# Patient Record
Sex: MALE | Race: BLACK OR AFRICAN AMERICAN | NOT HISPANIC OR LATINO | URBAN - METROPOLITAN AREA
[De-identification: names, ages, dates, MRNs, and addresses within clinical notes are randomized per-mention and may not be internally consistent; named-entity substitution may affect disease eponyms.]

---

## 2018-05-09 ENCOUNTER — INPATIENT (INPATIENT)
Facility: HOSPITAL | Age: 78
LOS: 14 days | Discharge: NURSING, REHAB OR LONG-TERM CARE | DRG: 264 | End: 2018-05-24
Attending: INTERNAL MEDICINE | Admitting: INTERNAL MEDICINE
Payer: MEDICARE

## 2018-05-09 VITALS
HEIGHT: 66 IN | OXYGEN SATURATION: 96 % | HEART RATE: 102 BPM | RESPIRATION RATE: 20 BRPM | TEMPERATURE: 98 F | SYSTOLIC BLOOD PRESSURE: 159 MMHG | DIASTOLIC BLOOD PRESSURE: 86 MMHG | WEIGHT: 138.01 LBS

## 2018-05-09 DIAGNOSIS — Z98.890 OTHER SPECIFIED POSTPROCEDURAL STATES: Chronic | ICD-10-CM

## 2018-05-09 DIAGNOSIS — Z95.1 PRESENCE OF AORTOCORONARY BYPASS GRAFT: Chronic | ICD-10-CM

## 2018-05-09 DIAGNOSIS — I10 ESSENTIAL (PRIMARY) HYPERTENSION: ICD-10-CM

## 2018-05-09 DIAGNOSIS — I25.10 ATHEROSCLEROTIC HEART DISEASE OF NATIVE CORONARY ARTERY WITHOUT ANGINA PECTORIS: ICD-10-CM

## 2018-05-09 DIAGNOSIS — E05.90 THYROTOXICOSIS, UNSPECIFIED WITHOUT THYROTOXIC CRISIS OR STORM: ICD-10-CM

## 2018-05-09 DIAGNOSIS — I50.9 HEART FAILURE, UNSPECIFIED: ICD-10-CM

## 2018-05-09 LAB
ALBUMIN SERPL ELPH-MCNC: 3.8 G/DL — SIGNIFICANT CHANGE UP (ref 3.3–5)
ALP SERPL-CCNC: 599 U/L — HIGH (ref 40–120)
ALT FLD-CCNC: 9 U/L — LOW (ref 10–45)
ANION GAP SERPL CALC-SCNC: 17 MMOL/L — SIGNIFICANT CHANGE UP (ref 5–17)
AST SERPL-CCNC: 47 U/L — HIGH (ref 10–40)
BASOPHILS NFR BLD AUTO: 0.2 % — SIGNIFICANT CHANGE UP (ref 0–2)
BILIRUB SERPL-MCNC: 1 MG/DL — SIGNIFICANT CHANGE UP (ref 0.2–1.2)
BUN SERPL-MCNC: 12 MG/DL — SIGNIFICANT CHANGE UP (ref 7–23)
CALCIUM SERPL-MCNC: 9.7 MG/DL — SIGNIFICANT CHANGE UP (ref 8.4–10.5)
CHLORIDE SERPL-SCNC: 106 MMOL/L — SIGNIFICANT CHANGE UP (ref 96–108)
CK MB CFR SERPL CALC: 3.3 NG/ML — SIGNIFICANT CHANGE UP (ref 0–6.7)
CK MB CFR SERPL CALC: 3.4 NG/ML — SIGNIFICANT CHANGE UP (ref 0–6.7)
CK SERPL-CCNC: 567 U/L — HIGH (ref 30–200)
CK SERPL-CCNC: 660 U/L — HIGH (ref 30–200)
CO2 SERPL-SCNC: 22 MMOL/L — SIGNIFICANT CHANGE UP (ref 22–31)
CREAT SERPL-MCNC: 0.75 MG/DL — SIGNIFICANT CHANGE UP (ref 0.5–1.3)
EOSINOPHIL NFR BLD AUTO: 0.1 % — SIGNIFICANT CHANGE UP (ref 0–6)
EXTRA BLUE TOP TUBE: SIGNIFICANT CHANGE UP
EXTRA SST TUBE: SIGNIFICANT CHANGE UP
GLUCOSE SERPL-MCNC: 105 MG/DL — HIGH (ref 70–99)
HCT VFR BLD CALC: 34.2 % — LOW (ref 39–50)
HGB BLD-MCNC: 11 G/DL — LOW (ref 13–17)
LYMPHOCYTES # BLD AUTO: 5.9 % — LOW (ref 13–44)
MCHC RBC-ENTMCNC: 27.9 PG — SIGNIFICANT CHANGE UP (ref 27–34)
MCHC RBC-ENTMCNC: 32.2 G/DL — SIGNIFICANT CHANGE UP (ref 32–36)
MCV RBC AUTO: 86.8 FL — SIGNIFICANT CHANGE UP (ref 80–100)
MONOCYTES NFR BLD AUTO: 8.3 % — SIGNIFICANT CHANGE UP (ref 2–14)
NEUTROPHILS NFR BLD AUTO: 85.5 % — HIGH (ref 43–77)
PLATELET # BLD AUTO: 336 K/UL — SIGNIFICANT CHANGE UP (ref 150–400)
POTASSIUM SERPL-MCNC: 4.3 MMOL/L — SIGNIFICANT CHANGE UP (ref 3.5–5.3)
POTASSIUM SERPL-SCNC: 4.3 MMOL/L — SIGNIFICANT CHANGE UP (ref 3.5–5.3)
PROT SERPL-MCNC: 7.3 G/DL — SIGNIFICANT CHANGE UP (ref 6–8.3)
RAPID RVP RESULT: DETECTED
RBC # BLD: 3.94 M/UL — LOW (ref 4.2–5.8)
RBC # FLD: 15.6 % — SIGNIFICANT CHANGE UP (ref 10.3–16.9)
SODIUM SERPL-SCNC: 145 MMOL/L — SIGNIFICANT CHANGE UP (ref 135–145)
TROPONIN T SERPL-MCNC: 0.08 NG/ML — CRITICAL HIGH (ref 0–0.01)
TROPONIN T SERPL-MCNC: 0.09 NG/ML — CRITICAL HIGH (ref 0–0.01)
WBC # BLD: 8.8 K/UL — SIGNIFICANT CHANGE UP (ref 3.8–10.5)
WBC # FLD AUTO: 8.8 K/UL — SIGNIFICANT CHANGE UP (ref 3.8–10.5)

## 2018-05-09 PROCEDURE — 71046 X-RAY EXAM CHEST 2 VIEWS: CPT | Mod: 26

## 2018-05-09 PROCEDURE — 99222 1ST HOSP IP/OBS MODERATE 55: CPT | Mod: AI

## 2018-05-09 PROCEDURE — 93010 ELECTROCARDIOGRAM REPORT: CPT

## 2018-05-09 PROCEDURE — 99285 EMERGENCY DEPT VISIT HI MDM: CPT | Mod: 25

## 2018-05-09 RX ORDER — METHIMAZOLE 10 MG/1
0 TABLET ORAL
Qty: 0 | Refills: 0 | COMMUNITY

## 2018-05-09 RX ORDER — MORPHINE SULFATE 50 MG/1
2 CAPSULE, EXTENDED RELEASE ORAL ONCE
Qty: 0 | Refills: 0 | Status: COMPLETED | OUTPATIENT
Start: 2018-05-09 | End: 2018-05-09

## 2018-05-09 RX ORDER — METOPROLOL TARTRATE 50 MG
25 TABLET ORAL DAILY
Qty: 0 | Refills: 0 | Status: DISCONTINUED | OUTPATIENT
Start: 2018-05-09 | End: 2018-05-24

## 2018-05-09 RX ORDER — NITROGLYCERIN 6.5 MG
1 CAPSULE, EXTENDED RELEASE ORAL EVERY 6 HOURS
Qty: 0 | Refills: 0 | Status: DISCONTINUED | OUTPATIENT
Start: 2018-05-09 | End: 2018-05-10

## 2018-05-09 RX ORDER — LOSARTAN POTASSIUM 100 MG/1
25 TABLET, FILM COATED ORAL DAILY
Qty: 0 | Refills: 0 | Status: DISCONTINUED | OUTPATIENT
Start: 2018-05-09 | End: 2018-05-24

## 2018-05-09 RX ORDER — METOPROLOL TARTRATE 50 MG
25 TABLET ORAL DAILY
Qty: 0 | Refills: 0 | Status: DISCONTINUED | OUTPATIENT
Start: 2018-05-09 | End: 2018-05-09

## 2018-05-09 RX ORDER — METOPROLOL TARTRATE 50 MG
0 TABLET ORAL
Qty: 0 | Refills: 0 | COMMUNITY

## 2018-05-09 RX ORDER — APIXABAN 2.5 MG/1
0 TABLET, FILM COATED ORAL
Qty: 0 | Refills: 0 | COMMUNITY

## 2018-05-09 RX ORDER — APIXABAN 2.5 MG/1
5 TABLET, FILM COATED ORAL EVERY 12 HOURS
Qty: 0 | Refills: 0 | Status: DISCONTINUED | OUTPATIENT
Start: 2018-05-09 | End: 2018-05-10

## 2018-05-09 RX ORDER — FUROSEMIDE 40 MG
40 TABLET ORAL
Qty: 0 | Refills: 0 | Status: DISCONTINUED | OUTPATIENT
Start: 2018-05-09 | End: 2018-05-11

## 2018-05-09 RX ORDER — ATORVASTATIN CALCIUM 80 MG/1
0 TABLET, FILM COATED ORAL
Qty: 0 | Refills: 0 | COMMUNITY

## 2018-05-09 RX ORDER — IPRATROPIUM/ALBUTEROL SULFATE 18-103MCG
3 AEROSOL WITH ADAPTER (GRAM) INHALATION ONCE
Qty: 0 | Refills: 0 | Status: COMPLETED | OUTPATIENT
Start: 2018-05-09 | End: 2018-05-09

## 2018-05-09 RX ORDER — FUROSEMIDE 40 MG
40 TABLET ORAL ONCE
Qty: 0 | Refills: 0 | Status: COMPLETED | OUTPATIENT
Start: 2018-05-09 | End: 2018-05-09

## 2018-05-09 RX ORDER — FUROSEMIDE 40 MG
0 TABLET ORAL
Qty: 0 | Refills: 0 | COMMUNITY

## 2018-05-09 RX ORDER — ATORVASTATIN CALCIUM 80 MG/1
40 TABLET, FILM COATED ORAL AT BEDTIME
Qty: 0 | Refills: 0 | Status: DISCONTINUED | OUTPATIENT
Start: 2018-05-09 | End: 2018-05-10

## 2018-05-09 RX ORDER — SENNA PLUS 8.6 MG/1
5 TABLET ORAL DAILY
Qty: 0 | Refills: 0 | Status: DISCONTINUED | OUTPATIENT
Start: 2018-05-09 | End: 2018-05-24

## 2018-05-09 RX ADMIN — ATORVASTATIN CALCIUM 40 MILLIGRAM(S): 80 TABLET, FILM COATED ORAL at 21:31

## 2018-05-09 RX ADMIN — Medication 40 MILLIGRAM(S): at 21:31

## 2018-05-09 RX ADMIN — Medication 40 MILLIGRAM(S): at 18:01

## 2018-05-09 RX ADMIN — Medication 3 MILLILITER(S): at 21:29

## 2018-05-09 RX ADMIN — Medication 1 INCH(S): at 21:31

## 2018-05-09 NOTE — H&P ADULT - NSHPSOCIALHISTORY_GEN_ALL_CORE
Pt denies tobacco/alcohol/elicit drug use. Pt denies elicit drug use.  former smoker; quit 30 yrs ago; smoked 1 Pack/week X 4-5 years (on and off)  drinks ETOH socially

## 2018-05-09 NOTE — H&P ADULT - PROBLEM SELECTOR PLAN 1
Pt with _____. BNP 6822.  --Trop 0.08, alk phos 500, . f/u CE @ 10pm, 2am  --CXR wet read revealed R sided pleural effusion. f/u official read.  --EKG revealed NSR with 1st degree AV block @ 94 BPM with left axis deviation and Q wave in inferior leads and V1-V5.  --Strict I&O's, daily weights. Pt with _____. BNP 6822. Currently CP free and hemodynamically stable.  --Trop 0.08, alk phos 500, . f/u CE @ 10pm, 2am  --Received 40 mg IV lasix x1 in ER.   --CXR wet read revealed R sided pleural effusion. f/u official read.  --EKG revealed NSR with 1st degree AV block @ 94 BPM with left axis deviation and Q wave in inferior leads and V1-V5.  --f/u ECHO  --Strict I&O's, daily weights. Pt with bibasilar crackles, L> R, diminished breath sounds at R lower bases, expiratory wheezes throughout all lung zones. BNP 6822. Currently CP free and hemodynamically stable.  --Trop 0.08, alk phos 500, . f/u CE @ 10pm, 2am  --Received 40 mg IV lasix x1 in ER. - c/w Lasix 40 mg IV BID  - given duoneb X 1 for expiratory wheezing.   --CXR wet read revealed R sided pleural effusion. f/u official read.  --EKG revealed NSR with 1st degree AV block @ 94 BPM with left axis deviation and Q wave in inferior leads and V1-V5.  - pt. with cough X 1 week with white sputum production . f/u RVP  --f/u ECHO  --Strict I&O's, daily weights.

## 2018-05-09 NOTE — H&P ADULT - PSH
History of coronary artery bypass graft  2017 @ [facility] History of coronary artery bypass graft  2017 @ [facility]  S/P hernia surgery

## 2018-05-09 NOTE — ED PROVIDER NOTE - MEDICAL DECISION MAKING DETAILS
pt with CHF exacerbation and CP, non ischemic EKG, trop and BNP elevated. Mild improvement with lasix 40mg. Will admit to cardiology for diuresis.

## 2018-05-09 NOTE — H&P ADULT - PROBLEM SELECTOR PLAN 3
SBP 150s  --c/w lopressor 25 mg BID  --Consider ACEi    Dispo:  --Pending clinical course SBP 150s  --c/w lopressor 25 mg BID, losartan 25 mg daily  --Continue to monitor    Dispo:  --Pending clinical course SBP 150s  --c/w lopressor 25 mg daily, losartan 25 mg daily  --Continue to monitor    DVT PPX: Eliquis  Dispo:  --Pending clinical course

## 2018-05-09 NOTE — H&P ADULT - RS GEN PE MLT RESP DETAILS PC
bibasilar crackles, L >R, expiratory wheezes throughout all lung zones/respirations non-labored/diminished breath sounds, R

## 2018-05-09 NOTE — H&P ADULT - PROBLEM SELECTOR PLAN 2
h/o CABG at [facility] in 2017.  --f/u AM lipid panel, hgbA1C  --c/w lipitor 40 mg daily, lopressor 25 mg BID. ?ACEi h/o CABG at [facility] in 2017.  --f/u AM lipid panel, hgbA1C  --c/w lipitor 40 mg daily, lopressor 25 mg BID, losartan 25 mg daily h/o CABG at [facility] in 2017.  --Obtain CABG report in AM as able.  --f/u AM lipid panel, hgbA1C  --c/w lipitor 40 mg daily, lopressor 25 mg BID, losartan 25 mg daily h/o CABG at [facility] in 2017.  --Obtain CABG report in AM as able.  --f/u AM lipid panel, hgbA1C  --c/w lipitor 40 mg daily, lopressor 25 mg daily, losartan 25 mg daily    - Pt. with hx of L DVT (dx: 6 months ago: on eliquis)- cw eliquis

## 2018-05-09 NOTE — H&P ADULT - HISTORY OF PRESENT ILLNESS
76 yo male with PMHx of HTN, CAD s/p CABG (in 2017 @ [facility]), CHF (EF unknown), ?ho AFIB (on Eliquis) who presented to [facility] ED on 5/9/18 which c/o non-radiating SSCP described as ____ and of _/10 intensity with associated SOB over past 1 week.  Denies dizziness, diaphoresis, palpitations, fatigue, LE edema, orthopnea, PND, syncope.  Upon admit, temp 98.3F,  BPM, /86, RR 20, SpO2 96% on RA.  Labs significant for alk phos 500, , trop 0.08, BNP 6822. CXR wet read revealed R sided pleural effusion. EKG revealed NSR with 1st degree AV block @ 94 BPM with left axis deviation and Q wave in inferior leads and V1-V5.  While in ER, pt received IV Lasix 40 mg x1. Patient admitted to 5U for diuresis, ECHO, r/o ACS. 78 yo male FORMER SMOKER with PMHx of HTN, HLD, pre- DM,  CAD s/p CABG (in 2016 @ [facility]), CHF (EF unknown), h/o DVT 6 months ago (on Eliquis; last dose last night) who presented to [facility] ED on 5/9/18 with CC of Cough and SOB X 5-6 days. Pt. reports that he initially had a cough with white sputum production associated with shortness of breath at rest, non-radiating L sided Chest tightness and palpitations. pt. reports that the chest tightness and SOB comes along with the cough  and is relieved when he stops coughing. He also reports feeling sore. At baseline, pt. reports that he can ambulate 4 blocks before getting SOB.  Today am, pt. reports having hot flashes , cough, SOB and chest tightness which prompted him to come to the ED. Denies dizziness, diaphoresis, fatigue, LE edema, orthopnea, PND, syncope.  Upon admit, temp 98.3F,  BPM, /86, RR 20, SpO2 96% on RA.  Labs significant for alk phos 500, , trop 0.08, BNP 6822. CXR wet read revealed R sided pleural effusion. EKG revealed NSR with 1st degree AV block @ 94 BPM with left axis deviation and Q wave in inferior leads and V1-V5.  While in ER, pt received IV Lasix 40 mg x1. Patient admitted to 5U for diuresis, ECHO, r/o ACS.

## 2018-05-09 NOTE — H&P ADULT - ATTENDING COMMENTS
Assessment: Patient personally seen and examined myself during rounds with the Physician Assistant/House Staff/Nurse Practitioner   ON DATE 5/9/18  Physician Assistant/House Staff/Nurse Practitioner note read, including vitals, physical findings, laboratory data, and radiological reports.   Revisions included below.   Direct personal management at bed side and extensive interpretation of the data.    Plan was outlined and discussed in details with the Physician Assistant/House Staff/Nurse Practitioner.    Decision making of high complexity   Risk high of complications, morbidity, and/or mortality  Assessment and Action taken for acute disease activity to reflect the level of care provided:  -Hemodynamic evaluation and support  -Medication reconciliation  -Review laboratory data  -EKG reviewed   -Echo reviewed   -ACS assessment and treatment as applicable  -Heart failure assessment and treatment as applicable  -Cardiac Telemetry reviewed  -Interdisciplinary discussion with IC / EP / HF / CTS teams as needed  TIME SPENT in evaluation and management, reassessments, review and interpretation of labs and x-rays, and hemodynamic management, formulating a plan and coordinating care: ___50____ MIN.  Time does not include procedural time.

## 2018-05-09 NOTE — PROVIDER CONTACT NOTE (OTHER) - SITUATION
Pt. c/o chest pain 8/10 to left side. Pt. c/o chest pain 8/10 to left side of chest. Pt. c/o 8/10 left sided chest pain and SOB.

## 2018-05-09 NOTE — ED ADULT NURSE NOTE - OBJECTIVE STATEMENT
pt to ER w/ report of sob and cough for two days.  Pt reports some chest tightness on L side.  hx recent cardiac sx.   O2 sat >95% on room air.  12 lead ekg done and shown to ER attending physician.  Pt maintained on CM. Will continue to monitor.

## 2018-05-09 NOTE — PROVIDER CONTACT NOTE (OTHER) - ACTION/TREATMENT ORDERED:
DWAYNE Steven at bedside to assess patient. Lasix 40mg IVP ordered and administered. EKG performed by PA. Nebulizer treatment administered. DWAYNE Steven at bedside to assess patient. Lasix 40mg IVP ordered and administered. Nitroglycerin paste administered. EKG performed by PA. Nebulizer treatment administered. Condom catheter applied.

## 2018-05-09 NOTE — PROVIDER CONTACT NOTE (OTHER) - ASSESSMENT
Pt. alert and oriented, c/o 8/10 chest pain. Lung sounds, crackles and expiratory wheezing to B/L lungs. B/L LE ankles +1 edema. Pt. alert and oriented, c/o 8/10 left sided chest pain and SOB. Lung sounds, crackles and expiratory wheezing to B/L lungs. B/L LE ankles +1 edema.

## 2018-05-09 NOTE — H&P ADULT - ASSESSMENT
78 yo male with PMHx of HTN, CAD s/p CABG (in 2017 @ [facility]), CHF (EF unknown), ?ho AFIB (on Eliquis) who presented to [facility] ED on 5/9/18 which c/o non-radiating SSCP described as ____ and of _/10 intensity with associated SOB over past 1 week.  Denies dizziness, diaphoresis, palpitations, fatigue, LE edema, orthopnea, PND, syncope.  Upon admit, temp 98.3F,  BPM, /86, RR 20, SpO2 96% on RA.  Labs significant for alk phos 500, , trop 0.08, BNP 6822. CXR wet read revealed R sided pleural effusion. EKG revealed NSR with 1st degree AV block @ 94 BPM with left axis deviation and Q wave in inferior leads and V1-V5.  While in ER, pt received IV Lasix 40 mg x1. Patient admitted to 5U for diuresis, ECHO, r/o ACS. 76 yo male with PMHx of HTN, CAD s/p CABG (in 2017 @ [facility]), CHF (EF unknown), ?ho AFIB (on Eliquis) who presented to [facility] ED on 5/9/18 which c/o non-radiating SSCP described as ____ and of _/10 intensity with associated SOB over past 1 week.  Patient admitted to 5U for r/o ACS,  diuresis, ECHO. 76 yo male FORMER SMOKER with PMHx of HTN, HLD, pre- DM, CAD s/p CABG (in 2016 @ [facility]), CHF (EF unknown), h/o DVT 6 months ago (on Eliquis; last dose last night)  admitted to 5U for diuresis, ECHO, r/o ACS.

## 2018-05-09 NOTE — ED PROVIDER NOTE - OBJECTIVE STATEMENT
78 yo male with CABG 1 yr ago at [facility], CHF (EF unknown), on eliquis (doesn't know if he has h/o a-fib) presenting with chest discomfort as well as worsening SOB for one week. Denies lower ext edema. Compliant with lasix 40mg BID.

## 2018-05-09 NOTE — H&P ADULT - NSHPLABSRESULTS_GEN_ALL_CORE
11.0   8.8   )-----------( 336      ( 09 May 2018 16:52 )             34.2       05-09    145  |  106  |  12  ----------------------------<  105<H>  4.3   |  22  |  0.75    Ca    9.7      09 May 2018 16:52    TPro  7.3  /  Alb  3.8  /  TBili  1.0  /  DBili  x   /  AST  47<H>  /  ALT  9<L>  /  AlkPhos  599<H>  05-09          CARDIAC MARKERS ( 09 May 2018 16:52 )  x     / 0.08 ng/mL / 660 U/L / x     / 3.3 ng/mL            EKG: NSR with 1st degree AV block @ 94 BPM with left axis deviation and Q wave in inferior leads and V1-V5.

## 2018-05-10 DIAGNOSIS — I82.409 ACUTE EMBOLISM AND THROMBOSIS OF UNSPECIFIED DEEP VEINS OF UNSPECIFIED LOWER EXTREMITY: ICD-10-CM

## 2018-05-10 LAB
ANION GAP SERPL CALC-SCNC: 16 MMOL/L — SIGNIFICANT CHANGE UP (ref 5–17)
APPEARANCE UR: CLEAR — SIGNIFICANT CHANGE UP
APTT BLD: 36.1 SEC — SIGNIFICANT CHANGE UP (ref 27.5–37.4)
APTT BLD: 46.2 SEC — HIGH (ref 27.5–37.4)
APTT BLD: 54.5 SEC — HIGH (ref 27.5–37.4)
BILIRUB UR-MCNC: NEGATIVE — SIGNIFICANT CHANGE UP
BUN SERPL-MCNC: 13 MG/DL — SIGNIFICANT CHANGE UP (ref 7–23)
CALCIUM SERPL-MCNC: 9.2 MG/DL — SIGNIFICANT CHANGE UP (ref 8.4–10.5)
CHLORIDE SERPL-SCNC: 103 MMOL/L — SIGNIFICANT CHANGE UP (ref 96–108)
CHOLEST SERPL-MCNC: 172 MG/DL — SIGNIFICANT CHANGE UP (ref 10–199)
CK MB CFR SERPL CALC: 2.5 NG/ML — SIGNIFICANT CHANGE UP (ref 0–6.7)
CK MB CFR SERPL CALC: 3.3 NG/ML — SIGNIFICANT CHANGE UP (ref 0–6.7)
CK MB CFR SERPL CALC: 3.5 NG/ML — SIGNIFICANT CHANGE UP (ref 0–6.7)
CK SERPL-CCNC: 346 U/L — HIGH (ref 30–200)
CK SERPL-CCNC: 347 U/L — HIGH (ref 30–200)
CK SERPL-CCNC: 408 U/L — HIGH (ref 30–200)
CK SERPL-CCNC: 473 U/L — HIGH (ref 30–200)
CO2 SERPL-SCNC: 25 MMOL/L — SIGNIFICANT CHANGE UP (ref 22–31)
COLOR SPEC: YELLOW — SIGNIFICANT CHANGE UP
CREAT SERPL-MCNC: 0.84 MG/DL — SIGNIFICANT CHANGE UP (ref 0.5–1.3)
DIFF PNL FLD: (no result)
GLUCOSE SERPL-MCNC: 110 MG/DL — HIGH (ref 70–99)
GLUCOSE UR QL: NEGATIVE — SIGNIFICANT CHANGE UP
HBA1C BLD-MCNC: 5.6 % — SIGNIFICANT CHANGE UP (ref 4–5.6)
HCT VFR BLD CALC: 33.2 % — LOW (ref 39–50)
HCT VFR BLD CALC: 36.3 % — LOW (ref 39–50)
HDLC SERPL-MCNC: 63 MG/DL — SIGNIFICANT CHANGE UP (ref 40–125)
HGB BLD-MCNC: 10.8 G/DL — LOW (ref 13–17)
HGB BLD-MCNC: 12 G/DL — LOW (ref 13–17)
INR BLD: 1.96 — HIGH (ref 0.88–1.16)
KETONES UR-MCNC: NEGATIVE — SIGNIFICANT CHANGE UP
LEUKOCYTE ESTERASE UR-ACNC: NEGATIVE — SIGNIFICANT CHANGE UP
LIPID PNL WITH DIRECT LDL SERPL: 93 MG/DL — SIGNIFICANT CHANGE UP
MAGNESIUM SERPL-MCNC: 1.8 MG/DL — SIGNIFICANT CHANGE UP (ref 1.6–2.6)
MCHC RBC-ENTMCNC: 27.8 PG — SIGNIFICANT CHANGE UP (ref 27–34)
MCHC RBC-ENTMCNC: 27.8 PG — SIGNIFICANT CHANGE UP (ref 27–34)
MCHC RBC-ENTMCNC: 32.5 G/DL — SIGNIFICANT CHANGE UP (ref 32–36)
MCHC RBC-ENTMCNC: 33.1 G/DL — SIGNIFICANT CHANGE UP (ref 32–36)
MCV RBC AUTO: 84 FL — SIGNIFICANT CHANGE UP (ref 80–100)
MCV RBC AUTO: 85.6 FL — SIGNIFICANT CHANGE UP (ref 80–100)
NITRITE UR-MCNC: NEGATIVE — SIGNIFICANT CHANGE UP
PH UR: 6 — SIGNIFICANT CHANGE UP (ref 5–8)
PLATELET # BLD AUTO: 306 K/UL — SIGNIFICANT CHANGE UP (ref 150–400)
PLATELET # BLD AUTO: 307 K/UL — SIGNIFICANT CHANGE UP (ref 150–400)
POTASSIUM SERPL-MCNC: 3.7 MMOL/L — SIGNIFICANT CHANGE UP (ref 3.5–5.3)
POTASSIUM SERPL-SCNC: 3.7 MMOL/L — SIGNIFICANT CHANGE UP (ref 3.5–5.3)
PROT UR-MCNC: NEGATIVE MG/DL — SIGNIFICANT CHANGE UP
PROTHROM AB SERPL-ACNC: 22 SEC — HIGH (ref 9.8–12.7)
RBC # BLD: 3.88 M/UL — LOW (ref 4.2–5.8)
RBC # BLD: 4.32 M/UL — SIGNIFICANT CHANGE UP (ref 4.2–5.8)
RBC # FLD: 15.3 % — SIGNIFICANT CHANGE UP (ref 10.3–16.9)
RBC # FLD: 15.6 % — SIGNIFICANT CHANGE UP (ref 10.3–16.9)
SODIUM SERPL-SCNC: 144 MMOL/L — SIGNIFICANT CHANGE UP (ref 135–145)
SP GR SPEC: 1.01 — SIGNIFICANT CHANGE UP (ref 1–1.03)
T3FREE SERPL-MCNC: 1.86 PG/ML — SIGNIFICANT CHANGE UP (ref 1.71–3.71)
T4 FREE SERPL-MCNC: 1.05 NG/DL — SIGNIFICANT CHANGE UP (ref 0.7–1.48)
TOTAL CHOLESTEROL/HDL RATIO MEASUREMENT: 2.7 RATIO — LOW (ref 3.4–9.6)
TRIGL SERPL-MCNC: 80 MG/DL — SIGNIFICANT CHANGE UP (ref 10–149)
TROPONIN T SERPL-MCNC: 0.12 NG/ML — CRITICAL HIGH (ref 0–0.01)
TROPONIN T SERPL-MCNC: 0.15 NG/ML — CRITICAL HIGH (ref 0–0.01)
TROPONIN T SERPL-MCNC: 0.16 NG/ML — CRITICAL HIGH (ref 0–0.01)
TROPONIN T SERPL-MCNC: 0.2 NG/ML — CRITICAL HIGH (ref 0–0.01)
TSH SERPL-MCNC: 1.77 UIU/ML — SIGNIFICANT CHANGE UP (ref 0.35–4.94)
UROBILINOGEN FLD QL: 0.2 E.U./DL — SIGNIFICANT CHANGE UP
WBC # BLD: 10 K/UL — SIGNIFICANT CHANGE UP (ref 3.8–10.5)
WBC # BLD: 9.5 K/UL — SIGNIFICANT CHANGE UP (ref 3.8–10.5)
WBC # FLD AUTO: 10 K/UL — SIGNIFICANT CHANGE UP (ref 3.8–10.5)
WBC # FLD AUTO: 9.5 K/UL — SIGNIFICANT CHANGE UP (ref 3.8–10.5)

## 2018-05-10 PROCEDURE — 93306 TTE W/DOPPLER COMPLETE: CPT | Mod: 26

## 2018-05-10 PROCEDURE — 99232 SBSQ HOSP IP/OBS MODERATE 35: CPT

## 2018-05-10 PROCEDURE — 93010 ELECTROCARDIOGRAM REPORT: CPT

## 2018-05-10 RX ORDER — HEPARIN SODIUM 5000 [USP'U]/ML
3800 INJECTION INTRAVENOUS; SUBCUTANEOUS EVERY 6 HOURS
Qty: 0 | Refills: 0 | Status: DISCONTINUED | OUTPATIENT
Start: 2018-05-10 | End: 2018-05-11

## 2018-05-10 RX ORDER — MORPHINE SULFATE 50 MG/1
1 CAPSULE, EXTENDED RELEASE ORAL ONCE
Qty: 0 | Refills: 0 | Status: DISCONTINUED | OUTPATIENT
Start: 2018-05-10 | End: 2018-05-10

## 2018-05-10 RX ORDER — TICAGRELOR 90 MG/1
180 TABLET ORAL ONCE
Qty: 0 | Refills: 0 | Status: COMPLETED | OUTPATIENT
Start: 2018-05-10 | End: 2018-05-10

## 2018-05-10 RX ORDER — TICAGRELOR 90 MG/1
90 TABLET ORAL
Qty: 0 | Refills: 0 | Status: DISCONTINUED | OUTPATIENT
Start: 2018-05-11 | End: 2018-05-11

## 2018-05-10 RX ORDER — ASPIRIN/CALCIUM CARB/MAGNESIUM 324 MG
81 TABLET ORAL DAILY
Qty: 0 | Refills: 0 | Status: DISCONTINUED | OUTPATIENT
Start: 2018-05-11 | End: 2018-05-15

## 2018-05-10 RX ORDER — ATORVASTATIN CALCIUM 80 MG/1
80 TABLET, FILM COATED ORAL AT BEDTIME
Qty: 0 | Refills: 0 | Status: DISCONTINUED | OUTPATIENT
Start: 2018-05-10 | End: 2018-05-24

## 2018-05-10 RX ORDER — ASPIRIN/CALCIUM CARB/MAGNESIUM 324 MG
325 TABLET ORAL ONCE
Qty: 0 | Refills: 0 | Status: COMPLETED | OUTPATIENT
Start: 2018-05-10 | End: 2018-05-10

## 2018-05-10 RX ORDER — HEPARIN SODIUM 5000 [USP'U]/ML
INJECTION INTRAVENOUS; SUBCUTANEOUS
Qty: 25000 | Refills: 0 | Status: DISCONTINUED | OUTPATIENT
Start: 2018-05-10 | End: 2018-05-11

## 2018-05-10 RX ORDER — MAGNESIUM OXIDE 400 MG ORAL TABLET 241.3 MG
400 TABLET ORAL ONCE
Qty: 0 | Refills: 0 | Status: COMPLETED | OUTPATIENT
Start: 2018-05-10 | End: 2018-05-10

## 2018-05-10 RX ORDER — POTASSIUM CHLORIDE 20 MEQ
20 PACKET (EA) ORAL ONCE
Qty: 0 | Refills: 0 | Status: COMPLETED | OUTPATIENT
Start: 2018-05-10 | End: 2018-05-10

## 2018-05-10 RX ADMIN — Medication 325 MILLIGRAM(S): at 09:13

## 2018-05-10 RX ADMIN — HEPARIN SODIUM 850 UNIT(S)/HR: 5000 INJECTION INTRAVENOUS; SUBCUTANEOUS at 19:54

## 2018-05-10 RX ADMIN — MORPHINE SULFATE 1 MILLIGRAM(S): 50 CAPSULE, EXTENDED RELEASE ORAL at 00:42

## 2018-05-10 RX ADMIN — Medication 40 MILLIGRAM(S): at 18:23

## 2018-05-10 RX ADMIN — TICAGRELOR 180 MILLIGRAM(S): 90 TABLET ORAL at 11:47

## 2018-05-10 RX ADMIN — HEPARIN SODIUM 850 UNIT(S)/HR: 5000 INJECTION INTRAVENOUS; SUBCUTANEOUS at 13:19

## 2018-05-10 RX ADMIN — ATORVASTATIN CALCIUM 80 MILLIGRAM(S): 80 TABLET, FILM COATED ORAL at 20:45

## 2018-05-10 RX ADMIN — Medication 40 MILLIGRAM(S): at 06:44

## 2018-05-10 RX ADMIN — Medication 1 INCH(S): at 06:44

## 2018-05-10 RX ADMIN — Medication 25 MILLIGRAM(S): at 05:55

## 2018-05-10 RX ADMIN — LOSARTAN POTASSIUM 25 MILLIGRAM(S): 100 TABLET, FILM COATED ORAL at 06:44

## 2018-05-10 RX ADMIN — HEPARIN SODIUM 750 UNIT(S)/HR: 5000 INJECTION INTRAVENOUS; SUBCUTANEOUS at 05:47

## 2018-05-10 RX ADMIN — MORPHINE SULFATE 1 MILLIGRAM(S): 50 CAPSULE, EXTENDED RELEASE ORAL at 02:03

## 2018-05-10 RX ADMIN — Medication 1 INCH(S): at 18:30

## 2018-05-10 RX ADMIN — Medication 20 MILLIEQUIVALENT(S): at 09:13

## 2018-05-10 RX ADMIN — MAGNESIUM OXIDE 400 MG ORAL TABLET 400 MILLIGRAM(S): 241.3 TABLET ORAL at 09:13

## 2018-05-10 RX ADMIN — SENNA PLUS 5 MILLILITER(S): 8.6 TABLET ORAL at 11:47

## 2018-05-10 NOTE — PROGRESS NOTE ADULT - PROBLEM SELECTOR PLAN 2
CP overnight.  EKG unchanged.  now CP free.  - Rule in NSTEMI, CE 0.08 -> 0.15, f/u CE 7pm.   - Continue heparin gtt (ACS)  - NPO after midnight for R/LHC tomorrow w/ Dr. Sam.  - Loaded with ASA 325mg PO x1 and Brilinta 180mg PO x1 today.  Continue ASA/Brilinta/STatin.

## 2018-05-10 NOTE — PROVIDER CONTACT NOTE (OTHER) - ACTION/TREATMENT ORDERED:
DWAYNE Steven at bedside to assess pt. Pt. bladder scanned, showing >649ml. Gould inserted by DWAYNE Steven. Pt. denies chest pain, SOB, and palpitations post gould insertion. VSS. Will continue to monitor.

## 2018-05-10 NOTE — PROGRESS NOTE ADULT - PROBLEM SELECTOR PLAN 1
SOB and wheezing much improved, b/l crackles on exam.  breathing well and laying flat on oxygen.    - CXR 5/10: Pulmonary vascular congestion. Interstitial pulmonary edema.  - EKG revealed NSR with 1st degree AV block @ 94 BPM with left axis deviation and Q wave in inferior leads and V1-V5.  - Echo 5/10: severe concentric left ventricular hypertrophy.There is severe   global hypokinesis of the left ventricle, EF 30%.The left atrium is severely dilated, mitral inflow pattern is consistent with restrictive left ventricular filling, suggestive of severely elevated left atrial pressure, right atrium is moderately dilated, Tethered mitral valve leaflets.  - Ordered for Cardiac MRI tonight (Echo findings suggestive of amyloidosis).   --Strict I&O's, daily weights.

## 2018-05-10 NOTE — CONSULT NOTE ADULT - SUBJECTIVE AND OBJECTIVE BOX
VIDA MCCALL      Patient is a 77y old  Male who presents with a chief complaint of CP (09 May 2018 19:41)      HPI:  78 yo male FORMER SMOKER with PMHx of HTN, HLD, pre- DM,  CAD s/p CABG (in 2016 @ [facility]), CHF (EF unknown), h/o DVT 6 months ago (on Eliquis; last dose last night) who presented to [facility] ED on 18 with CC of Cough and SOB X 5-6 days. Pt. reports that he initially had a cough with white sputum production associated with shortness of breath at rest, non-radiating L sided Chest tightness and palpitations. pt. reports that the chest tightness and SOB comes along with the cough  and is relieved when he stops coughing. He also reports feeling sore. At baseline, pt. reports that he can ambulate 4 blocks before getting SOB.  Today am, pt. reports having hot flashes , cough, SOB and chest tightness which prompted him to come to the ED. Denies dizziness, diaphoresis, fatigue, LE edema, orthopnea, PND, syncope.  Upon admit, temp 98.3F,  BPM, /86, RR 20, SpO2 96% on RA.  Labs significant for alk phos 500, , trop 0.08, BNP 6822. CXR wet read revealed R sided pleural effusion. EKG revealed NSR with 1st degree AV block @ 94 BPM with left axis deviation and Q wave in inferior leads and V1-V5.  While in ER, pt received IV Lasix 40 mg x1. Patient admitted to 5U for diuresis, ECHO, r/o ACS. (09 May 2018 19:41)      Addl  Medical issues:       HEALTH ISSUES - PROBLEM Dx:  DVT (deep venous thrombosis): DVT (deep venous thrombosis)  Hyperthyroidism: Hyperthyroidism  HTN (hypertension): HTN (hypertension)  CAD (coronary artery disease): CAD (coronary artery disease)  CHF exacerbation: CHF exacerbation            MEDICATIONS  (STANDING):  atorvastatin 80 milliGRAM(s) Oral at bedtime  furosemide   Injectable 40 milliGRAM(s) IV Push two times a day  heparin  Infusion.  Unit(s)/Hr (7.5 mL/Hr) IV Continuous <Continuous>  losartan 25 milliGRAM(s) Oral daily  methimazole 5 milliGRAM(s) Oral three times a day  metoprolol succinate ER 25 milliGRAM(s) Oral daily  senna Syrup 5 milliLiter(s) Oral daily    MEDICATIONS  (PRN):  heparin  Injectable 3800 Unit(s) IV Push every 6 hours PRN For aPTT less than 40          PAST MEDICAL & SURGICAL HISTORY:  CHF (congestive heart failure)  CAD (coronary artery disease)  HTN (hypertension)  S/P hernia surgery  History of coronary artery bypass graft: 2017 @ [facility]      REVIEW OF SYSTEMS:  [x] As per HPI          Reviewed   no change                            Changes noted  CONSTITUTIONAL: No fever, weight loss, or fatigue  RESPIRATORY: No cough, wheezing, chills or hemoptysis; No Shortness of Breath  CARDIOVASCULAR: No chest pain, palpitations, dizziness, or leg swelling  GASTROINTESTINAL: No abdominal or epigastric pain. No nausea, vomiting, or hematemesis; No diarrhea or constipation. No melena or hematochezia.  MUSCULOSKELETAL: No joint pain or swelling; No muscle, back, or extremity pain  Neuro:   Grossly  Negative  Psych        Awake  alert  [x] All others negative	  [ ] Unable to obtain      Vital Signs Last 24 Hrs  T(C): 37 (10 May 2018 22:35), Max: 37 (10 May 2018 22:35)  T(F): 98.6 (10 May 2018 22:35), Max: 98.6 (10 May 2018 22:35)  HR: 87 (10 May 2018 20:42) (79 - 108)  BP: 116/71 (10 May 2018 20:42) (116/71 - 164/85)  BP(mean): --  RR: 16 (10 May 2018 20:42) (16 - 20)  SpO2: 98% (10 May 2018 20:42) (95% - 100%)    PHYSICAL EXAM:      Constitutional:    Eyes:    ENMT:    Neck:    Breasts:    Back:    Respiratory:    Cardiovascular:    Gastrointestinal:    Genitourinary:    Rectal:    Extremities:    Vascular:    Neurological:    Skin:    Lymph Nodes:    Musculoskeletal:    Psychiatric:                              12.0   9.5   )-----------( 307      ( 10 May 2018 12:02 )             36.3     05-10    144  |  103  |  13  ----------------------------<  110<H>  3.7   |  25  |  0.84    Ca    9.2      10 May 2018 05:20  Mg     1.8     05-10    TPro  7.3  /  Alb  3.8  /  TBili  1.0  /  DBili  x   /  AST  47<H>  /  ALT  9<L>  /  AlkPhos  599<H>  05-09    CARDIAC MARKERS ( 10 May 2018 19:07 )  x     / 0.20 ng/mL / 346 U/L / x     / 2.5 ng/mL  CARDIAC MARKERS ( 10 May 2018 12:23 )  x     / 0.16 ng/mL / 347 U/L / x     / 3.3 ng/mL  CARDIAC MARKERS ( 10 May 2018 05:20 )  x     / 0.15 ng/mL / 408 U/L / x     / x      CARDIAC MARKERS ( 10 May 2018 02:34 )  x     / 0.12 ng/mL / 473 U/L / x     / 3.5 ng/mL  CARDIAC MARKERS ( 09 May 2018 22:55 )  x     / 0.09 ng/mL / 567 U/L / x     / 3.4 ng/mL  CARDIAC MARKERS ( 09 May 2018 16:52 )  x     / 0.08 ng/mL / 660 U/L / x     / 3.3 ng/mL      PT/INR - ( 10 May 2018 05:03 )   PT: 22.0 sec;   INR: 1.96          PTT - ( 10 May 2018 19:07 )  PTT:54.5 sec  CAPILLARY BLOOD GLUCOSE        Urinalysis Basic - ( 10 May 2018 01:55 )    Color: Yellow / Appearance: Clear / S.010 / pH: x  Gluc: x / Ketone: NEGATIVE  / Bili: Negative / Urobili: 0.2 E.U./dL   Blood: x / Protein: NEGATIVE mg/dL / Nitrite: NEGATIVE   Leuk Esterase: NEGATIVE / RBC: < 5 /HPF / WBC < 5 /HPF   Sq Epi: x / Non Sq Epi: 0-5 /HPF / Bacteria: Present /HPF      I&O's Summary    09 May 2018 07:01  -  10 May 2018 07:00  --------------------------------------------------------  IN: 15 mL / OUT: 2250 mL / NET: -2235 mL    10 May 2018 07:01  -  10 May 2018 23:05  --------------------------------------------------------  IN: 316 mL / OUT: 0 mL / NET: -1734 mL            ASSESSMENT/PLAN/RECOMMENDATIONS

## 2018-05-10 NOTE — CHART NOTE - NSCHARTNOTEFT_GEN_A_CORE
Pt seen since approx. 21:00 when notified by RN pt c/o 8/10 left sided chest pain and SOB.  Pt found standing at side of bed, several urine puddles on the floor.  Pt reports CP and SOB as above, similar to presenting symptoms. Denies HA, lightheadedness.    Vital Signs Last 24 Hrs  T(C): 36.3 (10 May 2018 00:06), Max: 36.8 (09 May 2018 15:43)  T(F): 97.3 (10 May 2018 00:06), Max: 98.3 (09 May 2018 15:43)  HR: 96 (10 May 2018 01:28) (86 - 108)  BP: 134/70 (10 May 2018 01:28) (134/70 - 164/85)  BP(mean): 103 (09 May 2018 19:41) (103 - 103)  RR: 18 (10 May 2018 01:28) (16 - 20)  SpO2: 98% (10 May 2018 01:28) (96% - 100%)  gen: pt standing at bedside, in moderate respiratory distress  CV: regular, tachy  lungs: diffuse crackles and rhonchi with diffuse, coarse anterior and posterior wheeze  +acc muscle use  Abd: soft, ntnd  ext: 1+ non pitting bilat le edema      LIVER FUNCTIONS - ( 09 May 2018 16:52 )  Alb: 3.8 g/dL / Pro: 7.3 g/dL / ALK PHOS: 599 U/L / ALT: 9 U/L / AST: 47 U/L / GGT: x           CBC Full  -  ( 09 May 2018 16:52 )  WBC Count : 8.8 K/uL  Hemoglobin : 11.0 g/dL  Hematocrit : 34.2 %  Platelet Count - Automated : 336 K/uL  Mean Cell Volume : 86.8 fL  Mean Cell Hemoglobin : 27.9 pg  Mean Cell Hemoglobin Concentration : 32.2 g/dL  Auto Neutrophil % : 85.5 %  Auto Lymphocyte % : 5.9 %  Auto Monocyte % : 8.3 %  Auto Eosinophil % : 0.1 %  Auto Basophil % : 0.2 %    CARDIAC MARKERS ( 09 May 2018 22:55 )  x     / 0.09 ng/mL / 567 U/L / x     / 3.4 ng/mL  CARDIAC MARKERS ( 09 May 2018 16:52 )  x     / 0.08 ng/mL / 660 U/L / x     / 3.3 ng/mL  CE #3 pending in lab now    09 May 2018 16:52    145    |  106    |  12     ----------------------------<  105    4.3     |  22     |  0.75     Ca    9.7        09 May 2018 16:52    TPro  7.3    /  Alb  3.8    /  TBili  1.0    /  DBili  x      /  AST  47     /  ALT  9      /  AlkPhos  599    09 May 2018 16:52      This is a 78yo M h/o CAD s/p 4V CABG, CHF and DVT on Eliquis Bid who is admitted with acute CHF exacerbation.  Pt initially seen with CP and respiratory distress.  Treatment included:  - oxygen 3lpm via NC  - NTP 1" acw q6hr first dose stat  - lasix 40mg IV x 1 stat  - condom catheter placement for I/Os  - duoneb as ordered    After above interventions were completed, pt reported resolution of chest pain and significant improvement in SOB.  On routine follow up, UO was noted at 1050cc.  Pt then reported difficulty voiding, having to "strain."  Bladder scan then revealed >650cc.  De Leon catheter was placed by me with clear urine output, ___ cc total.  At this time pt is resting comfortably without complaint.  Will continue tele monitoring, diuresis, echo planned for am.  Elevated troponin x 2 with #3 pending in lab now.  Can consider heparin gtt if continuing to rise or if recurrent c/o CP.   Eliquis HELD for possible intervention - will re-evaluate a/c in am.  echo ordered for am. Pt seen since approx. 21:00 when notified by RN pt c/o 8/10 left sided chest pain and SOB.  Pt found standing at side of bed, several urine puddles on the floor.  Pt reports CP and SOB as above, similar to presenting symptoms. Denies HA, lightheadedness.    Vital Signs Last 24 Hrs  T(C): 36.3 (10 May 2018 00:06), Max: 36.8 (09 May 2018 15:43)  T(F): 97.3 (10 May 2018 00:06), Max: 98.3 (09 May 2018 15:43)  HR: 96 (10 May 2018 01:28) (86 - 108)  BP: 134/70 (10 May 2018 01:28) (134/70 - 164/85)  BP(mean): 103 (09 May 2018 19:41) (103 - 103)  RR: 18 (10 May 2018 01:28) (16 - 20)  SpO2: 98% (10 May 2018 01:28) (96% - 100%)  gen: pt standing at bedside, in moderate respiratory distress  CV: regular, tachy  lungs: diffuse crackles and rhonchi with diffuse, coarse anterior and posterior wheeze  +acc muscle use  Abd: soft, ntnd  ext: 1+ non pitting bilat le edema    EKG SR with 1st degree avb, no changes from prior    LIVER FUNCTIONS - ( 09 May 2018 16:52 )  Alb: 3.8 g/dL / Pro: 7.3 g/dL / ALK PHOS: 599 U/L / ALT: 9 U/L / AST: 47 U/L / GGT: x           CBC Full  -  ( 09 May 2018 16:52 )  WBC Count : 8.8 K/uL  Hemoglobin : 11.0 g/dL  Hematocrit : 34.2 %  Platelet Count - Automated : 336 K/uL  Mean Cell Volume : 86.8 fL  Mean Cell Hemoglobin : 27.9 pg  Mean Cell Hemoglobin Concentration : 32.2 g/dL  Auto Neutrophil % : 85.5 %  Auto Lymphocyte % : 5.9 %  Auto Monocyte % : 8.3 %  Auto Eosinophil % : 0.1 %  Auto Basophil % : 0.2 %    CARDIAC MARKERS ( 09 May 2018 22:55 )  x     / 0.09 ng/mL / 567 U/L / x     / 3.4 ng/mL  CARDIAC MARKERS ( 09 May 2018 16:52 )  x     / 0.08 ng/mL / 660 U/L / x     / 3.3 ng/mL  CE #3 pending in lab now    09 May 2018 16:52    145    |  106    |  12     ----------------------------<  105    4.3     |  22     |  0.75     Ca    9.7        09 May 2018 16:52    TPro  7.3    /  Alb  3.8    /  TBili  1.0    /  DBili  x      /  AST  47     /  ALT  9      /  AlkPhos  599    09 May 2018 16:52      This is a 78yo M h/o CAD s/p 4V CABG, CHF and DVT on Eliquis Bid who is admitted with acute CHF exacerbation.  Pt initially seen with CP and respiratory distress.  Treatment included:  - oxygen 3lpm via NC  - NTP 1" acw q6hr first dose stat  - lasix 40mg IV x 1 stat  - condom catheter placement for I/Os  - duoneb as ordered    After above interventions were completed, pt reported resolution of chest pain and significant improvement in SOB.  On routine follow up, UO was noted at 1050cc.  Pt then reported difficulty voiding, having to "strain."  Bladder scan then revealed >650cc.  Morphine 1mg IV x 1 given for pain a/w retention.  De Leon catheter was placed by me with clear urine output, 1000cc additional.  At this time pt is resting comfortably without complaint.  Will continue tele monitoring, diuresis, echo planned for am.  Elevated troponin x 2 with #3 pending in lab now.  Can consider heparin gtt if continuing to rise or if recurrent c/o CP.   Eliquis HELD for possible intervention - will re-evaluate a/c in am.  echo ordered for am.

## 2018-05-10 NOTE — PROGRESS NOTE ADULT - ASSESSMENT
76 yo male FORMER SMOKER with PMHx of HTN, HLD, pre- DM, CAD s/p CABG (in 2016 @ [facility]), CHF (EF unknown), h/o DVT 6 months ago (on Eliquis; last dose last night)  admitted to 5U for diuresis, ECHO, r/o ACS.

## 2018-05-10 NOTE — PROGRESS NOTE ADULT - PROBLEM SELECTOR PLAN 5
- Pt. with hx of L DVT (dx: 6 months ago: on eliquis).  Last dose Eliquis 5/8pm  - Continue heparin gtt for ACS.

## 2018-05-10 NOTE — PROGRESS NOTE ADULT - SUBJECTIVE AND OBJECTIVE BOX
Interventional Cardiology PA Adult Progress Note    Subjective Assessment: Patient seen and examined at bedside, improved chest pain and SOB today  	  MEDICATIONS:  furosemide   Injectable 40 milliGRAM(s) IV Push two times a day  losartan 25 milliGRAM(s) Oral daily  metoprolol succinate ER 25 milliGRAM(s) Oral daily  senna Syrup 5 milliLiter(s) Oral daily  atorvastatin 40 milliGRAM(s) Oral at bedtime  methimazole 5 milliGRAM(s) Oral three times a day  heparin  Infusion.  Unit(s)/Hr IV Continuous <Continuous>  heparin  Injectable 3800 Unit(s) IV Push every 6 hours PRN    [PHYSICAL EXAM:  TELEMETRY:  T(C): 36.5 (05-10-18 @ 18:00), Max: 36.6 (05-09-18 @ 22:29)  HR: 85 (05-10-18 @ 16:41) (79 - 108)  BP: 137/79 (05-10-18 @ 16:41) (121/71 - 164/85)  RR: 16 (05-10-18 @ 16:41) (16 - 20)  SpO2: 97% (05-10-18 @ 16:41) (95% - 100%)    I&O's Summary    09 May 2018 07:01  -  10 May 2018 07:00  --------------------------------------------------------  IN: 15 mL / OUT: 2250 mL / NET: -2235 mL    10 May 2018 07:01  -  10 May 2018 18:54  --------------------------------------------------------  IN: 316 mL / OUT: 1300 mL / NET: -984 mL      Height (cm): 167.64 (05-09 @ 19:30)  Weight (kg): 62.6 (05-09 @ 19:30)  BMI (kg/m2): 22.3 (05-09 @ 19:30)  BSA (m2): 1.71 (05-09 @ 19:30)    [place]: No  Central/PICC/Mid Line: No                                         Appearance: Normal	  HEENT:   Normal oral mucosa, PERRL, EOMI	  Neck: Supple,  - JVD; Carotid Bruit   Cardiovascular: Normal S1 S2, No JVD, No murmurs,   Respiratory: b/l rales lower lungs  Gastrointestinal:  Soft, Non-tender, + BS	  Skin: No rashes, No ecchymoses, No cyanosis  Extremities: Normal range of motion, No clubbing, cyanosis or edema  Vascular: Peripheral pulses palpable 2+ bilaterally  Neurologic: Non-focal  Psychiatry: A & O x 3, Mood & affect appropriate  	    LABS:	 	  CARDIAC MARKERS:                          12.0   9.5   )-----------( 307      ( 10 May 2018 12:02 )             36.3     05-10    144  |  103  |  13  ----------------------------<  110<H>  3.7   |  25  |  0.84    Ca    9.2      10 May 2018 05:20  Mg     1.8     05-10    TPro  7.3  /  Alb  3.8  /  TBili  1.0  /  DBili  x   /  AST  47<H>  /  ALT  9<L>  /  AlkPhos  599<H>  05-09    HgA1c: Hemoglobin A1C, Whole Blood: 5.6 % (05-10 @ 05:22)    TSH: Thyroid Stimulating Hormone, Serum: 1.766 uIU/mL (05-10 @ 05:20)    PT/INR - ( 10 May 2018 05:03 )   PT: 22.0 sec;   INR: 1.96         PTT - ( 10 May 2018 12:02 )  PTT:46.2 sec    ASSESSMENT/PLAN:

## 2018-05-11 DIAGNOSIS — K76.89 OTHER SPECIFIED DISEASES OF LIVER: ICD-10-CM

## 2018-05-11 DIAGNOSIS — Z12.5 ENCOUNTER FOR SCREENING FOR MALIGNANT NEOPLASM OF PROSTATE: ICD-10-CM

## 2018-05-11 LAB
ALBUMIN SERPL ELPH-MCNC: 3.5 G/DL — SIGNIFICANT CHANGE UP (ref 3.3–5)
ALP SERPL-CCNC: 767 U/L — HIGH (ref 40–120)
ALT FLD-CCNC: 8 U/L — LOW (ref 10–45)
ANION GAP SERPL CALC-SCNC: 13 MMOL/L — SIGNIFICANT CHANGE UP (ref 5–17)
APTT BLD: 47.7 SEC — HIGH (ref 27.5–37.4)
APTT BLD: 52.1 SEC — HIGH (ref 27.5–37.4)
AST SERPL-CCNC: 36 U/L — SIGNIFICANT CHANGE UP (ref 10–40)
BILIRUB DIRECT SERPL-MCNC: 0.8 MG/DL — HIGH (ref 0–0.2)
BILIRUB INDIRECT FLD-MCNC: 1.4 MG/DL — HIGH (ref 0.2–1)
BILIRUB SERPL-MCNC: 2.2 MG/DL — HIGH (ref 0.2–1.2)
BUN SERPL-MCNC: 20 MG/DL — SIGNIFICANT CHANGE UP (ref 7–23)
CALCIUM SERPL-MCNC: 9.1 MG/DL — SIGNIFICANT CHANGE UP (ref 8.4–10.5)
CHLORIDE SERPL-SCNC: 101 MMOL/L — SIGNIFICANT CHANGE UP (ref 96–108)
CK MB CFR SERPL CALC: 2.1 NG/ML — SIGNIFICANT CHANGE UP (ref 0–6.7)
CK MB CFR SERPL CALC: 2.7 NG/ML — SIGNIFICANT CHANGE UP (ref 0–6.7)
CK SERPL-CCNC: 302 U/L — HIGH (ref 30–200)
CK SERPL-CCNC: 340 U/L — HIGH (ref 30–200)
CO2 SERPL-SCNC: 29 MMOL/L — SIGNIFICANT CHANGE UP (ref 22–31)
CREAT SERPL-MCNC: 0.86 MG/DL — SIGNIFICANT CHANGE UP (ref 0.5–1.3)
GLUCOSE SERPL-MCNC: 102 MG/DL — HIGH (ref 70–99)
HCT VFR BLD CALC: 41.4 % — SIGNIFICANT CHANGE UP (ref 39–50)
HGB BLD-MCNC: 13.8 G/DL — SIGNIFICANT CHANGE UP (ref 13–17)
INR BLD: 2.17 — HIGH (ref 0.88–1.16)
MAGNESIUM SERPL-MCNC: 2 MG/DL — SIGNIFICANT CHANGE UP (ref 1.6–2.6)
MCHC RBC-ENTMCNC: 28.2 PG — SIGNIFICANT CHANGE UP (ref 27–34)
MCHC RBC-ENTMCNC: 33.3 G/DL — SIGNIFICANT CHANGE UP (ref 32–36)
MCV RBC AUTO: 84.7 FL — SIGNIFICANT CHANGE UP (ref 80–100)
PLATELET # BLD AUTO: 338 K/UL — SIGNIFICANT CHANGE UP (ref 150–400)
POTASSIUM SERPL-MCNC: 3.4 MMOL/L — LOW (ref 3.5–5.3)
POTASSIUM SERPL-SCNC: 3.4 MMOL/L — LOW (ref 3.5–5.3)
PROT SERPL-MCNC: 7.4 G/DL — SIGNIFICANT CHANGE UP (ref 6–8.3)
PROTHROM AB SERPL-ACNC: 24.5 SEC — HIGH (ref 9.8–12.7)
RBC # BLD: 4.89 M/UL — SIGNIFICANT CHANGE UP (ref 4.2–5.8)
RBC # FLD: 15.6 % — SIGNIFICANT CHANGE UP (ref 10.3–16.9)
SODIUM SERPL-SCNC: 143 MMOL/L — SIGNIFICANT CHANGE UP (ref 135–145)
TROPONIN T SERPL-MCNC: 0.22 NG/ML — CRITICAL HIGH (ref 0–0.01)
TROPONIN T SERPL-MCNC: 0.24 NG/ML — CRITICAL HIGH (ref 0–0.01)
WBC # BLD: 12.4 K/UL — HIGH (ref 3.8–10.5)
WBC # FLD AUTO: 12.4 K/UL — HIGH (ref 3.8–10.5)

## 2018-05-11 PROCEDURE — 99232 SBSQ HOSP IP/OBS MODERATE 35: CPT

## 2018-05-11 PROCEDURE — 93459 L HRT ART/GRFT ANGIO: CPT | Mod: 26

## 2018-05-11 PROCEDURE — 93010 ELECTROCARDIOGRAM REPORT: CPT | Mod: 59

## 2018-05-11 RX ORDER — NITROGLYCERIN 6.5 MG
0.4 CAPSULE, EXTENDED RELEASE ORAL
Qty: 0 | Refills: 0 | Status: DISCONTINUED | OUTPATIENT
Start: 2018-05-11 | End: 2018-05-13

## 2018-05-11 RX ORDER — APIXABAN 2.5 MG/1
5 TABLET, FILM COATED ORAL EVERY 12 HOURS
Qty: 0 | Refills: 0 | Status: DISCONTINUED | OUTPATIENT
Start: 2018-05-12 | End: 2018-05-14

## 2018-05-11 RX ORDER — SODIUM CHLORIDE 9 MG/ML
200 INJECTION INTRAMUSCULAR; INTRAVENOUS; SUBCUTANEOUS ONCE
Qty: 0 | Refills: 0 | Status: DISCONTINUED | OUTPATIENT
Start: 2018-05-11 | End: 2018-05-11

## 2018-05-11 RX ORDER — POTASSIUM CHLORIDE 20 MEQ
40 PACKET (EA) ORAL ONCE
Qty: 0 | Refills: 0 | Status: COMPLETED | OUTPATIENT
Start: 2018-05-11 | End: 2018-05-11

## 2018-05-11 RX ORDER — FUROSEMIDE 40 MG
40 TABLET ORAL EVERY 12 HOURS
Qty: 0 | Refills: 0 | Status: DISCONTINUED | OUTPATIENT
Start: 2018-05-11 | End: 2018-05-12

## 2018-05-11 RX ORDER — ACETAMINOPHEN 500 MG
650 TABLET ORAL ONCE
Qty: 0 | Refills: 0 | Status: COMPLETED | OUTPATIENT
Start: 2018-05-11 | End: 2018-05-11

## 2018-05-11 RX ADMIN — Medication 40 MILLIEQUIVALENT(S): at 11:48

## 2018-05-11 RX ADMIN — Medication 25 MILLIGRAM(S): at 05:58

## 2018-05-11 RX ADMIN — Medication 650 MILLIGRAM(S): at 11:13

## 2018-05-11 RX ADMIN — ATORVASTATIN CALCIUM 80 MILLIGRAM(S): 80 TABLET, FILM COATED ORAL at 22:20

## 2018-05-11 RX ADMIN — TICAGRELOR 90 MILLIGRAM(S): 90 TABLET ORAL at 05:57

## 2018-05-11 RX ADMIN — SENNA PLUS 5 MILLILITER(S): 8.6 TABLET ORAL at 11:15

## 2018-05-11 RX ADMIN — HEPARIN SODIUM 950 UNIT(S)/HR: 5000 INJECTION INTRAVENOUS; SUBCUTANEOUS at 03:47

## 2018-05-11 RX ADMIN — Medication 81 MILLIGRAM(S): at 05:58

## 2018-05-11 RX ADMIN — LOSARTAN POTASSIUM 25 MILLIGRAM(S): 100 TABLET, FILM COATED ORAL at 05:58

## 2018-05-11 RX ADMIN — Medication 650 MILLIGRAM(S): at 11:45

## 2018-05-11 RX ADMIN — Medication 40 MILLIGRAM(S): at 17:56

## 2018-05-11 RX ADMIN — Medication 40 MILLIGRAM(S): at 05:57

## 2018-05-11 NOTE — PROGRESS NOTE ADULT - ASSESSMENT
78 yo male FORMER SMOKER with PMHx of HTN, HLD, pre- DM, CAD s/p CABG (in 2016 @ [facility]), CHF (EF unknown), h/o DVT 6 months ago (on Eliquis; last dose last night)  admitted to 5U for diuresis, ECHO, r/o ACS.

## 2018-05-11 NOTE — PROGRESS NOTE ADULT - PROBLEM SELECTOR PLAN 3
SBP low during and post Cath today.  Improved on arrival to 5 uris.  Continue to monitor.   --c/w lopressor 25 mg daily, losartan 25 mg daily

## 2018-05-11 NOTE — PROGRESS NOTE ADULT - SUBJECTIVE AND OBJECTIVE BOX
Interventional Cardiology PA Adult Progress Note    Subjective Assessment: Patient seen and examined this morning, complaint of some slight chest pressure.  Patient also with c/o penile pain 2/2 gould placement.   	  MEDICATIONS:  furosemide    Tablet 40 milliGRAM(s) Oral every 12 hours  losartan 25 milliGRAM(s) Oral daily  metoprolol succinate ER 25 milliGRAM(s) Oral daily  nitroglycerin     SubLingual 0.4 milliGRAM(s) SubLingual every 5 minutes PRN  senna Syrup 5 milliLiter(s) Oral daily  atorvastatin 80 milliGRAM(s) Oral at bedtime  methimazole 5 milliGRAM(s) Oral three times a day  aspirin enteric coated 81 milliGRAM(s) Oral daily	    [PHYSICAL EXAM:  TELEMETRY:  T(C): 36 (05-11-18 @ 09:47), Max: 37 (05-10-18 @ 22:35)  HR: 74 (05-11-18 @ 16:32) (74 - 92)  BP: 96/62 (05-11-18 @ 16:32) (94/58 - 120/65)  RR: 17 (05-11-18 @ 16:32) (16 - 18)  SpO2: 98% (05-11-18 @ 16:32) (95% - 98%)    I&O's Summary    10 May 2018 07:01  -  11 May 2018 07:00  --------------------------------------------------------  IN: 316 mL / OUT: 2050 mL / NET: -1734 mL    11 May 2018 07:01  -  11 May 2018 17:24  --------------------------------------------------------  IN: 128.5 mL / OUT: 0 mL / NET: 128.5 mL    Gould: No  Central/PICC/Mid Line: NO                                        Appearance: Normal	  HEENT:   Normal oral mucosa, PERRL, EOMI	  Neck: Supple,  - JVD; Carotid Bruit   Cardiovascular: Normal S1 S2, No JVD, No murmurs,   Respiratory: mild crackles  Gastrointestinal:  Soft, Non-tender, + BS	  Skin: No rashes, No ecchymoses, No cyanosis  Extremities: Normal range of motion, No clubbing, cyanosis or edema  Vascular: Peripheral pulses palpable 2+ bilaterally  Neurologic: Non-focal  Psychiatry: A & O x 3, Mood & affect appropriate    LABS:	 	  CARDIAC MARKERS:                        13.8   12.4  )-----------( 338      ( 11 May 2018 06:41 )             41.4     05-11    143  |  101  |  20  ----------------------------<  102<H>  3.4<L>   |  29  |  0.86    Ca    9.1      11 May 2018 06:40  Mg     2.0     05-11    TPro  7.4  /  Alb  3.5  /  TBili  2.2<H>  /  DBili  0.8<H>  /  AST  36  /  ALT  8<L>  /  AlkPhos  767<H>  05-11    PT/INR - ( 11 May 2018 06:42 )   PT: 24.5 sec;   INR: 2.17     PTT - ( 11 May 2018 10:09 )  PTT:52.1 sec    ASSESSMENT/PLAN:

## 2018-05-11 NOTE — PROGRESS NOTE ADULT - PROBLEM SELECTOR PLAN 7
Alk Phos 767, Total Bili 2.2, direct bili 0.8, Indirect bili 1.4.  Persistently elevated INR.   - No complaints of Abd pain  - Ordered for CT Abd/Plevis w/ IV contrast.
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
## 2018-05-11 NOTE — PROGRESS NOTE ADULT - PROBLEM SELECTOR PLAN 1
Patient appears near euvolemic today, Breathing much improved.   - CXR 5/10: Pulmonary vascular congestion. Interstitial pulmonary edema.  - EKG revealed NSR with 1st degree AV block @ 94 BPM with left axis deviation and Q wave in inferior leads and V1-V5.  - Echo 5/10: severe concentric LVH, severe global hypokinesis of the left ventricle, EF 30%, left atrium is severely dilated, mitral inflow pattern is consistent with restrictive left ventricular filling, suggestive of severely elevated left atrial pressure, right atrium is moderately dilated, Tethered mitral valve leaflets.  - Echo possibly concerning for amyloidosis but no Cardiac MRI at this time per Dr. Murphy.   - Lasix transitioned to Lasix 40mg PO BID today as EDP 4  - Continue Losartan 25mg QD.   - Strict I&O's, daily weights.

## 2018-05-11 NOTE — PROGRESS NOTE ADULT - SUBJECTIVE AND OBJECTIVE BOX
Procedure: LHC, Perclose  Indication: NSTEMI, CAD  Complication: none    Result:  1) Three vessel CAD (99% mLAD, 100% ramus, 90% OM2, 99% small OM3, 85% distal dominant LCX)  2) Patent LIMA to LAD  3) Patent radial graft to ramus  4) Patent SVG to OM2  5) LVEDP 4        Plan: Medical management of chronic systolic CHF

## 2018-05-11 NOTE — PROGRESS NOTE ADULT - PROBLEM SELECTOR PLAN 2
current CP free.  Patient r/I NSTEMI CE 0.08 - > 0.24.  EKG remained unchanged.   - s/p Diagnostic Cath today 5/11: known 3VCAD (99% mLAD, 100% ramus, 90% OM2, 99% small OM3, 85% distal dominant LCX), Patent LIMA to LAD, patent radial graft to ramus, patent SVG to OM2, LVEDP 4.  Perclose.   - Received 200 mcg briana x2 for hypotension in procedure room (86/57, 91/59) which improved to ~100 SBP, 79 SBP post-cath in holding, reverse-trandelenberg and SBP improved to 120. SBP again declined to 70s-80s with c/o dizziness and given 200cc IV NS bolus with improvement in SBP to 90s.  HD stable upon transfer to [facility].   - Continue ASA 81mg QD, Atorvastatin 80mg Qhs. (heparin gtt and Brilinta discontinued).

## 2018-05-11 NOTE — PROGRESS NOTE ADULT - PROBLEM SELECTOR PLAN 2
current CP free.  Patient r/I NSTEMI CE 0.08 - > 0.24.  EKG remained unchanged.   - s/p Diagnostic Cath today 5/11: known 3VCAD (99% mLAD, 100% ramus, 90% OM2, 99% small OM3, 85% distal dominant LCX), Patent LIMA to LAD, patent radial graft to ramus, patent SVG to OM2, LVEDP 4.  Perclose.   - Received 200 mcg briana x2 for hypotension in procedure room (86/57, 91/59) which improved to ~100 SBP, 79 SBP post-cath in holding, reverse-trandelenberg and SBP improved to 120. SBP again declined to 70s-80s with c/o dizziness and given 200cc IV NS bolus with improvement in SBP to 90s.  HD stable upon transfer to [facility].   - Continue ASA 81mg QD, Atorvastatin 80mg Qhs. (heparin gtt and Brilinta discontinued).

## 2018-05-11 NOTE — PROVIDER CONTACT NOTE (CRITICAL VALUE NOTIFICATION) - SITUATION
Pt admitted with L sided chest tightness, SOB, and palpitations.  Pt NPO for cardiac cath this morning as scheduled.  Pt on heparin gtt for ACS.

## 2018-05-11 NOTE — PROGRESS NOTE ADULT - ASSESSMENT
76 yo male FORMER SMOKER with PMHx of HTN, HLD, pre- DM, CAD s/p CABG (in 2016 @ [facility]), CHF (EF unknown), h/o DVT 6 months ago (on Eliquis; last dose last night)  admitted to 5U for diuresis, ECHO, r/o ACS.

## 2018-05-11 NOTE — PROVIDER CONTACT NOTE (CRITICAL VALUE NOTIFICATION) - BACKGROUND
HTN, HLD, pre- DM, CAD s/p CABG, CHF (EF unknown), h/o DVT 6 months ago (on Eliquis; last dose last night), hyperthyroidism

## 2018-05-11 NOTE — PROGRESS NOTE ADULT - PROBLEM SELECTOR PLAN 5
- Pt. with hx of L DVT (dx: 6 months ago: on eliquis).  Last dose Eliquis 5/8pm  - Will resume Eliquis 5/12 AM  - Consider stopping as diagnosed 6mo ago.

## 2018-05-11 NOTE — PROGRESS NOTE ADULT - PROBLEM SELECTOR PLAN 6
CXR w/ incidental finding of sclerotic bone metastases, likely from prostate CA.  - Patient denies Hx of prostate CA  - Urology Team consulted.  f/u further Recs  - f/u PSA level  - Ordered for CT Abd/Pelvis w/ IV contrast.

## 2018-05-12 LAB
ALBUMIN SERPL ELPH-MCNC: 3 G/DL — LOW (ref 3.3–5)
ALP SERPL-CCNC: 576 U/L — HIGH (ref 40–120)
ALT FLD-CCNC: 8 U/L — LOW (ref 10–45)
ANION GAP SERPL CALC-SCNC: 13 MMOL/L — SIGNIFICANT CHANGE UP (ref 5–17)
AST SERPL-CCNC: 22 U/L — SIGNIFICANT CHANGE UP (ref 10–40)
BILIRUB SERPL-MCNC: 1.7 MG/DL — HIGH (ref 0.2–1.2)
BUN SERPL-MCNC: 35 MG/DL — HIGH (ref 7–23)
CALCIUM SERPL-MCNC: 8.8 MG/DL — SIGNIFICANT CHANGE UP (ref 8.4–10.5)
CHLORIDE SERPL-SCNC: 100 MMOL/L — SIGNIFICANT CHANGE UP (ref 96–108)
CO2 SERPL-SCNC: 28 MMOL/L — SIGNIFICANT CHANGE UP (ref 22–31)
CREAT SERPL-MCNC: 1.11 MG/DL — SIGNIFICANT CHANGE UP (ref 0.5–1.3)
CULTURE RESULTS: NO GROWTH — SIGNIFICANT CHANGE UP
GLUCOSE BLDC GLUCOMTR-MCNC: 97 MG/DL — SIGNIFICANT CHANGE UP (ref 70–99)
GLUCOSE SERPL-MCNC: 97 MG/DL — SIGNIFICANT CHANGE UP (ref 70–99)
HCT VFR BLD CALC: 37.3 % — LOW (ref 39–50)
HGB BLD-MCNC: 12.3 G/DL — LOW (ref 13–17)
MAGNESIUM SERPL-MCNC: 2 MG/DL — SIGNIFICANT CHANGE UP (ref 1.6–2.6)
MCHC RBC-ENTMCNC: 28.1 PG — SIGNIFICANT CHANGE UP (ref 27–34)
MCHC RBC-ENTMCNC: 33 G/DL — SIGNIFICANT CHANGE UP (ref 32–36)
MCV RBC AUTO: 85.2 FL — SIGNIFICANT CHANGE UP (ref 80–100)
PLATELET # BLD AUTO: 326 K/UL — SIGNIFICANT CHANGE UP (ref 150–400)
POTASSIUM SERPL-MCNC: 3.5 MMOL/L — SIGNIFICANT CHANGE UP (ref 3.5–5.3)
POTASSIUM SERPL-SCNC: 3.5 MMOL/L — SIGNIFICANT CHANGE UP (ref 3.5–5.3)
PROT SERPL-MCNC: 6.4 G/DL — SIGNIFICANT CHANGE UP (ref 6–8.3)
RBC # BLD: 4.38 M/UL — SIGNIFICANT CHANGE UP (ref 4.2–5.8)
RBC # FLD: 15.5 % — SIGNIFICANT CHANGE UP (ref 10.3–16.9)
SODIUM SERPL-SCNC: 141 MMOL/L — SIGNIFICANT CHANGE UP (ref 135–145)
SPECIMEN SOURCE: SIGNIFICANT CHANGE UP
WBC # BLD: 11 K/UL — HIGH (ref 3.8–10.5)
WBC # FLD AUTO: 11 K/UL — HIGH (ref 3.8–10.5)

## 2018-05-12 PROCEDURE — 99233 SBSQ HOSP IP/OBS HIGH 50: CPT

## 2018-05-12 PROCEDURE — 74177 CT ABD & PELVIS W/CONTRAST: CPT | Mod: 26

## 2018-05-12 RX ORDER — POTASSIUM CHLORIDE 20 MEQ
40 PACKET (EA) ORAL ONCE
Qty: 0 | Refills: 0 | Status: COMPLETED | OUTPATIENT
Start: 2018-05-12 | End: 2018-05-12

## 2018-05-12 RX ORDER — OXYCODONE AND ACETAMINOPHEN 5; 325 MG/1; MG/1
1 TABLET ORAL ONCE
Qty: 0 | Refills: 0 | Status: DISCONTINUED | OUTPATIENT
Start: 2018-05-12 | End: 2018-05-12

## 2018-05-12 RX ORDER — TAMSULOSIN HYDROCHLORIDE 0.4 MG/1
0.4 CAPSULE ORAL AT BEDTIME
Qty: 0 | Refills: 0 | Status: DISCONTINUED | OUTPATIENT
Start: 2018-05-12 | End: 2018-05-24

## 2018-05-12 RX ORDER — FUROSEMIDE 40 MG
40 TABLET ORAL DAILY
Qty: 0 | Refills: 0 | Status: DISCONTINUED | OUTPATIENT
Start: 2018-05-12 | End: 2018-05-24

## 2018-05-12 RX ORDER — KETOROLAC TROMETHAMINE 30 MG/ML
15 SYRINGE (ML) INJECTION ONCE
Qty: 0 | Refills: 0 | Status: DISCONTINUED | OUTPATIENT
Start: 2018-05-12 | End: 2018-05-12

## 2018-05-12 RX ADMIN — SENNA PLUS 5 MILLILITER(S): 8.6 TABLET ORAL at 12:05

## 2018-05-12 RX ADMIN — Medication 81 MILLIGRAM(S): at 12:04

## 2018-05-12 RX ADMIN — ATORVASTATIN CALCIUM 80 MILLIGRAM(S): 80 TABLET, FILM COATED ORAL at 21:52

## 2018-05-12 RX ADMIN — OXYCODONE AND ACETAMINOPHEN 1 TABLET(S): 5; 325 TABLET ORAL at 17:26

## 2018-05-12 RX ADMIN — Medication 40 MILLIEQUIVALENT(S): at 12:07

## 2018-05-12 RX ADMIN — APIXABAN 5 MILLIGRAM(S): 2.5 TABLET, FILM COATED ORAL at 17:26

## 2018-05-12 RX ADMIN — Medication 15 MILLIGRAM(S): at 22:27

## 2018-05-12 RX ADMIN — OXYCODONE AND ACETAMINOPHEN 1 TABLET(S): 5; 325 TABLET ORAL at 17:45

## 2018-05-12 RX ADMIN — Medication 15 MILLIGRAM(S): at 02:02

## 2018-05-12 RX ADMIN — APIXABAN 5 MILLIGRAM(S): 2.5 TABLET, FILM COATED ORAL at 07:27

## 2018-05-12 RX ADMIN — Medication 15 MILLIGRAM(S): at 22:12

## 2018-05-12 RX ADMIN — Medication 15 MILLIGRAM(S): at 01:32

## 2018-05-12 RX ADMIN — TAMSULOSIN HYDROCHLORIDE 0.4 MILLIGRAM(S): 0.4 CAPSULE ORAL at 21:52

## 2018-05-12 NOTE — PROGRESS NOTE ADULT - SUBJECTIVE AND OBJECTIVE BOX
Interventional Cardiology PA Adult Progress Note        Subjective Assessment: Pt was examined at bedside. C/o mild chest discomfort that is worsened with movement. Denies SOB or palpitaions  	      MEDICATIONS:  furosemide    Tablet 40 milliGRAM(s) Oral daily  losartan 25 milliGRAM(s) Oral daily  metoprolol succinate ER 25 milliGRAM(s) Oral daily  nitroglycerin     SubLingual 0.4 milliGRAM(s) SubLingual every 5 minutes PRN  senna Syrup 5 milliLiter(s) Oral daily  atorvastatin 80 milliGRAM(s) Oral at bedtime  methimazole 5 milliGRAM(s) Oral three times a day  apixaban 5 milliGRAM(s) Oral every 12 hours  aspirin enteric coated 81 milliGRAM(s) Oral daily      	    [PHYSICAL EXAM:  TELEMETRY:  T(C): 36 (05-12-18 @ 14:05), Max: 36.8 (05-11-18 @ 19:22)  HR: 83 (05-12-18 @ 12:50) (74 - 84)  BP: 107/57 (05-12-18 @ 12:50) (90/52 - 107/57)  RR: 16 (05-12-18 @ 12:50) (16 - 17)  SpO2: 98% (05-12-18 @ 12:50) (95% - 99%)    I&O's Summary    11 May 2018 07:01  -  12 May 2018 07:00  --------------------------------------------------------  IN: 248.5 mL / OUT: 700 mL / NET: -451.5 mL                                            Appearance: Normal	  HEENT:   Normal oral mucosa, PERRL, EOMI	  Neck: Supple,  - JVD; no Carotid Bruit   Cardiovascular: Normal S1 S2, No JVD, No murmurs, sternotomy scar c/d/i  Respiratory: b/l rales lower lungs  Gastrointestinal:  Soft, Non-tender, + BS  Skin: No rashes, No ecchymoses, No cyanosis  Extremities: Normal range of motion, No clubbing, cyanosis or edema  Vascular: Peripheral pulses palpable 2+ bilaterally  Neurologic: Non-focal  Psychiatry: A & O x 3, Mood & affect appropriate      ECHO 05/10/2018: There is severe concentric left ventricular hypertrophy.There is severe global   hypokinesis of the left ventricle.The left ventricular ejection fraction   is   severely reduced.The left ventricular ejection fraction is 30%.The left   atrium   is severely dilated.The left atrial volume index is 58 cc/m2 (normal   <34cc/m2)The mitral inflow pattern is consistent with restrictive left   ventricular filling, suggestive of severely elevated left atrial pressure   (>20mmHg).  The right atrium is moderately dilated.The right atrial   volume   index is 44 cc/m2 (normal range 21+/-6 for women, 25 +/- 7 for men)    Calcified   aortic valve.There is trace aortic regurgitation.Tethered mitral valve   leaflets.There is mild mitral regurgitation.There is mild tricuspid   regurgitation.There is mild pulmonary hypertension.The pulmonary artery   systolic pressure is estimated to be 49 mmHg.Structurally normal pulmonic   valve.There is mild pulmonic regurgitation.There is no pericardial   effusion.Bilateral pleural effusion noted.    LABS:	 	  CARDIAC MARKERS:                                  12.3   11.0  )-----------( 326      ( 12 May 2018 07:31 )             37.3     05-12    141  |  100  |  35<H>  ----------------------------<  97  3.5   |  28  |  1.11    Ca    8.8      12 May 2018 07:31  Mg     2.0     05-12    TPro  6.4  /  Alb  3.0<L>  /  TBili  1.7<H>  /  DBili  x   /  AST  22  /  ALT  8<L>  /  AlkPhos  576<H>  05-12    proBNP:   Lipid Profile:   HgA1c:   TSH:   PT/INR - ( 11 May 2018 06:42 )   PT: 24.5 sec;   INR: 2.17          PTT - ( 11 May 2018 10:09 )  PTT:52.1 sec    ASSESSMENT/PLAN: 	        DVT ppx:  Dispo: Interventional Cardiology PA Adult Progress Note        Subjective Assessment: Pt was examined at bedside. C/o mild chest discomfort that is worsened with movement. Denies SOB or palpitaions  	      MEDICATIONS:  furosemide    Tablet 40 milliGRAM(s) Oral daily  losartan 25 milliGRAM(s) Oral daily  metoprolol succinate ER 25 milliGRAM(s) Oral daily  nitroglycerin     SubLingual 0.4 milliGRAM(s) SubLingual every 5 minutes PRN  senna Syrup 5 milliLiter(s) Oral daily  atorvastatin 80 milliGRAM(s) Oral at bedtime  methimazole 5 milliGRAM(s) Oral three times a day  apixaban 5 milliGRAM(s) Oral every 12 hours  aspirin enteric coated 81 milliGRAM(s) Oral daily      	    [PHYSICAL EXAM:  TELEMETRY:  T(C): 36 (05-12-18 @ 14:05), Max: 36.8 (05-11-18 @ 19:22)  HR: 83 (05-12-18 @ 12:50) (74 - 84)  BP: 107/57 (05-12-18 @ 12:50) (90/52 - 107/57)  RR: 16 (05-12-18 @ 12:50) (16 - 17)  SpO2: 98% (05-12-18 @ 12:50) (95% - 99%)    I&O's Summary    11 May 2018 07:01  -  12 May 2018 07:00  --------------------------------------------------------  IN: 248.5 mL / OUT: 700 mL / NET: -451.5 mL                                            Appearance: Normal	  HEENT:   Normal oral mucosa, PERRL, EOMI	  Neck: Supple,  - JVD; no Carotid Bruit   Cardiovascular: Normal S1 S2, No JVD, No murmurs, sternotomy scar c/d/i  Respiratory: b/l rales lower lungs  Gastrointestinal:  Soft, Non-tender, + BS  Skin: No rashes, No ecchymoses, No cyanosis  Extremities: Normal range of motion, No clubbing, cyanosis or edema  Vascular: Peripheral pulses palpable 2+ bilaterally  Neurologic: Non-focal  Psychiatry: A & O x 3, Mood & affect appropriate    CT abdomen/pelvis 05/11/2018: Prostate tumor. Distended urinary bladder. Extensive bony metastasis. Multiple solid pulmonary nodules. Pulmonary metastasis is a   consideration. Recommend CT of chest for complete evaluation. Small right pleural effusion. Cardiomegaly. Heterogeneous hepatic enhancement likely due to altered perfusion. No discrete hepatic mass. Cholelithiasis. No biliary dilatation. Patent portal vein. Nonspecific thickening of the bilateral adrenal glands could be due to adenomatous hyperplasia. CT without contrast or PET/CT would be useful for differentiation from metastasis.     ECHO 05/10/2018: There is severe concentric left ventricular hypertrophy. There is severe global hypokinesis of the left ventricle. The left ventricular ejection fraction   is severely reduced. The left ventricular ejection fraction is 30%.The left atrium is severely dilated. The left atrial volume index is 58 cc/m2 (normal <34cc/m2)The mitral inflow pattern is consistent with restrictive left ventricular filling, suggestive of severely elevated left atrial pressure (>20mmHg).  The right atrium is moderately dilated. The right atrial   volume index is 44 cc/m2 (normal range 21+/-6 for women, 25 +/- 7 for men)  Calcified aortic valve. There is trace aortic regurgitation. Tethered mitral valve leaflets. There is mild mitral regurgitation. There is mild tricuspid regurgitation. There is mild pulmonary hypertension. The pulmonary artery systolic pressure is estimated to be 49 mmHg. Structurally normal pulmonic valve. There is mild pulmonic regurgitation. There is no pericardial effusion. Bilateral pleural effusion noted.     Chest X-ray 05/09/2018: Cardiomegaly. Pulmonary vascular congestion. Interstitial pulmonary edema. Sclerotic bone metastases, likely from prostate cancer.          LABS:	 	  CARDIAC MARKERS ( 11 May 2018 06:40 )  x     / 0.24 ng/mL / 340 U/L / x     / 2.7 ng/mL  CARDIAC MARKERS ( 11 May 2018 02:39 )  x     / 0.22 ng/mL / 302 U/L / x     / 2.1 ng/mL  CARDIAC MARKERS ( 10 May 2018 19:07 )  x     / 0.20 ng/mL / 346 U/L / x     / 2.5 ng/mL                            12.3   11.0  )-----------( 326      ( 12 May 2018 07:31 )             37.3     05-12    141  |  100  |  35<H>  ----------------------------<  97  3.5   |  28  |  1.11    Ca    8.8      12 May 2018 07:31  Mg     2.0     05-12    TPro  6.4  /  Alb  3.0<L>  /  TBili  1.7<H>  /  DBili  x   /  AST  22  /  ALT  8<L>  /  AlkPhos  576<H>  05-12        Serum Pro-Brain Natriuretic Peptide: 6822     Lipid Profile in AM (05.10.18 @ 05:20)    Total Cholesterol/HDL Ratio Measurement: 2.7 RATIO    Cholesterol, Serum: 172 mg/dL    Triglycerides, Serum: 80 mg/dL    HDL Cholesterol, Serum: 63 mg/dL    Direct LDL: 93: LDL     PT/INR - ( 11 May 2018 06:42 )   PT: 24.5 sec;   INR: 2.17          PTT - ( 11 May 2018 10:09 )  PTT:52.1 sec Interventional Cardiology PA Adult Progress Note        Subjective Assessment: Pt was examined at bedside. C/o mild chest discomfort that is worsened with movement. Denies SOB or palpitaions  	      MEDICATIONS:  furosemide    Tablet 40 milliGRAM(s) Oral daily  losartan 25 milliGRAM(s) Oral daily  metoprolol succinate ER 25 milliGRAM(s) Oral daily  nitroglycerin     SubLingual 0.4 milliGRAM(s) SubLingual every 5 minutes PRN  senna Syrup 5 milliLiter(s) Oral daily  atorvastatin 80 milliGRAM(s) Oral at bedtime  methimazole 5 milliGRAM(s) Oral three times a day  apixaban 5 milliGRAM(s) Oral every 12 hours  aspirin enteric coated 81 milliGRAM(s) Oral daily      	    [PHYSICAL EXAM:  TELEMETRY:  T(C): 36 (05-12-18 @ 14:05), Max: 36.8 (05-11-18 @ 19:22)  HR: 83 (05-12-18 @ 12:50) (74 - 84)  BP: 107/57 (05-12-18 @ 12:50) (90/52 - 107/57)  RR: 16 (05-12-18 @ 12:50) (16 - 17)  SpO2: 98% (05-12-18 @ 12:50) (95% - 99%)    I&O's Summary    11 May 2018 07:01  -  12 May 2018 07:00  --------------------------------------------------------  IN: 248.5 mL / OUT: 700 mL / NET: -451.5 mL                                            Appearance: Normal	  HEENT:   Normal oral mucosa, PERRL, EOMI	  Neck: Supple,  - JVD; no Carotid Bruit   Cardiovascular: Normal S1 S2, No JVD, No murmurs, sternotomy scar c/d/i, reproducible chest pain  Respiratory: Lungs CTAB  Gastrointestinal:  Soft, Non-tender, + BS  Skin: No rashes, No ecchymoses, No cyanosis  Extremities: Normal range of motion, No clubbing, cyanosis or edema  Vascular: Peripheral pulses palpable 2+ bilaterally  Neurologic: Non-focal  Psychiatry: A & O x 3, Mood & affect appropriate    CT abdomen/pelvis 05/11/2018: Prostate tumor. Distended urinary bladder. Extensive bony metastasis. Multiple solid pulmonary nodules. Pulmonary metastasis is a   consideration. Recommend CT of chest for complete evaluation. Small right pleural effusion. Cardiomegaly. Heterogeneous hepatic enhancement likely due to altered perfusion. No discrete hepatic mass. Cholelithiasis. No biliary dilatation. Patent portal vein. Nonspecific thickening of the bilateral adrenal glands could be due to adenomatous hyperplasia. CT without contrast or PET/CT would be useful for differentiation from metastasis.     ECHO 05/10/2018: There is severe concentric left ventricular hypertrophy. There is severe global hypokinesis of the left ventricle. The left ventricular ejection fraction   is severely reduced. The left ventricular ejection fraction is 30%.The left atrium is severely dilated. The left atrial volume index is 58 cc/m2 (normal <34cc/m2)The mitral inflow pattern is consistent with restrictive left ventricular filling, suggestive of severely elevated left atrial pressure (>20mmHg).  The right atrium is moderately dilated. The right atrial   volume index is 44 cc/m2 (normal range 21+/-6 for women, 25 +/- 7 for men)  Calcified aortic valve. There is trace aortic regurgitation. Tethered mitral valve leaflets. There is mild mitral regurgitation. There is mild tricuspid regurgitation. There is mild pulmonary hypertension. The pulmonary artery systolic pressure is estimated to be 49 mmHg. Structurally normal pulmonic valve. There is mild pulmonic regurgitation. There is no pericardial effusion. Bilateral pleural effusion noted.     Chest X-ray 05/09/2018: Cardiomegaly. Pulmonary vascular congestion. Interstitial pulmonary edema. Sclerotic bone metastases, likely from prostate cancer.          LABS:	 	  CARDIAC MARKERS ( 11 May 2018 06:40 )  x     / 0.24 ng/mL / 340 U/L / x     / 2.7 ng/mL  CARDIAC MARKERS ( 11 May 2018 02:39 )  x     / 0.22 ng/mL / 302 U/L / x     / 2.1 ng/mL  CARDIAC MARKERS ( 10 May 2018 19:07 )  x     / 0.20 ng/mL / 346 U/L / x     / 2.5 ng/mL                            12.3   11.0  )-----------( 326      ( 12 May 2018 07:31 )             37.3     05-12    141  |  100  |  35<H>  ----------------------------<  97  3.5   |  28  |  1.11    Ca    8.8      12 May 2018 07:31  Mg     2.0     05-12    TPro  6.4  /  Alb  3.0<L>  /  TBili  1.7<H>  /  DBili  x   /  AST  22  /  ALT  8<L>  /  AlkPhos  576<H>  05-12        Serum Pro-Brain Natriuretic Peptide: 6822     Lipid Profile in AM (05.10.18 @ 05:20)    Total Cholesterol/HDL Ratio Measurement: 2.7 RATIO    Cholesterol, Serum: 172 mg/dL    Triglycerides, Serum: 80 mg/dL    HDL Cholesterol, Serum: 63 mg/dL    Direct LDL: 93: LDL     PT/INR - ( 11 May 2018 06:42 )   PT: 24.5 sec;   INR: 2.17          PTT - ( 11 May 2018 10:09 )  PTT:52.1 sec

## 2018-05-12 NOTE — PROGRESS NOTE ADULT - SUBJECTIVE AND OBJECTIVE BOX
Subjective Assessment:  C/o mild chest discomfort that is worsened with movement. Denies SOB or palpitaions  	      MEDICATIONS:  furosemide    Tablet 40 milliGRAM(s) Oral daily  losartan 25 milliGRAM(s) Oral daily  metoprolol succinate ER 25 milliGRAM(s) Oral daily  nitroglycerin     SubLingual 0.4 milliGRAM(s) SubLingual every 5 minutes PRN  senna Syrup 5 milliLiter(s) Oral daily  atorvastatin 80 milliGRAM(s) Oral at bedtime  methimazole 5 milliGRAM(s) Oral three times a day  apixaban 5 milliGRAM(s) Oral every 12 hours  aspirin enteric coated 81 milliGRAM(s) Oral daily      	    [PHYSICAL EXAM:  TELEMETRY:  T(C): 36 (05-12-18 @ 14:05), Max: 36.8 (05-11-18 @ 19:22)  HR: 83 (05-12-18 @ 12:50) (74 - 84)  BP: 107/57 (05-12-18 @ 12:50) (90/52 - 107/57)  RR: 16 (05-12-18 @ 12:50) (16 - 17)  SpO2: 98% (05-12-18 @ 12:50) (95% - 99%)    I&O's Summary    11 May 2018 07:01  -  12 May 2018 07:00  --------------------------------------------------------  IN: 248.5 mL / OUT: 700 mL / NET: -451.5 mL                                            Appearance: Normal	  HEENT:   Normal oral mucosa, PERRL, EOMI	  Neck: Supple,  - JVD; no Carotid Bruit   Cardiovascular: Normal S1 S2, No JVD, No murmurs, sternotomy scar c/d/i, reproducible chest pain  Respiratory: Lungs CTAB  Gastrointestinal:  Soft, Non-tender, + BS  Skin: No rashes, No ecchymoses, No cyanosis  Extremities: Normal range of motion, No clubbing, cyanosis or edema  Vascular: Peripheral pulses palpable 2+ bilaterally  Neurologic: Non-focal  Psychiatry: A & O x 3, Mood & affect appropriate    CT abdomen/pelvis 05/11/2018: Prostate tumor. Distended urinary bladder. Extensive bony metastasis. Multiple solid pulmonary nodules. Pulmonary metastasis is a   consideration. Recommend CT of chest for complete evaluation. Small right pleural effusion. Cardiomegaly. Heterogeneous hepatic enhancement likely due to altered perfusion. No discrete hepatic mass. Cholelithiasis. No biliary dilatation. Patent portal vein. Nonspecific thickening of the bilateral adrenal glands could be due to adenomatous hyperplasia. CT without contrast or PET/CT would be useful for differentiation from metastasis.     ECHO 05/10/2018: There is severe concentric left ventricular hypertrophy. There is severe global hypokinesis of the left ventricle. The left ventricular ejection fraction   is severely reduced. The left ventricular ejection fraction is 30%.The left atrium is severely dilated. The left atrial volume index is 58 cc/m2 (normal <34cc/m2)The mitral inflow pattern is consistent with restrictive left ventricular filling, suggestive of severely elevated left atrial pressure (>20mmHg).  The right atrium is moderately dilated. The right atrial   volume index is 44 cc/m2 (normal range 21+/-6 for women, 25 +/- 7 for men)  Calcified aortic valve. There is trace aortic regurgitation. Tethered mitral valve leaflets. There is mild mitral regurgitation. There is mild tricuspid regurgitation. There is mild pulmonary hypertension. The pulmonary artery systolic pressure is estimated to be 49 mmHg. Structurally normal pulmonic valve. There is mild pulmonic regurgitation. There is no pericardial effusion. Bilateral pleural effusion noted.     Chest X-ray 05/09/2018: Cardiomegaly. Pulmonary vascular congestion. Interstitial pulmonary edema. Sclerotic bone metastases, likely from prostate cancer.          LABS:	 	  CARDIAC MARKERS ( 11 May 2018 06:40 )  x     / 0.24 ng/mL / 340 U/L / x     / 2.7 ng/mL  CARDIAC MARKERS ( 11 May 2018 02:39 )  x     / 0.22 ng/mL / 302 U/L / x     / 2.1 ng/mL  CARDIAC MARKERS ( 10 May 2018 19:07 )  x     / 0.20 ng/mL / 346 U/L / x     / 2.5 ng/mL                            12.3   11.0  )-----------( 326      ( 12 May 2018 07:31 )             37.3     05-12    141  |  100  |  35<H>  ----------------------------<  97  3.5   |  28  |  1.11    Ca    8.8      12 May 2018 07:31  Mg     2.0     05-12    TPro  6.4  /  Alb  3.0<L>  /  TBili  1.7<H>  /  DBili  x   /  AST  22  /  ALT  8<L>  /  AlkPhos  576<H>  05-12        Serum Pro-Brain Natriuretic Peptide: 6822     Lipid Profile in AM (05.10.18 @ 05:20)    Total Cholesterol/HDL Ratio Measurement: 2.7 RATIO    Cholesterol, Serum: 172 mg/dL    Triglycerides, Serum: 80 mg/dL    HDL Cholesterol, Serum: 63 mg/dL    Direct LDL: 93: LDL     PT/INR - ( 11 May 2018 06:42 )   PT: 24.5 sec;   INR: 2.17          PTT - ( 11 May 2018 10:09 )  PTT:52.1 sec

## 2018-05-12 NOTE — CONSULT NOTE ADULT - ASSESSMENT
impression: 76 yo male with h/o htn, hld, preDM, CAD s/p CABG, CHF, dvt on eliquis, found to have sclerotic bony lesions,  consulted to r/o CaP, CT abdomen/pelvis with pulmonary nodules, prostatic lesions, distended bladder, sclerotic lesions on spine, ribs and pelvis c/w metastatic disease, urinary retention, gould placed per request of cardiology team  case d/w dr rupinder lopes and the following reflect his plan  plan:  will follow PSA results  consider a pulmonary and oncology consult  fall precautions  will follow

## 2018-05-12 NOTE — PROGRESS NOTE ADULT - PROBLEM SELECTOR PLAN 7
Alk Phos 767, Total Bili 2.2, direct bili 0.8, Indirect bili 1.4.  Persistently elevated INR.   - No complaints of Abd pain  - Ordered for CT Abd/Plevis w/ IV contrast. -Alk Phos 767, Total Bili 2.2, direct bili 0.8, Indirect bili 1.4.  Persistently elevated INR.   - No complaints of Abd pain  -CT abdomen/pelvis 05/11: Prostate tumor. Distended urinary bladder. Extensive bony metastasis. Multiple solid pulmonary nodules. Pulmonary metastasis is a consideration. Recommend CT of chest for complete evaluation. Small right pleural effusion. Cardiomegaly. Heterogeneous hepatic enhancement likely due to altered perfusion. No discrete hepatic mass. Cholelithiasis. No biliary dilatation. Patent portal vein. Nonspecific thickening of the bilateral adrenal glands could be due to adenomatous hyperplasia.      DVT PPx: Eliquis  Dispo: f/u PSA level, f/u urology recs    Case d/w Dr. Poole

## 2018-05-12 NOTE — PROGRESS NOTE ADULT - ASSESSMENT
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

## 2018-05-12 NOTE — PROGRESS NOTE ADULT - PROBLEM SELECTOR PLAN 2
current CP free.  Patient r/I NSTEMI CE 0.08 - > 0.24.  EKG remained unchanged.   - s/p Diagnostic Cath today 5/11: known 3VCAD (99% mLAD, 100% ramus, 90% OM2, 99% small OM3, 85% distal dominant LCX), Patent LIMA to LAD, patent radial graft to ramus, patent SVG to OM2, LVEDP 4.  Perclose.   - Received 200 mcg briana x2 for hypotension in procedure room (86/57, 91/59) which improved to ~100 SBP, 79 SBP post-cath in holding, reverse-trandelenberg and SBP improved to 120. SBP again declined to 70s-80s with c/o dizziness and given 200cc IV NS bolus with improvement in SBP to 90s.  HD stable upon transfer to [facility].   - Continue ASA 81mg QD, Atorvastatin 80mg Qhs. (heparin gtt and Brilinta discontinued). - this AM pt c/o mild chest discomfort, chest pain is reproducible  -Patient r/I NSTEMI CE 0.08 - > 0.24.  EKG remained unchanged.   -s/p Diagnostic Cath today 5/11: known 3VCAD (99% mLAD, 100% ramus, 90% OM2, 99% small OM3, 85% distal dominant LCX), Patent LIMA to LAD, patent radial graft to ramus, patent SVG to OM2, LVEDP4.    - Continue ASA 81mg QD, Atorvastatin 80mg Qhs. (heparin gtt and Brilinta discontinued)

## 2018-05-12 NOTE — CONSULT NOTE ADULT - SUBJECTIVE AND OBJECTIVE BOX
followup  consulted for sclerotic bony lesions, recommendations for  PSA and ct abdomen and pelvis, primary team called to f/u on ct results and per cardiology attending to place gould catheter. per team, pt with a post void residual of approximately 600 cc, primary team and nursing staff not comfortable with placing a gould catheter secondary to issue of possible prostate ca. pt seen laying in bed, states he had a gould catheter indwelling but was removed, he is voiding very small amounts and has sensation of incomplete emptying, currently with a condom catheter.     ct scan: -CT-3.3x2.6cm peripheral hypodense mass at the right prostate, 0.6cm right periprostatic lymph node  Multiple sclertoic lesions in the spine, ribs, pelvis c/w metastatic disease.  multiple solid pulmonary nodules, pulmonary metastasis is a consideration    PSA pending    PE: alert and oriented x 3, in no sig distress  abd soft, non tender, mild suprapubic distension  genitalia with condom catheter, yellow tinge urine in drainage bag    procedure: after adequate hand hygiene, pt condom catheter removed, under sterile fashion genitalia was prepped and draped appropriately, an 18 F gould coude catheter placed w/o difficulty into bladder immediately approximately 550 cc of yellow tinge urine drained, pt tolerated well, gould secured to catheter lock.

## 2018-05-12 NOTE — PROGRESS NOTE ADULT - PROBLEM SELECTOR PLAN 7
-Alk Phos 767, Total Bili 2.2, direct bili 0.8, Indirect bili 1.4.  Persistently elevated INR.   - No complaints of Abd pain  -CT abdomen/pelvis 05/11: Prostate tumor. Distended urinary bladder. Extensive bony metastasis. Multiple solid pulmonary nodules. Pulmonary metastasis is a consideration. Recommend CT of chest for complete evaluation. Small right pleural effusion. Cardiomegaly. Heterogeneous hepatic enhancement likely due to altered perfusion. No discrete hepatic mass. Cholelithiasis. No biliary dilatation. Patent portal vein. Nonspecific thickening of the bilateral adrenal glands could be due to adenomatous hyperplasia.      DVT PPx: Eliquis  Dispo: f/u PSA level, f/u urology recs    Case d/w Dr. Poole

## 2018-05-12 NOTE — PROGRESS NOTE ADULT - PROBLEM SELECTOR PLAN 3
SBP low during and post Cath today.  Improved on arrival to 5 uris.  Continue to monitor.   --c/w lopressor 25 mg daily, losartan 25 mg daily -SBP low during and post Cath. Improved on arrival to 5 uris.   -SBP 90s-100s today, continue to monitor  -c/w lopressor 25 mg daily and losartan 25 mg daily.

## 2018-05-12 NOTE — PROGRESS NOTE ADULT - PROBLEM SELECTOR PLAN 5
- Pt. with hx of L DVT (dx: 6 months ago: on eliquis).   -Per Dr. Poole c/w Eliquis 5mg BID for now, due to possible cancer diagnosis and unknown reason for prior DVT

## 2018-05-12 NOTE — PROGRESS NOTE ADULT - PROBLEM SELECTOR PLAN 3
-SBP low during and post Cath. Improved on arrival to 5 uris.   -SBP 90s-100s today, continue to monitor  -c/w lopressor 25 mg daily and losartan 25 mg daily.

## 2018-05-12 NOTE — PROGRESS NOTE ADULT - PROBLEM SELECTOR PLAN 1
-Patient euvolemic on exam, Breathing improved.   - CXR 5/10: Pulmonary vascular congestion. Interstitial pulmonary edema.  - EKG revealed NSR with 1st degree AV block @ 94 BPM with left axis deviation and Q wave in inferior leads and V1-V5.  - Echo 5/10: severe concentric LVH, severe global hypokinesis of the left ventricle, EF 30%, left atrium is severely dilated, mitral inflow pattern is consistent with restrictive left ventricular filling, suggestive of severely elevated left atrial pressure, right atrium is moderately dilated, tethered mitral valve leaflets.  - Echo possibly concerning for amyloidosis but no Cardiac MRI at this time per Dr. Murphy.   - Lasix transitioned to Lasix 40mg PO BID yesterday as EDP was 4, per Dr. Poole decrease to PO Lasix 40mg QD as pt euvolemic on exam  - Continue Losartan 25mg QD.   - Strict I&O's, daily weights.

## 2018-05-12 NOTE — PROGRESS NOTE ADULT - PROBLEM SELECTOR PLAN 1
Patient appears near euvolemic today, Breathing much improved.   - CXR 5/10: Pulmonary vascular congestion. Interstitial pulmonary edema.  - EKG revealed NSR with 1st degree AV block @ 94 BPM with left axis deviation and Q wave in inferior leads and V1-V5.  - Echo 5/10: severe concentric LVH, severe global hypokinesis of the left ventricle, EF 30%, left atrium is severely dilated, mitral inflow pattern is consistent with restrictive left ventricular filling, suggestive of severely elevated left atrial pressure, right atrium is moderately dilated, Tethered mitral valve leaflets.  - Echo possibly concerning for amyloidosis but no Cardiac MRI at this time per Dr. Murphy.   - Lasix transitioned to Lasix 40mg PO BID today as EDP 4  - Continue Losartan 25mg QD.   - Strict I&O's, daily weights. -Patient euvolemic on exam, Breathing improved.   - CXR 5/10: Pulmonary vascular congestion. Interstitial pulmonary edema.  - EKG revealed NSR with 1st degree AV block @ 94 BPM with left axis deviation and Q wave in inferior leads and V1-V5.  - Echo 5/10: severe concentric LVH, severe global hypokinesis of the left ventricle, EF 30%, left atrium is severely dilated, mitral inflow pattern is consistent with restrictive left ventricular filling, suggestive of severely elevated left atrial pressure, right atrium is moderately dilated, tethered mitral valve leaflets.  - Echo possibly concerning for amyloidosis but no Cardiac MRI at this time per Dr. Murphy.   - Lasix transitioned to Lasix 40mg PO BID yesterday as EDP was 4, per Dr. Poole decrease to PO Lasix 40mg QD as pt euvolemic on exam  - Continue Losartan 25mg QD.   - Strict I&O's, daily weights.

## 2018-05-12 NOTE — PROGRESS NOTE ADULT - PROBLEM SELECTOR PLAN 5
- Pt. with hx of L DVT (dx: 6 months ago: on eliquis).  Last dose Eliquis 5/8pm  - Will resume Eliquis 5/12 AM  - Consider stopping as diagnosed 6mo ago. - Pt. with hx of L DVT (dx: 6 months ago: on eliquis).   -Per Dr. Poole c/w Eliquis 5mg BID for now, due to possible cancer diagnosis and unknown reason for prior DVT

## 2018-05-12 NOTE — PROGRESS NOTE ADULT - ASSESSMENT
76 yo male FORMER SMOKER with PMHx of HTN, HLD, pre- DM, CAD s/p CABG (in 2016 @ [facility]), CHF (EF unknown), h/o DVT 6 months ago (on Eliquis; last dose last night)  admitted to 5U for diuresis, ECHO, r/o ACS.

## 2018-05-12 NOTE — PROGRESS NOTE ADULT - PROBLEM SELECTOR PLAN 6
CXR w/ incidental finding of sclerotic bone metastases, likely from prostate CA.  - Patient denies Hx of prostate CA  - Urology Team consulted.  f/u further Recs  - f/u PSA level  - Ordered for CT Abd/Pelvis w/ IV contrast. -Patient denies Hx of prostate CA  -Urology Team consulted.  f/u further Recs  -CXR w/ incidental finding of sclerotic bone metastases, likely from prostate CA.  -CT abdomen/pelvis 05/11: Prostate tumor. Distended urinary bladder. Extensive bony metastasis. Multiple solid pulmonary nodules. Pulmonary metastasis is a consideration.  -Urology Team consulted. f/u further Recs. Post void scan 640 pt will need a gould per Dr. Poole Urology aware  - f/u PSA level  -Pt complaining of diffuse pain, unable to consult pain management today, will order low dose of oxycodone for pain in interim until pain management can be consulted per Dr. Poole. Given oxycodone Pox1   -started on Flomax 0.4mg QD per Dr. Poole

## 2018-05-12 NOTE — PROGRESS NOTE ADULT - PROBLEM SELECTOR PLAN 2
- this AM pt c/o mild chest discomfort, chest pain is reproducible  -Patient r/I NSTEMI CE 0.08 - > 0.24.  EKG remained unchanged.   -s/p Diagnostic Cath today 5/11: known 3VCAD (99% mLAD, 100% ramus, 90% OM2, 99% small OM3, 85% distal dominant LCX), Patent LIMA to LAD, patent radial graft to ramus, patent SVG to OM2, LVEDP4.    - Continue ASA 81mg QD, Atorvastatin 80mg Qhs. (heparin gtt and Brilinta discontinued)

## 2018-05-12 NOTE — PROGRESS NOTE ADULT - PROBLEM SELECTOR PLAN 6
-Patient denies Hx of prostate CA  -Urology Team consulted.  f/u further Recs  -CXR w/ incidental finding of sclerotic bone metastases, likely from prostate CA.  -CT abdomen/pelvis 05/11: Prostate tumor. Distended urinary bladder. Extensive bony metastasis. Multiple solid pulmonary nodules. Pulmonary metastasis is a consideration.  -Urology Team consulted. f/u further Recs. Post void scan 640 pt will need a gould per Dr. Poole Urology aware  - f/u PSA level  -Pt complaining of diffuse pain, unable to consult pain management today, will order low dose of oxycodone for pain in interim until pain management can be consulted per Dr. Poole. Given oxycodone Pox1   -started on Flomax 0.4mg QD per Dr. Poole

## 2018-05-13 DIAGNOSIS — Z02.9 ENCOUNTER FOR ADMINISTRATIVE EXAMINATIONS, UNSPECIFIED: ICD-10-CM

## 2018-05-13 LAB
ANION GAP SERPL CALC-SCNC: 13 MMOL/L — SIGNIFICANT CHANGE UP (ref 5–17)
BUN SERPL-MCNC: 32 MG/DL — HIGH (ref 7–23)
CALCIUM SERPL-MCNC: 8.4 MG/DL — SIGNIFICANT CHANGE UP (ref 8.4–10.5)
CHLORIDE SERPL-SCNC: 99 MMOL/L — SIGNIFICANT CHANGE UP (ref 96–108)
CO2 SERPL-SCNC: 26 MMOL/L — SIGNIFICANT CHANGE UP (ref 22–31)
CREAT SERPL-MCNC: 1.22 MG/DL — SIGNIFICANT CHANGE UP (ref 0.5–1.3)
GLUCOSE SERPL-MCNC: 82 MG/DL — SIGNIFICANT CHANGE UP (ref 70–99)
HCT VFR BLD CALC: 33.1 % — LOW (ref 39–50)
HGB BLD-MCNC: 10.8 G/DL — LOW (ref 13–17)
MAGNESIUM SERPL-MCNC: 2 MG/DL — SIGNIFICANT CHANGE UP (ref 1.6–2.6)
MCHC RBC-ENTMCNC: 27.8 PG — SIGNIFICANT CHANGE UP (ref 27–34)
MCHC RBC-ENTMCNC: 32.6 G/DL — SIGNIFICANT CHANGE UP (ref 32–36)
MCV RBC AUTO: 85.3 FL — SIGNIFICANT CHANGE UP (ref 80–100)
PLATELET # BLD AUTO: 300 K/UL — SIGNIFICANT CHANGE UP (ref 150–400)
POTASSIUM SERPL-MCNC: 3.7 MMOL/L — SIGNIFICANT CHANGE UP (ref 3.5–5.3)
POTASSIUM SERPL-SCNC: 3.7 MMOL/L — SIGNIFICANT CHANGE UP (ref 3.5–5.3)
PSA FLD-MCNC: 6264 NG/ML — HIGH (ref 0–4)
RBC # BLD: 3.88 M/UL — LOW (ref 4.2–5.8)
RBC # FLD: 15.1 % — SIGNIFICANT CHANGE UP (ref 10.3–16.9)
SODIUM SERPL-SCNC: 138 MMOL/L — SIGNIFICANT CHANGE UP (ref 135–145)
WBC # BLD: 7.6 K/UL — SIGNIFICANT CHANGE UP (ref 3.8–10.5)
WBC # FLD AUTO: 7.6 K/UL — SIGNIFICANT CHANGE UP (ref 3.8–10.5)

## 2018-05-13 PROCEDURE — 99233 SBSQ HOSP IP/OBS HIGH 50: CPT

## 2018-05-13 RX ORDER — OXYCODONE AND ACETAMINOPHEN 5; 325 MG/1; MG/1
1 TABLET ORAL EVERY 6 HOURS
Qty: 0 | Refills: 0 | Status: DISCONTINUED | OUTPATIENT
Start: 2018-05-13 | End: 2018-05-13

## 2018-05-13 RX ORDER — TRAMADOL HYDROCHLORIDE 50 MG/1
25 TABLET ORAL EVERY 6 HOURS
Qty: 0 | Refills: 0 | Status: DISCONTINUED | OUTPATIENT
Start: 2018-05-13 | End: 2018-05-13

## 2018-05-13 RX ORDER — POTASSIUM CHLORIDE 20 MEQ
40 PACKET (EA) ORAL ONCE
Qty: 0 | Refills: 0 | Status: COMPLETED | OUTPATIENT
Start: 2018-05-13 | End: 2018-05-13

## 2018-05-13 RX ORDER — ACETAMINOPHEN 500 MG
650 TABLET ORAL EVERY 6 HOURS
Qty: 0 | Refills: 0 | Status: DISCONTINUED | OUTPATIENT
Start: 2018-05-13 | End: 2018-05-24

## 2018-05-13 RX ORDER — DOCUSATE SODIUM 100 MG
100 CAPSULE ORAL DAILY
Qty: 0 | Refills: 0 | Status: DISCONTINUED | OUTPATIENT
Start: 2018-05-13 | End: 2018-05-24

## 2018-05-13 RX ADMIN — OXYCODONE AND ACETAMINOPHEN 1 TABLET(S): 5; 325 TABLET ORAL at 08:48

## 2018-05-13 RX ADMIN — SENNA PLUS 5 MILLILITER(S): 8.6 TABLET ORAL at 21:08

## 2018-05-13 RX ADMIN — Medication 40 MILLIEQUIVALENT(S): at 16:24

## 2018-05-13 RX ADMIN — OXYCODONE AND ACETAMINOPHEN 1 TABLET(S): 5; 325 TABLET ORAL at 09:30

## 2018-05-13 RX ADMIN — ATORVASTATIN CALCIUM 80 MILLIGRAM(S): 80 TABLET, FILM COATED ORAL at 21:08

## 2018-05-13 RX ADMIN — APIXABAN 5 MILLIGRAM(S): 2.5 TABLET, FILM COATED ORAL at 17:30

## 2018-05-13 RX ADMIN — Medication 100 MILLIGRAM(S): at 17:30

## 2018-05-13 RX ADMIN — Medication 81 MILLIGRAM(S): at 11:45

## 2018-05-13 RX ADMIN — Medication 25 MILLIGRAM(S): at 06:33

## 2018-05-13 RX ADMIN — LOSARTAN POTASSIUM 25 MILLIGRAM(S): 100 TABLET, FILM COATED ORAL at 06:33

## 2018-05-13 RX ADMIN — Medication 650 MILLIGRAM(S): at 22:08

## 2018-05-13 RX ADMIN — Medication 650 MILLIGRAM(S): at 21:08

## 2018-05-13 RX ADMIN — TAMSULOSIN HYDROCHLORIDE 0.4 MILLIGRAM(S): 0.4 CAPSULE ORAL at 21:08

## 2018-05-13 RX ADMIN — APIXABAN 5 MILLIGRAM(S): 2.5 TABLET, FILM COATED ORAL at 06:33

## 2018-05-13 NOTE — PROGRESS NOTE ADULT - PROBLEM SELECTOR PLAN 3
SBP remains stable 90-100s.   - c/w lopressor 25 mg daily and losartan 25 mg daily.  - Continue to monitor.

## 2018-05-13 NOTE — PROGRESS NOTE ADULT - SUBJECTIVE AND OBJECTIVE BOX
Interventional Cardiology PA Adult Progress Note    Subjective Assessment: Patient seen and examined at bedside, breathing improved.  Patient with continued left rib pain only mild.   	  MEDICATIONS:  furosemide    Tablet 40 milliGRAM(s) Oral daily  losartan 25 milliGRAM(s) Oral daily  metoprolol succinate ER 25 milliGRAM(s) Oral daily  nitroglycerin     SubLingual 0.4 milliGRAM(s) SubLingual every 5 minutes PRN  tamsulosin 0.4 milliGRAM(s) Oral at bedtime  oxyCODONE    5 mG/acetaminophen 325 mG 1 Tablet(s) Oral every 6 hours PRN  senna Syrup 5 milliLiter(s) Oral daily  atorvastatin 80 milliGRAM(s) Oral at bedtime  methimazole 5 milliGRAM(s) Oral three times a day  apixaban 5 milliGRAM(s) Oral every 12 hours  aspirin enteric coated 81 milliGRAM(s) Oral daily	    [PHYSICAL EXAM:  TELEMETRY:  T(C): 36.3 (05-13-18 @ 09:04), Max: 36.7 (05-12-18 @ 17:53)  HR: 69 (05-13-18 @ 08:52) (69 - 87)  BP: 99/56 (05-13-18 @ 08:52) (95/55 - 107/57)  RR: 16 (05-13-18 @ 08:52) (16 - 16)  SpO2: 100% (05-13-18 @ 08:52) (96% - 100%)    I&O's Summary    12 May 2018 07:01  -  13 May 2018 07:00  --------------------------------------------------------  IN: 320 mL / OUT: 1500 mL / NET: -1180 mL    13 May 2018 07:01  -  13 May 2018 09:27  --------------------------------------------------------  IN: 395 mL / OUT: 0 mL / NET: 395 mL    De Leon: Yes  Central/PICC/Mid Line: No                                         Appearance: Normal	  HEENT:   Normal oral mucosa, PERRL, EOMI	  Neck: Supple,- JVD;   Cardiovascular: Normal S1 S2, No JVD, No murmurs,   Respiratory: Lungs clear to auscultation//No Rales, Rhonchi, Wheezing	  Gastrointestinal:  Soft, Non-tender, + BS	  Skin: No rashes, No ecchymoses, No cyanosis  Extremities: Normal range of motion, No clubbing, cyanosis or edema  Vascular: Peripheral pulses palpable 2+ bilaterally  Neurologic: Non-focal  Psychiatry: A & O x 3, Mood & affect appropriate    LABS:	 	  CARDIAC MARKERS:                        10.8   7.6   )-----------( 300      ( 13 May 2018 06:13 )             33.1     05-13    138  |  99  |  32<H>  ----------------------------<  82  3.7   |  26  |  1.22    Ca    8.4      13 May 2018 06:16  Mg     2.0     05-13    TPro  6.4  /  Alb  3.0<L>  /  TBili  1.7<H>  /  DBili  x   /  AST  22  /  ALT  8<L>  /  AlkPhos  576<H>  05-12  PTT - ( 11 May 2018 10:09 )  PTT:52.1 sec    ASSESSMENT/PLAN: 	  DVT ppx: Johann

## 2018-05-13 NOTE — PROGRESS NOTE ADULT - PROBLEM SELECTOR PLAN 7
- Alk Phos 767, Total Bili 2.2, direct bili 0.8, Indirect bili 1.4.  Persistently elevated INR.   - No complaints of Abd pain  - CT abdomen/pelvis 05/11: Heterogeneous hepatic enhancement likely due to altered perfusion. No discrete hepatic mass. Cholelithiasis. No biliary dilatation. Patent portal vein. Nonspecific thickening of the bilateral adrenal glands could be due to adenomatous hyperplasia.

## 2018-05-13 NOTE — PROGRESS NOTE ADULT - SUBJECTIVE AND OBJECTIVE BOX
Subjective Assessment: No CP/SOB  	  MEDICATIONS:  furosemide    Tablet 40 milliGRAM(s) Oral daily  losartan 25 milliGRAM(s) Oral daily  metoprolol succinate ER 25 milliGRAM(s) Oral daily  nitroglycerin     SubLingual 0.4 milliGRAM(s) SubLingual every 5 minutes PRN  tamsulosin 0.4 milliGRAM(s) Oral at bedtime  oxyCODONE    5 mG/acetaminophen 325 mG 1 Tablet(s) Oral every 6 hours PRN  senna Syrup 5 milliLiter(s) Oral daily  atorvastatin 80 milliGRAM(s) Oral at bedtime  methimazole 5 milliGRAM(s) Oral three times a day  apixaban 5 milliGRAM(s) Oral every 12 hours  aspirin enteric coated 81 milliGRAM(s) Oral daily	    [PHYSICAL EXAM:  TELEMETRY:  T(C): 36.3 (05-13-18 @ 09:04), Max: 36.7 (05-12-18 @ 17:53)  HR: 69 (05-13-18 @ 08:52) (69 - 87)  BP: 99/56 (05-13-18 @ 08:52) (95/55 - 107/57)  RR: 16 (05-13-18 @ 08:52) (16 - 16)  SpO2: 100% (05-13-18 @ 08:52) (96% - 100%)    I&O's Summary    12 May 2018 07:01  -  13 May 2018 07:00  --------------------------------------------------------  IN: 320 mL / OUT: 1500 mL / NET: -1180 mL    13 May 2018 07:01  -  13 May 2018 09:27  --------------------------------------------------------  IN: 395 mL / OUT: 0 mL / NET: 395 mL    De Leon: Yes                                         Appearance: Normal	  HEENT:   Normal oral mucosa, PERRL, EOMI	  Neck: Supple,- JVD;   Cardiovascular: Normal S1 S2, No JVD, No murmurs,   Respiratory: Lungs clear to auscultation//No Rales, Rhonchi, Wheezing	  Gastrointestinal:  Soft, Non-tender, + BS	  Skin: No rashes, No ecchymoses, No cyanosis  Extremities: Normal range of motion, No clubbing, cyanosis or edema  Vascular: Peripheral pulses palpable 2+ bilaterally  Neurologic: Non-focal  Psychiatry: A & O x 3, Mood & affect appropriate    LABS:	 	  CARDIAC MARKERS:                        10.8   7.6   )-----------( 300      ( 13 May 2018 06:13 )             33.1     05-13    138  |  99  |  32<H>  ----------------------------<  82  3.7   |  26  |  1.22    Ca    8.4      13 May 2018 06:16  Mg     2.0     05-13    TPro  6.4  /  Alb  3.0<L>  /  TBili  1.7<H>  /  DBili  x   /  AST  22  /  ALT  8<L>  /  AlkPhos  576<H>  05-12  PTT - ( 11 May 2018 10:09 )  PTT:52.1 sec    ASSESSMENT/PLAN: 	  DVT ppx: Johann

## 2018-05-13 NOTE — PROGRESS NOTE ADULT - ASSESSMENT
76 yo male FORMER SMOKER with PMHx of HTN, HLD, pre- DM, CAD s/p CABG (in 2016 @ [facility]), chronic systolic CHF, h/o DVT 6 months ago (on Eliquis)  admitted for r/o ACS s/p non obstructed Cath, being worked up for possible prostate CA.

## 2018-05-13 NOTE — PROGRESS NOTE ADULT - PROBLEM SELECTOR PLAN 5
hx of L DVT (dx: 6 months ago: on eliquis).   - Continue Eliquis 5mg BID for now, due to possible cancer diagnosis and unknown reason for prior DVT

## 2018-05-13 NOTE — PROGRESS NOTE ADULT - ASSESSMENT
78 yo male FORMER SMOKER with PMHx of HTN, HLD, pre- DM, CAD s/p CABG (in 2016 @ [facility]), chronic systolic CHF, h/o DVT 6 months ago (on Eliquis)  admitted for r/o ACS s/p non obstructed Cath, being worked up for possible prostate CA.

## 2018-05-13 NOTE — PROGRESS NOTE ADULT - PROBLEM SELECTOR PLAN 1
Now euvolemic, breathing improved.   - CXR 5/10: Pulmonary vascular congestion. Interstitial pulmonary edema.  - EKG revealed NSR with 1st degree AV block @ 94 BPM with left axis deviation and Q wave in inferior leads and V1-V5.  - Echo 5/10: severe concentric LVH, severe global hypokinesis of the left ventricle, EF 30%, left atrium is severely dilated, mitral inflow pattern is consistent with restrictive left ventricular filling, suggestive of severely elevated left atrial pressure, right atrium is moderately dilated, tethered mitral valve leaflets.  - Echo possibly concerning for amyloidosis but no Cardiac MRI at this time per Dr. Murphy.   - Continue Lasix 40mg PO QD and Losartan 25mg QD.   - Strict I&O's, daily weights.

## 2018-05-13 NOTE — PROGRESS NOTE ADULT - PROBLEM SELECTOR PLAN 6
CXR w/ incidental finding of sclerotic bone metastases, likely from prostate CA. Patient denies CA hx.   - Urology Team consulted.  f/u further Recs  - PSA elevated 6264  - CT abdomen/pelvis 05/11: Prostate tumor. Distended urinary bladder. Extensive bony metastasis. Multiple solid pulmonary nodules. Pulmonary metastasis is a consideration.  - Continue gould for urinary retention  - Tamsulosin 0.4mg started.   - Pt complaining of diffuse pain, unable to consult pain management today, will order low dose of oxycodone for pain in interim until pain management can be consulted per Dr. Poole.  - f/u further urology recs, consider Oncology consult.

## 2018-05-13 NOTE — PROGRESS NOTE ADULT - PROBLEM SELECTOR PLAN 2
Mild left side chest/rib pain, reproducible on exam.  - R/I NSTEMI peak CE 0.24.  EKG remained unchanged. CE likely in setting of CHF.  - s/p Diagnostic Cath 5/11: known 3VCAD (99% mLAD, 100% ramus, 90% OM2, 99% small OM3, 85% distal dominant LCX), Patent LIMA to LAD, patent radial graft to ramus, patent SVG to OM2, LVEDP4.    - Continue ASA 81mg QD, Atorvastatin 80mg Qhs, Metoprolol 25mg QD.

## 2018-05-14 DIAGNOSIS — R91.8 OTHER NONSPECIFIC ABNORMAL FINDING OF LUNG FIELD: ICD-10-CM

## 2018-05-14 LAB
ALBUMIN SERPL ELPH-MCNC: 2.9 G/DL — LOW (ref 3.3–5)
ALP SERPL-CCNC: 430 U/L — HIGH (ref 40–120)
ALT FLD-CCNC: 8 U/L — LOW (ref 10–45)
ANION GAP SERPL CALC-SCNC: 13 MMOL/L — SIGNIFICANT CHANGE UP (ref 5–17)
AST SERPL-CCNC: 20 U/L — SIGNIFICANT CHANGE UP (ref 10–40)
BILIRUB SERPL-MCNC: 1 MG/DL — SIGNIFICANT CHANGE UP (ref 0.2–1.2)
BUN SERPL-MCNC: 34 MG/DL — HIGH (ref 7–23)
CALCIUM SERPL-MCNC: 8.6 MG/DL — SIGNIFICANT CHANGE UP (ref 8.4–10.5)
CHLORIDE SERPL-SCNC: 96 MMOL/L — SIGNIFICANT CHANGE UP (ref 96–108)
CO2 SERPL-SCNC: 25 MMOL/L — SIGNIFICANT CHANGE UP (ref 22–31)
CREAT SERPL-MCNC: 1.24 MG/DL — SIGNIFICANT CHANGE UP (ref 0.5–1.3)
GLUCOSE SERPL-MCNC: 73 MG/DL — SIGNIFICANT CHANGE UP (ref 70–99)
HCT VFR BLD CALC: 31.1 % — LOW (ref 39–50)
HGB BLD-MCNC: 10.1 G/DL — LOW (ref 13–17)
MAGNESIUM SERPL-MCNC: 2 MG/DL — SIGNIFICANT CHANGE UP (ref 1.6–2.6)
MCHC RBC-ENTMCNC: 27.7 PG — SIGNIFICANT CHANGE UP (ref 27–34)
MCHC RBC-ENTMCNC: 32.5 G/DL — SIGNIFICANT CHANGE UP (ref 32–36)
MCV RBC AUTO: 85.2 FL — SIGNIFICANT CHANGE UP (ref 80–100)
PLATELET # BLD AUTO: 291 K/UL — SIGNIFICANT CHANGE UP (ref 150–400)
POTASSIUM SERPL-MCNC: 3.9 MMOL/L — SIGNIFICANT CHANGE UP (ref 3.5–5.3)
POTASSIUM SERPL-SCNC: 3.9 MMOL/L — SIGNIFICANT CHANGE UP (ref 3.5–5.3)
PROT SERPL-MCNC: 5.9 G/DL — LOW (ref 6–8.3)
RBC # BLD: 3.65 M/UL — LOW (ref 4.2–5.8)
RBC # FLD: 14.9 % — SIGNIFICANT CHANGE UP (ref 10.3–16.9)
SODIUM SERPL-SCNC: 134 MMOL/L — LOW (ref 135–145)
WBC # BLD: 6.5 K/UL — SIGNIFICANT CHANGE UP (ref 3.8–10.5)
WBC # FLD AUTO: 6.5 K/UL — SIGNIFICANT CHANGE UP (ref 3.8–10.5)

## 2018-05-14 PROCEDURE — 99232 SBSQ HOSP IP/OBS MODERATE 35: CPT

## 2018-05-14 PROCEDURE — 71250 CT THORAX DX C-: CPT | Mod: 26

## 2018-05-14 RX ORDER — HEPARIN SODIUM 5000 [USP'U]/ML
5000 INJECTION INTRAVENOUS; SUBCUTANEOUS EVERY 8 HOURS
Qty: 0 | Refills: 0 | Status: DISCONTINUED | OUTPATIENT
Start: 2018-05-15 | End: 2018-05-17

## 2018-05-14 RX ADMIN — ATORVASTATIN CALCIUM 80 MILLIGRAM(S): 80 TABLET, FILM COATED ORAL at 21:40

## 2018-05-14 RX ADMIN — SENNA PLUS 5 MILLILITER(S): 8.6 TABLET ORAL at 11:32

## 2018-05-14 RX ADMIN — Medication 650 MILLIGRAM(S): at 11:40

## 2018-05-14 RX ADMIN — Medication 81 MILLIGRAM(S): at 11:32

## 2018-05-14 RX ADMIN — Medication 100 MILLIGRAM(S): at 11:32

## 2018-05-14 RX ADMIN — TAMSULOSIN HYDROCHLORIDE 0.4 MILLIGRAM(S): 0.4 CAPSULE ORAL at 21:40

## 2018-05-14 RX ADMIN — Medication 650 MILLIGRAM(S): at 19:55

## 2018-05-14 RX ADMIN — Medication 40 MILLIGRAM(S): at 07:20

## 2018-05-14 RX ADMIN — APIXABAN 5 MILLIGRAM(S): 2.5 TABLET, FILM COATED ORAL at 06:36

## 2018-05-14 RX ADMIN — Medication 650 MILLIGRAM(S): at 10:54

## 2018-05-14 RX ADMIN — Medication 25 MILLIGRAM(S): at 07:20

## 2018-05-14 RX ADMIN — Medication 650 MILLIGRAM(S): at 19:24

## 2018-05-14 NOTE — PROGRESS NOTE ADULT - PROBLEM SELECTOR PLAN 3
SBP remains low, 80-100s.   - c/w lopressor 25 mg daily and losartan 25 mg daily.  - Continue to monitor.

## 2018-05-14 NOTE — DIETITIAN INITIAL EVALUATION ADULT. - PROBLEM SELECTOR PLAN 1
Pt with bibasilar crackles, L> R, diminished breath sounds at R lower bases, expiratory wheezes throughout all lung zones. BNP 6822. Currently CP free and hemodynamically stable.  --Trop 0.08, alk phos 500, . f/u CE @ 10pm, 2am  --Received 40 mg IV lasix x1 in ER. - c/w Lasix 40 mg IV BID  - given duoneb X 1 for expiratory wheezing.   --CXR wet read revealed R sided pleural effusion. f/u official read.  --EKG revealed NSR with 1st degree AV block @ 94 BPM with left axis deviation and Q wave in inferior leads and V1-V5.  - pt. with cough X 1 week with white sputum production . f/u RVP  --f/u ECHO  --Strict I&O's, daily weights.

## 2018-05-14 NOTE — PROGRESS NOTE ADULT - SUBJECTIVE AND OBJECTIVE BOX
Interventional, Pulmonary, Critical, Chest Special Procedures.    Pt was seen and fully examined by myself.     Time spent with patient in minutes:77    Patient is a 77y old  Male who presents with a chief complaint of CP (09 May 2018 19:41)Events leading to this admission reviewed. The patient ill appearing, reporting white sputum to non productive chronic cough, denies aspiration, denies hemoptysis, denies any trauma.  The patient is eupneic on RA at rest.    HPI:  76 yo male FORMER SMOKER with PMHx of HTN, HLD, pre- DM,  CAD s/p CABG (in 2016 @ [facility]), CHF (EF unknown), h/o DVT 6 months ago (on Eliquis; last dose last night) who presented to [facility] ED on 5/9/18 with CC of Cough and SOB X 5-6 days. Pt. reports that he initially had a cough with white sputum production associated with shortness of breath at rest, non-radiating L sided Chest tightness and palpitations. pt. reports that the chest tightness and SOB comes along with the cough  and is relieved when he stops coughing. He also reports feeling sore. At baseline, pt. reports that he can ambulate 4 blocks before getting SOB.  Today am, pt. reports having hot flashes , cough, SOB and chest tightness which prompted him to come to the ED. Denies dizziness, diaphoresis, fatigue, LE edema, orthopnea, PND, syncope.  Upon admit, temp 98.3F,  BPM, /86, RR 20, SpO2 96% on RA.  Labs significant for alk phos 500, , trop 0.08, BNP 6822. CXR wet read revealed R sided pleural effusion. EKG revealed NSR with 1st degree AV block @ 94 BPM with left axis deviation and Q wave in inferior leads and V1-V5.  While in ER, pt received IV Lasix 40 mg x1. Patient admitted to 5U for diuresis, ECHO, r/o ACS. (09 May 2018 19:41)    REVIEW OF SYSTEMS:  Constitutional: No fever, weight loss, chills + fatigue  Eyes: No eye pain, visual disturbances, or discharge  ENMT:  + some difficulty hearing, tinnitus, vertigo; No sinus or throat pain. No epistaxis, dysphagia, +dysphonia, +hoarseness no odynophagia  Neck: No pain, stiffness or neck swelling.  No masses or deformities  Respiratory: +cough, wheezing, chills or hemoptysis  - COPD  - ILD   - PE   - ASTHMA     - PNEUMONIA  Cardiovascular: + chest pain, dysrhythmia, palpitations, dizziness or edema   - COPD     +CAD   + CHF   + HTN  Gastrointestinal: No abdominal or epigastric pain. No nausea, vomiting or hematemesis; No diarrhea or constipation. No melena or hematochezia. No dysphagia or Icterus.          Genitourinary: No dysuria, frequency, hematuria or incontinence   - CKD/PAM      - ESRD  Neurological: No headaches, memory loss, loss of strength, numbness or tremors      -DEMENTIA     - STROKE    - SEIZURE  Skin: No itching, burning, rashes or lesions   Lymph Nodes: No enlarged glands  Endocrine: No heat or cold intolerance; No hair loss       - DM     - THYROID DISORDER  Musculoskeletal: No joint pain or swelling; No muscle, back or extremity pain  Psychiatric: No depression, anxiety, mood swings or difficulty sleeping  Heme/Lymph: No easy bruising or bleeding gums         + ANEMIA      + CANCER   +COAGULOPATHY  Allergy and Immunologic: No hives or eczema    PAST MEDICAL & SURGICAL HISTORY:  CHF (congestive heart failure)  CAD (coronary artery disease)  HTN (hypertension)  S/P hernia surgery  History of coronary artery bypass graft: 2017 @ [facility]    FAMILY HISTORY:  No pertinent family history in first degree relatives    SOCIAL HISTORY:      - Tobacco     - ETOH    Allergies    No Known Allergies    Intolerances      Vital Signs Last 24 Hrs  T(C): 36.2 (14 May 2018 08:55), Max: 36.6 (13 May 2018 13:11)  T(F): 97.2 (14 May 2018 08:55), Max: 97.9 (13 May 2018 13:11)  HR: 77 (14 May 2018 08:55) (68 - 77)  BP: 84/54 (14 May 2018 08:55) (82/50 - 99/52)  BP(mean): --  RR: 16 (14 May 2018 08:55) (16 - 116)  SpO2: 100% (14 May 2018 08:55) (96% - 100%)    05-13 @ 07:01  -  05-14 @ 07:00  --------------------------------------------------------  IN: 875 mL / OUT: 800 mL / NET: 75 mL    05-14 @ 07:01  -  05-14 @ 11:46  --------------------------------------------------------  IN: 300 mL / OUT: 0 mL / NET: 300 mL        PHYSICAL EXAM:  Un Comfortable, no immediate distress  Eyes: PERRL, EOM intact; conjunctiva and sclera clear  Head: Normocephalic;  No Trauma  ENMT: No nasal discharge, +hoarseness, +cough   Neck: Supple; non tender; no masses or deformities.    No JVD  Respiratory:  - WHEEZING   - RHONCHI  - RALES  + CRACKLES.  Diminished breath sounds  BILATERAL  RIGHT  LEFT bases  Cardiovascular: Regular rate and rhythm. S1 and S2 Normal; No murmurs, gallops or rubs     - PPM/AICD  Gastrointestinal: Soft non-tender, non-distended; Normal bowel sounds; No hepatosplenomegaly.     -PEG    -  GT   - LAU  Genitourinary: No costovertebral angle tenderness. No dysuria  Extremities: AROM, + clubbing, cyanosis or edema    Vascular: Peripheral pulses palpable 2+ bilaterally  Neurological: Alert and responisve to stimuli   Skin: Warm and dry. No obvious rash  Lymph Nodes: No acute cervical or supraclavicular adenopathy  Psychiatric: Cooperative and appropriate mood    DEVICES:  - DENTURES   +IV R / L     - ETUBE   -TRACH   -CTUBE  R / L      LABS:                          10.1   6.5   )-----------( 291      ( 14 May 2018 06:52 )             31.1     05-14    134<L>  |  96  |  34<H>  ----------------------------<  73  3.9   |  25  |  1.24    Ca    8.6      14 May 2018 06:54  Mg     2.0     05-14    TPro  5.9<L>  /  Alb  2.9<L>  /  TBili  1.0  /  DBili  x   /  AST  20  /  ALT  8<L>  /  AlkPhos  430<H>  05-14    < from: CT Abdomen and Pelvis w/ IV Cont (05.12.18 @ 11:32) >  EXAM:  CT ABDOMEN AND PELVIS IC                          PROCEDURE DATE:  05/12/2018    Quantity of Contrast in Vial in ml: 100 Contrast Used: Optiray 350  Quantity of Contrast Wasted in ml: 10           INTERPRETATION:  CT of the ABDOMEN and PELVIS with intravenous contrast   dated 5/12/2018 11:32 AM    INDICATION: Elevated liver present. Right groin pain. Congestive heart   failure. Rule out prostate cancer.    TECHNIQUE: CT of the abdomen and pelvis was performed using  intravenous   contrast. Axial, sagittal and coronal images were produced and reviewed.    PRIOR STUDIES: None.    FINDINGS: Images of the lower chest demonstrate cardiomegaly. There is a   small right pleural effusion with overlying compressive atelectasis.   Epicardial pacer wires are noted. Multiple subcentimeter nodular   densities are seen in the lower lobes bilaterally. The largest measures 8   mm in the left lower lobe (series 4 image 8).    There is geographical heterogeneous hepatic enhancement with decreased   enhancement of the right hepatic lobe compared to left.  Portal vein   appears patent. A gallstone is seen within the gallbladder lumen. There   is no dilatation of the intra or extrahepatic biliary system. The   pancreas is normal in appearance.  No splenic abnormalities are seen.    There is nonspecific thickening of the bilateral adrenal glands. There is   a 2.8 cm left renal cyst. There are a few subcentimeter hypodensities   renal lesions that are too small to characterize. No hydronephrosis.     No abdominal aortic aneurysm is seen. Severe noncalcified and calcified   plaque aorta. There is a 1.0 cm distal paraesophageal lymph node at the   level of diaphragmatic hiatus. Otherwise, no retroperitoneal   lymphadenopathy is seen.     Evaluation of the bowel demonstrates mild colonic diverticulosis. No   bowel obstruction or free air. No ascites is seen. The appendix is   normal. Small fat-containing bilateral inguinal hernias.    Images of the pelvis demonstrate an enlarged and heterogeneous prostate   gland. There is a 3.3 x 2.6 cm peripheral hypodense mass at the right   prostate consistent with normal malignancy. Nodular densities project   from the prostate into the bladder base. There is a 0.6 cm right   periprostatic lymph node.  The urinary bladder is distended.     Evaluation of the osseous structures demonstrates multiple sclerotic   lesions in the spine, ribs, and pelvis consistent with metastatic   disease. There is a lipoma in the right posterior lateral aspect of the   chest wall.      IMPRESSION:  1.  Prostate tumor. Distended urinary bladder.  2.  Extensive bony metastasis.  3.  Multiple solid pulmonary nodules. Pulmonary metastasis is a   consideration. Recommend CT of chest for complete evaluation.  4.  Small right pleural effusion. Cardiomegaly.  5.  Heterogeneous hepatic enhancement likely due to altered perfusion. No   discrete hepatic mass. Cholelithiasis. No biliary dilatation. Patent   portal vein.  6.  Nonspecific thickening of the bilateral adrenal glands could be due   to adenomatous hyperplasia. CT without contrast or PET/CT would be useful   for differentiation from metastasis.    < end of copied text >    RADIOLOGY & ADDITIONAL STUDIES (The following images were personally reviewed):

## 2018-05-14 NOTE — DIETITIAN INITIAL EVALUATION ADULT. - PROBLEM SELECTOR PLAN 2
h/o CABG at [facility] in 2017.  --Obtain CABG report in AM as able.  --f/u AM lipid panel, hgbA1C  --c/w lipitor 40 mg daily, lopressor 25 mg daily, losartan 25 mg daily    - Pt. with hx of L DVT (dx: 6 months ago: on eliquis)- cw eliquis

## 2018-05-14 NOTE — DIETITIAN INITIAL EVALUATION ADULT. - OTHER INFO
78 yo male FORMER SMOKER with PMHx of HTN, HLD, pre- DM, CAD s/p CABG,  chronic systolic CHF, h/o DVT 6 months ago (on Eliquis) admitted for r/o ACS s/p non obstructed Cath, being worked up for possible prostate CA.  Pt tolerating diet with fair (~50%) PO intake. Pt reports to follow a heart healthy diet at home, fluid restriction, low sodium. Pt reports decreased appetite PTA which resulted in ~14 lb wt loss over the past few months. No n/v/d/c noted. No pain.

## 2018-05-14 NOTE — PROGRESS NOTE ADULT - ASSESSMENT
76 yo male FORMER SMOKER with PMHx of HTN, HLD, pre- DM, CAD s/p CABG (in 2016 @ [facility]), chronic systolic CHF, h/o DVT 6 months ago (on Eliquis)  admitted for r/o ACS s/p non obstructed Cath, being worked up for possible prostate CA.

## 2018-05-14 NOTE — PROGRESS NOTE ADULT - PROBLEM SELECTOR PLAN 1
Now euvolemic, breathing improved.   - CXR 5/10: Pulmonary vascular congestion. Interstitial pulmonary edema.  - EKG revealed NSR with 1st degree AV block @ 94 BPM with left axis deviation and Q wave in inferior leads and V1-V5.  - Echo 5/10: severe concentric LVH, severe global hypokinesis of the left ventricle, EF 30%, left atrium is severely dilated, mitral inflow pattern is consistent with restrictive left ventricular filling, suggestive of severely elevated left atrial pressure, right atrium is moderately dilated, tethered mitral valve leaflets.  - Echo possibly concerning for amyloidosis but no Cardiac MRI at this time per Dr. Muprhy.   - Continue Lasix 40mg PO QD and Losartan 25mg QD.   - Strict I&O's, daily weights.

## 2018-05-14 NOTE — PROGRESS NOTE ADULT - PROBLEM SELECTOR PLAN 7
Multiple pulmonary nodules noted on CT Abd/Plevis  - Per Oncology recs perform biopsy of nodules to determine cause from prostate CA vs. pulmonary CA.   - Dr. Woods consulted, f/u Recs  - CT Chest w/o contrast ordered  - Eliquis on hold with plan for possible biopsy earliest Thursday (Last dose Eliquis 5/14 AM)

## 2018-05-14 NOTE — PROGRESS NOTE ADULT - SUBJECTIVE AND OBJECTIVE BOX
I    Subjective Assessment: denies SOB.  Chest/rib pain stable.  patient aware of possible cancer diagnosis.   	  MEDICATIONS:  furosemide    Tablet 40 milliGRAM(s) Oral daily  losartan 25 milliGRAM(s) Oral daily  metoprolol succinate ER 25 milliGRAM(s) Oral daily  tamsulosin 0.4 milliGRAM(s) Oral at bedtime  acetaminophen   Tablet. 650 milliGRAM(s) Oral every 6 hours PRN  traMADol 25 milliGRAM(s) Oral every 6 hours PRN  docusate sodium 100 milliGRAM(s) Oral daily  senna Syrup 5 milliLiter(s) Oral daily  atorvastatin 80 milliGRAM(s) Oral at bedtime  methimazole 5 milliGRAM(s) Oral three times a day  aspirin enteric coated 81 milliGRAM(s) Oral daily	    [PHYSICAL EXAM:  TELEMETRY:  T(C): 36.2 (05-14-18 @ 08:55), Max: 36.6 (05-13-18 @ 13:11)  HR: 77 (05-14-18 @ 08:55) (68 - 77)  BP: 84/54 (05-14-18 @ 08:55) (82/50 - 99/52)  RR: 16 (05-14-18 @ 08:55) (16 - 116)  SpO2: 100% (05-14-18 @ 08:55) (96% - 100%)    I&O's Summary    13 May 2018 07:01  -  14 May 2018 07:00  --------------------------------------------------------  IN: 875 mL / OUT: 800 mL / NET: 75 mL    14 May 2018 07:01  -  14 May 2018 12:58  --------------------------------------------------------  IN: 300 mL / OUT: 0 mL / NET: 300 mL    De Leon: Yes                                    Appearance: Normal	  HEENT:   Normal oral mucosa, PERRL, EOMI	  Neck: Supple,  - JVD; Carotid Bruit   Cardiovascular: Normal S1 S2, No JVD, No murmurs,   Respiratory: Lungs clear to auscultation//No Rales, Rhonchi, Wheezing	  Gastrointestinal:  Soft, Non-tender, + BS	  Skin: No rashes, No ecchymoses, No cyanosis  Extremities: Normal range of motion, No clubbing, cyanosis or edema  Vascular: Peripheral pulses palpable 2+ bilaterally  Neurologic: Non-focal  Psychiatry: A & O x 3, Mood & affect appropriate	    LABS:	 	  CARDIAC MARKERS:                  10.1   6.5   )-----------( 291      ( 14 May 2018 06:52 )             31.1     05-14    134<L>  |  96  |  34<H>  ----------------------------<  73  3.9   |  25  |  1.24    Ca    8.6      14 May 2018 06:54  Mg     2.0     05-14    TPro  5.9<L>  /  Alb  2.9<L>  /  TBili  1.0  /  DBili  x   /  AST  20  /  ALT  8<L>  /  AlkPhos  430<H>  05-14    ASSESSMENT/PLAN: 	  DVT ppx: Hep Sub Q

## 2018-05-14 NOTE — PROGRESS NOTE ADULT - PROBLEM SELECTOR PLAN 5
hx of L DVT (dx: 6 months ago: on eliquis).   - Eliquis on hold for now given possible need for biopsy of lung nodules.   - Consider restarting when completed Given possible cancer diagnosis and unknown reason for prior DVT

## 2018-05-14 NOTE — PROGRESS NOTE ADULT - PROBLEM SELECTOR PLAN 6
CXR w/ incidental finding of sclerotic bone metastases, likely from prostate CA. Patient denies CA hx.   - Urology Team consulted (Dr. Brown).  f/u further Recs  - Oncology Team consulted (Dr. Martinez), f/u further Recs.   - PSA elevated 6264  - CT abdomen/pelvis 05/11: Prostate tumor. Distended urinary bladder. Extensive bony metastasis. Multiple solid pulmonary nodules. Pulmonary metastasis is a consideration.  - Continue gould for urinary retention (placed by urology), Continue Tamsulosin 0.4mg  - Pt complaining of mild diffuse pain, states it is tolerable.  Continue Tramadol PRN.  consider pain management.

## 2018-05-14 NOTE — PROGRESS NOTE ADULT - ASSESSMENT
76 yo male FORMER SMOKER with PMHx of HTN, HLD, pre- DM, CAD s/p CABG (in 2016 @ [facility]), chronic systolic CHF, h/o DVT 6 months ago (on Eliquis)  admitted for r/o ACS s/p non obstructed Cath, being worked up for possible prostate CA.

## 2018-05-14 NOTE — CONSULT NOTE ADULT - ASSESSMENT
78 yo M former smoker with CHF, CAD, incidental finding of bone mets on imaging and markedly elevated PSA  Imaging also with suspicious lung lesions   admitted with NSTEMI 76 yo M former smoker with CHF, CAD, incidental finding of bone mets on imaging and markedly elevated PSA  Imaging also with suspicious lung lesions   admitted with NSTEMI      Lung lesions:    Needs CT chest and biopsy of lung lesion pending results to confirm that there is not two separate issues    Certainly with prostate cancer given imaging and PSA  at a minimum, will need ADT and bone modifying drug     DIscussed with patient

## 2018-05-14 NOTE — DIETITIAN INITIAL EVALUATION ADULT. - PROBLEM SELECTOR PLAN 3
SBP 150s  --c/w lopressor 25 mg daily, losartan 25 mg daily  --Continue to monitor    DVT PPX: Eliquis  Dispo:  --Pending clinical course

## 2018-05-14 NOTE — DIETITIAN INITIAL EVALUATION ADULT. - ENERGY NEEDS
Height: 66" Weight: 138lbs, IBW 142lbs+/-10%, %IBW 97%, BMI - 22.3  ABW used to calculate energy needs due to pt's current body weight within % IBW   Nutrient needs based on [facility] standards of care for repletion in older adults 2/2 wt loss  Fluid per MD 2/2 CHF

## 2018-05-14 NOTE — CONSULT NOTE ADULT - SUBJECTIVE AND OBJECTIVE BOX
76 yo M former smoker with CHF, CAD, incidental finding of bone mets on imaging and markedly elevated PSA  Imaging also with suspicious lung lesions         Vital Signs Last 24 Hrs  T(C): 36.5 (14 May 2018 05:15), Max: 36.6 (13 May 2018 13:11)  T(F): 97.7 (14 May 2018 05:15), Max: 97.9 (13 May 2018 13:11)  HR: 77 (14 May 2018 08:55) (68 - 77)  BP: 84/54 (14 May 2018 08:55) (82/50 - 99/52)  BP(mean): --  RR: 16 (14 May 2018 08:55) (16 - 116)  SpO2: 100% (14 May 2018 08:55) (96% - 100%)      PAST MEDICAL & SURGICAL HISTORY:  CHF (congestive heart failure)  CAD (coronary artery disease)  HTN (hypertension)  S/P hernia surgery  History of coronary artery bypass graft: 2017 @ [facility]      MEDICATIONS  (STANDING):  apixaban 5 milliGRAM(s) Oral every 12 hours  aspirin enteric coated 81 milliGRAM(s) Oral daily  atorvastatin 80 milliGRAM(s) Oral at bedtime  docusate sodium 100 milliGRAM(s) Oral daily  furosemide    Tablet 40 milliGRAM(s) Oral daily  losartan 25 milliGRAM(s) Oral daily  methimazole 5 milliGRAM(s) Oral three times a day  metoprolol succinate ER 25 milliGRAM(s) Oral daily  senna Syrup 5 milliLiter(s) Oral daily  tamsulosin 0.4 milliGRAM(s) Oral at bedtime    MEDICATIONS  (PRN):  acetaminophen   Tablet. 650 milliGRAM(s) Oral every 6 hours PRN Mild Pain (1 - 3)  traMADol 25 milliGRAM(s) Oral every 6 hours PRN Moderate Pain (4 - 6)      CBC Full  -  ( 14 May 2018 06:52 )  WBC Count : 6.5 K/uL  Hemoglobin : 10.1 g/dL  Hematocrit : 31.1 %  Platelet Count - Automated : 291 K/uL  Mean Cell Volume : 85.2 fL  Mean Cell Hemoglobin : 27.7 pg  Mean Cell Hemoglobin Concentration : 32.5 g/dL  Auto Neutrophil # : x  Auto Lymphocyte # : x  Auto Monocyte # : x  Auto Eosinophil # : x  Auto Basophil # : x  Auto Neutrophil % : x  Auto Lymphocyte % : x  Auto Monocyte % : x  Auto Eosinophil % : x  Auto Basophil % : x      05-14    134<L>  |  96  |  34<H>  ----------------------------<  73  3.9   |  25  |  1.24    Ca    8.6      14 May 2018 06:54  Mg     2.0     05-14    TPro  5.9<L>  /  Alb  2.9<L>  /  TBili  1.0  /  DBili  x   /  AST  20  /  ALT  8<L>  /  AlkPhos  430<H>  05-14      Imaging:   reviewed 76 yo M former smoker with CHF, CAD, incidental finding of bone mets on imaging and markedly elevated PSA  Imaging also with suspicious lung lesions         Vital Signs Last 24 Hrs  T(C): 36.5 (14 May 2018 05:15), Max: 36.6 (13 May 2018 13:11)  T(F): 97.7 (14 May 2018 05:15), Max: 97.9 (13 May 2018 13:11)  HR: 77 (14 May 2018 08:55) (68 - 77)  BP: 84/54 (14 May 2018 08:55) (82/50 - 99/52)  BP(mean): --  RR: 16 (14 May 2018 08:55) (16 - 116)  SpO2: 100% (14 May 2018 08:55) (96% - 100%)    frail, chronically ill appearing  no cervical adenopathy  s1s2 nml rrr  ctab  abdo: SNT  No edema      PAST MEDICAL & SURGICAL HISTORY:  CHF (congestive heart failure)  CAD (coronary artery disease)  HTN (hypertension)  S/P hernia surgery  History of coronary artery bypass graft: 2017 @ [facility]    Social Hx:  lives alone, with HHA  ambulates with cane  quit smoking 40 yrs ago    He denies a family history of cancer    MEDICATIONS  (STANDING):  apixaban 5 milliGRAM(s) Oral every 12 hours  aspirin enteric coated 81 milliGRAM(s) Oral daily  atorvastatin 80 milliGRAM(s) Oral at bedtime  docusate sodium 100 milliGRAM(s) Oral daily  furosemide    Tablet 40 milliGRAM(s) Oral daily  losartan 25 milliGRAM(s) Oral daily  methimazole 5 milliGRAM(s) Oral three times a day  metoprolol succinate ER 25 milliGRAM(s) Oral daily  senna Syrup 5 milliLiter(s) Oral daily  tamsulosin 0.4 milliGRAM(s) Oral at bedtime    MEDICATIONS  (PRN):  acetaminophen   Tablet. 650 milliGRAM(s) Oral every 6 hours PRN Mild Pain (1 - 3)  traMADol 25 milliGRAM(s) Oral every 6 hours PRN Moderate Pain (4 - 6)      CBC Full  -  ( 14 May 2018 06:52 )  WBC Count : 6.5 K/uL  Hemoglobin : 10.1 g/dL  Hematocrit : 31.1 %  Platelet Count - Automated : 291 K/uL  Mean Cell Volume : 85.2 fL  Mean Cell Hemoglobin : 27.7 pg  Mean Cell Hemoglobin Concentration : 32.5 g/dL  Auto Neutrophil # : x  Auto Lymphocyte # : x  Auto Monocyte # : x  Auto Eosinophil # : x  Auto Basophil # : x  Auto Neutrophil % : x  Auto Lymphocyte % : x  Auto Monocyte % : x  Auto Eosinophil % : x  Auto Basophil % : x      05-14    134<L>  |  96  |  34<H>  ----------------------------<  73  3.9   |  25  |  1.24    Ca    8.6      14 May 2018 06:54  Mg     2.0     05-14    TPro  5.9<L>  /  Alb  2.9<L>  /  TBili  1.0  /  DBili  x   /  AST  20  /  ALT  8<L>  /  AlkPhos  430<H>  05-14      Imaging:   reviewed

## 2018-05-14 NOTE — PROGRESS NOTE ADULT - SUBJECTIVE AND OBJECTIVE BOX
Interventional Cardiology PA Adult Progress Note    Subjective Assessment: Patient seen and examined at bedside, feeling well this morning, denies SOB.  Chest/rib pain stable.  patient aware of possible cancer diagnosis.   	  MEDICATIONS:  furosemide    Tablet 40 milliGRAM(s) Oral daily  losartan 25 milliGRAM(s) Oral daily  metoprolol succinate ER 25 milliGRAM(s) Oral daily  tamsulosin 0.4 milliGRAM(s) Oral at bedtime  acetaminophen   Tablet. 650 milliGRAM(s) Oral every 6 hours PRN  traMADol 25 milliGRAM(s) Oral every 6 hours PRN  docusate sodium 100 milliGRAM(s) Oral daily  senna Syrup 5 milliLiter(s) Oral daily  atorvastatin 80 milliGRAM(s) Oral at bedtime  methimazole 5 milliGRAM(s) Oral three times a day  aspirin enteric coated 81 milliGRAM(s) Oral daily	    [PHYSICAL EXAM:  TELEMETRY:  T(C): 36.2 (05-14-18 @ 08:55), Max: 36.6 (05-13-18 @ 13:11)  HR: 77 (05-14-18 @ 08:55) (68 - 77)  BP: 84/54 (05-14-18 @ 08:55) (82/50 - 99/52)  RR: 16 (05-14-18 @ 08:55) (16 - 116)  SpO2: 100% (05-14-18 @ 08:55) (96% - 100%)    I&O's Summary    13 May 2018 07:01  -  14 May 2018 07:00  --------------------------------------------------------  IN: 875 mL / OUT: 800 mL / NET: 75 mL    14 May 2018 07:01  -  14 May 2018 12:58  --------------------------------------------------------  IN: 300 mL / OUT: 0 mL / NET: 300 mL    De Leon: Yes  Central/PICC/Mid Line: No                                         Appearance: Normal	  HEENT:   Normal oral mucosa, PERRL, EOMI	  Neck: Supple,  - JVD; Carotid Bruit   Cardiovascular: Normal S1 S2, No JVD, No murmurs,   Respiratory: Lungs clear to auscultation//No Rales, Rhonchi, Wheezing	  Gastrointestinal:  Soft, Non-tender, + BS	  Skin: No rashes, No ecchymoses, No cyanosis  Extremities: Normal range of motion, No clubbing, cyanosis or edema  Vascular: Peripheral pulses palpable 2+ bilaterally  Neurologic: Non-focal  Psychiatry: A & O x 3, Mood & affect appropriate	    LABS:	 	  CARDIAC MARKERS:                  10.1   6.5   )-----------( 291      ( 14 May 2018 06:52 )             31.1     05-14    134<L>  |  96  |  34<H>  ----------------------------<  73  3.9   |  25  |  1.24    Ca    8.6      14 May 2018 06:54  Mg     2.0     05-14    TPro  5.9<L>  /  Alb  2.9<L>  /  TBili  1.0  /  DBili  x   /  AST  20  /  ALT  8<L>  /  AlkPhos  430<H>  05-14    ASSESSMENT/PLAN: 	  DVT ppx: Hep Sub Q

## 2018-05-14 NOTE — CHART NOTE - NSCHARTNOTEFT_GEN_A_CORE
Upon Nutritional Assessment by the Registered Dietitian your patient was determined to meet criteria / has evidence of the following diagnosis/diagnoses:          [ ]  Mild Protein Calorie Malnutrition        [ ]  Moderate Protein Calorie Malnutrition        [X] Severe Protein Calorie Malnutrition        [ ] Unspecified Protein Calorie Malnutrition        [ ] Underweight / BMI <19        [ ] Morbid Obesity / BMI > 40      Findings as based on:  •  Comprehensive nutrition assessment and consultation  •  Calorie counts (nutrient intake analysis)  •  Food acceptance and intake status from observations by staff  •  Follow up  •  Patient education  •  Intervention secondary to interdisciplinary rounds  •   concerns      Treatment:    The following diet has been recommended:  1.) Rec. Ensure Enlive BID. MD diet order verification placed. MD to verify order.       PROVIDER Section:     By signing this assessment you are acknowledging and agree with the diagnosis/diagnoses assigned by the Registered Dietitian    Comments:

## 2018-05-14 NOTE — PROGRESS NOTE ADULT - ASSESSMENT
ASSESSMENT/PLAN77 yo male FORMER SMOKER with PMHx of HTN, HLD, pre- DM, CAD s/p CABG (in 2016 @ [facility]), chronic systolic CHF, h/o DVT 6 months ago (on Eliquis)  admitted for r/o ACS s/p non obstructed Cath, being worked up for possible prostate CA, found to have lung nodules.     1. O2 2LNC  2. Bronchodilators:  Atrovent/ albuterol q 4 – 6 hours as needed  3. Corticosteroids: off  4. ID/Antibiotics: off  5. Cardiac/HTN: optimize BP  6. GI: Rx/ prophylaxis c PPI/H2B  7. Heme: Rx/VT prophylaxis c SQH/SCD/AC Eliquis last dose 05/14/AM,   8. Aspiration precautions  9. Obtain dedicated CT chest today  Discussed with managing team

## 2018-05-15 LAB
ALBUMIN SERPL ELPH-MCNC: 2.7 G/DL — LOW (ref 3.3–5)
ALP SERPL-CCNC: 447 U/L — HIGH (ref 40–120)
ALT FLD-CCNC: 7 U/L — LOW (ref 10–45)
ANION GAP SERPL CALC-SCNC: 12 MMOL/L — SIGNIFICANT CHANGE UP (ref 5–17)
AST SERPL-CCNC: 19 U/L — SIGNIFICANT CHANGE UP (ref 10–40)
BILIRUB SERPL-MCNC: 1 MG/DL — SIGNIFICANT CHANGE UP (ref 0.2–1.2)
BUN SERPL-MCNC: 24 MG/DL — HIGH (ref 7–23)
CALCIUM SERPL-MCNC: 8.8 MG/DL — SIGNIFICANT CHANGE UP (ref 8.4–10.5)
CHLORIDE SERPL-SCNC: 102 MMOL/L — SIGNIFICANT CHANGE UP (ref 96–108)
CO2 SERPL-SCNC: 25 MMOL/L — SIGNIFICANT CHANGE UP (ref 22–31)
CREAT SERPL-MCNC: 1.15 MG/DL — SIGNIFICANT CHANGE UP (ref 0.5–1.3)
GLUCOSE SERPL-MCNC: 84 MG/DL — SIGNIFICANT CHANGE UP (ref 70–99)
HCT VFR BLD CALC: 33.6 % — LOW (ref 39–50)
HGB BLD-MCNC: 11 G/DL — LOW (ref 13–17)
MAGNESIUM SERPL-MCNC: 2.2 MG/DL — SIGNIFICANT CHANGE UP (ref 1.6–2.6)
MCHC RBC-ENTMCNC: 27.4 PG — SIGNIFICANT CHANGE UP (ref 27–34)
MCHC RBC-ENTMCNC: 32.7 G/DL — SIGNIFICANT CHANGE UP (ref 32–36)
MCV RBC AUTO: 83.6 FL — SIGNIFICANT CHANGE UP (ref 80–100)
PLATELET # BLD AUTO: 316 K/UL — SIGNIFICANT CHANGE UP (ref 150–400)
POTASSIUM SERPL-MCNC: 3.9 MMOL/L — SIGNIFICANT CHANGE UP (ref 3.5–5.3)
POTASSIUM SERPL-SCNC: 3.9 MMOL/L — SIGNIFICANT CHANGE UP (ref 3.5–5.3)
PROT SERPL-MCNC: 6.3 G/DL — SIGNIFICANT CHANGE UP (ref 6–8.3)
RBC # BLD: 4.02 M/UL — LOW (ref 4.2–5.8)
RBC # FLD: 15.1 % — SIGNIFICANT CHANGE UP (ref 10.3–16.9)
SODIUM SERPL-SCNC: 139 MMOL/L — SIGNIFICANT CHANGE UP (ref 135–145)
WBC # BLD: 6 K/UL — SIGNIFICANT CHANGE UP (ref 3.8–10.5)
WBC # FLD AUTO: 6 K/UL — SIGNIFICANT CHANGE UP (ref 3.8–10.5)

## 2018-05-15 PROCEDURE — 93010 ELECTROCARDIOGRAM REPORT: CPT

## 2018-05-15 RX ORDER — PANTOPRAZOLE SODIUM 20 MG/1
40 TABLET, DELAYED RELEASE ORAL
Qty: 0 | Refills: 0 | Status: DISCONTINUED | OUTPATIENT
Start: 2018-05-15 | End: 2018-05-24

## 2018-05-15 RX ORDER — IPRATROPIUM/ALBUTEROL SULFATE 18-103MCG
3 AEROSOL WITH ADAPTER (GRAM) INHALATION EVERY 6 HOURS
Qty: 0 | Refills: 0 | Status: DISCONTINUED | OUTPATIENT
Start: 2018-05-15 | End: 2018-05-24

## 2018-05-15 RX ORDER — POTASSIUM CHLORIDE 20 MEQ
20 PACKET (EA) ORAL ONCE
Qty: 0 | Refills: 0 | Status: COMPLETED | OUTPATIENT
Start: 2018-05-15 | End: 2018-05-15

## 2018-05-15 RX ADMIN — Medication 25 MILLIGRAM(S): at 05:44

## 2018-05-15 RX ADMIN — Medication 40 MILLIGRAM(S): at 05:44

## 2018-05-15 RX ADMIN — Medication 650 MILLIGRAM(S): at 20:40

## 2018-05-15 RX ADMIN — Medication 100 MILLIGRAM(S): at 14:00

## 2018-05-15 RX ADMIN — TAMSULOSIN HYDROCHLORIDE 0.4 MILLIGRAM(S): 0.4 CAPSULE ORAL at 21:27

## 2018-05-15 RX ADMIN — Medication 650 MILLIGRAM(S): at 09:49

## 2018-05-15 RX ADMIN — Medication 650 MILLIGRAM(S): at 10:48

## 2018-05-15 RX ADMIN — Medication 20 MILLIEQUIVALENT(S): at 09:48

## 2018-05-15 RX ADMIN — HEPARIN SODIUM 5000 UNIT(S): 5000 INJECTION INTRAVENOUS; SUBCUTANEOUS at 13:59

## 2018-05-15 RX ADMIN — LOSARTAN POTASSIUM 25 MILLIGRAM(S): 100 TABLET, FILM COATED ORAL at 21:27

## 2018-05-15 RX ADMIN — ATORVASTATIN CALCIUM 80 MILLIGRAM(S): 80 TABLET, FILM COATED ORAL at 21:27

## 2018-05-15 RX ADMIN — Medication 650 MILLIGRAM(S): at 19:40

## 2018-05-15 RX ADMIN — HEPARIN SODIUM 5000 UNIT(S): 5000 INJECTION INTRAVENOUS; SUBCUTANEOUS at 21:27

## 2018-05-15 RX ADMIN — SENNA PLUS 5 MILLILITER(S): 8.6 TABLET ORAL at 13:59

## 2018-05-15 RX ADMIN — HEPARIN SODIUM 5000 UNIT(S): 5000 INJECTION INTRAVENOUS; SUBCUTANEOUS at 05:44

## 2018-05-15 NOTE — PROGRESS NOTE ADULT - PROBLEM SELECTOR PLAN 1
Now euvolemic, breathing improved. Net neg 7L since admit.  - CXR 5/10: Pulmonary vascular congestion. Interstitial pulmonary edema.  - EKG revealed NSR with 1st degree AV block @ 94 BPM with left axis deviation and Q wave in inferior leads and V1-V5.  - Echo 5/10: severe concentric LVH, severe global hypokinesis of the left ventricle, EF 30%, left atrium is severely dilated, mitral inflow pattern is c/w restrictive left ventricular filling, suggestive of severely elevated left atrial pressure, right atrium is moderately dilated, tethered mitral valve leaflets.  - Echo possibly concerning for amyloidosis but no Cardiac MRI at this time per Dr. Murphy.   - Continue Lasix 40mg PO QD and Losartan 25mg QD.   - Strict I&O's, daily weights.

## 2018-05-15 NOTE — PROGRESS NOTE ADULT - ASSESSMENT
78 yo M former smoker with CHF, CAD, incidental finding of bone mets on imaging and markedly elevated PSA  Imaging also with suspicious lung lesions   admitted with NSTEMI      Lung lesions:  s/p CT chest - awaiting report  if predominant mass/pending results - lung biopsy may be warranted to rule out second malignancy    Certainly with prostate cancer given imaging and PSA  at a minimum, will need ADT and bone modifying drug     please check vitamin D level in anticipation of denosumab     Discussed with patient

## 2018-05-15 NOTE — PROGRESS NOTE ADULT - PROBLEM SELECTOR PLAN 2
c/o mild left side chest pain below nipple overnight that pt reports felt like gas feeling overnight  - 5 beats of NSVT 5/15/18 AM, asymptomatic, continues on BB.  -Repeat EKG revealed _____.   - R/I NSTEMI peak CE 0.24. CE likely in setting of CHF.  - s/p Diagnostic Cath 5/11: known 3VCAD (99% mLAD, 100% ramus, 90% OM2, 99% small OM3, 85% distal dominant LCX), Patent LIMA to LAD, patent radial graft to ramus, patent SVG to OM2, LVEDP4.    - Continue Atorvastatin 80mg Qhs, Metoprolol 25mg QD.   - Holding ASA for scheduled bronch 5/17/18 as per Dr. Woods

## 2018-05-15 NOTE — PROGRESS NOTE ADULT - PROBLEM SELECTOR PLAN 6
CXR w/ incidental finding of sclerotic bone metastases, likely from prostate CA.    - PSA elevated 6264   - CT abdomen/pelvis 05/11: Prostate tumor. Distended urinary bladder. Extensive bony metastasis. Multiple solid pulmonary nodules. Pulmonary metastasis is a consideration.  - Urology Team consulted (Dr. Brown).  Continue gould for urinary retention (placed by urology), Continue Tamsulosin 0.4mg. f/u further Recs  - Oncology Team consulted (Dr. Martinez), certainly prostate CA, will need ADT and bone modifying drug. f/u vitamin D level in anticipation of starting denosumab.   -- Tramadol PRN for pain. Pt reports pain tolerable.

## 2018-05-15 NOTE — PHYSICAL THERAPY INITIAL EVALUATION ADULT - PERTINENT HX OF CURRENT PROBLEM, REHAB EVAL
78 yo male FORMER SMOKER with PMHx of HTN, HLD, pre- DM, CAD s/p CABG (in 2016 @ [facility]), chronic systolic CHF, h/o DVT 6 months ago (on Eliquis)  admitted for r/o ACS s/p non obstructed Cath, being worked up for possible prostate CA.

## 2018-05-15 NOTE — PROGRESS NOTE ADULT - PROBLEM SELECTOR PLAN 7
Multiple pulmonary nodules noted on CT Abd/Plevis  - Per Oncology recs perform biopsy of nodules to determine cause from prostate CA vs. pulmonary CA.   - Dr. Woods consulted, f/u Recs  - CT Chest w/o contrast ordered  - Eliquis on hold with plan for possible biopsy earliest Thursday (Last dose Eliquis 5/14 AM) Multiple pulmonary nodules noted on CT Abd/Plevis  - CT chest 5/14 revealed innumerable scattered pulmonary nodules B/L, most of   which demonstrated a lobulated configuration, likely representing mucous   plugging. More discrete nodular opacities visualized some demonstrating   feeding vessel sign concerning for metastatic prostate. Bulky mediastinal LAD also thought  to be prostatic. 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 Patchy ground less opacities predominantly in the RUL & RLL which may represent early pneumonitis. Consider correlation with PET/CT for more complete staging.  - Per Oncology recs perform biopsy of nodules to determine cause from prostate CA vs. pulmonary CA.    - Dr. Woods consulted, plan for bronchoscopy scheduled for 05/17 10am  - Eliquis on hold with plan for bronchoscopy scheduled Thursday (Last dose Eliquis 5/14 AM) Multiple pulmonary nodules noted on CT Abd/Plevis  - CT chest 5/14 revealed innumerable scattered pulmonary nodules B/L, most of   which demonstrated a lobulated configuration, likely representing mucous   plugging. More discrete nodular opacities visualized some demonstrating   feeding vessel sign concerning for metastatic prostate. Bulky mediastinal LAD also thought  to be prostatic. 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 Patchy ground less opacities predominantly in the RUL & RLL which may represent early pneumonitis. Consider correlation with PET/CT for more complete staging.  - Per Oncology recs perform biopsy of nodules to determine cause from prostate CA vs. pulmonary CA.    - Dr. Woods consulted, plan for bronchoscopy scheduled for 05/17 10am  - Eliquis on hold with plan for bronchoscopy scheduled Thursday (Last dose Eliquis 5/14 AM)  --Duonebs q6hr PRN, aspiration precaution

## 2018-05-15 NOTE — PROGRESS NOTE ADULT - PROBLEM SELECTOR PLAN 7
Multiple pulmonary nodules noted on CT Abd/Plevis  - CT chest 5/14 revealed innumerable scattered pulmonary nodules B/L, most of   which demonstrated a lobulated configuration, likely representing mucous   plugging. More discrete nodular opacities visualized some demonstrating   feeding vessel sign concerning for metastatic prostate. Bulky mediastinal LAD also thought  to be prostatic. 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 Patchy ground less opacities predominantly in the RUL & RLL which may represent early pneumonitis. Consider correlation with PET/CT for more complete staging.  - Per Oncology recs perform biopsy of nodules to determine cause from prostate CA vs. pulmonary CA.    - Dr. Woods consulted, plan for bronchoscopy scheduled for 05/17 10am  - Eliquis on hold with plan for bronchoscopy scheduled Thursday (Last dose Eliquis 5/14 AM)  --Duonebs q6hr PRN, aspiration precaution

## 2018-05-15 NOTE — PROGRESS NOTE ADULT - SUBJECTIVE AND OBJECTIVE BOX
Interventional Cardiology PA Adult Progress Note    CC: cough and SOB upon admit    Subjective Assessment:  Pt seen and examined at bedside this AM. Report episode L sided discomfort below nipple overnight that felt like "gas feeling." No current CP. Denies SOB, dizziness, palpitations, N/V, abdominal pain. All other 12 point review of systems otherwise negative.   	  MEDICATIONS:  furosemide    Tablet 40 milliGRAM(s) Oral daily  losartan 25 milliGRAM(s) Oral daily  metoprolol succinate ER 25 milliGRAM(s) Oral daily  tamsulosin 0.4 milliGRAM(s) Oral at bedtime  acetaminophen   Tablet. 650 milliGRAM(s) Oral every 6 hours PRN  traMADol 25 milliGRAM(s) Oral every 6 hours PRN  docusate sodium 100 milliGRAM(s) Oral daily  senna Syrup 5 milliLiter(s) Oral daily  atorvastatin 80 milliGRAM(s) Oral at bedtime  methimazole 5 milliGRAM(s) Oral three times a day  heparin  Injectable 5000 Unit(s) SubCutaneous every 8 hours    [PHYSICAL EXAM:  TELEMETRY:  T(C): 36.3 (05-15-18 @ 14:47), Max: 37.1 (05-15-18 @ 00:35)  HR: 78 (05-15-18 @ 08:49) (71 - 78)  BP: 95/60 (05-15-18 @ 08:49) (85/53 - 104/61)  RR: 18 (05-15-18 @ 08:49) (16 - 18)  SpO2: 97% (05-15-18 @ 08:49) (96% - 100%)  Wt(kg): --  I&O's Summary    14 May 2018 07:01  -  15 May 2018 07:00  --------------------------------------------------------  IN: 420 mL / OUT: 1500 mL / NET: -1080 mL    15 May 2018 07:01  -  15 May 2018 16:30  --------------------------------------------------------  IN: 360 mL / OUT: 450 mL / NET: -90 mL        De Leon: in place                                  Appearance: Normal	  HEENT:   Normal oral mucosa, PERRL, EOMI	  Neck: Supple,  - JVD; Carotid Bruit   Cardiovascular: Normal S1 S2, No JVD, No murmurs,   Respiratory: Lungs clear to auscultation//No Rales, Rhonchi, Wheezing	  Gastrointestinal:  Soft, Non-tender, + BS	  Skin: No rashes, No ecchymoses, No cyanosis  Extremities: Normal range of motion, No clubbing, cyanosis or edema  Vascular: 1+ B/L PT/DP B/L  Neurologic: Non-focal  Psychiatry: A & O x 3, Mood & affect appropriate                              11.0   6.0   )-----------( 316      ( 15 May 2018 06:11 )             33.6     05-15    139  |  102  |  24<H>  ----------------------------<  84  3.9   |  25  |  1.15    Ca    8.8      15 May 2018 06:10  Mg     2.2     05-15    TPro  6.3  /  Alb  2.7<L>  /  TBili  1.0  /  DBili  x   /  AST  19  /  ALT  7<L>  /  AlkPhos  447<H>  05-15

## 2018-05-15 NOTE — PROGRESS NOTE ADULT - ASSESSMENT
ASSESSMENT/PLAN77 yo male FORMER SMOKER with PMHx of HTN, HLD, pre- DM, CAD s/p CABG (in 2016 @ [facility] [place], [place]), chronic systolic CHF, h/o DVT 6 months ago (on Eliquis)  admitted for r/o ACS s/p non obstructed Cath, being worked up for possible prostate CA, found to have multiple  lung nodules, diffuse mediastinal LAD.    1. O2 2LNC  2. Bronchodilators:  Atrovent/ albuterol q 4 – 6 hours as needed  3. Corticosteroids: off  4. ID/Antibiotics: off  5. Cardiac/HTN: optimize BP  6. GI: Rx/ prophylaxis c PPI/H2B  7. Heme: Rx/VT prophylaxis c SQH/SCD/AC Eliquis last dose 05/14/AM, hold ASA  8. Aspiration precautions  9. Bronchoscopy scheduled for 05/17 10am.  Discussed with managing team

## 2018-05-15 NOTE — PROGRESS NOTE ADULT - PROBLEM SELECTOR PLAN 2
c/o mild left side chest pain below nipple that comes and goes that pt reports feels like gas feeling overnight  -Repeat EKG revealed _____.  - R/I NSTEMI peak CE 0.24.  EKG remained unchanged. CE likely in setting of CHF.  - s/p Diagnostic Cath 5/11: known 3VCAD (99% mLAD, 100% ramus, 90% OM2, 99% small OM3, 85% distal dominant LCX), Patent LIMA to LAD, patent radial graft to ramus, patent SVG to OM2, LVEDP4.    - Continue Atorvastatin 80mg Qhs, Metoprolol 25mg QD. Holding ASA for scheduled bronch 5/17/18 as per Dr. Woods c/o mild left side chest pain below nipple overnight that pt reports felt like gas feeling overnight  -Repeat EKG revealed _____.  - R/I NSTEMI peak CE 0.24.  EKG remained unchanged. CE likely in setting of CHF.  - s/p Diagnostic Cath 5/11: known 3VCAD (99% mLAD, 100% ramus, 90% OM2, 99% small OM3, 85% distal dominant LCX), Patent LIMA to LAD, patent radial graft to ramus, patent SVG to OM2, LVEDP4.    - Continue Atorvastatin 80mg Qhs, Metoprolol 25mg QD.   - Holding ASA for scheduled bronch 5/17/18 as per Dr. Woods c/o mild left side chest pain below nipple overnight that pt reports felt like gas feeling overnight  - 5 beats of NSVT 5/15/18 AM, asymptomatic, continues on BB.  -Repeat EKG revealed _____.   - R/I NSTEMI peak CE 0.24. CE likely in setting of CHF.  - s/p Diagnostic Cath 5/11: known 3VCAD (99% mLAD, 100% ramus, 90% OM2, 99% small OM3, 85% distal dominant LCX), Patent LIMA to LAD, patent radial graft to ramus, patent SVG to OM2, LVEDP4.    - Continue Atorvastatin 80mg Qhs, Metoprolol 25mg QD.   - Holding ASA for scheduled bronch 5/17/18 as per Dr. Woods c/o mild left side chest pain below nipple overnight that pt reports felt like gas feeling overnight. Repeat EKG 5/15 unchanged from previous. Currently CP free and HD stable.  - 5 beats of NSVT 5/15/18 AM, asymptomatic, continues on BB.  - R/I NSTEMI peak CE 0.24. CE likely in setting of CHF.  - s/p Diagnostic Cath 5/11: known 3VCAD (99% mLAD, 100% ramus, 90% OM2, 99% small OM3, 85% distal dominant LCX), Patent LIMA to LAD, patent radial graft to ramus, patent SVG to OM2, LVEDP4.    - Continue Atorvastatin 80mg Qhs, Metoprolol 25mg QD.   - Holding ASA for scheduled bronch 5/17/18 as per Dr. Woods

## 2018-05-15 NOTE — PROGRESS NOTE ADULT - PROBLEM SELECTOR PLAN 5
hx of L DVT (dx: 6 months ago: on eliquis)  - Eliquis on hold for scheduled bronch on 5/17/18  - Consider restarting when completed given cancer diagnosis and unknown reason for prior DVT

## 2018-05-15 NOTE — PROGRESS NOTE ADULT - PROBLEM SELECTOR PLAN 8
Code: Full  VTE: sub Q heparin   Dispo: pending clinical progression  -PT recommending subacute rehab Code:   - Full    VTE:   -sub Q heparin which will be held on Thursday AM. Continuing to hold Eliquis in light of upcoming bronch.     Dispo:   -pending clinical progression  -PT recommending subacute rehab    Case d/w Dr. Murphy Code:   - Full    VTE:   -sub Q heparin which will be held on Thursday AM. Continuing to hold Eliquis in light of upcoming bronch.     GI PPx:  - Protonix    Dispo:   -pending clinical progression  - PT recommending subacute rehab    Case d/w Dr. Murphy

## 2018-05-15 NOTE — PROGRESS NOTE ADULT - ASSESSMENT
76 yo male FORMER SMOKER with PMHx of HTN, HLD, pre- DM, CAD s/p CABG (in 2016 @ [facility]), chronic systolic CHF, h/o DVT 6 months ago (on Eliquis)  admitted for r/o ACS s/p non obstructed Cath, being worked up for possible prostate CA with plan for bronch on Thursday with Dr. Woods.

## 2018-05-15 NOTE — PHYSICAL THERAPY INITIAL EVALUATION ADULT - ADDITIONAL COMMENTS
Pt lives alone in a studio with 4 steps to enter. States that he ambulates with cane at baseline and has HHA from 2-6pm M-F. Pt reports that he has recently had difficulty getting out of bed, resulting in him staying in bed until 2pm when HHA arrives.

## 2018-05-15 NOTE — PROGRESS NOTE ADULT - SUBJECTIVE AND OBJECTIVE BOX
78 yo M former smoker with CHF, CAD, incidental finding of bone mets on imaging and markedly elevated PSA  Imaging also with suspicious lung lesions     Interval history:  reports he is ambulating with the walker  no new symptoms  occasional discomfort L chest  no back pain or bone pain       Vital Signs Last 24 Hrs  T(C): 36.2 (15 May 2018 05:04), Max: 37.1 (15 May 2018 00:35)  T(F): 97.2 (15 May 2018 05:04), Max: 98.7 (15 May 2018 00:35)  HR: 73 (15 May 2018 05:41) (66 - 77)  BP: 104/61 (15 May 2018 05:41) (84/54 - 104/61)  BP(mean): --  RR: 18 (15 May 2018 05:41) (16 - 18)  SpO2: 96% (15 May 2018 05:41) (94% - 100%)      frail, chronically ill appearing  no cervical adenopathy  s1s2 nml rrr  ctab  abdo: SNT  No edema      PAST MEDICAL & SURGICAL HISTORY:  CHF (congestive heart failure)  CAD (coronary artery disease)  HTN (hypertension)  S/P hernia surgery  History of coronary artery bypass graft: 2017 @ [facility]    Social Hx:  lives alone, with HHA  ambulates with cane  quit smoking 40 yrs ago    He denies a family history of cancer    MEDICATIONS  (STANDING):  apixaban 5 milliGRAM(s) Oral every 12 hours  aspirin enteric coated 81 milliGRAM(s) Oral daily  atorvastatin 80 milliGRAM(s) Oral at bedtime  docusate sodium 100 milliGRAM(s) Oral daily  furosemide    Tablet 40 milliGRAM(s) Oral daily  losartan 25 milliGRAM(s) Oral daily  methimazole 5 milliGRAM(s) Oral three times a day  metoprolol succinate ER 25 milliGRAM(s) Oral daily  senna Syrup 5 milliLiter(s) Oral daily  tamsulosin 0.4 milliGRAM(s) Oral at bedtime    MEDICATIONS  (PRN):  acetaminophen   Tablet. 650 milliGRAM(s) Oral every 6 hours PRN Mild Pain (1 - 3)  traMADol 25 milliGRAM(s) Oral every 6 hours PRN Moderate Pain (4 - 6)        CBC Full  -  ( 15 May 2018 06:11 )  WBC Count : 6.0 K/uL  Hemoglobin : 11.0 g/dL  Hematocrit : 33.6 %  Platelet Count - Automated : 316 K/uL  Mean Cell Volume : 83.6 fL  Mean Cell Hemoglobin : 27.4 pg  Mean Cell Hemoglobin Concentration : 32.7 g/dL  Auto Neutrophil # : x  Auto Lymphocyte # : x  Auto Monocyte # : x  Auto Eosinophil # : x  Auto Basophil # : x  Auto Neutrophil % : x  Auto Lymphocyte % : x  Auto Monocyte % : x  Auto Eosinophil % : x  Auto Basophil % : x      05-15    139  |  102  |  24<H>  ----------------------------<  84  3.9   |  25  |  1.15    Ca    8.8      15 May 2018 06:10  Mg     2.2     05-15    TPro  6.3  /  Alb  2.7<L>  /  TBili  1.0  /  DBili  x   /  AST  19  /  ALT  7<L>  /  AlkPhos  447<H>  05-15            Imaging:   reviewed

## 2018-05-15 NOTE — PROGRESS NOTE ADULT - SUBJECTIVE AND OBJECTIVE BOX
Interventional, Pulmonary, Critical, Chest Special Procedures.    Pt was seen and fully examined by myself.     Time spent with patient in minutes:37    Patient is a 77y old  Male who presents with a chief complaint of CP (09 May 2018 19:41)The patient ill appearing, no new complaints, eupneic when seen. I reviewed CT chest c pt and discussed risks vs benefits, alternatives  to bronchoscopy c pt. The patient stated he understood.    HPI:  78 yo male FORMER SMOKER with PMHx of HTN, HLD, pre- DM,  CAD s/p CABG (in 2016 @ [facility]), CHF (EF unknown), h/o DVT 6 months ago (on Eliquis; last dose last night) who presented to [facility] ED on 5/9/18 with CC of Cough and SOB X 5-6 days. Pt. reports that he initially had a cough with white sputum production associated with shortness of breath at rest, non-radiating L sided Chest tightness and palpitations. pt. reports that the chest tightness and SOB comes along with the cough  and is relieved when he stops coughing. He also reports feeling sore. At baseline, pt. reports that he can ambulate 4 blocks before getting SOB.  Today am, pt. reports having hot flashes , cough, SOB and chest tightness which prompted him to come to the ED. Denies dizziness, diaphoresis, fatigue, LE edema, orthopnea, PND, syncope.  Upon admit, temp 98.3F,  BPM, /86, RR 20, SpO2 96% on RA.  Labs significant for alk phos 500, , trop 0.08, BNP 6822. CXR wet read revealed R sided pleural effusion. EKG revealed NSR with 1st degree AV block @ 94 BPM with left axis deviation and Q wave in inferior leads and V1-V5.  While in ER, pt received IV Lasix 40 mg x1. Patient admitted to 5U for diuresis, ECHO, r/o ACS. (09 May 2018 19:41)    REVIEW OF SYSTEMS: updated  Constitutional: No fever, weight loss, chills or fatigue  Eyes: No eye pain, visual disturbances, or discharge  ENMT:  No difficulty hearing, tinnitus, vertigo; No sinus or throat pain. No epistaxis, dysphagia, dysphonia, hoarseness or odynophagia  Neck: No pain, stiffness or neck swelling.  No masses or deformities  Respiratory: No cough, wheezing, chills or hemoptysis  - COPD  - ILD   - PE   - ASTHMA     - PNEUMONIA  Cardiovascular: No chest pain, dysrhythmia, palpitations, dizziness or edema   - COPD     - CAD   - CHF   - HTN  Gastrointestinal: No abdominal or epigastric pain. No nausea, vomiting or hematemesis; No diarrhea or constipation. No melena or hematochezia. No dysphagia or Icterus.          Genitourinary: No dysuria, frequency, hematuria or incontinence   - CKD/PAM      - ESRD  Neurological: No headaches, memory loss, loss of strength, numbness or tremors      -DEMENTIA     - STROKE    - SEIZURE  Skin: No itching, burning, rashes or lesions   Lymph Nodes: No enlarged glands  Endocrine: No heat or cold intolerance; No hair loss       - DM     - THYROID DISORDER  Musculoskeletal: No joint pain or swelling; No muscle, back or extremity pain  Psychiatric: No depression, anxiety, mood swings or difficulty sleeping  Heme/Lymph: No easy bruising or bleeding gums         - ANEMIA      - CANCER   -COAGULOPATHY  Allergy and Immunologic: No hives or eczema    PAST MEDICAL & SURGICAL HISTORY:  CHF (congestive heart failure)  CAD (coronary artery disease)  HTN (hypertension)  S/P hernia surgery  History of coronary artery bypass graft: 2017 @ [facility]    FAMILY HISTORY:  No pertinent family history in first degree relatives    SOCIAL HISTORY:      - Tobacco     - ETOH    Allergies    No Known Allergies    Intolerances      Vital Signs Last 24 Hrs  T(C): 36.1 (15 May 2018 09:19), Max: 37.1 (15 May 2018 00:35)  T(F): 97 (15 May 2018 09:19), Max: 98.7 (15 May 2018 00:35)  HR: 78 (15 May 2018 08:49) (66 - 78)  BP: 95/60 (15 May 2018 08:49) (84/54 - 104/61)  BP(mean): --  RR: 18 (15 May 2018 08:49) (16 - 18)  SpO2: 97% (15 May 2018 08:49) (96% - 100%)    05-14 @ 07:01  -  05-15 @ 07:00  --------------------------------------------------------  IN: 420 mL / OUT: 1500 mL / NET: -1080 mL    05-15 @ 07:01  -  05-15 @ 11:54  --------------------------------------------------------  IN: 180 mL / OUT: 0 mL / NET: 180 mL        PHYSICAL EXAM:  Un Comfortable, no immediate distress  Eyes: PERRL, EOM intact; conjunctiva and sclera clear  Head: Normocephalic;  No Trauma  ENMT: No nasal discharge, +hoarseness, +cough   Neck: Supple; non tender; no masses or deformities.    No JVD  Respiratory:  - WHEEZING   - RHONCHI  - RALES  + CRACKLES.  Diminished breath sounds  BILATERAL  RIGHT  LEFT bases  Cardiovascular: Regular rate and rhythm. S1 and S2 Normal; No murmurs, gallops or rubs     - PPM/AICD  Gastrointestinal: Soft non-tender, non-distended; Normal bowel sounds; No hepatosplenomegaly.     -PEG    -  GT   - LAU  Genitourinary: No costovertebral angle tenderness. No dysuria  Extremities: AROM, + clubbing, cyanosis or edema    Vascular: Peripheral pulses palpable 2+ bilaterally  Neurological: Alert and responisve to stimuli   Skin: Warm and dry. No obvious rash  Lymph Nodes: No acute cervical or supraclavicular adenopathy  Psychiatric: Cooperative and appropriate mood    DEVICES:  - DENTURES   +IV R / L     - ETUBE   -TRACH   -CTUBE  R / L      LABS:                          11.0   6.0   )-----------( 316      ( 15 May 2018 06:11 )             33.6     05-15    139  |  102  |  24<H>  ----------------------------<  84  3.9   |  25  |  1.15    Ca    8.8      15 May 2018 06:10  Mg     2.2     05-15    TPro  6.3  /  Alb  2.7<L>  /  TBili  1.0  /  DBili  x   /  AST  19  /  ALT  7<L>  /  AlkPhos  447<H>  05-15      RADIOLOGY & ADDITIONAL STUDIES (The following images were personally reviewed):CT Chest preliminary discussed c radiology

## 2018-05-15 NOTE — PROGRESS NOTE ADULT - PROBLEM SELECTOR PLAN 6
CXR w/ incidental finding of sclerotic bone metastases, likely from prostate CA. Patient denies CA hx.   - Urology Team consulted (Dr. Brown).  f/u further Recs  - Oncology Team consulted (Dr. Martinez), f/u further Recs.   - PSA elevated 6264  - CT abdomen/pelvis 05/11: Prostate tumor. Distended urinary bladder. Extensive bony metastasis. Multiple solid pulmonary nodules. Pulmonary metastasis is a consideration.  - Continue gould for urinary retention (placed by urology), Continue Tamsulosin 0.4mg  - Pt complaining of mild diffuse pain, states it is tolerable.  Continue Tramadol PRN. CXR w/ incidental finding of sclerotic bone metastases, likely from prostate CA.  - - PSA elevated 6264   - CT abdomen/pelvis 05/11: Prostate tumor. Distended urinary bladder. Extensive bony metastasis. Multiple solid pulmonary nodules. Pulmonary metastasis is a consideration.  - Urology Team consulted (Dr. Brown).  Continue gould for urinary retention (placed by urology), Continue Tamsulosin 0.4mg. f/u further Recs  - Oncology Team consulted (Dr. Martinez), will need ADT and bone modifying drug. f/u vitamin D level in anticipation of starting denosumab.   -- Tramadol PRN for pain. Pt reports pain tolerable. CXR w/ incidental finding of sclerotic bone metastases, likely from prostate CA.    - PSA elevated 6264   - CT abdomen/pelvis 05/11: Prostate tumor. Distended urinary bladder. Extensive bony metastasis. Multiple solid pulmonary nodules. Pulmonary metastasis is a consideration.  - Urology Team consulted (Dr. Brown).  Continue gould for urinary retention (placed by urology), Continue Tamsulosin 0.4mg. f/u further Recs  - Oncology Team consulted (Dr. Martinez), certainly prostate CA, will need ADT and bone modifying drug. f/u vitamin D level in anticipation of starting denosumab.   -- Tramadol PRN for pain. Pt reports pain tolerable.

## 2018-05-15 NOTE — PROGRESS NOTE ADULT - PROBLEM SELECTOR PLAN 5
hx of L DVT (dx: 6 months ago: on eliquis)  - Eliquis on hold for scheduled bronch on 5/17/18  - Consider restarting when completed Given possible cancer diagnosis and unknown reason for prior DVT hx of L DVT (dx: 6 months ago: on eliquis)  - Eliquis on hold for scheduled bronch on 5/17/18  - Consider restarting when completed Given cancer diagnosis and unknown reason for prior DVT hx of L DVT (dx: 6 months ago: on eliquis)  - Eliquis on hold for scheduled bronch on 5/17/18  - Consider restarting when completed given cancer diagnosis and unknown reason for prior DVT

## 2018-05-15 NOTE — PROGRESS NOTE ADULT - PROBLEM SELECTOR PLAN 8
Code:   - Full    VTE:   -sub Q heparin which will be held on Thursday AM. Continuing to hold Eliquis in light of upcoming bronch.     GI PPx:  - Protonix    Dispo:   -pending clinical progression  - PT recommending subacute rehab    Case d/w Dr. Murphy

## 2018-05-15 NOTE — PROGRESS NOTE ADULT - SUBJECTIVE AND OBJECTIVE BOX
Interventional Cardiology PA Adult Progress Note    CC: cough and SOB upon admit    Subjective Assessment:  Pt seen and examined at bedside this AM. Report episode L sided discomfort below nipple that comes and goes and feels like "gas feeling." Denies SOB, dizziness, palpitations, N/V, abdominal pain. All other 12 point review of systems otherwise negative.   	  MEDICATIONS:  furosemide    Tablet 40 milliGRAM(s) Oral daily  losartan 25 milliGRAM(s) Oral daily  metoprolol succinate ER 25 milliGRAM(s) Oral daily  tamsulosin 0.4 milliGRAM(s) Oral at bedtime  acetaminophen   Tablet. 650 milliGRAM(s) Oral every 6 hours PRN  traMADol 25 milliGRAM(s) Oral every 6 hours PRN  docusate sodium 100 milliGRAM(s) Oral daily  senna Syrup 5 milliLiter(s) Oral daily  atorvastatin 80 milliGRAM(s) Oral at bedtime  methimazole 5 milliGRAM(s) Oral three times a day  heparin  Injectable 5000 Unit(s) SubCutaneous every 8 hours    [PHYSICAL EXAM:  TELEMETRY:  T(C): 36.3 (05-15-18 @ 14:47), Max: 37.1 (05-15-18 @ 00:35)  HR: 78 (05-15-18 @ 08:49) (71 - 78)  BP: 95/60 (05-15-18 @ 08:49) (85/53 - 104/61)  RR: 18 (05-15-18 @ 08:49) (16 - 18)  SpO2: 97% (05-15-18 @ 08:49) (96% - 100%)  Wt(kg): --  I&O's Summary    14 May 2018 07:01  -  15 May 2018 07:00  --------------------------------------------------------  IN: 420 mL / OUT: 1500 mL / NET: -1080 mL    15 May 2018 07:01  -  15 May 2018 16:30  --------------------------------------------------------  IN: 360 mL / OUT: 450 mL / NET: -90 mL        [place]: in place                                  Appearance: Normal	  HEENT:   Normal oral mucosa, PERRL, EOMI	  Neck: Supple,  - JVD; Carotid Bruit   Cardiovascular: Normal S1 S2, No JVD, No murmurs,   Respiratory: Lungs clear to auscultation//No Rales, Rhonchi, Wheezing	  Gastrointestinal:  Soft, Non-tender, + BS	  Skin: No rashes, No ecchymoses, No cyanosis  Extremities: Normal range of motion, No clubbing, cyanosis or edema  Vascular: 1+ B/L PT/DP B/L  Neurologic: Non-focal  Psychiatry: A & O x 3, Mood & affect appropriate                              11.0   6.0   )-----------( 316      ( 15 May 2018 06:11 )             33.6     05-15    139  |  102  |  24<H>  ----------------------------<  84  3.9   |  25  |  1.15    Ca    8.8      15 May 2018 06:10  Mg     2.2     05-15    TPro  6.3  /  Alb  2.7<L>  /  TBili  1.0  /  DBili  x   /  AST  19  /  ALT  7<L>  /  AlkPhos  447<H>  05-15 Interventional Cardiology PA Adult Progress Note    CC: cough and SOB upon admit    Subjective Assessment:  Pt seen and examined at bedside this AM. Report episode L sided discomfort below nipple overnight that felt like "gas feeling." No current CP. Denies SOB, dizziness, palpitations, N/V, abdominal pain. All other 12 point review of systems otherwise negative.   	  MEDICATIONS:  furosemide    Tablet 40 milliGRAM(s) Oral daily  losartan 25 milliGRAM(s) Oral daily  metoprolol succinate ER 25 milliGRAM(s) Oral daily  tamsulosin 0.4 milliGRAM(s) Oral at bedtime  acetaminophen   Tablet. 650 milliGRAM(s) Oral every 6 hours PRN  traMADol 25 milliGRAM(s) Oral every 6 hours PRN  docusate sodium 100 milliGRAM(s) Oral daily  senna Syrup 5 milliLiter(s) Oral daily  atorvastatin 80 milliGRAM(s) Oral at bedtime  methimazole 5 milliGRAM(s) Oral three times a day  heparin  Injectable 5000 Unit(s) SubCutaneous every 8 hours    [PHYSICAL EXAM:  TELEMETRY:  T(C): 36.3 (05-15-18 @ 14:47), Max: 37.1 (05-15-18 @ 00:35)  HR: 78 (05-15-18 @ 08:49) (71 - 78)  BP: 95/60 (05-15-18 @ 08:49) (85/53 - 104/61)  RR: 18 (05-15-18 @ 08:49) (16 - 18)  SpO2: 97% (05-15-18 @ 08:49) (96% - 100%)  Wt(kg): --  I&O's Summary    14 May 2018 07:01  -  15 May 2018 07:00  --------------------------------------------------------  IN: 420 mL / OUT: 1500 mL / NET: -1080 mL    15 May 2018 07:01  -  15 May 2018 16:30  --------------------------------------------------------  IN: 360 mL / OUT: 450 mL / NET: -90 mL        [place]: in place                                  Appearance: Normal	  HEENT:   Normal oral mucosa, PERRL, EOMI	  Neck: Supple,  - JVD; Carotid Bruit   Cardiovascular: Normal S1 S2, No JVD, No murmurs,   Respiratory: Lungs clear to auscultation//No Rales, Rhonchi, Wheezing	  Gastrointestinal:  Soft, Non-tender, + BS	  Skin: No rashes, No ecchymoses, No cyanosis  Extremities: Normal range of motion, No clubbing, cyanosis or edema  Vascular: 1+ B/L PT/DP B/L  Neurologic: Non-focal  Psychiatry: A & O x 3, Mood & affect appropriate                              11.0   6.0   )-----------( 316      ( 15 May 2018 06:11 )             33.6     05-15    139  |  102  |  24<H>  ----------------------------<  84  3.9   |  25  |  1.15    Ca    8.8      15 May 2018 06:10  Mg     2.2     05-15    TPro  6.3  /  Alb  2.7<L>  /  TBili  1.0  /  DBili  x   /  AST  19  /  ALT  7<L>  /  AlkPhos  447<H>  05-15

## 2018-05-16 DIAGNOSIS — F32.9 MAJOR DEPRESSIVE DISORDER, SINGLE EPISODE, UNSPECIFIED: ICD-10-CM

## 2018-05-16 LAB
24R-OH-CALCIDIOL SERPL-MCNC: 35.3 NG/ML — SIGNIFICANT CHANGE UP (ref 30–80)
ANION GAP SERPL CALC-SCNC: 12 MMOL/L — SIGNIFICANT CHANGE UP (ref 5–17)
APTT BLD: 50.5 SEC — HIGH (ref 27.5–37.4)
BUN SERPL-MCNC: 23 MG/DL — SIGNIFICANT CHANGE UP (ref 7–23)
CALCIUM SERPL-MCNC: 8.8 MG/DL — SIGNIFICANT CHANGE UP (ref 8.4–10.5)
CHLORIDE SERPL-SCNC: 101 MMOL/L — SIGNIFICANT CHANGE UP (ref 96–108)
CO2 SERPL-SCNC: 25 MMOL/L — SIGNIFICANT CHANGE UP (ref 22–31)
CREAT SERPL-MCNC: 1.11 MG/DL — SIGNIFICANT CHANGE UP (ref 0.5–1.3)
GLUCOSE SERPL-MCNC: 92 MG/DL — SIGNIFICANT CHANGE UP (ref 70–99)
HCT VFR BLD CALC: 32.5 % — LOW (ref 39–50)
HGB BLD-MCNC: 10.7 G/DL — LOW (ref 13–17)
INR BLD: 1.46 — HIGH (ref 0.88–1.16)
MAGNESIUM SERPL-MCNC: 2.2 MG/DL — SIGNIFICANT CHANGE UP (ref 1.6–2.6)
MCHC RBC-ENTMCNC: 27.6 PG — SIGNIFICANT CHANGE UP (ref 27–34)
MCHC RBC-ENTMCNC: 32.9 G/DL — SIGNIFICANT CHANGE UP (ref 32–36)
MCV RBC AUTO: 83.8 FL — SIGNIFICANT CHANGE UP (ref 80–100)
PLATELET # BLD AUTO: 318 K/UL — SIGNIFICANT CHANGE UP (ref 150–400)
POTASSIUM SERPL-MCNC: 3.9 MMOL/L — SIGNIFICANT CHANGE UP (ref 3.5–5.3)
POTASSIUM SERPL-SCNC: 3.9 MMOL/L — SIGNIFICANT CHANGE UP (ref 3.5–5.3)
PROTHROM AB SERPL-ACNC: 16.3 SEC — HIGH (ref 9.8–12.7)
RBC # BLD: 3.88 M/UL — LOW (ref 4.2–5.8)
RBC # FLD: 15.2 % — SIGNIFICANT CHANGE UP (ref 10.3–16.9)
SODIUM SERPL-SCNC: 138 MMOL/L — SIGNIFICANT CHANGE UP (ref 135–145)
VIT D25+D1,25 OH+D1,25 PNL SERPL-MCNC: 92.9 PG/ML — HIGH (ref 19.9–79.3)
WBC # BLD: 6.6 K/UL — SIGNIFICANT CHANGE UP (ref 3.8–10.5)
WBC # FLD AUTO: 6.6 K/UL — SIGNIFICANT CHANGE UP (ref 3.8–10.5)

## 2018-05-16 RX ORDER — POTASSIUM CHLORIDE 20 MEQ
20 PACKET (EA) ORAL ONCE
Qty: 0 | Refills: 0 | Status: DISCONTINUED | OUTPATIENT
Start: 2018-05-16 | End: 2018-05-16

## 2018-05-16 RX ADMIN — SENNA PLUS 5 MILLILITER(S): 8.6 TABLET ORAL at 11:49

## 2018-05-16 RX ADMIN — HEPARIN SODIUM 5000 UNIT(S): 5000 INJECTION INTRAVENOUS; SUBCUTANEOUS at 21:54

## 2018-05-16 RX ADMIN — HEPARIN SODIUM 5000 UNIT(S): 5000 INJECTION INTRAVENOUS; SUBCUTANEOUS at 15:09

## 2018-05-16 RX ADMIN — ATORVASTATIN CALCIUM 80 MILLIGRAM(S): 80 TABLET, FILM COATED ORAL at 21:54

## 2018-05-16 RX ADMIN — Medication 40 MILLIGRAM(S): at 05:18

## 2018-05-16 RX ADMIN — TAMSULOSIN HYDROCHLORIDE 0.4 MILLIGRAM(S): 0.4 CAPSULE ORAL at 21:54

## 2018-05-16 RX ADMIN — Medication 100 MILLIGRAM(S): at 11:49

## 2018-05-16 RX ADMIN — Medication 650 MILLIGRAM(S): at 21:54

## 2018-05-16 RX ADMIN — Medication 650 MILLIGRAM(S): at 02:30

## 2018-05-16 RX ADMIN — Medication 650 MILLIGRAM(S): at 23:00

## 2018-05-16 RX ADMIN — LOSARTAN POTASSIUM 25 MILLIGRAM(S): 100 TABLET, FILM COATED ORAL at 11:49

## 2018-05-16 RX ADMIN — PANTOPRAZOLE SODIUM 40 MILLIGRAM(S): 20 TABLET, DELAYED RELEASE ORAL at 05:20

## 2018-05-16 RX ADMIN — Medication 25 MILLIGRAM(S): at 05:18

## 2018-05-16 RX ADMIN — HEPARIN SODIUM 5000 UNIT(S): 5000 INJECTION INTRAVENOUS; SUBCUTANEOUS at 05:19

## 2018-05-16 RX ADMIN — Medication 650 MILLIGRAM(S): at 01:41

## 2018-05-16 NOTE — PROGRESS NOTE ADULT - PROBLEM SELECTOR PLAN 7
Multiple pulmonary nodules noted on CT Abd/Plevis  --CT chest 5/14 revealed innumerable scattered pulmonary nodules B/L, most of   which demonstrated a lobulated configuration, likely representing mucous   plugging. More discrete nodular opacities visualized some demonstrating   feeding vessel sign concerning for metastatic prostate. Bulky mediastinal LAD also thought  to be prostatic. 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 Patchy ground less opacities predominantly in the RUL & RLL which may represent early pneumonitis. Consider correlation with PET/CT for more complete staging.  --Per Oncology recs perform biopsy of nodules to determine cause from prostate CA vs. pulmonary CA.    --Dr. Woods consulted, plan for bronchoscopy scheduled for 05/17 10am  --Eliquis on hold with plan for bronchoscopy scheduled Thursday (Last dose Eliquis 5/14 AM)  --Duonebs q6hr PRN, aspiration precaution Multiple pulmonary nodules noted on CT Abd/Plevis  --CT chest 5/14 revealed innumerable scattered pulmonary nodules B/L, most of   which demonstrated a lobulated configuration, likely representing mucous   plugging. More discrete nodular opacities visualized some demonstrating   feeding vessel sign concerning for metastatic prostate. Bulky mediastinal LAD also thought  to be prostatic. 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 Patchy ground less opacities predominantly in the RUL & RLL which may represent early pneumonitis. Consider correlation with PET/CT for more complete staging.  --Per Oncology recs perform biopsy of nodules to determine cause from prostate CA vs. pulmonary CA.    --Dr. Woods consulted, plan for bronchoscopy scheduled for 05/17 10am  --Eliquis on hold with plan for bronchoscopy scheduled Thursday (Last dose Eliquis 5/14 AM), f/u repeat coags  --Duonebs q6hr PRN, aspiration precaution

## 2018-05-16 NOTE — PROGRESS NOTE ADULT - PROBLEM SELECTOR PLAN 1
Now euvolemic, breathing improved. Net neg 7L since admit.  --CXR 5/10: Pulmonary vascular congestion. Interstitial pulmonary edema. f/u repeat CXR  --EKG revealed NSR with 1st degree AV block @ 94 BPM with left axis deviation and Q wave in inferior leads and V1-V5.  --Echo 5/10: severe concentric LVH, severe global hypokinesis of the left ventricle, EF 30%, left atrium is severely dilated, mitral inflow pattern is c/w restrictive left ventricular filling, suggestive of severely elevated left atrial pressure, right atrium is moderately dilated, tethered mitral valve leaflets.  --Echo possibly concerning for amyloidosis but no Cardiac MRI at this time per Dr. Murphy.   --Continue Lasix 40mg PO QD and Losartan 25mg QD.   --Strict I&O's, daily weights.

## 2018-05-16 NOTE — PROGRESS NOTE ADULT - SUBJECTIVE AND OBJECTIVE BOX
CC: cough     Subjective Assessment:  occ cough  	  MEDICATIONS:  furosemide    Tablet 40 milliGRAM(s) Oral daily  losartan 25 milliGRAM(s) Oral daily  metoprolol succinate ER 25 milliGRAM(s) Oral daily  tamsulosin 0.4 milliGRAM(s) Oral at bedtime  acetaminophen   Tablet. 650 milliGRAM(s) Oral every 6 hours PRN  traMADol 25 milliGRAM(s) Oral every 6 hours PRN  docusate sodium 100 milliGRAM(s) Oral daily  senna Syrup 5 milliLiter(s) Oral daily  atorvastatin 80 milliGRAM(s) Oral at bedtime  methimazole 5 milliGRAM(s) Oral three times a day  heparin  Injectable 5000 Unit(s) SubCutaneous every 8 hours    [PHYSICAL EXAM:  TELEMETRY:  T(C): 36.3 (05-15-18 @ 14:47), Max: 37.1 (05-15-18 @ 00:35)  HR: 78 (05-15-18 @ 08:49) (71 - 78)  BP: 95/60 (05-15-18 @ 08:49) (85/53 - 104/61)  RR: 18 (05-15-18 @ 08:49) (16 - 18)  SpO2: 97% (05-15-18 @ 08:49) (96% - 100%)  Wt(kg): --  I&O's Summary    14 May 2018 07:01  -  15 May 2018 07:00  --------------------------------------------------------  IN: 420 mL / OUT: 1500 mL / NET: -1080 mL    15 May 2018 07:01  -  15 May 2018 16:30  --------------------------------------------------------  IN: 360 mL / OUT: 450 mL / NET: -90 mL        [place]: in place                                  Appearance: Normal	  HEENT:   Normal oral mucosa, PERRL, EOMI	  Neck: Supple,  - JVD; Carotid Bruit   Cardiovascular: Normal S1 S2, No JVD, No murmurs,   Respiratory: Lungs clear to auscultation//No Rales, Rhonchi, Wheezing	  Gastrointestinal:  Soft, Non-tender, + BS	  Skin: No rashes, No ecchymoses, No cyanosis  Extremities: Normal range of motion, No clubbing, cyanosis or edema  Vascular: 1+ B/L PT/DP B/L  Neurologic: Non-focal  Psychiatry: A & O x 3, Mood & affect appropriate    Complete Blood Count in AM (05.16.18 @ 06:57)    WBC Count: 6.6 K/uL    RBC Count: 3.88 M/uL    Hemoglobin: 10.7 g/dL    Hematocrit: 32.5 %    Mean Cell Volume: 83.8 fL    Mean Cell Hemoglobin: 27.6 pg    Mean Cell Hemoglobin Conc: 32.9 g/dL    Red Cell Distrib Width: 15.2 %    Platelet Count - Automated: 318 K/uL                              11.0   6.0   )-----------( 316      ( 15 May 2018 06:11 )             33.6     05-15    139  |  102  |  24<H>  ----------------------------<  84  3.9   |  25  |  1.15    Ca    8.8      15 May 2018 06:10  Mg     2.2     05-15    TPro  6.3  /  Alb  2.7<L>  /  TBili  1.0  /  DBili  x   /  AST  19  /  ALT  7<L>  /  AlkPhos  447<H>  05-15  Prostate Ca Screen, PSA Total (05.12.18 @ 19:57)    Prostate Ca Screen, PSA Total: 6264.00: Test repeated.  Method: Roche Chemiluminescence  Values obtained with different assay methods or kits cannot be  used interchangeably.  Results cannot be interpreted as absolute evidence of the presence  or absence of malignant disease ng/mL        < from: CT Chest No Cont (05.14.18 @ 16:27) >    1. Innumerable scattered pulmonary nodules are noted bilaterally. Most of   which demonstrated a lobulated configuration, likely representing mucous   plugging.     2. However there are more discrete nodular opacities some demonstrating   feeding vessel sign concerning for metastatic prostate.     3. Bulky mediastinal lymphadenopathy as described above and also thought   to be prostatic. Correlation with PET/CT for more complete staging.    4. Views osteoblastic metastases involving the scapulae left greater than   right as well as multiple ribs with a pathologic fracture involving the   right eighth rib as well as the vertebra without compression fracture   deformity.    5. Patchy ground less opacities predominantly in the right upper and   right lower lobes which may represent early pneumonitis. Short interval   surveillance may be of value to confirm stability and/or resolution.           < end of copied text >

## 2018-05-16 NOTE — CONSULT NOTE ADULT - SUBJECTIVE AND OBJECTIVE BOX
Patient is a 77 year old    american , former smoker and with a history of HTN, HLD, pre DM, CAD S/P CABG in 2016 at [facility], CHF and DVT 6 months ago and who presented to [facility] on 5/09/18 complaining of cough and shortness of breath of 5 days duration. He reported to have experienced chest tightness and increasing weakness. Chest Xray in ED revealed a right sided pleural effusion and patient was admitted to [facility] for diuesis, ECHO, to rule out ACS, Patient treated for CHF.      Patient subseqently found to have bone metastases in chest xray and was subsequently found to have a prostate tumor and multiple pulmonary nodules.      Patient has been informed of findings and initially did not want to proceed with furthur work up. He has since agreed to proceed with ongoing evaluation.          Mental Status:  Patient is a 77 year old  male of slender build who is alert and oriented to person place and time and sitting up in a chair. Speech is clear and of normal rate and rhythm. Affect is full range. Mood is anxious and depressed. Thought processes are linear. There are no auditory or visual hallucinations. There is no suicidal or homicidal ideation.          Impression: Adjustment reaction with anxiety and depression.         Recommend: Supportive therapy Will discuss.

## 2018-05-16 NOTE — PROGRESS NOTE ADULT - PROBLEM SELECTOR PLAN 8
Pt reporting sadness since receiving prostate CA diagnosis  --Denies SI/HI  --Consulted Dr. Hamm, f/u

## 2018-05-16 NOTE — CONSULT NOTE ADULT - CONSULT REASON
followup to sclerotic bony lesions, and to place gould catheter
medicine
possible metastatic prostate ca
prostate cancer
Anxiety

## 2018-05-16 NOTE — PROGRESS NOTE ADULT - PROBLEM SELECTOR PLAN 3
SBP remains low, 90-100s today  --c/w lopressor 25 mg daily and losartan 25 mg daily.  --Continue to monitor.

## 2018-05-16 NOTE — PROGRESS NOTE ADULT - PROBLEM SELECTOR PLAN 5
hx of L DVT (dx: 6 months ago: on eliquis)  --Eliquis on hold for scheduled bronch on 5/17/18, last dose Eliquis 5/14 AM  --Consider restarting when completed given cancer diagnosis and unknown reason for prior DVT

## 2018-05-16 NOTE — PROGRESS NOTE ADULT - ASSESSMENT
78 yo male FORMER SMOKER with PMHx of HTN, HLD, pre- DM, CAD s/p CABG (in 2016 @ [facility]), chronic systolic CHF, h/o DVT 6 months ago (on Eliquis)  admitted for r/o ACS s/p non obstructed Cath, being worked up for possible prostate CA with plan for bronch on Thursday with Dr. Woods.

## 2018-05-16 NOTE — PROGRESS NOTE ADULT - PROBLEM SELECTOR PLAN 9
Code:   --Full    VTE:   --Sub Q heparin which will be held on Thursday AM. Continuing to hold Eliquis in light of upcoming bronch.     GI PPx:  --Protonix    Dispo:   ---Pending clinical progression  --PT recommending subacute rehab    Case d/w Dr. Murphy

## 2018-05-16 NOTE — PROGRESS NOTE ADULT - PROBLEM SELECTOR PLAN 2
Currently CP free today and HD stable.  --5 beats of NSVT 5/15/18 AM, asymptomatic, continues on BB, no events on tele o/n  --R/I NSTEMI peak CE 0.24. CE likely in setting of CHF.  --s/p Diagnostic Cath 5/11: known 3VCAD (99% mLAD, 100% ramus, 90% OM2, 99% small OM3, 85% distal dominant LCX), Patent LIMA to LAD, patent radial graft to ramus, patent SVG to OM2, LVEDP4.    --Continue Atorvastatin 80mg Qhs, Metoprolol 25mg QD.   --Holding ASA for scheduled bronch 5/17/18 as per Dr. Woods

## 2018-05-16 NOTE — PROGRESS NOTE ADULT - ASSESSMENT
76 yo M former smoker with CHF, CAD, incidental finding of bone mets on imaging and markedly elevated PSA  Imaging also with suspicious lung lesions   admitted with NSTEMI      Lung lesions:  s/p CT chest -   bronchoscopy tomorrow     Certainly with prostate cancer given imaging and PSA  at a minimum, will need ADT and bone modifying drug     please check vitamin D level in anticipation of denosumab     Discussed with patient

## 2018-05-16 NOTE — PROGRESS NOTE ADULT - PROBLEM SELECTOR PLAN 6
CXR w/ incidental finding of sclerotic bone metastases, likely from prostate CA.    --PSA elevated 6264   --CT abdomen/pelvis 05/11: Prostate tumor. Distended urinary bladder. Extensive bony metastasis. Multiple solid pulmonary nodules. Pulmonary metastasis is a consideration.  --Urology Team consulted (Dr. Brown).  Continue gould for urinary retention (placed by urology), Continue Tamsulosin 0.4mg. f/u further Recs  --Oncology Team consulted (Dr. Martinez), certainly prostate CA, will need ADT and bone modifying drug. f/u vitamin D level in anticipation of starting denosumab.   --Tramadol PRN for pain.

## 2018-05-16 NOTE — PROGRESS NOTE ADULT - ASSESSMENT
ASSESSMENT/PLAN77 yo male FORMER SMOKER with PMHx of HTN, HLD, pre- DM, CAD s/p CABG (in 2016 @ [facility]), chronic systolic CHF, h/o DVT 6 months ago (on Eliquis)  admitted for r/o ACS s/p non obstructed Cath, being worked up for possible prostate CA, found to have multiple  lung nodules, diffuse mediastinal LAD.    1. O2 2LNC  2. Bronchodilators:  Atrovent/ albuterol q 4 – 6 hours as needed  3. Corticosteroids: off  4. ID/Antibiotics: off  5. Cardiac/HTN: optimize BP  6. GI: Rx/ prophylaxis c PPI/H2B  7. Heme: Rx/VT prophylaxis c SQH/SCD/AC Eliquis last dose 05/14/AM, hold ASA, repeat coags today  8. Aspiration precautions  9. Bronchoscopy scheduled for 05/17 10am. NPO  Discussed with managing team

## 2018-05-16 NOTE — PROGRESS NOTE ADULT - SUBJECTIVE AND OBJECTIVE BOX
Interventional, Pulmonary, Critical, Chest Special Procedures.    Pt was seen and fully examined by myself.     Time spent with patient in minutes:37    Patient is a 77y old  Male who presents with a chief complaint of CP (09 May 2018 19:41)The patient ill appearing, pain free when seen. Concerned c illness.     HPI:  78 yo male FORMER SMOKER with PMHx of HTN, HLD, pre- DM,  CAD s/p CABG (in 2016 @ [facility]), CHF (EF unknown), h/o DVT 6 months ago (on Eliquis; last dose last night) who presented to [facility] ED on 5/9/18 with CC of Cough and SOB X 5-6 days. Pt. reports that he initially had a cough with white sputum production associated with shortness of breath at rest, non-radiating L sided Chest tightness and palpitations. pt. reports that the chest tightness and SOB comes along with the cough  and is relieved when he stops coughing. He also reports feeling sore. At baseline, pt. reports that he can ambulate 4 blocks before getting SOB.  Today am, pt. reports having hot flashes , cough, SOB and chest tightness which prompted him to come to the ED. Denies dizziness, diaphoresis, fatigue, LE edema, orthopnea, PND, syncope.  Upon admit, temp 98.3F,  BPM, /86, RR 20, SpO2 96% on RA.  Labs significant for alk phos 500, , trop 0.08, BNP 6822. CXR wet read revealed R sided pleural effusion. EKG revealed NSR with 1st degree AV block @ 94 BPM with left axis deviation and Q wave in inferior leads and V1-V5.  While in ER, pt received IV Lasix 40 mg x1. Patient admitted to 5U for diuresis, ECHO, r/o ACS. (09 May 2018 19:41)    REVIEW OF SYSTEMS: updated  Constitutional: No fever, weight loss, chills or fatigue  Eyes: No eye pain, visual disturbances, or discharge  ENMT:  No difficulty hearing, tinnitus, vertigo; No sinus or throat pain. No epistaxis, dysphagia, dysphonia, hoarseness or odynophagia  Neck: No pain, stiffness or neck swelling.  No masses or deformities  Respiratory: No cough, wheezing, chills or hemoptysis  - COPD  - ILD   - PE   - ASTHMA     - PNEUMONIA  Cardiovascular: No chest pain, dysrhythmia, palpitations, dizziness or edema   - COPD     - CAD   - CHF   - HTN  Gastrointestinal: No abdominal or epigastric pain. No nausea, vomiting or hematemesis; No diarrhea or constipation. No melena or hematochezia. No dysphagia or Icterus.          Genitourinary: No dysuria, frequency, hematuria or incontinence   - CKD/PAM      - ESRD  Neurological: No headaches, memory loss, loss of strength, numbness or tremors      -DEMENTIA     - STROKE    - SEIZURE  Skin: No itching, burning, rashes or lesions   Lymph Nodes: No enlarged glands  Endocrine: No heat or cold intolerance; No hair loss       - DM     - THYROID DISORDER  Musculoskeletal: No joint pain or swelling; No muscle, back or extremity pain  Psychiatric: No depression, anxiety, mood swings or difficulty sleeping  Heme/Lymph: No easy bruising or bleeding gums         - ANEMIA      - CANCER   -COAGULOPATHY  Allergy and Immunologic: No hives or eczema    PAST MEDICAL & SURGICAL HISTORY:  CHF (congestive heart failure)  CAD (coronary artery disease)  HTN (hypertension)  S/P hernia surgery  History of coronary artery bypass graft: 2017 @ [facility]    FAMILY HISTORY:  No pertinent family history in first degree relatives    SOCIAL HISTORY:      - Tobacco     - ETOH    Allergies    No Known Allergies    Intolerances      Vital Signs Last 24 Hrs  T(C): 36.3 (16 May 2018 13:44), Max: 36.4 (16 May 2018 00:11)  T(F): 97.4 (16 May 2018 13:44), Max: 97.6 (16 May 2018 08:37)  HR: 69 (16 May 2018 11:47) (63 - 77)  BP: 101/58 (16 May 2018 11:47) (89/51 - 107/62)  BP(mean): --  RR: 18 (16 May 2018 11:47) (16 - 18)  SpO2: 97% (16 May 2018 11:47) (97% - 100%)    05-15 @ 07:01  -  05-16 @ 07:00  --------------------------------------------------------  IN: 800 mL / OUT: 1150 mL / NET: -350 mL    05-16 @ 07:01  -  05-16 @ 15:55  --------------------------------------------------------  IN: 600 mL / OUT: 565 mL / NET: 35 mL        PHYSICAL EXAM:  Un Comfortable, no immediate distress  Eyes: PERRL, EOM intact; conjunctiva and sclera clear  Head: Normocephalic;  No Trauma  ENMT: No nasal discharge, +hoarseness, +cough   Neck: Supple; non tender; no masses or deformities.    No JVD  Respiratory:  - WHEEZING   - RHONCHI  - RALES  + CRACKLES.  Diminished breath sounds  BILATERAL  RIGHT  LEFT bases  Cardiovascular: Regular rate and rhythm. S1 and S2 Normal; No murmurs, gallops or rubs     - PPM/AICD  Gastrointestinal: Soft non-tender, non-distended; Normal bowel sounds; No hepatosplenomegaly.     -PEG    -  GT   - LAU  Genitourinary: No costovertebral angle tenderness. No dysuria  Extremities: AROM, + clubbing, cyanosis or edema    Vascular: Peripheral pulses palpable 2+ bilaterally  Neurological: Alert and responisve to stimuli   Skin: Warm and dry. No obvious rash  Lymph Nodes: No acute cervical or supraclavicular adenopathy  Psychiatric: Cooperative and appropriate mood    DEVICES:  - DENTURES   +IV R / L     - ETUBE   -TRACH   -CTUBE  R / L      LABS:                          10.7   6.6   )-----------( 318      ( 16 May 2018 06:57 )             32.5     05-16    138  |  101  |  23  ----------------------------<  92  3.9   |  25  |  1.11    Ca    8.8      16 May 2018 06:57  Mg     2.2     05-16    TPro  6.3  /  Alb  2.7<L>  /  TBili  1.0  /  DBili  x   /  AST  19  /  ALT  7<L>  /  AlkPhos  447<H>  05-15      < from: CT Chest No Cont (05.14.18 @ 16:27) >    EXAM:  CT CHEST                          PROCEDURE DATE:  05/14/2018                     INTERPRETATION:  CT Chest without intravenous contrast    History: Pulmonary nodules.    Technique: CT scan of chest performed from lung apices through lung   bases.  1 mm thick axial images, axial MIPS, and sagittal and coronal   reformatted images were produced. Intravenous contrast was not   administered, as ordered.    Comparison: None.    FINDINGS:     Lungs and large airways: Mild biapical centrilobular emphysema.    Innumerable scattered  pulmonary nodules are noted bilaterally. Any are   tubular and branching in configuration with adjacent dilated bronchi   thought to represent areas of mucous plugging (images 173-195) within the   medial segment of the right middle lobe. Additional similar-appearing   tubular branching opacities are seen within the posterior basal segment   of the right lower lobe (image [id number], series 3) (image 1:). There   are more discrete nodular opacities some of which may demonstrate feeding   vessel sign as well as arch adjacent to the pleura metastatic disease   cannot be excluded the largest located within the right upper lobe   measuring 7 mm (image 82, series 3), and 6 mm noncalcified nodule within   the right upper lobe (image 88, series 3). Both small and large airway   inflammation is noted with areas of cylindrical bronchiectasis as well as   scattered tree-in-bud micronodular opacities (image 112, series 3) within   the anterior segment of theright upper lobe. Additionally patchy ground   glass opacities are seen in the right upper lobe, as well as more   coalescent in the lateral segment of the right middle lobe and   immediately adjacent superior segment to the right lower lobe. Pleural  parenchymal scarring, predominantly involving bilateral lower lung zones.     Pleura:  Trace basilar pleural effusion.    Adenopathy:   There is a probable right-sided substernal thyroid goiter   with a high attenuation right paratracheal mass effect on the tracheal   air column which is most slightly to the left (image 12, series 4, and   image 59, series [id number]). It measures 3 x 2.1 x 5.3 cm in AP by transverse   by cephalocaudal dimensions. Moreover there is marked lymphadenopathy   involvingpredominantly the right paratracheal, precarinal, AP window,   subcarinal and as ago esophageal recess is with coalescent   lymphadenopathy measuring as follows: 3 .8 cm in transverse dimension   (image 22, series 4), 2.4 cm in AP dimension in the subcarinal region   (image 35, series 4).    Heart and pericardium: Post CABG and LIMA procedure. There is moderate   cardio megaly with multiple chamber enlargement. There is heavy coronary   artery calcification/stents. There is calcification of the proximal   ascending aorta. No pericardial effusion.     Vessels:  Moderately calcified aortic knob. Status post CABG.    Chest wall and lower neck:  Status post thoracic surgery with sternotomy   wires in place.    Upper abdomen: Small hiatal hernia. Subcentimeter hypodensity upper pole   left kidney, too small to characterize.    Bones:  Diffuse sclerotic lesions involving the thoracic spine, most   prominent at T-5, suspicious for metastatic disease. Probable pathologic   fracture involving the right eighth rib with bone callous. Severe diffuse   osteoblastic metastasis involving complete leave the left scapula most   pronounced in the region of the glenohumeral joint space.     IMPRESSION:       1. Innumerable scattered pulmonary nodules are noted bilaterally. Most of   which demonstrated a lobulated configuration, likely representing mucous   plugging.     2. However there are more discrete nodular opacities some demonstrating   feeding vessel sign concerning for metastatic prostate.     3. Bulky mediastinal lymphadenopathy as described above and also thought   to be prostatic. Correlation with PET/CT for more complete staging.    4. Views osteoblastic metastases involving the scapulae left greater than   right as well as multiple ribs with a pathologic fracture involving the   right eighth rib as well as the vertebra without compression fracture   deformity.    5. Patchy ground less opacities predominantly in the right upper and   right lower lobes which may represent early pneumonitis. Short interval   surveillance may be of value to confirm stability and/or resolution.     < end of copied text >  RADIOLOGY & ADDITIONAL STUDIES (The following images were personally reviewed):

## 2018-05-16 NOTE — PROGRESS NOTE ADULT - SUBJECTIVE AND OBJECTIVE BOX
Interventional Cardiology PA Adult Progress Note    CC: cough and SOB upon admit    Subjective Assessment:  Pt seen and examined at bedside. Patient reports he is feeling well but nervous about tomorrows procedure. Denies CP today, SOB, dizziness, palpitations, N/V, abdominal pain. All other 12 point review of systems otherwise negative except per HPI.   	  MEDICATIONS:  furosemide    Tablet 40 milliGRAM(s) Oral daily  losartan 25 milliGRAM(s) Oral daily  metoprolol succinate ER 25 milliGRAM(s) Oral daily  tamsulosin 0.4 milliGRAM(s) Oral at bedtime  ALBUTerol/ipratropium for Nebulization 3 milliLiter(s) Nebulizer every 6 hours PRN  acetaminophen   Tablet. 650 milliGRAM(s) Oral every 6 hours PRN  traMADol 25 milliGRAM(s) Oral every 6 hours PRN  docusate sodium 100 milliGRAM(s) Oral daily  pantoprazole    Tablet 40 milliGRAM(s) Oral before breakfast  senna Syrup 5 milliLiter(s) Oral daily  atorvastatin 80 milliGRAM(s) Oral at bedtime  methimazole 5 milliGRAM(s) Oral three times a day  heparin  Injectable 5000 Unit(s) SubCutaneous every 8 hours    [PHYSICAL EXAM:  TELEMETRY:  T(C): 36.3 (05-16-18 @ 13:44), Max: 36.4 (05-16-18 @ 00:11)  HR: 69 (05-16-18 @ 11:47) (63 - 77)  BP: 101/58 (05-16-18 @ 11:47) (89/51 - 107/62)  RR: 18 (05-16-18 @ 11:47) (16 - 18)  SpO2: 97% (05-16-18 @ 11:47) (97% - 100%)  Wt(kg): --  I&O's Summary    15 May 2018 07:01  -  16 May 2018 07:00  --------------------------------------------------------  IN: 800 mL / OUT: 1150 mL / NET: -350 mL    16 May 2018 07:01  -  16 May 2018 17:05  --------------------------------------------------------  IN: 600 mL / OUT: 565 mL / NET: 35 mL        [place]: IN PLACE  Appearance: Frail, elderly man laying comfortably in bed	  HEENT:   Normal oral mucosa, PERRL, EOMI	  Neck: Supple,  - JVD; Carotid Bruit   Cardiovascular: Normal S1 S2, No JVD, No murmurs,   Respiratory: Lungs clear to auscultation//No Rales, Rhonchi, Wheezing	  Gastrointestinal:  Mild tenderness to LLQ on palpation, no guarding or rebound tenderness  Skin: No rashes, No ecchymoses, No cyanosis  Extremities: Normal range of motion, No clubbing, cyanosis or edema  Vascular: 1+ B/L PT/DP B/L  Neurologic: Non-focal  Psychiatry: A & O x 3, Mood & affect appropriate                 10.7   6.6   )-----------( 318      ( 16 May 2018 06:57 )             32.5     05-16    138  |  101  |  23  ----------------------------<  92  3.9   |  25  |  1.11    Ca    8.8      16 May 2018 06:57  Mg     2.2     05-16    TPro  6.3  /  Alb  2.7<L>  /  TBili  1.0  /  DBili  x   /  AST  19  /  ALT  7<L>  /  AlkPhos  447<H>  05-15

## 2018-05-16 NOTE — PROGRESS NOTE ADULT - SUBJECTIVE AND OBJECTIVE BOX
78 yo M former smoker with CHF, CAD, incidental finding of bone mets on imaging and markedly elevated PSA  Imaging also with suspicious lung lesions     Interval history:  reports he is ambulating with the walker  no new symptoms  no back pain or bone pain     s/p CT chest    Vital Signs Last 24 Hrs  T(C): 36.1 (16 May 2018 04:54), Max: 36.4 (16 May 2018 00:11)  T(F): 96.9 (16 May 2018 04:54), Max: 97.5 (16 May 2018 00:11)  HR: 66 (16 May 2018 05:15) (64 - 78)  BP: 98/54 (16 May 2018 05:15) (83/49 - 107/62)  BP(mean): --  RR: 16 (16 May 2018 05:15) (16 - 18)  SpO2: 100% (16 May 2018 05:15) (97% - 100%)    frail, chronically ill appearing  lying flat, sleeping  NAD    PAST MEDICAL & SURGICAL HISTORY:  CHF (congestive heart failure)  CAD (coronary artery disease)  HTN (hypertension)  S/P hernia surgery  History of coronary artery bypass graft: 2017 @ [facility]    Social Hx:  lives alone, with HHA  ambulates with cane  quit smoking 40 yrs ago    He denies a family history of cancer          Labs: reviewed          Imaging:   reviewed

## 2018-05-17 ENCOUNTER — RESULT REVIEW (OUTPATIENT)
Age: 78
End: 2018-05-17

## 2018-05-17 DIAGNOSIS — R04.2 HEMOPTYSIS: ICD-10-CM

## 2018-05-17 DIAGNOSIS — R59.0 LOCALIZED ENLARGED LYMPH NODES: ICD-10-CM

## 2018-05-17 DIAGNOSIS — R09.02 HYPOXEMIA: ICD-10-CM

## 2018-05-17 LAB
ANION GAP SERPL CALC-SCNC: 11 MMOL/L — SIGNIFICANT CHANGE UP (ref 5–17)
APTT BLD: 49.3 SEC — HIGH (ref 27.5–37.4)
BUN SERPL-MCNC: 18 MG/DL — SIGNIFICANT CHANGE UP (ref 7–23)
CALCIUM SERPL-MCNC: 9.2 MG/DL — SIGNIFICANT CHANGE UP (ref 8.4–10.5)
CHLORIDE SERPL-SCNC: 99 MMOL/L — SIGNIFICANT CHANGE UP (ref 96–108)
CO2 SERPL-SCNC: 27 MMOL/L — SIGNIFICANT CHANGE UP (ref 22–31)
CREAT SERPL-MCNC: 1.03 MG/DL — SIGNIFICANT CHANGE UP (ref 0.5–1.3)
GLUCOSE SERPL-MCNC: 97 MG/DL — SIGNIFICANT CHANGE UP (ref 70–99)
HCT VFR BLD CALC: 34.3 % — LOW (ref 39–50)
HGB BLD-MCNC: 11.2 G/DL — LOW (ref 13–17)
INR BLD: 1.44 — HIGH (ref 0.88–1.16)
MAGNESIUM SERPL-MCNC: 2.2 MG/DL — SIGNIFICANT CHANGE UP (ref 1.6–2.6)
MCHC RBC-ENTMCNC: 27.2 PG — SIGNIFICANT CHANGE UP (ref 27–34)
MCHC RBC-ENTMCNC: 32.7 G/DL — SIGNIFICANT CHANGE UP (ref 32–36)
MCV RBC AUTO: 83.3 FL — SIGNIFICANT CHANGE UP (ref 80–100)
PLATELET # BLD AUTO: 317 K/UL — SIGNIFICANT CHANGE UP (ref 150–400)
POTASSIUM SERPL-MCNC: 4.2 MMOL/L — SIGNIFICANT CHANGE UP (ref 3.5–5.3)
POTASSIUM SERPL-SCNC: 4.2 MMOL/L — SIGNIFICANT CHANGE UP (ref 3.5–5.3)
PROTHROM AB SERPL-ACNC: 16.1 SEC — HIGH (ref 9.8–12.7)
RBC # BLD: 4.12 M/UL — LOW (ref 4.2–5.8)
RBC # FLD: 15.2 % — SIGNIFICANT CHANGE UP (ref 10.3–16.9)
SODIUM SERPL-SCNC: 137 MMOL/L — SIGNIFICANT CHANGE UP (ref 135–145)
WBC # BLD: 6.4 K/UL — SIGNIFICANT CHANGE UP (ref 3.8–10.5)
WBC # FLD AUTO: 6.4 K/UL — SIGNIFICANT CHANGE UP (ref 3.8–10.5)

## 2018-05-17 PROCEDURE — 71045 X-RAY EXAM CHEST 1 VIEW: CPT | Mod: 26

## 2018-05-17 PROCEDURE — 93010 ELECTROCARDIOGRAM REPORT: CPT

## 2018-05-17 RX ORDER — BENZOCAINE AND MENTHOL 5; 1 G/100ML; G/100ML
1 LIQUID ORAL EVERY 4 HOURS
Qty: 0 | Refills: 0 | Status: DISCONTINUED | OUTPATIENT
Start: 2018-05-17 | End: 2018-05-24

## 2018-05-17 RX ORDER — HEPARIN SODIUM 5000 [USP'U]/ML
5000 INJECTION INTRAVENOUS; SUBCUTANEOUS EVERY 8 HOURS
Qty: 0 | Refills: 0 | Status: DISCONTINUED | OUTPATIENT
Start: 2018-05-17 | End: 2018-05-18

## 2018-05-17 RX ADMIN — PANTOPRAZOLE SODIUM 40 MILLIGRAM(S): 20 TABLET, DELAYED RELEASE ORAL at 05:51

## 2018-05-17 RX ADMIN — LOSARTAN POTASSIUM 25 MILLIGRAM(S): 100 TABLET, FILM COATED ORAL at 14:48

## 2018-05-17 RX ADMIN — BENZOCAINE AND MENTHOL 1 LOZENGE: 5; 1 LIQUID ORAL at 22:06

## 2018-05-17 RX ADMIN — Medication 40 MILLIGRAM(S): at 05:51

## 2018-05-17 RX ADMIN — TAMSULOSIN HYDROCHLORIDE 0.4 MILLIGRAM(S): 0.4 CAPSULE ORAL at 22:06

## 2018-05-17 RX ADMIN — Medication 25 MILLIGRAM(S): at 05:51

## 2018-05-17 RX ADMIN — ATORVASTATIN CALCIUM 80 MILLIGRAM(S): 80 TABLET, FILM COATED ORAL at 22:06

## 2018-05-17 RX ADMIN — HEPARIN SODIUM 5000 UNIT(S): 5000 INJECTION INTRAVENOUS; SUBCUTANEOUS at 22:06

## 2018-05-17 NOTE — PROGRESS NOTE ADULT - SUBJECTIVE AND OBJECTIVE BOX
Interventional, Pulmonary, Critical, Chest Special Procedures.    Pt was seen and fully examined by myself.     Time spent with patient in minutes:37    Patient is a 77y old  Male who presents with a chief complaint of CP (09 May 2018 19:41) The patient now post complex bronchoscopic procedure. Please see formal OR report in chart.  The patient is now extubated. HD stable, no dysphagia, no dysphonia.    HPI:  76 yo male FORMER SMOKER with PMHx of HTN, HLD, pre- DM,  CAD s/p CABG (in 2016 @ [facility]), CHF (EF unknown), h/o DVT 6 months ago (on Eliquis; last dose last night) who presented to [facility] ED on 5/9/18 with CC of Cough and SOB X 5-6 days. Pt. reports that he initially had a cough with white sputum production associated with shortness of breath at rest, non-radiating L sided Chest tightness and palpitations. pt. reports that the chest tightness and SOB comes along with the cough  and is relieved when he stops coughing. He also reports feeling sore. At baseline, pt. reports that he can ambulate 4 blocks before getting SOB.  Today am, pt. reports having hot flashes , cough, SOB and chest tightness which prompted him to come to the ED. Denies dizziness, diaphoresis, fatigue, LE edema, orthopnea, PND, syncope.  Upon admit, temp 98.3F,  BPM, /86, RR 20, SpO2 96% on RA.  Labs significant for alk phos 500, , trop 0.08, BNP 6822. CXR wet read revealed R sided pleural effusion. EKG revealed NSR with 1st degree AV block @ 94 BPM with left axis deviation and Q wave in inferior leads and V1-V5.  While in ER, pt received IV Lasix 40 mg x1. Patient admitted to 5U for diuresis, ECHO, r/o ACS. (09 May 2018 19:41)    REVIEW OF SYSTEMS:updated  Constitutional: No fever, weight loss, chills or fatigue  Eyes: No eye pain, visual disturbances, or discharge  ENMT:  No difficulty hearing, tinnitus, vertigo; No sinus or throat pain. No epistaxis, dysphagia, dysphonia, hoarseness or odynophagia  Neck: No pain, stiffness or neck swelling.  No masses or deformities  Respiratory: No cough, wheezing, chills or hemoptysis  - COPD  - ILD   - PE   - ASTHMA     - PNEUMONIA  Cardiovascular: No chest pain, dysrhythmia, palpitations, dizziness or edema   - COPD     - CAD   - CHF   - HTN  Gastrointestinal: No abdominal or epigastric pain. No nausea, vomiting or hematemesis; No diarrhea or constipation. No melena or hematochezia. No dysphagia or Icterus.          Genitourinary: No dysuria, frequency, hematuria or incontinence   - CKD/PAM      - ESRD  Neurological: No headaches, memory loss, loss of strength, numbness or tremors      -DEMENTIA     - STROKE    - SEIZURE  Skin: No itching, burning, rashes or lesions   Lymph Nodes: No enlarged glands  Endocrine: No heat or cold intolerance; No hair loss       - DM     - THYROID DISORDER  Musculoskeletal: No joint pain or swelling; No muscle, back or extremity pain  Psychiatric: No depression, anxiety, mood swings or difficulty sleeping  Heme/Lymph: No easy bruising or bleeding gums         - ANEMIA      - CANCER   -COAGULOPATHY  Allergy and Immunologic: No hives or eczema    PAST MEDICAL & SURGICAL HISTORY:  CHF (congestive heart failure)  CAD (coronary artery disease)  HTN (hypertension)  S/P hernia surgery  History of coronary artery bypass graft: 2017 @ [facility]    FAMILY HISTORY:  No pertinent family history in first degree relatives    SOCIAL HISTORY:      - Tobacco     - ETOH    Allergies    No Known Allergies    Intolerances      Vital Signs Last 24 Hrs  T(C): 36.3 (17 May 2018 10:12), Max: 36.8 (17 May 2018 01:50)  T(F): 97.3 (17 May 2018 10:12), Max: 98.3 (17 May 2018 01:50)  HR: 65 (17 May 2018 13:50) (65 - 78)  BP: 98/53 (17 May 2018 13:50) (91/50 - 107/61)  BP(mean): --  RR: 16 (17 May 2018 13:50) (16 - 18)  SpO2: 100% (17 May 2018 13:50) (96% - 100%)    05-16 @ 07:01  -  05-17 @ 07:00  --------------------------------------------------------  IN: 1120 mL / OUT: 1715 mL / NET: -595 mL    05-17 @ 07:01  -  05-17 @ 14:07  --------------------------------------------------------  IN: 0 mL / OUT: 400 mL / NET: -400 mL        PHYSICAL EXAM:  Un Comfortable, no immediate distress  Eyes: PERRL, EOM intact; conjunctiva and sclera clear  Head: Normocephalic;  No Trauma  ENMT: No nasal discharge, +hoarseness, +cough   Neck: Supple; non tender; no masses or deformities.    No JVD  Respiratory:  - WHEEZING   - RHONCHI  - RALES  + CRACKLES.  Diminished breath sounds  BILATERAL  RIGHT  LEFT bases  Cardiovascular: Regular rate and rhythm. S1 and S2 Normal; No murmurs, gallops or rubs     - PPM/AICD  Gastrointestinal: Soft non-tender, non-distended; Normal bowel sounds; No hepatosplenomegaly.     -PEG    -  GT   - LAU  Genitourinary: No costovertebral angle tenderness. No dysuria  Extremities: AROM, + clubbing, cyanosis or edema    Vascular: Peripheral pulses palpable 2+ bilaterally  Neurological: Alert and responisve to stimuli   Skin: Warm and dry. No obvious rash  Lymph Nodes: No acute cervical or supraclavicular adenopathy  Psychiatric: Cooperative and appropriate mood    DEVICES:  - DENTURES   +IV R / L     - ETUBE   -TRACH   -CTUBE  R / L      LABS:                          11.2   6.4   )-----------( 317      ( 17 May 2018 07:19 )             34.3     05-17    137  |  99  |  18  ----------------------------<  97  4.2   |  27  |  1.03    Ca    9.2      17 May 2018 07:19  Mg     2.2     05-17      PT/INR - ( 17 May 2018 07:19 )   PT: 16.1 sec;   INR: 1.44          PTT - ( 17 May 2018 07:19 )  PTT:49.3 sec  RADIOLOGY & ADDITIONAL STUDIES (The following images were personally reviewed):

## 2018-05-17 NOTE — PROGRESS NOTE ADULT - SUBJECTIVE AND OBJECTIVE BOX
76 yo M former smoker with CHF, CAD, incidental finding of bone mets on imaging and markedly elevated PSA  Imaging also with suspicious lung lesions     Interval history:  reports he is ambulating with the walker  no new symptoms  no back pain or bone pain     s/p CT chest    Vital Signs Last 24 Hrs  T(C): 35.9 (17 May 2018 05:08), Max: 36.8 (17 May 2018 01:50)  T(F): 96.7 (17 May 2018 05:08), Max: 98.3 (17 May 2018 01:50)  HR: 74 (17 May 2018 04:26) (69 - 78)  BP: 102/59 (17 May 2018 04:26) (91/50 - 107/61)  BP(mean): --  RR: 17 (17 May 2018 04:26) (16 - 18)  SpO2: 100% (17 May 2018 04:26) (97% - 100%)  frail, chronically ill appearing  lying flat  NAD    PAST MEDICAL & SURGICAL HISTORY:  CHF (congestive heart failure)  CAD (coronary artery disease)  HTN (hypertension)  S/P hernia surgery  History of coronary artery bypass graft: 2017 @ [facility]    Social Hx:  lives alone, with HHA  ambulates with cane  quit smoking 40 yrs ago    He denies a family history of cancer          Labs: reviewed          Imaging:   reviewed

## 2018-05-17 NOTE — PROGRESS NOTE ADULT - PROBLEM SELECTOR PLAN 7
Multiple pulmonary nodules noted on CT Abd/Plevis  --CT chest 5/14 revealed innumerable scattered pulmonary nodules B/L, most of   which demonstrated a lobulated configuration, likely representing mucous   plugging. More discrete nodular opacities visualized some demonstrating   feeding vessel sign concerning for metastatic prostate. Bulky mediastinal LAD also thought  to be prostatic. 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 Patchy ground less opacities predominantly in the RUL & RLL which may represent early pneumonitis. Consider correlation with PET/CT for more complete staging.  --Per Oncology recs perform biopsy of nodules to determine cause from prostate CA vs. pulmonary CA.    --Dr. Woods consulted, plan for bronchoscopy scheduled for 05/17 10am  --Eliquis on hold with plan for bronchoscopy scheduled Thursday (Last dose Eliquis 5/14 AM), f/u repeat coags  --Duonebs q6hr PRN, aspiration precaution -Multiple pulmonary nodules noted on CT Abd/Plevis  -CT chest 5/14 revealed innumerable scattered pulmonary nodules B/L, most of which demonstrated a lobulated configuration, likely representing mucous plugging. More discrete nodular opacities visualized some demonstrating feeding vessel sign concerning for metastatic prostate. Bulky mediastinal LAD also thought to be prostatic. 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 Patchy ground less opacities predominantly in the RUL & RLL which may represent early pneumonitis  -Per Oncology recs perform biopsy of nodules to determine cause from prostate CA vs. pulmonary CA.    -Dr. Woods consulted, plan for bronchoscopy scheduled for today f/u results  -Eliquis on hold with plan for bronchoscopy scheduled today 05/17 (Last dose Eliquis 5/14 AM), restart Eliquis tomorrow per Dr. Woods, INR continues to be elevated this AM 1.44 Dr. Murphy aware possibly likely to mets to liver, f/u coags in AM  -Duonebs q6hr PRN, aspiration precaution.

## 2018-05-17 NOTE — PROGRESS NOTE ADULT - PROBLEM SELECTOR PLAN 1
Now euvolemic, breathing improved. Net neg 7L since admit.  --CXR 5/10: Pulmonary vascular congestion. Interstitial pulmonary edema. f/u repeat CXR  --EKG revealed NSR with 1st degree AV block @ 94 BPM with left axis deviation and Q wave in inferior leads and V1-V5.  --Echo 5/10: severe concentric LVH, severe global hypokinesis of the left ventricle, EF 30%, left atrium is severely dilated, mitral inflow pattern is c/w restrictive left ventricular filling, suggestive of severely elevated left atrial pressure, right atrium is moderately dilated, tethered mitral valve leaflets.  --Echo possibly concerning for amyloidosis but no Cardiac MRI at this time per Dr. Murphy.   --Continue Lasix 40mg PO QD and Losartan 25mg QD.   --Strict I&O's, daily weights. -Now euvolemic, breathing improved.   -CXR 5/10: Pulmonary vascular congestion. Interstitial pulmonary edema. f/u repeat CXR  -EKG revealed NSR with 1st degree AV block @ 94 BPM with left axis deviation and Q wave in inferior leads and V1-V5.  -Echo 5/10: severe concentric LVH, severe global hypokinesis of the left ventricle, EF 30%, left atrium is severely dilated, mitral inflow pattern is c/w restrictive left ventricular filling, suggestive of severely elevated left atrial pressure, right atrium is moderately dilated, tethered mitral valve leaflets.  -Echo possibly concerning for amyloidosis but no Cardiac MRI at this time per Dr. Murphy.   -Continue Lasix 40mg PO QD and Losartan 25mg QD.   -Strict I&O's, daily weights.

## 2018-05-17 NOTE — PROGRESS NOTE ADULT - PROBLEM SELECTOR PLAN 7
Multiple pulmonary nodules noted on CT Abd/Plevis  --CT chest 5/14 revealed innumerable scattered pulmonary nodules B/L, most of   which demonstrated a lobulated configuration, likely representing mucous   plugging. More discrete nodular opacities visualized some demonstrating   feeding vessel sign concerning for metastatic prostate. Bulky mediastinal LAD also thought  to be prostatic. 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 Patchy ground less opacities predominantly in the RUL & RLL which may represent early pneumonitis. Consider correlation with PET/CT for more complete staging.  --Per Oncology recs perform biopsy of nodules to determine cause from prostate CA vs. pulmonary CA.    --Dr. Woods consulted, plan for bronchoscopy scheduled for 05/17 10am  --Eliquis on hold with plan for bronchoscopy scheduled Thursday (Last dose Eliquis 5/14 AM), f/u repeat coags  --Duonebs q6hr PRN, aspiration precaution

## 2018-05-17 NOTE — PROGRESS NOTE ADULT - ASSESSMENT
76 yo male FORMER SMOKER with PMHx of HTN, HLD, pre- DM, CAD s/p CABG (in 2016 @ [facility]), chronic systolic CHF, h/o DVT 6 months ago (on Eliquis)  admitted for r/o ACS s/p non obstructed Cath, being worked up for possible prostate CA with plan for bronch today

## 2018-05-17 NOTE — PROGRESS NOTE ADULT - ASSESSMENT
ASSESSMENT/PLAN77 yo male FORMER SMOKER with PMHx of HTN, HLD, pre- DM, CAD s/p CABG (in 2016 @ [facility]), chronic systolic CHF, h/o DVT 6 months ago (on Eliquis)  admitted for r/o ACS s/p non obstructed Cath, being worked up for possible prostate CA, found to have multiple  lung nodules, diffuse mediastinal LAD clinical metastatic cancer.    1. O2 2LNC  2. Bronchodilators:  Atrovent/ albuterol q 4 – 6 hours as needed  3. Corticosteroids: off  4. ID/Antibiotics: off pending Bronch CX  5. Cardiac/HTN: optimize BP  6. GI: Rx/ prophylaxis c PPI/H2B  7. Heme: Rx/VT prophylaxis c SQH/ today, may resume Eliquis tomorrow am unless contraindicated.  8. Aspiration precautions  9.Observe R groin Fidel site for risk of  hematoma formation.  Discussed with managing team, anesthesia

## 2018-05-17 NOTE — PROGRESS NOTE ADULT - ASSESSMENT
76 yo M former smoker with CHF, CAD, incidental finding of bone mets on imaging and markedly elevated PSA  Imaging also with suspicious lung lesions   admitted with NSTEMI      Lung lesions:  s/p CT chest - bulky mediastinal nodes, nodules   bronchoscopy today    Certainly with prostate cancer given imaging and PSA  at a minimum, will need ADT and bone modifying drug . chemotherapy to be considered based on lung/mediastinal node pathology

## 2018-05-17 NOTE — PROGRESS NOTE ADULT - PROBLEM SELECTOR PLAN 8
Pt reporting sadness since receiving prostate CA diagnosis  --Denies SI/HI  --Consulted Dr. Hamm, f/u -Pt reporting sadness since receiving prostate CA diagnosis  -Denies SI/HI  -psych recs: adjustment reaction with anxiety and depression- supportive therapy        DVT: SQH today, and restart Eliquis tomorrow  Dispo: pending clinical progression      Case d/w Dr. Murphy -Pt reporting sadness since receiving prostate CA diagnosis  -Denies SI/HI  -psych recs: adjustment reaction with anxiety and depression- supportive therapy        DVT: SQH today, and restart Eliquis tomorrow  Dispo: pending clinical progression  PT recommending BAILEY      Case d/w Dr. Murphy

## 2018-05-17 NOTE — PROGRESS NOTE ADULT - SUBJECTIVE AND OBJECTIVE BOX
[facility] PUD IP ATTENDING    Being asked to assist, monitor hemodynamic and respiratory physiology, prevent and manage complications.    Patient with coagulopathy (48 hours post discontinuation of apixaban), multiple lung lesions, massive mediastinal LAP,  CABG with left radial artery, vasculopathy, no palpable pulse of the upper extremities, CAD, CHF, HTN. Laryngeal abnormality on CT.  For linear EBUS, possibly forceps biopsies and needle biopsies of R 5 segment, with fluoroscopy    Pigtail catheter available  Lemaitre balloon catheter #4 at bedside  APC back up      Chart and images reviewed in detail    No radial pulse  Femoral a line was inserted    Anesthesia:  topical lido  sevo  propofol  glycopyrrolate  Dexamethasone  Phenylephrine    Difficult intubation  Flexible Th 190 bronchoscope was used for guided 8.5 MERLE intubation    Secured at 19 cm  Bite block was secured    Both lungs were surveyed and cleaned. Several endobronchial nonpulsating lesions.    Radial EBUS did not show vascular elements.    Linear EBUS for N 7: Three aspirations with 22 g, adequate sample.  #2 and #3 for cell block.    Bleed was controlled.    Washings were performed    PAST MEDICAL & SURGICAL HISTORY:  CHF (congestive heart failure)  CAD (coronary artery disease)  HTN (hypertension)  S/P hernia surgery  History of coronary artery bypass graft: 2017 @ [facility]    FAMILY HISTORY:  No pertinent family history in first degree relatives    SOCIAL HISTORY:    REVIEW OF SYSTEMS: reviewed  Constitutional: () weight change, () fever,  () chills, () fatigue, () night sweats  Eyes: () discharge, () eye pain, () visual change  ENT:  () hearing difficulty, () vertigo, () sinus pain,  () throat pain, () epistaxis, () dysphagia, () hoarseness  Neck: () pain, () stiffness, () swelling  Respiratory: () cough, () wheezing, () hemoptysis      Cardiovascular: () chest pain, ()palpitations, () dizziness   Gastrointestinal: () abdominal pain, () nausea, () vomiting, () hematemesis, () diarrhea,  () constipation, () melena  Genitourinary:  () dysuria, () frequency, () hematuria, () incontinence      Neurologic: () headache, () memory loss, () loss of strength, () numbness, () tremor     Skin: () itching, () burning, () rash, () lesions   Lymphatic: () enlarged lymph nodes  Endocrine: () hair loss, () temperature intolerance         Musculoskeletal: () back pain, () joint pain,  () extremity pain  Psychiatric: () visual change, () auditory change, () depression, () anxiety, () suicidal  Sleep: () disorder, () insomnia, () sleep deprivation  Heme/Lymph: () easy bruising, () bleeding gums            Allergy and Immunologic: () hives, () eczema    Vital Signs Last 24 Hrs  T(C): 36.3 (17 May 2018 10:12), Max: 36.8 (17 May 2018 01:50)  T(F): 97.3 (17 May 2018 10:12), Max: 98.3 (17 May 2018 01:50)  HR: 75 (17 May 2018 09:35) (72 - 78)  BP: 106/61 (17 May 2018 09:35) (91/50 - 107/61)  BP(mean): --  RR: 16 (17 May 2018 09:35) (16 - 18)  SpO2: 96% (17 May 2018 09:35) (96% - 100%)    I&O's Detail    16 May 2018 07:01  -  17 May 2018 07:00  --------------------------------------------------------  IN:    Oral Fluid: 1120 mL  Total IN: 1120 mL    OUT:    Indwelling Catheter - Urethral: 1715 mL  Total OUT: 1715 mL    Total NET: -595 mL      17 May 2018 07:01  -  17 May 2018 12:57  --------------------------------------------------------  IN:  Total IN: 0 mL    OUT:    Indwelling Catheter - Urethral: 400 mL  Total OUT: 400 mL    Total NET: -400 mL    PHYSICAL EXAM:  On stretcher, being monitored  Comfortable, - acute distress; vital signs are monitored continuously  Eyes: PERRLA, EOMI, -conjunctivitis, -scleritis   Head: no focal deficit, normocephalic,  no trauma  ENMT: moist tongue, no thrush, -nasal discharge, -hoarseness, normal hearing, -cough, -hemoptysis, trachea midline  Neck: supple, - lymphadenopathy,  -masses, -JVD  Respiratory: bilateral diminished breath sounds, -wheezing, + rhonchi, -rales, -crackles  Chest: -accessory muscle use, -paradoxical breathing  Cardiovascular: regular rate and sinus rhythm, S1 S2 normal, -S3, -S4, -murmur, -gallop, -rub  Gastrointestinal: soft, nontender, nondistended, normal bowel sounds, no hepatosplenomegaly  Genitourinary: -flank pain, -dysuria   LAU  Extremities: -clubbing, -cyanosis, -edema    Vascular: peripheral pulses hardly palpable palpable  Neurological: alert, oriented x 3, no focal deficit, -tremor   Skin: warm, dry, -erythema, iv site intact  Lymph nodes; no cervical, supraclavicular or axillary adenopathy  Psychiatric: cooperative, appropriate mood    MEDICATIONS  (STANDING):  atorvastatin 80 milliGRAM(s) Oral at bedtime  docusate sodium 100 milliGRAM(s) Oral daily  furosemide    Tablet 40 milliGRAM(s) Oral daily  losartan 25 milliGRAM(s) Oral daily  methimazole 5 milliGRAM(s) Oral three times a day  metoprolol succinate ER 25 milliGRAM(s) Oral daily  pantoprazole    Tablet 40 milliGRAM(s) Oral before breakfast  senna Syrup 5 milliLiter(s) Oral daily  tamsulosin 0.4 milliGRAM(s) Oral at bedtime    MEDICATIONS  (PRN):  acetaminophen   Tablet. 650 milliGRAM(s) Oral every 6 hours PRN Mild Pain (1 - 3)  ALBUTerol/ipratropium for Nebulization 3 milliLiter(s) Nebulizer every 6 hours PRN Shortness of Breath and/or Wheezing  traMADol 25 milliGRAM(s) Oral every 6 hours PRN Moderate Pain (4 - 6)      Allergies    No Known Allergies    Intolerances        LABS:                        11.2   6.4   )-----------( 317      ( 17 May 2018 07:19 )             34.3     05-17    137  |  99  |  18  ----------------------------<  97  4.2   |  27  |  1.03    Ca    9.2      17 May 2018 07:19  Mg     2.2     05-17      PT/INR - ( 17 May 2018 07:19 )   PT: 16.1 sec;   INR: 1.44     PTT - ( 17 May 2018 07:19 )  PTT:49.3 sec    +DVT prophylaxis  SC HEPARIN  +Sleep  +Nutrition goals  NPO  -Pain  DENIED  -Decubital ulcer  +GI prophylaxis (PPV, coagulopathy, Hx)  +Aspiration prophylaxis (45 degrees)    Ventilator synchronized  Tracheal cuff pressure  LOW    +Sedation/analgesia stopped once  +ID (phos, CH, mupi, SB)  -Delirium  +Cardiac Beta/ACEI-ARB/ASA/statin  +Prevention  +Education  +Medication reviewed (drug-drug interactions, PDA)  Medical devices    Discussed with anesthesia, Dr Woods, endoscopy CCRNs, at length    RADIOLOGY & ADDITIONAL STUDIES:      EXAM:  CT CHEST                          PROCEDURE DATE:  05/14/2018      INTERPRETATION:  CT Chest without intravenous contrast    History: Pulmonary nodules.    Technique: CT scan of chest performed from lung apices through lung   bases.  1 mm thick axial images, axial MIPS, and sagittal and coronal   reformatted images were produced. Intravenous contrast was not   administered, as ordered.    Comparison: None.    FINDINGS:     Lungs and large airways: Mild biapical centrilobular emphysema.    Innumerable scattered  pulmonary nodules are noted bilaterally. Any are   tubular and branching in configuration with adjacent dilated bronchi   thought to represent areas of mucous plugging (images 173-195) within the   medial segment of the right middle lobe. Additional similar-appearing   tubular branching opacities are seen within the posterior basal segment   of the right lower lobe (image 186-201, series 3) (image 1:). There   are more discrete nodular opacities some of which may demonstrate feeding   vessel sign as well as arch adjacent to the pleura metastatic disease   cannot be excluded the largest located within the right upper lobe   measuring 7 mm (image 82, series 3), and 6 mm noncalcified nodule within   the right upper lobe (image 88, series 3). Both small and large airway   inflammation is noted with areas of cylindrical bronchiectasis as well as   scattered tree-in-bud micronodular opacities (image 112, series 3) within   the anterior segment of theright upper lobe. Additionally patchy ground   glass opacities are seen in the right upper lobe, as well as more   coalescent in the lateral segment of the right middle lobe and   immediately adjacent superior segment to the right lower lobe. Pleural  parenchymal scarring, predominantly involving bilateral lower lung zones.     Pleura:  Trace basilar pleural effusion.    Adenopathy:   There is a probable right-sided substernal thyroid goiter   with a high attenuation right paratracheal mass effect on the tracheal   air column which is most slightly to the left (image 12, series 4, and   image 59, series 25125). It measures 3 x 2.1 x 5.3 cm in AP by transverse   by cephalocaudal dimensions. Moreover there is marked lymphadenopathy   involvingpredominantly the right paratracheal, precarinal, AP window,   subcarinal and as ago esophageal recess is with coalescent   lymphadenopathy measuring as follows: 3 .8 cm in transverse dimension   (image 22, series 4), 2.4 cm in AP dimension in the subcarinal region   (image 35, series 4).    Heart and pericardium: Post CABG and LIMA procedure. There is moderate   cardio megaly with multiple chamber enlargement. There is heavy coronary   artery calcification/stents. There is calcification of the proximal   ascending aorta. No pericardial effusion.     Vessels:  Moderately calcified aortic knob. Status post CABG.    Chest wall and lower neck:  Status post thoracic surgery with sternotomy   wires in place.    Upper abdomen: Small hiatal hernia. Subcentimeter hypodensity upper pole   left kidney, too small to characterize.    Bones:  Diffuse sclerotic lesions involving the thoracic spine, most   prominent at T-5, suspicious for metastatic disease. Probable pathologic   fracture involving the right eighth rib with bone callous. Severe diffuse   osteoblastic metastasis involving complete leave the left scapula most   pronounced in the region of the glenohumeral joint space.     IMPRESSION:       1. Innumerable scattered pulmonary nodules are noted bilaterally. Most of   which demonstrated a lobulated configuration, likely representing mucous   plugging.     2. However there are more discrete nodular opacities some demonstrating   feeding vessel sign concerning for metastatic prostate.     3. Bulky mediastinal lymphadenopathy as described above and also thought   to be prostatic. Correlation with PET/CT for more complete staging.    4. Views osteoblastic metastases involving the scapulae left greater than   right as well as multiple ribs with a pathologic fracture involving the   right eighth rib as well as the vertebra without compression fracture   deformity.    5. Patchy ground less opacities predominantly in the right upper and   right lower lobes which may represent early pneumonitis. Short interval   surveillance may be of value to confirm stability and/or resolution.

## 2018-05-17 NOTE — PROGRESS NOTE ADULT - PROBLEM SELECTOR PLAN 2
Currently CP free today and HD stable.  --5 beats of NSVT 5/15/18 AM, asymptomatic, continues on BB, no events on tele o/n  --R/I NSTEMI peak CE 0.24. CE likely in setting of CHF.  --s/p Diagnostic Cath 5/11: known 3VCAD (99% mLAD, 100% ramus, 90% OM2, 99% small OM3, 85% distal dominant LCX), Patent LIMA to LAD, patent radial graft to ramus, patent SVG to OM2, LVEDP4.    --Continue Atorvastatin 80mg Qhs, Metoprolol 25mg QD.   --Holding ASA for scheduled bronch 5/17/18 as per Dr. Woods -Currently CP free today and HD stable.  -5 beats of NSVT 5/15/18 AM, asymptomatic, continue on BB, no events on tele o/n  -R/I NSTEMI peak CE 0.24. CE likely in setting of CHF.  -s/p Diagnostic Cath 5/11: known 3VCAD (99% mLAD, 100% ramus, 90% OM2, 99% small OM3, 85% distal dominant LCX), Patent LIMA to LAD, patent radial graft to ramus, patent SVG to OM2, LVEDP4.    -Continue Atorvastatin 80mg Qhs, Metoprolol 25mg QD.   -Holding ASA for scheduled bronch today as per Dr. Woods.

## 2018-05-17 NOTE — PROGRESS NOTE ADULT - PROBLEM SELECTOR PLAN 5
hx of L DVT (dx: 6 months ago: on eliquis)  --Eliquis on hold for scheduled bronch on 5/17/18, last dose Eliquis 5/14 AM  --Consider restarting when completed given cancer diagnosis and unknown reason for prior DVT -hx of L DVT (dx: 6 months ago: on eliquis)  -Eliquis on hold for scheduled bronch on 5/17/18, last dose Eliquis 5/14 AM, restart on 05/18 per Dr. Woods     -Consider restarting when completed given cancer diagnosis and unknown reason for prior DVT.

## 2018-05-17 NOTE — CHART NOTE - NSCHARTNOTEFT_GEN_A_CORE
Admitting Diagnosis:   76 yo male FORMER SMOKER with PMHx of HTN, HLD, pre- DM, CAD s/p CABG (in 2016 @ [facility]), chronic systolic CHF, h/o DVT 6 months ago (on Eliquis)  admitted for r/o ACS s/p non obstructed Cath, being worked up for possible prostate CA with plan for bronch today      PAST MEDICAL & SURGICAL HISTORY:  CHF (congestive heart failure)  CAD (coronary artery disease)  HTN (hypertension)  S/P hernia surgery  History of coronary artery bypass graft: 2017 @ [facility]      Current Nutrition Order: -npo after midnight  -DASH/TLC, 1200ml fluid restriction       PO Intake: Good (%) [   ]  Fair (50-75%) [ X  ] Poor (<25%) [   ]    GI Issues: pnone    Pain: none    Skin Integrity: L groin surgical incision    Labs:       137  |  99  |  18  ----------------------------<  97  4.2   |  27  |  1.03    Ca    9.2      17 May 2018 07:19  Mg     2.2           CAPILLARY BLOOD GLUCOSE          Medications:  MEDICATIONS  (STANDING):  atorvastatin 80 milliGRAM(s) Oral at bedtime  docusate sodium 100 milliGRAM(s) Oral daily  furosemide    Tablet 40 milliGRAM(s) Oral daily  heparin  Injectable 5000 Unit(s) SubCutaneous every 8 hours  losartan 25 milliGRAM(s) Oral daily  methimazole 5 milliGRAM(s) Oral three times a day  metoprolol succinate ER 25 milliGRAM(s) Oral daily  pantoprazole    Tablet 40 milliGRAM(s) Oral before breakfast  senna Syrup 5 milliLiter(s) Oral daily  tamsulosin 0.4 milliGRAM(s) Oral at bedtime    MEDICATIONS  (PRN):  acetaminophen   Tablet. 650 milliGRAM(s) Oral every 6 hours PRN Mild Pain (1 - 3)  ALBUTerol/ipratropium for Nebulization 3 milliLiter(s) Nebulizer every 6 hours PRN Shortness of Breath and/or Wheezing  traMADol 25 milliGRAM(s) Oral every 6 hours PRN Moderate Pain (4 - 6)      Weight:  : 62.6kg  Daily Weight in k.3 (17 May 2018 05:08)    Weight Change: 5.3kg wt loss nohted since admission, will continue to trend wt    Estimated energy needs: admisson wt used to calculate energy needs due to pt's current body weight within % IBW   Nutrient needs based on [facility] standards of care for repletion in older adults 2/2 wt loss; Fluid per MD 2/2 CHF    Kcal: 25-30kcal/ABW: 1562-1875kcal  Protein: 1.2-1.4g/ABW: 75-87.5g      Subjective: pt still w/ fair appetite, reports 50% po intake w/ meals; prefers soft food, communicated to team; recommend Ensure BID (700kcal, 40g protein); denies any GI stress or pain.     Previous Nutrition Diagnosis: Inadequate oral intake r/t decreased appetite/intake AEB current intake ~50% of EER, 14 lb wt loss pta      Active [X   ]  Resolved [   ]    If resolved, new PES:     Goal: pt to meet > 75% EER via po consistently     Recommendations:  -Please change diet to soft/DASH/TLC/1200ml fluid rest and provide Ensure BID (700kcal, 40g protein)-ommunicated to team  -monitor lytes and fluid and zreplete prn  -please check wt x3/wk    Education: increased po intake w/ meals and ONS    Risk Level: High [  x ] Moderate [   ] Low [   ]

## 2018-05-17 NOTE — PROGRESS NOTE ADULT - SUBJECTIVE AND OBJECTIVE BOX
Subjective Assessment: . Feeling well this AM. Denies CP, SOB or palpitations.  	  MEDICATIONS:  furosemide    Tablet 40 milliGRAM(s) Oral daily  losartan 25 milliGRAM(s) Oral daily  metoprolol succinate ER 25 milliGRAM(s) Oral daily  tamsulosin 0.4 milliGRAM(s) Oral at bedtime  ALBUTerol/ipratropium for Nebulization 3 milliLiter(s) Nebulizer every 6 hours PRN  acetaminophen   Tablet. 650 milliGRAM(s) Oral every 6 hours PRN  traMADol 25 milliGRAM(s) Oral every 6 hours PRN  docusate sodium 100 milliGRAM(s) Oral daily  pantoprazole    Tablet 40 milliGRAM(s) Oral before breakfast  senna Syrup 5 milliLiter(s) Oral daily  atorvastatin 80 milliGRAM(s) Oral at bedtime  methimazole 5 milliGRAM(s) Oral three times a day        	    [PHYSICAL EXAM:  TELEMETRY:  T(C): 36.3 (05-17-18 @ 10:12), Max: 36.8 (05-17-18 @ 01:50)  HR: 65 (05-17-18 @ 13:50) (65 - 78)  BP: 98/53 (05-17-18 @ 13:50) (91/50 - 107/61)  RR: 16 (05-17-18 @ 13:50) (16 - 18)  SpO2: 100% (05-17-18 @ 13:50) (96% - 100%)    I&O's Summary    16 May 2018 07:01  -  17 May 2018 07:00  --------------------------------------------------------  IN: 1120 mL / OUT: 1715 mL / NET: -595 mL    17 May 2018 07:01  -  17 May 2018 14:22  --------------------------------------------------------  IN: 0 mL / OUT: 400 mL / NET: -400 mL                                              Appearance: Frail, elderly man laying comfortably in bed	  HEENT:   Normal oral mucosa, PERRL, EOMI	  Neck: Supple,  - JVD; Carotid Bruit   Cardiovascular: Normal S1 S2, No JVD, No murmurs,   Respiratory: Lungs clear to auscultation//No Rales, Rhonchi, Wheezing	  Gastrointestinal:  Mild tenderness to LLQ on palpation, no guarding or rebound tenderness  Skin: No rashes, No ecchymoses, No cyanosis  Extremities: Normal range of motion, No clubbing, cyanosis or edema  Vascular: 1+ B/L PT/DP B/L  Neurologic: Non-focal  Psychiatry: A & O x 3, Mood & affect appropriate    CT chest 05/14/2018: 1. Innumerable scattered pulmonary nodules are noted bilaterally. Most of which demonstrated a lobulated configuration, likely representing mucous   plugging. 2. However there are more discrete nodular opacities some demonstrating feeding vessel sign concerning for metastatic prostate. 3. Bulky mediastinal lymphadenopathy as described above and also thought to be prostatic. Correlation with PET/CT for more complete staging. 4. Views osteoblastic metastases involving the scapulae left greater than right as well as multiple ribs with a pathologic fracture involving the right eighth rib as well as the vertebra without compression fracture   deformity. 5. Patchy ground less opacities predominantly in the right upper and right lower lobes which may represent early pneumonitis. Short interval surveillance may be of value to confirm stability and/or resolution.     CT abdomen/pelvis 05/11/2018: Prostate tumor. Distended urinary bladder. Extensive bony metastasis. Multiple solid pulmonary nodules. Pulmonary metastasis is a   consideration. Recommend CT of chest for complete evaluation. Small right pleural effusion. Cardiomegaly. Heterogeneous hepatic enhancement likely due to altered perfusion. No discrete hepatic mass. Cholelithiasis. No biliary dilatation. Patent portal vein. Nonspecific thickening of the bilateral adrenal glands could be due to adenomatous hyperplasia. CT without contrast or PET/CT would be useful for differentiation from metastasis.     ECHO 05/10/2018: There is severe concentric left ventricular hypertrophy. There is severe global hypokinesis of the left ventricle. The left ventricular ejection fraction   is severely reduced. The left ventricular ejection fraction is 30%.The left atrium is severely dilated. The left atrial volume index is 58 cc/m2 (normal <34cc/m2)The mitral inflow pattern is consistent with restrictive left ventricular filling, suggestive of severely elevated left atrial pressure (>20mmHg).  The right atrium is moderately dilated. The right atrial   volume index is 44 cc/m2 (normal range 21+/-6 for women, 25 +/- 7 for men)  Calcified aortic valve. There is trace aortic regurgitation. Tethered mitral valve leaflets. There is mild mitral regurgitation. There is mild tricuspid regurgitation. There is mild pulmonary hypertension. The pulmonary artery systolic pressure is estimated to be 49 mmHg. Structurally normal pulmonic valve. There is mild pulmonic regurgitation. There is no pericardial effusion. Bilateral pleural effusion noted.     Chest X-ray 05/09/2018: Cardiomegaly. Pulmonary vascular congestion. Interstitial pulmonary edema. Sclerotic bone metastases, likely from prostate cancer.    LABS:	 	                          11.2   6.4   )-----------( 317      ( 17 May 2018 07:19 )             34.3     05-17    137  |  99  |  18  ----------------------------<  97  4.2   |  27  |  1.03    Ca    9.2      17 May 2018 07:19  Mg     2.2     05-17          PT/INR - ( 17 May 2018 07:19 )   PT: 16.1 sec;   INR: 1.44          PTT - ( 17 May 2018 07:19 )  PTT:49.3 sec

## 2018-05-17 NOTE — PROGRESS NOTE ADULT - PROBLEM SELECTOR PLAN 6
CXR w/ incidental finding of sclerotic bone metastases, likely from prostate CA.    --PSA elevated 6264   --CT abdomen/pelvis 05/11: Prostate tumor. Distended urinary bladder. Extensive bony metastasis. Multiple solid pulmonary nodules. Pulmonary metastasis is a consideration.  --Urology Team consulted (Dr. Brown).  Continue gould for urinary retention (placed by urology), Continue Tamsulosin 0.4mg. f/u further Recs  --Oncology Team consulted (Dr. Martinez), certainly prostate CA, will need ADT and bone modifying drug. f/u vitamin D level in anticipation of starting denosumab.   --Tramadol PRN for pain. -CXR w/ incidental finding of sclerotic bone metastases, likely from prostate CA.    -PSA elevated 6264   -CT abdomen/pelvis 05/11: Prostate tumor. Distended urinary bladder. Extensive bony metastasis. Multiple solid pulmonary nodules. Pulmonary metastasis is a consideration.  -Urology Team consulted (Dr. Brown). Continue gould for urinary retention (placed by urology), Continue Tamsulosin 0.4mg. f/u further Recs  -Oncology Team consulted (Dr. Martinez), certainly prostate CA, will need ADT and bone modifying drug.   -Vitamin D 35.3 and Vitamin D 1, 25: 92.9, f/u heme/onc on possibly starting denosumab.   -Tramadol PRN for pain.

## 2018-05-17 NOTE — PROGRESS NOTE ADULT - SUBJECTIVE AND OBJECTIVE BOX
Notes reviewed; discussed with staff; patient seen. Patient had bronchoscopy this AM and says it went well. Mood a little better but feels tired now. Will resume our session tomorrow. Encouragement and support provided. Notes reviewed; discussed with staff; patient seen. Mood a little better. Has more energy today and is sitting up in a chair. Treatment plan in progress.. Encouragement and support provided. Notes reviewed; discussed with staff; patient seen. Mood a little better. Has more energy today and is sitting up in a chair. Treatment plan in progress.. Expects to be discharged to Rehab tomorrow. Encouragement and support provided. Notes reviewed; discussed with staff; patient seen. Mood a little better. Has more energy today and is sitting up on the side of the bed having supper.. Treatment plan in progress..  Patient was not discharged today as he expected and says he is still awaiting results of 1 of his tests and now believes he will be here at least to the weekend. Encouragement and support provided. Notes reviewed; discussed with staff; patient seen. Mood a little better. Has more energy today. Treatment plan in progress..  Patient preoccupied with being discharged to Rehab and wants to get stronger.  Says he is still awaiting results of 1 of his tests and now believes he will be here at least until the weekend. Encouragement and support provided.

## 2018-05-17 NOTE — PROGRESS NOTE ADULT - ASSESSMENT
76 yo male FORMER SMOKER with PMHx of HTN, HLD, pre- DM, CAD s/p CABG (in 2016 @ [facility]), chronic systolic CHF, h/o DVT 6 months ago (on Eliquis)  admitted for r/o ACS s/p non obstructed Cath, being worked up for possible prostate CA with plan for bronch today

## 2018-05-17 NOTE — PROGRESS NOTE ADULT - PROBLEM SELECTOR PLAN 3
SBP remains low, 90-100s today  --c/w lopressor 25 mg daily and losartan 25 mg daily.  --Continue to monitor. -SBP remains low, 90-100s today  -c/w lopressor 25 mg daily and losartan 25 mg daily.  -Continue to monitor.

## 2018-05-18 LAB
ANION GAP SERPL CALC-SCNC: 14 MMOL/L — SIGNIFICANT CHANGE UP (ref 5–17)
BUN SERPL-MCNC: 19 MG/DL — SIGNIFICANT CHANGE UP (ref 7–23)
CALCIUM SERPL-MCNC: 8.9 MG/DL — SIGNIFICANT CHANGE UP (ref 8.4–10.5)
CHLORIDE SERPL-SCNC: 96 MMOL/L — SIGNIFICANT CHANGE UP (ref 96–108)
CO2 SERPL-SCNC: 26 MMOL/L — SIGNIFICANT CHANGE UP (ref 22–31)
CREAT SERPL-MCNC: 1 MG/DL — SIGNIFICANT CHANGE UP (ref 0.5–1.3)
GLUCOSE SERPL-MCNC: 138 MG/DL — HIGH (ref 70–99)
GRAM STN FLD: SIGNIFICANT CHANGE UP
HCT VFR BLD CALC: 34.5 % — LOW (ref 39–50)
HGB BLD-MCNC: 11.3 G/DL — LOW (ref 13–17)
INR BLD: 1.44 — HIGH (ref 0.88–1.16)
MAGNESIUM SERPL-MCNC: 2.1 MG/DL — SIGNIFICANT CHANGE UP (ref 1.6–2.6)
MCHC RBC-ENTMCNC: 27.9 PG — SIGNIFICANT CHANGE UP (ref 27–34)
MCHC RBC-ENTMCNC: 32.8 G/DL — SIGNIFICANT CHANGE UP (ref 32–36)
MCV RBC AUTO: 85.2 FL — SIGNIFICANT CHANGE UP (ref 80–100)
NIGHT BLUE STAIN TISS: SIGNIFICANT CHANGE UP
NON-GYNECOLOGICAL CYTOLOGY STUDY: SIGNIFICANT CHANGE UP
PLATELET # BLD AUTO: 409 K/UL — HIGH (ref 150–400)
POTASSIUM SERPL-MCNC: 4 MMOL/L — SIGNIFICANT CHANGE UP (ref 3.5–5.3)
POTASSIUM SERPL-SCNC: 4 MMOL/L — SIGNIFICANT CHANGE UP (ref 3.5–5.3)
PROTHROM AB SERPL-ACNC: 16.1 SEC — HIGH (ref 9.8–12.7)
RBC # BLD: 4.05 M/UL — LOW (ref 4.2–5.8)
RBC # FLD: 14.9 % — SIGNIFICANT CHANGE UP (ref 10.3–16.9)
SODIUM SERPL-SCNC: 136 MMOL/L — SIGNIFICANT CHANGE UP (ref 135–145)
SPECIMEN SOURCE: SIGNIFICANT CHANGE UP
SPECIMEN SOURCE: SIGNIFICANT CHANGE UP
WBC # BLD: 8.3 K/UL — SIGNIFICANT CHANGE UP (ref 3.8–10.5)
WBC # FLD AUTO: 8.3 K/UL — SIGNIFICANT CHANGE UP (ref 3.8–10.5)

## 2018-05-18 RX ORDER — APIXABAN 2.5 MG/1
5 TABLET, FILM COATED ORAL EVERY 12 HOURS
Qty: 0 | Refills: 0 | Status: DISCONTINUED | OUTPATIENT
Start: 2018-05-18 | End: 2018-05-24

## 2018-05-18 RX ORDER — ASPIRIN/CALCIUM CARB/MAGNESIUM 324 MG
81 TABLET ORAL DAILY
Qty: 0 | Refills: 0 | Status: DISCONTINUED | OUTPATIENT
Start: 2018-05-18 | End: 2018-05-24

## 2018-05-18 RX ORDER — LANOLIN ALCOHOL/MO/W.PET/CERES
1 CREAM (GRAM) TOPICAL AT BEDTIME
Qty: 0 | Refills: 0 | Status: DISCONTINUED | OUTPATIENT
Start: 2018-05-18 | End: 2018-05-24

## 2018-05-18 RX ADMIN — PANTOPRAZOLE SODIUM 40 MILLIGRAM(S): 20 TABLET, DELAYED RELEASE ORAL at 06:07

## 2018-05-18 RX ADMIN — BENZOCAINE AND MENTHOL 1 LOZENGE: 5; 1 LIQUID ORAL at 21:39

## 2018-05-18 RX ADMIN — Medication 81 MILLIGRAM(S): at 18:14

## 2018-05-18 RX ADMIN — BENZOCAINE AND MENTHOL 1 LOZENGE: 5; 1 LIQUID ORAL at 10:48

## 2018-05-18 RX ADMIN — LOSARTAN POTASSIUM 25 MILLIGRAM(S): 100 TABLET, FILM COATED ORAL at 11:03

## 2018-05-18 RX ADMIN — Medication 40 MILLIGRAM(S): at 06:07

## 2018-05-18 RX ADMIN — Medication 650 MILLIGRAM(S): at 22:39

## 2018-05-18 RX ADMIN — SENNA PLUS 5 MILLILITER(S): 8.6 TABLET ORAL at 11:03

## 2018-05-18 RX ADMIN — HEPARIN SODIUM 5000 UNIT(S): 5000 INJECTION INTRAVENOUS; SUBCUTANEOUS at 06:11

## 2018-05-18 RX ADMIN — ATORVASTATIN CALCIUM 80 MILLIGRAM(S): 80 TABLET, FILM COATED ORAL at 21:39

## 2018-05-18 RX ADMIN — Medication 1 MILLIGRAM(S): at 01:28

## 2018-05-18 RX ADMIN — TAMSULOSIN HYDROCHLORIDE 0.4 MILLIGRAM(S): 0.4 CAPSULE ORAL at 21:39

## 2018-05-18 RX ADMIN — BENZOCAINE AND MENTHOL 1 LOZENGE: 5; 1 LIQUID ORAL at 18:15

## 2018-05-18 RX ADMIN — APIXABAN 5 MILLIGRAM(S): 2.5 TABLET, FILM COATED ORAL at 18:14

## 2018-05-18 RX ADMIN — Medication 1 MILLIGRAM(S): at 21:39

## 2018-05-18 RX ADMIN — BENZOCAINE AND MENTHOL 1 LOZENGE: 5; 1 LIQUID ORAL at 13:27

## 2018-05-18 RX ADMIN — Medication 25 MILLIGRAM(S): at 06:07

## 2018-05-18 RX ADMIN — Medication 650 MILLIGRAM(S): at 21:39

## 2018-05-18 RX ADMIN — Medication 100 MILLIGRAM(S): at 06:11

## 2018-05-18 NOTE — PROGRESS NOTE ADULT - SUBJECTIVE AND OBJECTIVE BOX
Interventional, Pulmonary, Critical, Chest Special Procedures.    Pt was seen and fully examined by myself.     Time spent with patient in minutes:37    Patient is a 77y old  Male who presents with a chief complaint of CP (09 May 2018 19:41)The patient reports improved breathing after therapeutic bronchoscopy, eupneic now on RA, pain free when seen. no new hematoma at R groin Fidel site.    HPI:  76 yo male FORMER SMOKER with PMHx of HTN, HLD, pre- DM,  CAD s/p CABG (in 2016 @ [facility]), CHF (EF unknown), h/o DVT 6 months ago (on Eliquis; last dose last night) who presented to [facility] ED on 5/9/18 with CC of Cough and SOB X 5-6 days. Pt. reports that he initially had a cough with white sputum production associated with shortness of breath at rest, non-radiating L sided Chest tightness and palpitations. pt. reports that the chest tightness and SOB comes along with the cough  and is relieved when he stops coughing. He also reports feeling sore. At baseline, pt. reports that he can ambulate 4 blocks before getting SOB.  Today am, pt. reports having hot flashes , cough, SOB and chest tightness which prompted him to come to the ED. Denies dizziness, diaphoresis, fatigue, LE edema, orthopnea, PND, syncope.  Upon admit, temp 98.3F,  BPM, /86, RR 20, SpO2 96% on RA.  Labs significant for alk phos 500, , trop 0.08, BNP 6822. CXR wet read revealed R sided pleural effusion. EKG revealed NSR with 1st degree AV block @ 94 BPM with left axis deviation and Q wave in inferior leads and V1-V5.  While in ER, pt received IV Lasix 40 mg x1. Patient admitted to 5U for diuresis, ECHO, r/o ACS. (09 May 2018 19:41)    REVIEW OF SYSTEMS: updated  Constitutional: No fever, weight loss, chills or fatigue  Eyes: No eye pain, visual disturbances, or discharge  ENMT:  No difficulty hearing, tinnitus, vertigo; No sinus or throat pain. No epistaxis, dysphagia, dysphonia, hoarseness or odynophagia  Neck: No pain, stiffness or neck swelling.  No masses or deformities  Respiratory: No cough, wheezing, chills or hemoptysis  - COPD  - ILD   - PE   - ASTHMA     - PNEUMONIA  Cardiovascular: No chest pain, dysrhythmia, palpitations, dizziness or edema   - COPD     - CAD   - CHF   - HTN  Gastrointestinal: No abdominal or epigastric pain. No nausea, vomiting or hematemesis; No diarrhea or constipation. No melena or hematochezia. No dysphagia or Icterus.          Genitourinary: No dysuria, frequency, hematuria or incontinence   - CKD/PAM      - ESRD  Neurological: No headaches, memory loss, loss of strength, numbness or tremors      -DEMENTIA     - STROKE    - SEIZURE  Skin: No itching, burning, rashes or lesions   Lymph Nodes: No enlarged glands  Endocrine: No heat or cold intolerance; No hair loss       - DM     - THYROID DISORDER  Musculoskeletal: No joint pain or swelling; No muscle, back or extremity pain  Psychiatric: No depression, anxiety, mood swings or difficulty sleeping  Heme/Lymph: No easy bruising or bleeding gums         - ANEMIA      - CANCER   -COAGULOPATHY  Allergy and Immunologic: No hives or eczema    PAST MEDICAL & SURGICAL HISTORY:  CHF (congestive heart failure)  CAD (coronary artery disease)  HTN (hypertension)  S/P hernia surgery  History of coronary artery bypass graft: 2017 @ [facility]    FAMILY HISTORY:  No pertinent family history in first degree relatives    SOCIAL HISTORY:      - Tobacco     - ETOH    Allergies    No Known Allergies    Intolerances      Vital Signs Last 24 Hrs  T(C): 36.1 (18 May 2018 14:04), Max: 36.6 (18 May 2018 01:43)  T(F): 96.9 (18 May 2018 14:04), Max: 97.8 (18 May 2018 01:43)  HR: 58 (18 May 2018 13:00) (58 - 68)  BP: 93/50 (18 May 2018 13:00) (89/54 - 116/61)  BP(mean): --  RR: 16 (18 May 2018 13:00) (15 - 18)  SpO2: 100% (18 May 2018 13:00) (98% - 100%)    05-17 @ 07:01  -  05-18 @ 07:00  --------------------------------------------------------  IN: 680 mL / OUT: 1500 mL / NET: -820 mL    05-18 @ 07:01  -  05-18 @ 15:57  --------------------------------------------------------  IN: 420 mL / OUT: 200 mL / NET: 220 mL        PHYSICAL EXAM:  More  Comfortable, no immediate distress  Eyes: PERRL, EOM intact; conjunctiva and sclera clear  Head: Normocephalic;  No Trauma  ENMT: No nasal discharge, +hoarseness, less cough   Neck: Supple; non tender; no masses or deformities.    No JVD  Respiratory:  - WHEEZING   - RHONCHI  - RALES  + CRACKLES.  Diminished breath sounds  BILATERAL  RIGHT  LEFT bases  Cardiovascular: Regular rate and rhythm. S1 and S2 Normal; No murmurs, gallops or rubs     - PPM/AICD  Gastrointestinal: Soft non-tender, non-distended; Normal bowel sounds; No hepatosplenomegaly.     -PEG    -  GT   - LAU  Genitourinary: No costovertebral angle tenderness. No dysuria  Extremities: AROM, + clubbing, cyanosis or edema    Vascular: Peripheral pulses palpable 2+ bilaterally  Neurological: Alert and responisve to stimuli   Skin: Warm and dry. No obvious rash  Lymph Nodes: No acute cervical or supraclavicular adenopathy  Psychiatric: Cooperative and appropriate mood    DEVICES:  - DENTURES   +IV R / L     - ETUBE   -TRACH   -CTUBE  R / L      LABS:                          11.3   8.3   )-----------( 409      ( 18 May 2018 06:20 )             34.5     05-18    136  |  96  |  19  ----------------------------<  138<H>  4.0   |  26  |  1.00    Ca    8.9      18 May 2018 06:20  Mg     2.1     05-18      PT/INR - ( 18 May 2018 06:20 )   PT: 16.1 sec;   INR: 1.44          PTT - ( 17 May 2018 07:19 )  PTT:49.3 sec  RADIOLOGY & ADDITIONAL STUDIES (The following images were personally reviewed):

## 2018-05-18 NOTE — PROGRESS NOTE ADULT - ASSESSMENT
78 yo male FORMER SMOKER with PMHx of HTN, HLD, pre- DM, CAD s/p CABG (in 2016 @ [facility]), chronic systolic CHF, h/o DVT 6 months ago (on Eliquis)  admitted for r/o ACS s/p non obstructed Cath, being worked up for possible prostate CA, pt now optimized from cardiac standpoint and being stepped down to medicine under Dr. Joseph 78 yo male FORMER SMOKER with PMHx of HTN, HLD, pre- DM, CAD s/p CABG (in 2016 @ [facility]), chronic systolic CHF, h/o DVT 6 months ago (on Eliquis)  admitted for r/o ACS s/p non obstructed Cath, being worked up for possible prostate CA, pt now optimized from cardiac standpoint and now transferred to medicine under Dr. Joseph

## 2018-05-18 NOTE — PROGRESS NOTE ADULT - PROBLEM SELECTOR PLAN 6
-CXR w/ incidental finding of sclerotic bone metastases, likely from prostate CA.    -PSA elevated 6264   -CT abdomen/pelvis 05/11: Prostate tumor. Distended urinary bladder. Extensive bony metastasis. Multiple solid pulmonary nodules. Pulmonary metastasis is a consideration.  -Urology Team consulted (Dr. Brown). Continue gould for urinary retention (placed by urology), Continue Tamsulosin 0.4mg. f/u further Recs  -Oncology Team consulted (Dr. Martinez), certainly prostate CA, will need ADT and bone modifying drug.   -Vitamin D 35.3 and Vitamin D 1, 25: 92.9, f/u heme/onc on possibly starting denosumab.   -Tramadol PRN for pain. -CXR w/ incidental finding of sclerotic bone metastases, likely from prostate CA.    -PSA elevated 6264   -CT abdomen/pelvis 05/11: Prostate tumor. Distended urinary bladder. Extensive bony metastasis. Multiple solid pulmonary nodules. Pulmonary metastasis is a consideration.  -Urology Team consulted (Dr. Brown). Continue gould for urinary retention (placed by urology), Continue Tamsulosin 0.4mg. f/u further Recs  -Oncology Team consulted (Dr. Martinez), certainly prostate CA, will need ADT and bone modifying drug, continue to f/u recs   -Vitamin D 35.3 and Vitamin D 1, 25: 92.9, f/u heme/onc on possibly starting denosumab.   -Tramadol PRN for pain.

## 2018-05-18 NOTE — PROGRESS NOTE ADULT - ASSESSMENT
ASSESSMENT/PLAN77 yo male FORMER SMOKER with PMHx of HTN, HLD, pre- DM, CAD s/p CABG (in 2016 @ [facility]), chronic systolic CHF, h/o DVT 6 months ago (on Eliquis)  admitted for r/o ACS s/p non obstructed Cath, being worked up for possible prostate CA, found to have multiple  lung nodules, diffuse mediastinal LAD. Clinically metastatic cancer.    1. O2 2LNC  2. Bronchodilators:  Atrovent/ albuterol q 4 – 6 hours as needed  3. Corticosteroids: off  4. ID/Antibiotics: off pending bronchoscopy CX  5. Cardiac/HTN: optimize BP  6. GI: Rx/ prophylaxis c PPI/H2B  7. Heme: Rx/VT prophylaxis c SQH/SCD/AC resume  Eliquis  8. Aspiration precautions  9 Mobilize, OOB  Discussed with managing team

## 2018-05-18 NOTE — PROGRESS NOTE ADULT - PROBLEM SELECTOR PLAN 1
-Now euvolemic, breathing improved.   -CXR 5/10: Pulmonary vascular congestion. Interstitial pulmonary edema. f/u repeat CXR  -EKG revealed NSR with 1st degree AV block @ 94 BPM with left axis deviation and Q wave in inferior leads and V1-V5.  -Echo 5/10: severe concentric LVH, severe global hypokinesis of the left ventricle, EF 30%, left atrium is severely dilated, mitral inflow pattern is c/w restrictive left ventricular filling, suggestive of severely elevated left atrial pressure, right atrium is moderately dilated, tethered mitral valve leaflets.  -Echo possibly concerning for amyloidosis but no Cardiac MRI at this time per Dr. Murphy.   -Continue Lasix 40mg PO QD and Losartan 25mg QD.   -Strict I&O's, daily weights.

## 2018-05-18 NOTE — PROGRESS NOTE ADULT - SUBJECTIVE AND OBJECTIVE BOX
Interventional Cardiology PA Adult Progress Note    5Uris TRANSFER TO MEDICINE NOTE    78 yo male FORMER SMOKER with PMHx of HTN, HLD, pre- DM,  CAD s/p CABG (in 2016 @ [facility]), CHF (EF unknown), h/o DVT 6 months ago (on Eliquis; last dose last night) who presented to [facility] ED on 5/9/18 with CC of Cough and SOB X 5-6 days. Pt. reports that he initially had a cough with white sputum production associated with shortness of breath at rest, non-radiating L sided Chest tightness and palpitations. pt. reports that the chest tightness and SOB comes along with the cough  and is relieved when he stops coughing. He also reports feeling sore. At baseline, pt. reports that he can ambulate 4 blocks before getting SOB.  Today am, pt. reports having hot flashes , cough, SOB and chest tightness which prompted him to come to the ED. Denies dizziness, diaphoresis, fatigue, LE edema, orthopnea, PND, syncope.  Upon admit, temp 98.3F,  BPM, /86, RR 20, SpO2 96% on RA.  Labs significant for alk phos 500, , trop 0.08, BNP 6822. CXR wet read revealed R sided pleural effusion. EKG revealed NSR with 1st degree AV block @ 94 BPM with left axis deviation and Q wave in inferior leads and V1-V5.  While in ER, pt received IV Lasix 40 mg x1. Patient admitted to 5U for diuresis, ECHO, r/o ACS. CXR on 5/10: Pulmonary vascular congestion. Interstitial pulmonary edema. Echo 5/10: severe concentric left ventricular hypertrophy. There is severe global hypokinesis of the left ventricle, EF 30%.The left atrium is severely dilated, mitral inflow pattern is consistent with restrictive left ventricular filling, suggestive of severely elevated left atrial pressure, right atrium is moderately dilated, Tethered mitral valve leaflets. Pt was diuresed and is now euvolemic on PE.         Subjective Assessment: Patient examined at bedside this AM. Resting comfortably. Denies CP, SOB, or palpitations.   	  MEDICATIONS:  furosemide    Tablet 40 milliGRAM(s) Oral daily  losartan 25 milliGRAM(s) Oral daily  metoprolol succinate ER 25 milliGRAM(s) Oral daily  tamsulosin 0.4 milliGRAM(s) Oral at bedtime      ALBUTerol/ipratropium for Nebulization 3 milliLiter(s) Nebulizer every 6 hours PRN    acetaminophen   Tablet. 650 milliGRAM(s) Oral every 6 hours PRN  melatonin 1 milliGRAM(s) Oral at bedtime  traMADol 25 milliGRAM(s) Oral every 6 hours PRN    docusate sodium 100 milliGRAM(s) Oral daily  pantoprazole    Tablet 40 milliGRAM(s) Oral before breakfast  senna Syrup 5 milliLiter(s) Oral daily    atorvastatin 80 milliGRAM(s) Oral at bedtime  methimazole 5 milliGRAM(s) Oral three times a day    apixaban 5 milliGRAM(s) Oral every 12 hours  benzocaine 15 mG/menthol 3.6 mG Lozenge 1 Lozenge Oral every 4 hours      	    [PHYSICAL EXAM:  TELEMETRY:  T(C): 35.9 (05-18-18 @ 10:05), Max: 36.6 (05-18-18 @ 01:43)  HR: 63 (05-18-18 @ 08:59) (58 - 68)  BP: 89/54 (05-18-18 @ 08:59) (89/54 - 116/61)  RR: 16 (05-18-18 @ 08:59) (15 - 18)  SpO2: 100% (05-18-18 @ 08:59) (98% - 100%)  Wt(kg): --  I&O's Summary    17 May 2018 07:01  -  18 May 2018 07:00  --------------------------------------------------------  IN: 680 mL / OUT: 1500 mL / NET: -820 mL    18 May 2018 07:01  -  18 May 2018 13:25  --------------------------------------------------------  IN: 180 mL / OUT: 0 mL / NET: 180 mL        De Leon:  Central/PICC/Mid Line:                                         Appearance: Normal	  HEENT:   Normal oral mucosa, PERRL, EOMI	  Neck: Supple, + JVD/ - JVD; Carotid Bruit   Cardiovascular: Normal S1 S2, No JVD, No murmurs,   Respiratory: Lungs clear to auscultation/Decreased Breath Sounds/No Rales, Rhonchi, Wheezing	  Gastrointestinal:  Soft, Non-tender, + BS	  Skin: No rashes, No ecchymoses, No cyanosis  Extremities: Normal range of motion, No clubbing, cyanosis or edema  Vascular: Peripheral pulses palpable 2+ bilaterally  Neurologic: Non-focal  Psychiatry: A & O x 3, Mood & affect appropriate      	    ECG:  	  RADIOLOGY:   DIAGNOSTIC TESTING:  [ ] Echocardiogram:  [ ]  Catheterization:  [ ] Stress Test:    [ ] SIMI  OTHER: 	    LABS:	 	  CARDIAC MARKERS:                                  11.3   8.3   )-----------( 409      ( 18 May 2018 06:20 )             34.5     05-18    136  |  96  |  19  ----------------------------<  138<H>  4.0   |  26  |  1.00    Ca    8.9      18 May 2018 06:20  Mg     2.1     05-18      proBNP:   Lipid Profile:   HgA1c:   TSH:   PT/INR - ( 18 May 2018 06:20 )   PT: 16.1 sec;   INR: 1.44          PTT - ( 17 May 2018 07:19 )  PTT:49.3 sec    ASSESSMENT/PLAN: 	        DVT ppx:  Dispo: Interventional Cardiology PA Adult Progress Note    5Uris TRANSFER TO MEDICINE NOTE    76 yo male FORMER SMOKER with PMHx of HTN, HLD, pre- DM,  CAD s/p CABG (in 2016 @ [facility]), CHF (EF unknown), h/o DVT 6 months ago (on Eliquis; last dose last night) who presented to [facility] ED on 5/9/18 with CC of Cough and SOB X 5-6 days. Pt. reports that he initially had a cough with white sputum production associated with shortness of breath at rest, non-radiating L sided Chest tightness and palpitations. pt. reports that the chest tightness and SOB comes along with the cough  and is relieved when he stops coughing. He also reports feeling sore. At baseline, pt. reports that he can ambulate 4 blocks before getting SOB.  Today am, pt. reports having hot flashes , cough, SOB and chest tightness which prompted him to come to the ED. Denies dizziness, diaphoresis, fatigue, LE edema, orthopnea, PND, syncope.  Upon admit, temp 98.3F,  BPM, /86, RR 20, SpO2 96% on RA.  Labs significant for alk phos 500, , trop 0.08, BNP 6822. CXR wet read revealed R sided pleural effusion. EKG revealed NSR with 1st degree AV block @ 94 BPM with left axis deviation and Q wave in inferior leads and V1-V5.  While in ER, pt received IV Lasix 40 mg x1. Patient admitted to 5U for diuresis, ECHO, r/o ACS. CXR on 5/10: Pulmonary vascular congestion. Interstitial pulmonary edema. Echo 5/10: severe concentric left ventricular hypertrophy. There is severe global hypokinesis of the left ventricle, EF 30%.The left atrium is severely dilated, mitral inflow pattern is consistent with restrictive left ventricular filling, suggestive of severely elevated left atrial pressure, right atrium is moderately dilated, Tethered mitral valve leaflets. Pt was diuresed and is now euvolemic on PE.         Subjective Assessment: Patient examined at bedside this AM. Resting comfortably. Denies CP, SOB, or palpitations.   	  MEDICATIONS:  furosemide    Tablet 40 milliGRAM(s) Oral daily  losartan 25 milliGRAM(s) Oral daily  metoprolol succinate ER 25 milliGRAM(s) Oral daily  tamsulosin 0.4 milliGRAM(s) Oral at bedtime  ALBUTerol/ipratropium for Nebulization 3 milliLiter(s) Nebulizer every 6 hours PRN  acetaminophen   Tablet. 650 milliGRAM(s) Oral every 6 hours PRN  melatonin 1 milliGRAM(s) Oral at bedtime  traMADol 25 milliGRAM(s) Oral every 6 hours PRN  docusate sodium 100 milliGRAM(s) Oral daily  pantoprazole    Tablet 40 milliGRAM(s) Oral before breakfast  senna Syrup 5 milliLiter(s) Oral daily  atorvastatin 80 milliGRAM(s) Oral at bedtime  methimazole 5 milliGRAM(s) Oral three times a day  apixaban 5 milliGRAM(s) Oral every 12 hours  benzocaine 15 mG/menthol 3.6 mG Lozenge 1 Lozenge Oral every 4 hours      	    [PHYSICAL EXAM:  TELEMETRY:  T(C): 35.9 (05-18-18 @ 10:05), Max: 36.6 (05-18-18 @ 01:43)  HR: 63 (05-18-18 @ 08:59) (58 - 68)  BP: 89/54 (05-18-18 @ 08:59) (89/54 - 116/61)  RR: 16 (05-18-18 @ 08:59) (15 - 18)  SpO2: 100% (05-18-18 @ 08:59) (98% - 100%)    I&O's Summary    17 May 2018 07:01  -  18 May 2018 07:00  --------------------------------------------------------  IN: 680 mL / OUT: 1500 mL / NET: -820 mL    18 May 2018 07:01  -  18 May 2018 13:25  --------------------------------------------------------  IN: 180 mL / OUT: 0 mL / NET: 180 mL        De Leon: Yes  Central/PICC/Mid Line: No                                         Appearance: Frail, elderly man laying comfortably in bed	  HEENT:   Normal oral mucosa, PERRL, EOMI	  Neck: Supple,  - JVD; Carotid Bruit   Cardiovascular: Normal S1 S2, No JVD, No murmurs,   Respiratory: Lungs clear to auscultation//No Rales, Rhonchi, Wheezing	  Gastrointestinal:  Mild tenderness to LLQ on palpation, no guarding or rebound tenderness  Skin: No rashes, No ecchymoses, No cyanosis  Extremities: Normal range of motion, No clubbing, cyanosis or edema  Vascular: 1+ B/L PT/DP B/L  Neurologic: Non-focal  Psychiatry: A & O x 3, Mood & affect appropriate    CT chest 05/14/2018: 1. Innumerable scattered pulmonary nodules are noted bilaterally. Most of which demonstrated a lobulated configuration, likely representing mucous   plugging. 2. However there are more discrete nodular opacities some demonstrating feeding vessel sign concerning for metastatic prostate. 3. Bulky mediastinal lymphadenopathy as described above and also thought to be prostatic. Correlation with PET/CT for more complete staging. 4. Views osteoblastic metastases involving the scapulae left greater than right as well as multiple ribs with a pathologic fracture involving the right eighth rib as well as the vertebra without compression fracture   deformity. 5. Patchy ground less opacities predominantly in the right upper and right lower lobes which may represent early pneumonitis. Short interval surveillance may be of value to confirm stability and/or resolution.     CT abdomen/pelvis 05/11/2018: Prostate tumor. Distended urinary bladder. Extensive bony metastasis. Multiple solid pulmonary nodules. Pulmonary metastasis is a   consideration. Recommend CT of chest for complete evaluation. Small right pleural effusion. Cardiomegaly. Heterogeneous hepatic enhancement likely due to altered perfusion. No discrete hepatic mass. Cholelithiasis. No biliary dilatation. Patent portal vein. Nonspecific thickening of the bilateral adrenal glands could be due to adenomatous hyperplasia. CT without contrast or PET/CT would be useful for differentiation from metastasis.     ECHO 05/10/2018: There is severe concentric left ventricular hypertrophy. There is severe global hypokinesis of the left ventricle. The left ventricular ejection fraction   is severely reduced. The left ventricular ejection fraction is 30%.The left atrium is severely dilated. The left atrial volume index is 58 cc/m2 (normal <34cc/m2)The mitral inflow pattern is consistent with restrictive left ventricular filling, suggestive of severely elevated left atrial pressure (>20mmHg).  The right atrium is moderately dilated. The right atrial   volume index is 44 cc/m2 (normal range 21+/-6 for women, 25 +/- 7 for men)  Calcified aortic valve. There is trace aortic regurgitation. Tethered mitral valve leaflets. There is mild mitral regurgitation. There is mild tricuspid regurgitation. There is mild pulmonary hypertension. The pulmonary artery systolic pressure is estimated to be 49 mmHg. Structurally normal pulmonic valve. There is mild pulmonic regurgitation. There is no pericardial effusion. Bilateral pleural effusion noted.     Chest X-ray 05/09/2018: Cardiomegaly. Pulmonary vascular congestion. Interstitial pulmonary edema. Sclerotic bone metastases, likely from prostate cancer.      	        LABS:	 	                        11.3   8.3   )-----------( 409      ( 18 May 2018 06:20 )             34.5     05-18    136  |  96  |  19  ----------------------------<  138<H>  4.0   |  26  |  1.00    Ca    8.9      18 May 2018 06:20  Mg     2.1     05-18      PT/INR - ( 18 May 2018 06:20 )   PT: 16.1 sec;   INR: 1.44          PTT - ( 17 May 2018 07:19 )  PTT:49.3 sec Interventional Cardiology PA Adult Progress Note    5Uris TRANSFER TO MEDICINE NOTE    78 yo male FORMER SMOKER with PMHx of HTN, HLD, pre- DM,  CAD s/p CABG (in 2016 @ [facility]), CHF (EF unknown), h/o DVT 6 months ago (on Eliquis; last dose last night) who presented to [facility] ED on 5/9/18 with CC of Cough and SOB X 5-6 days. Pt. reports that he initially had a cough with white sputum production associated with shortness of breath at rest, non-radiating L sided Chest tightness and palpitations. pt. reports that the chest tightness and SOB comes along with the cough  and is relieved when he stops coughing. He also reports feeling sore. At baseline, pt. reports that he can ambulate 4 blocks before getting SOB.  Today am, pt. reports having hot flashes , cough, SOB and chest tightness which prompted him to come to the ED. Denies dizziness, diaphoresis, fatigue, LE edema, orthopnea, PND, syncope.  Upon admit, temp 98.3F,  BPM, /86, RR 20, SpO2 96% on RA.  Labs significant for alk phos 500, , trop 0.08, BNP 6822. CXR wet read revealed R sided pleural effusion. EKG revealed NSR with 1st degree AV block @ 94 BPM with left axis deviation and Q wave in inferior leads and V1-V5.  While in ER, pt received IV Lasix 40 mg x1. Patient admitted to 5U for diuresis, ECHO, r/o ACS. CXR on 5/10: Pulmonary vascular congestion. Interstitial pulmonary edema. Echo 5/10: severe concentric left ventricular hypertrophy. There is severe global hypokinesis of the left ventricle, EF 30%.The left atrium is severely dilated, mitral inflow pattern is consistent with restrictive left ventricular filling, suggestive of severely elevated left atrial pressure, right atrium is moderately dilated, Tethered mitral valve leaflets. Pt was diuresed and is now euvolemic on PE. Pt ruled in NSTEMI  CE 0.08 -> 0.15, s/p diagnostic cardiac cath known 3VCAD (99% mLAD, 100% ramus, 90% OM2, 99% small OM3, 85% distal dominant LCX), Patent LIMA to LAD, patent radial graft to ramus, patent SVG to OM2, LVEDP4, pt optimized with medical therapy. CXR with incidental finding of sclerotic bone metastases, urology consulted PSA level [id number], De Leon was inserted in the setting of urinary retention. CT abdomen/pelvis 05/11 Prostate tumor. Distended urinary bladder. Extensive bony metastasis. Multiple solid pulmonary nodules. Pulmonary metastasis is a consideration. Pulm consulted. CT chest 05/14 revealed  discrete nodular opacities visualized some demonstrating feeding vessel sign concerning for metastatic prostate. Pt was then scheduled for bronchoscopy which was done 05/16, pending results. Pt. reported feeling depressed and anxious with cancer diagnosis psych consulted and recommended supportive therapy. Pt now optimized from cardiac standpoint and now transferred to medicine under Dr. Joseph           Subjective Assessment: Patient examined at bedside this AM. Resting comfortably. Denies CP, SOB, or palpitations.   	  MEDICATIONS:  furosemide    Tablet 40 milliGRAM(s) Oral daily  losartan 25 milliGRAM(s) Oral daily  metoprolol succinate ER 25 milliGRAM(s) Oral daily  tamsulosin 0.4 milliGRAM(s) Oral at bedtime  ALBUTerol/ipratropium for Nebulization 3 milliLiter(s) Nebulizer every 6 hours PRN  acetaminophen   Tablet. 650 milliGRAM(s) Oral every 6 hours PRN  melatonin 1 milliGRAM(s) Oral at bedtime  traMADol 25 milliGRAM(s) Oral every 6 hours PRN  docusate sodium 100 milliGRAM(s) Oral daily  pantoprazole    Tablet 40 milliGRAM(s) Oral before breakfast  senna Syrup 5 milliLiter(s) Oral daily  atorvastatin 80 milliGRAM(s) Oral at bedtime  methimazole 5 milliGRAM(s) Oral three times a day  apixaban 5 milliGRAM(s) Oral every 12 hours  benzocaine 15 mG/menthol 3.6 mG Lozenge 1 Lozenge Oral every 4 hours      	    [PHYSICAL EXAM:  TELEMETRY:  T(C): 35.9 (05-18-18 @ 10:05), Max: 36.6 (05-18-18 @ 01:43)  HR: 63 (05-18-18 @ 08:59) (58 - 68)  BP: 89/54 (05-18-18 @ 08:59) (89/54 - 116/61)  RR: 16 (05-18-18 @ 08:59) (15 - 18)  SpO2: 100% (05-18-18 @ 08:59) (98% - 100%)    I&O's Summary    17 May 2018 07:01  -  18 May 2018 07:00  --------------------------------------------------------  IN: 680 mL / OUT: 1500 mL / NET: -820 mL    18 May 2018 07:01  -  18 May 2018 13:25  --------------------------------------------------------  IN: 180 mL / OUT: 0 mL / NET: 180 mL        De Leon: Yes  Central/PICC/Mid Line: No                                         Appearance: Frail, elderly man laying comfortably in bed	  HEENT:   Normal oral mucosa, PERRL, EOMI	  Neck: Supple,  - JVD; Carotid Bruit   Cardiovascular: Normal S1 S2, No JVD, No murmurs,   Respiratory: Lungs clear to auscultation//No Rales, Rhonchi, Wheezing	  Gastrointestinal:  Mild tenderness to LLQ on palpation, no guarding or rebound tenderness  Skin: No rashes, No ecchymoses, No cyanosis  Extremities: Normal range of motion, No clubbing, cyanosis or edema  Vascular: 1+ B/L PT/DP B/L  Neurologic: Non-focal  Psychiatry: A & O x 3, Mood & affect appropriate    CT chest 05/14/2018: 1. Innumerable scattered pulmonary nodules are noted bilaterally. Most of which demonstrated a lobulated configuration, likely representing mucous   plugging. 2. However there are more discrete nodular opacities some demonstrating feeding vessel sign concerning for metastatic prostate. 3. Bulky mediastinal lymphadenopathy as described above and also thought to be prostatic. Correlation with PET/CT for more complete staging. 4. Views osteoblastic metastases involving the scapulae left greater than right as well as multiple ribs with a pathologic fracture involving the right eighth rib as well as the vertebra without compression fracture   deformity. 5. Patchy ground less opacities predominantly in the right upper and right lower lobes which may represent early pneumonitis. Short interval surveillance may be of value to confirm stability and/or resolution.     CT abdomen/pelvis 05/11/2018: Prostate tumor. Distended urinary bladder. Extensive bony metastasis. Multiple solid pulmonary nodules. Pulmonary metastasis is a   consideration. Recommend CT of chest for complete evaluation. Small right pleural effusion. Cardiomegaly. Heterogeneous hepatic enhancement likely due to altered perfusion. No discrete hepatic mass. Cholelithiasis. No biliary dilatation. Patent portal vein. Nonspecific thickening of the bilateral adrenal glands could be due to adenomatous hyperplasia. CT without contrast or PET/CT would be useful for differentiation from metastasis.     ECHO 05/10/2018: There is severe concentric left ventricular hypertrophy. There is severe global hypokinesis of the left ventricle. The left ventricular ejection fraction   is severely reduced. The left ventricular ejection fraction is 30%.The left atrium is severely dilated. The left atrial volume index is 58 cc/m2 (normal <34cc/m2)The mitral inflow pattern is consistent with restrictive left ventricular filling, suggestive of severely elevated left atrial pressure (>20mmHg).  The right atrium is moderately dilated. The right atrial   volume index is 44 cc/m2 (normal range 21+/-6 for women, 25 +/- 7 for men)  Calcified aortic valve. There is trace aortic regurgitation. Tethered mitral valve leaflets. There is mild mitral regurgitation. There is mild tricuspid regurgitation. There is mild pulmonary hypertension. The pulmonary artery systolic pressure is estimated to be 49 mmHg. Structurally normal pulmonic valve. There is mild pulmonic regurgitation. There is no pericardial effusion. Bilateral pleural effusion noted.     Chest X-ray 05/09/2018: Cardiomegaly. Pulmonary vascular congestion. Interstitial pulmonary edema. Sclerotic bone metastases, likely from prostate cancer.      	        LABS:	 	                        11.3   8.3   )-----------( 409      ( 18 May 2018 06:20 )             34.5     05-18    136  |  96  |  19  ----------------------------<  138<H>  4.0   |  26  |  1.00    Ca    8.9      18 May 2018 06:20  Mg     2.1     05-18      PT/INR - ( 18 May 2018 06:20 )   PT: 16.1 sec;   INR: 1.44          PTT - ( 17 May 2018 07:19 )  PTT:49.3 sec Interventional Cardiology PA Adult Progress Note    5Uris TRANSFER TO MEDICINE NOTE    Hospital Course  78 yo male FORMER SMOKER with PMHx of HTN, HLD, pre- DM,  CAD s/p CABG (in 2016 @ [facility]), CHF (EF unknown), h/o DVT 6 months ago (on Eliquis; last dose last night) who presented to [facility] ED on 5/9/18 with CC of Cough and SOB X 5-6 days. Pt. reports that he initially had a cough with white sputum production associated with shortness of breath at rest, non-radiating L sided Chest tightness and palpitations. pt. reports that the chest tightness and SOB comes along with the cough  and is relieved when he stops coughing. He also reports feeling sore. At baseline, pt. reports that he can ambulate 4 blocks before getting SOB.  Today am, pt. reports having hot flashes , cough, SOB and chest tightness which prompted him to come to the ED. Denies dizziness, diaphoresis, fatigue, LE edema, orthopnea, PND, syncope.  Upon admit, temp 98.3F,  BPM, /86, RR 20, SpO2 96% on RA.  Labs significant for alk phos 500, , trop 0.08, BNP 6822. CXR wet read revealed R sided pleural effusion. EKG revealed NSR with 1st degree AV block @ 94 BPM with left axis deviation and Q wave in inferior leads and V1-V5.  While in ER, pt received IV Lasix 40 mg x1. Patient admitted to 5U for diuresis, ECHO, r/o ACS. CXR on 5/10: Pulmonary vascular congestion. Interstitial pulmonary edema. Echo 5/10: severe concentric left ventricular hypertrophy. There is severe global hypokinesis of the left ventricle, EF 30%.The left atrium is severely dilated, mitral inflow pattern is consistent with restrictive left ventricular filling, suggestive of severely elevated left atrial pressure, right atrium is moderately dilated, Tethered mitral valve leaflets. Pt was diuresed and is now euvolemic on PE. Pt ruled in NSTEMI  CE 0.08 -> 0.15, s/p diagnostic cardiac cath known 3VCAD (99% mLAD, 100% ramus, 90% OM2, 99% small OM3, 85% distal dominant LCX), Patent LIMA to LAD, patent radial graft to ramus, patent SVG to OM2, LVEDP4, pt optimized with medical therapy. CXR with incidental finding of sclerotic bone metastases, urology consulted PSA level [id number], De Leon was inserted in the setting of urinary retention. CT abdomen/pelvis 05/11 Prostate tumor. Distended urinary bladder. Extensive bony metastasis. Multiple solid pulmonary nodules. Pulmonary metastasis is a consideration. Pulm consulted. CT chest 05/14 revealed  discrete nodular opacities visualized some demonstrating feeding vessel sign concerning for metastatic prostate. Pt was then scheduled for bronchoscopy which was done 05/16, pending results. Pt. reported feeling depressed and anxious with cancer diagnosis psych consulted and recommended supportive therapy. Pt now optimized from cardiac standpoint and now transferred to medicine under Dr. Joseph           Subjective Assessment: Patient examined at bedside this AM. Resting comfortably. Denies CP, SOB, or palpitations.   	  MEDICATIONS:  furosemide    Tablet 40 milliGRAM(s) Oral daily  losartan 25 milliGRAM(s) Oral daily  metoprolol succinate ER 25 milliGRAM(s) Oral daily  tamsulosin 0.4 milliGRAM(s) Oral at bedtime  ALBUTerol/ipratropium for Nebulization 3 milliLiter(s) Nebulizer every 6 hours PRN  acetaminophen   Tablet. 650 milliGRAM(s) Oral every 6 hours PRN  melatonin 1 milliGRAM(s) Oral at bedtime  traMADol 25 milliGRAM(s) Oral every 6 hours PRN  docusate sodium 100 milliGRAM(s) Oral daily  pantoprazole    Tablet 40 milliGRAM(s) Oral before breakfast  senna Syrup 5 milliLiter(s) Oral daily  atorvastatin 80 milliGRAM(s) Oral at bedtime  methimazole 5 milliGRAM(s) Oral three times a day  apixaban 5 milliGRAM(s) Oral every 12 hours  benzocaine 15 mG/menthol 3.6 mG Lozenge 1 Lozenge Oral every 4 hours      	    [PHYSICAL EXAM:  TELEMETRY:  T(C): 35.9 (05-18-18 @ 10:05), Max: 36.6 (05-18-18 @ 01:43)  HR: 63 (05-18-18 @ 08:59) (58 - 68)  BP: 89/54 (05-18-18 @ 08:59) (89/54 - 116/61)  RR: 16 (05-18-18 @ 08:59) (15 - 18)  SpO2: 100% (05-18-18 @ 08:59) (98% - 100%)    I&O's Summary    17 May 2018 07:01  -  18 May 2018 07:00  --------------------------------------------------------  IN: 680 mL / OUT: 1500 mL / NET: -820 mL    18 May 2018 07:01  -  18 May 2018 13:25  --------------------------------------------------------  IN: 180 mL / OUT: 0 mL / NET: 180 mL        De Leon: Yes  Central/PICC/Mid Line: No                                         Appearance: Frail, elderly man laying comfortably in bed	  HEENT:   Normal oral mucosa, PERRL, EOMI	  Neck: Supple,  - JVD; Carotid Bruit   Cardiovascular: Normal S1 S2, No JVD, No murmurs,   Respiratory: Lungs clear to auscultation//No Rales, Rhonchi, Wheezing	  Gastrointestinal:  Mild tenderness to LLQ on palpation, no guarding or rebound tenderness  Skin: No rashes, No ecchymoses, No cyanosis  Extremities: Normal range of motion, No clubbing, cyanosis or edema  Vascular: 1+ B/L PT/DP B/L  Neurologic: Non-focal  Psychiatry: A & O x 3, Mood & affect appropriate    CT chest 05/14/2018: 1. Innumerable scattered pulmonary nodules are noted bilaterally. Most of which demonstrated a lobulated configuration, likely representing mucous   plugging. 2. However there are more discrete nodular opacities some demonstrating feeding vessel sign concerning for metastatic prostate. 3. Bulky mediastinal lymphadenopathy as described above and also thought to be prostatic. Correlation with PET/CT for more complete staging. 4. Views osteoblastic metastases involving the scapulae left greater than right as well as multiple ribs with a pathologic fracture involving the right eighth rib as well as the vertebra without compression fracture   deformity. 5. Patchy ground less opacities predominantly in the right upper and right lower lobes which may represent early pneumonitis. Short interval surveillance may be of value to confirm stability and/or resolution.     CT abdomen/pelvis 05/11/2018: Prostate tumor. Distended urinary bladder. Extensive bony metastasis. Multiple solid pulmonary nodules. Pulmonary metastasis is a   consideration. Recommend CT of chest for complete evaluation. Small right pleural effusion. Cardiomegaly. Heterogeneous hepatic enhancement likely due to altered perfusion. No discrete hepatic mass. Cholelithiasis. No biliary dilatation. Patent portal vein. Nonspecific thickening of the bilateral adrenal glands could be due to adenomatous hyperplasia. CT without contrast or PET/CT would be useful for differentiation from metastasis.     ECHO 05/10/2018: There is severe concentric left ventricular hypertrophy. There is severe global hypokinesis of the left ventricle. The left ventricular ejection fraction   is severely reduced. The left ventricular ejection fraction is 30%.The left atrium is severely dilated. The left atrial volume index is 58 cc/m2 (normal <34cc/m2)The mitral inflow pattern is consistent with restrictive left ventricular filling, suggestive of severely elevated left atrial pressure (>20mmHg).  The right atrium is moderately dilated. The right atrial   volume index is 44 cc/m2 (normal range 21+/-6 for women, 25 +/- 7 for men)  Calcified aortic valve. There is trace aortic regurgitation. Tethered mitral valve leaflets. There is mild mitral regurgitation. There is mild tricuspid regurgitation. There is mild pulmonary hypertension. The pulmonary artery systolic pressure is estimated to be 49 mmHg. Structurally normal pulmonic valve. There is mild pulmonic regurgitation. There is no pericardial effusion. Bilateral pleural effusion noted.     Chest X-ray 05/09/2018: Cardiomegaly. Pulmonary vascular congestion. Interstitial pulmonary edema. Sclerotic bone metastases, likely from prostate cancer.      	        LABS:	 	                        11.3   8.3   )-----------( 409      ( 18 May 2018 06:20 )             34.5     05-18    136  |  96  |  19  ----------------------------<  138<H>  4.0   |  26  |  1.00    Ca    8.9      18 May 2018 06:20  Mg     2.1     05-18      PT/INR - ( 18 May 2018 06:20 )   PT: 16.1 sec;   INR: 1.44          PTT - ( 17 May 2018 07:19 )  PTT:49.3 sec

## 2018-05-18 NOTE — PROGRESS NOTE ADULT - PROBLEM SELECTOR PLAN 8
-Pt reporting sadness since receiving prostate CA diagnosis  -Denies SI/HI  -psych recs: adjustment reaction with anxiety and depression- supportive therapy        DVT: SQH today, and restart Eliquis tomorrow  Dispo: pending clinical progression  PT recommending BAILEY      Case d/w Dr. Murphy -Pt reporting sadness since receiving prostate CA diagnosis  -Denies SI/HI  -psych recs: adjustment reaction with anxiety and depression- supportive therapy        DVT: on Eliquis  Dispo: transfer to medicine under Dr. Joseph   PT recommending BAILEY      Case d/w Dr. Murphy

## 2018-05-18 NOTE — PROGRESS NOTE ADULT - PROBLEM SELECTOR PLAN 3
-SBP remains low, 90-100s today  -c/w lopressor 25 mg daily and losartan 25 mg daily.  -Continue to monitor. -SBP remains low, 80-100s today  -c/w lopressor 25 mg daily and losartan 25 mg daily.  -Continue to monitor.

## 2018-05-18 NOTE — PROGRESS NOTE ADULT - PROBLEM SELECTOR PLAN 2
-Currently CP free today and HD stable.  -5 beats of NSVT 5/15/18 AM, asymptomatic, continue on BB, no events on tele o/n  -R/I NSTEMI peak CE 0.24. CE likely in setting of CHF.  -s/p Diagnostic Cath 5/11: known 3VCAD (99% mLAD, 100% ramus, 90% OM2, 99% small OM3, 85% distal dominant LCX), Patent LIMA to LAD, patent radial graft to ramus, patent SVG to OM2, LVEDP4.    -Continue Atorvastatin 80mg Qhs, Metoprolol 25mg QD.   -Holding ASA for scheduled bronch today as per Dr. Woods. -Currently CP free today and HD stable.  -5 beats of NSVT 5/15/18 AM, asymptomatic, continue on BB, no events on tele o/n  -R/I NSTEMI peak CE 0.24. CE likely in setting of CHF.  -s/p Diagnostic Cath 5/11: known 3VCAD (99% mLAD, 100% ramus, 90% OM2, 99% small OM3, 85% distal dominant LCX), Patent LIMA to LAD, patent radial graft to ramus, patent SVG to OM2, LVEDP4.    -Continue Atorvastatin 80mg Qhs, Metoprolol 25mg QD and ASA 81mg QD

## 2018-05-18 NOTE — PROGRESS NOTE ADULT - PROBLEM SELECTOR PLAN 7
-Multiple pulmonary nodules noted on CT Abd/Plevis  -CT chest 5/14 revealed innumerable scattered pulmonary nodules B/L, most of which demonstrated a lobulated configuration, likely representing mucous plugging. More discrete nodular opacities visualized some demonstrating feeding vessel sign concerning for metastatic prostate. Bulky mediastinal LAD also thought to be prostatic. 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 Patchy ground less opacities predominantly in the RUL & RLL which may represent early pneumonitis  -Per Oncology recs perform biopsy of nodules to determine cause from prostate CA vs. pulmonary CA.    -Dr. Woods consulted, plan for bronchoscopy scheduled for today f/u results  -Eliquis on hold with plan for bronchoscopy scheduled today 05/17 (Last dose Eliquis 5/14 AM), restart Eliquis tomorrow per Dr. Woods, INR continues to be elevated this AM 1.44 Dr. Murphy aware possibly likely to mets to liver, f/u coags in AM  -Duonebs q6hr PRN, aspiration precaution. -Multiple pulmonary nodules noted on CT Abd/Plevis  -CT chest 5/14 revealed innumerable scattered pulmonary nodules B/L, most of which demonstrated a lobulated configuration, likely representing mucous plugging. More discrete nodular opacities visualized some demonstrating feeding vessel sign concerning for metastatic prostate. Bulky mediastinal LAD also thought to be prostatic. 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 Patchy ground less opacities predominantly in the RUL & RLL which may represent early pneumonitis  -Per Oncology recs perform biopsy of nodules to determine cause from prostate CA vs. pulmonary CA.    -Dr. Woods consulted, bronchoscopy on 05/17 f/u results  -Eliquis on hold with plan for bronchoscopy scheduled today 05/17 (Last dose Eliquis 5/14 AM), restarted Eliquis today per Dr. Woods, INR continues to be elevated this AM 1.44 Dr. Murphy aware possibly likely to mets to liver, f/u coags in AM  -Duonebs q6hr PRN, aspiration precaution

## 2018-05-18 NOTE — PROGRESS NOTE ADULT - PROBLEM SELECTOR PLAN 5
-hx of L DVT (dx: 6 months ago: on eliquis)  -Eliquis on hold for scheduled bronch on 5/17/18, last dose Eliquis 5/14 AM, restart on 05/18 per Dr. Woods     -Consider restarting when completed given cancer diagnosis and unknown reason for prior DVT. -hx of L DVT (dx: 6 months ago: on eliquis), given cancer diagnosis and unknown reason for prior DVT.  -per Dr. Peggy may to restart Eliquis after bronchoscopy 05/17. Continue Eliquis 5mg BID

## 2018-05-19 LAB
ANION GAP SERPL CALC-SCNC: 11 MMOL/L — SIGNIFICANT CHANGE UP (ref 5–17)
BUN SERPL-MCNC: 22 MG/DL — SIGNIFICANT CHANGE UP (ref 7–23)
CALCIUM SERPL-MCNC: 8.9 MG/DL — SIGNIFICANT CHANGE UP (ref 8.4–10.5)
CHLORIDE SERPL-SCNC: 98 MMOL/L — SIGNIFICANT CHANGE UP (ref 96–108)
CO2 SERPL-SCNC: 28 MMOL/L — SIGNIFICANT CHANGE UP (ref 22–31)
CREAT SERPL-MCNC: 1.04 MG/DL — SIGNIFICANT CHANGE UP (ref 0.5–1.3)
GLUCOSE SERPL-MCNC: 106 MG/DL — HIGH (ref 70–99)
HCT VFR BLD CALC: 33.7 % — LOW (ref 39–50)
HGB BLD-MCNC: 10.8 G/DL — LOW (ref 13–17)
INR BLD: 1.48 — HIGH (ref 0.88–1.16)
MAGNESIUM SERPL-MCNC: 2.1 MG/DL — SIGNIFICANT CHANGE UP (ref 1.6–2.6)
MCHC RBC-ENTMCNC: 27.3 PG — SIGNIFICANT CHANGE UP (ref 27–34)
MCHC RBC-ENTMCNC: 32 G/DL — SIGNIFICANT CHANGE UP (ref 32–36)
MCV RBC AUTO: 85.1 FL — SIGNIFICANT CHANGE UP (ref 80–100)
PLATELET # BLD AUTO: 426 K/UL — HIGH (ref 150–400)
POTASSIUM SERPL-MCNC: 4.3 MMOL/L — SIGNIFICANT CHANGE UP (ref 3.5–5.3)
POTASSIUM SERPL-SCNC: 4.3 MMOL/L — SIGNIFICANT CHANGE UP (ref 3.5–5.3)
PROTHROM AB SERPL-ACNC: 16.6 SEC — HIGH (ref 9.8–12.7)
RBC # BLD: 3.96 M/UL — LOW (ref 4.2–5.8)
RBC # FLD: 14.9 % — SIGNIFICANT CHANGE UP (ref 10.3–16.9)
SODIUM SERPL-SCNC: 137 MMOL/L — SIGNIFICANT CHANGE UP (ref 135–145)
WBC # BLD: 10 K/UL — SIGNIFICANT CHANGE UP (ref 3.8–10.5)
WBC # FLD AUTO: 10 K/UL — SIGNIFICANT CHANGE UP (ref 3.8–10.5)

## 2018-05-19 PROCEDURE — 99233 SBSQ HOSP IP/OBS HIGH 50: CPT

## 2018-05-19 RX ORDER — ACETAMINOPHEN 500 MG
325 TABLET ORAL ONCE
Qty: 0 | Refills: 0 | Status: COMPLETED | OUTPATIENT
Start: 2018-05-19 | End: 2018-05-19

## 2018-05-19 RX ADMIN — SENNA PLUS 5 MILLILITER(S): 8.6 TABLET ORAL at 17:26

## 2018-05-19 RX ADMIN — Medication 1 MILLIGRAM(S): at 21:14

## 2018-05-19 RX ADMIN — APIXABAN 5 MILLIGRAM(S): 2.5 TABLET, FILM COATED ORAL at 07:23

## 2018-05-19 RX ADMIN — ATORVASTATIN CALCIUM 80 MILLIGRAM(S): 80 TABLET, FILM COATED ORAL at 21:13

## 2018-05-19 RX ADMIN — Medication 81 MILLIGRAM(S): at 11:12

## 2018-05-19 RX ADMIN — Medication 650 MILLIGRAM(S): at 09:51

## 2018-05-19 RX ADMIN — Medication 40 MILLIGRAM(S): at 07:23

## 2018-05-19 RX ADMIN — Medication 100 MILLIGRAM(S): at 11:11

## 2018-05-19 RX ADMIN — Medication 25 MILLIGRAM(S): at 07:23

## 2018-05-19 RX ADMIN — Medication 325 MILLIGRAM(S): at 21:14

## 2018-05-19 RX ADMIN — LOSARTAN POTASSIUM 25 MILLIGRAM(S): 100 TABLET, FILM COATED ORAL at 11:12

## 2018-05-19 RX ADMIN — Medication 650 MILLIGRAM(S): at 10:45

## 2018-05-19 RX ADMIN — TAMSULOSIN HYDROCHLORIDE 0.4 MILLIGRAM(S): 0.4 CAPSULE ORAL at 21:13

## 2018-05-19 RX ADMIN — BENZOCAINE AND MENTHOL 1 LOZENGE: 5; 1 LIQUID ORAL at 11:12

## 2018-05-19 RX ADMIN — APIXABAN 5 MILLIGRAM(S): 2.5 TABLET, FILM COATED ORAL at 17:26

## 2018-05-19 RX ADMIN — PANTOPRAZOLE SODIUM 40 MILLIGRAM(S): 20 TABLET, DELAYED RELEASE ORAL at 07:23

## 2018-05-19 RX ADMIN — Medication 325 MILLIGRAM(S): at 22:14

## 2018-05-19 NOTE — PROGRESS NOTE ADULT - PROBLEM SELECTOR PLAN 5
-hx of L DVT (dx: 6 months ago: on eliquis), given cancer diagnosis and unknown reason for prior DVT.  -per Dr. Peggy may to restart Eliquis after bronchoscopy 05/17. Continue Eliquis 5mg BID

## 2018-05-19 NOTE — PROGRESS NOTE ADULT - ASSESSMENT
78 yo male FORMER SMOKER with PMHx of HTN, HLD, pre- DM, CAD s/p CABG (in 2016 @ [facility]), chronic systolic CHF, h/o DVT 6 months ago (on Eliquis)  admitted for r/o ACS s/p non obstructed Cath, being worked up for possible prostate CA, pt now optimized from cardiac standpoint and now transferred to [facility]

## 2018-05-19 NOTE — PROGRESS NOTE ADULT - PROBLEM SELECTOR PLAN 7
-Multiple pulmonary nodules noted on CT Abd/Plevis  -CT chest 5/14 revealed innumerable scattered pulmonary nodules B/L, most of which demonstrated a lobulated configuration, likely representing mucous plugging. More discrete nodular opacities visualized some demonstrating feeding vessel sign concerning for metastatic prostate. Bulky mediastinal LAD also thought to be prostatic. 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 Patchy ground less opacities predominantly in the RUL & RLL which may represent early pneumonitis  -Per Oncology recs perform biopsy of nodules to determine cause from prostate CA vs. pulmonary CA.    -Dr. Woods consulted, bronchoscopy on 05/17 f/u results  -Eliquis on hold with plan for bronchoscopy scheduled today 05/17 (Last dose Eliquis 5/14 AM), restarted Eliquis today per Dr. Woods, INR continues to be elevated this AM 1.44 Dr. Murphy aware possibly likely to mets to liver, f/u coags in AM  -Duonebs q6hr PRN, aspiration precaution

## 2018-05-19 NOTE — PROGRESS NOTE ADULT - SUBJECTIVE AND OBJECTIVE BOX
76 yo M former smoker with CHF, CAD, incidental finding of bone mets on imaging and markedly elevated PSA  Imaging also with suspicious lung lesions     Interval history:  reports he is ambulating with the walker  no new symptoms  no back pain or bone pain     s/p bronchoscopy     vitals reviewed    frail, chronically ill appearing  lying flat  NAD    PAST MEDICAL & SURGICAL HISTORY:  CHF (congestive heart failure)  CAD (coronary artery disease)  HTN (hypertension)  S/P hernia surgery  History of coronary artery bypass graft: 2017 @ [facility]    Social Hx:  lives alone, with HHA  ambulates with cane  quit smoking 40 yrs ago    He denies a family history of cancer          Labs: reviewed          Imaging:   reviewed

## 2018-05-19 NOTE — PROGRESS NOTE ADULT - ASSESSMENT
78 yo male FORMER SMOKER with PMHx of HTN, HLD, pre- DM, CAD s/p CABG (in 2016 @ [facility]), chronic systolic CHF, h/o DVT 6 months ago (on Eliquis)  admitted for r/o ACS s/p non obstructed Cath, being worked up for possible prostate CA, pt now optimized from cardiac standpoint and now transferred to [facility]

## 2018-05-19 NOTE — PROGRESS NOTE ADULT - PROBLEM SELECTOR PLAN 1
-Now euvolemic, breathing improved.     -EKG revealed NSR with 1st degree AV block @ 94 BPM with left axis deviation and Q wave in inferior leads and V1-V5.  -Echo 5/10: severe concentric LVH, severe global hypokinesis of the left ventricle, EF 30%, left atrium is severely dilated, mitral inflow pattern is c/w restrictive left ventricular filling, suggestive of severely elevated left atrial pressure, right atrium is moderately dilated, tethered mitral valve leaflets.  -Echo possibly concerning for amyloidosis but no Cardiac MRI at this time per Dr. Murphy.   -Continue Lasix 40mg PO QD and Losartan 25mg QD.   -Strict I&O's, daily weights.

## 2018-05-19 NOTE — PROGRESS NOTE ADULT - PROBLEM SELECTOR PLAN 6
-CXR w/ incidental finding of sclerotic bone metastases, likely from prostate CA.    -PSA elevated 6264   -CT abdomen/pelvis 05/11: Prostate tumor. Distended urinary bladder. Extensive bony metastasis. Multiple solid pulmonary nodules. Pulmonary metastasis is a consideration.  -Urology Team consulted (Dr. Brown). Continue gould for urinary retention (placed by urology), Continue Tamsulosin 0.4mg. f/u further Recs  -Oncology Team consulted (Dr. Martinez), certainly prostate CA, will need ADT and bone modifying drug, continue to f/u recs   -Vitamin D 35.3 and Vitamin D 1, 25: 92.9, f/u heme/onc on possibly starting denosumab.   -Tramadol PRN for pain.

## 2018-05-19 NOTE — PROGRESS NOTE ADULT - PROBLEM SELECTOR PLAN 8
-Pt reporting sadness since receiving prostate CA diagnosis  -Denies SI/HI  -psych recs: adjustment reaction with anxiety and depression- supportive therapy        DVT: on Eliquis  Dispo: transfer to medicine under Dr. Joseph   PT recommending BAILEY      Case d/w Dr. Murphy

## 2018-05-19 NOTE — PROGRESS NOTE ADULT - PROBLEM SELECTOR PLAN 2
-Currently CP free today and HD stable.  -5 beats of NSVT 5/15/18 AM, asymptomatic, continue on BB, no events on tele o/n  -R/I NSTEMI peak CE 0.24. CE likely in setting of CHF.  -s/p Diagnostic Cath 5/11: known 3VCAD (99% mLAD, 100% ramus, 90% OM2, 99% small OM3, 85% distal dominant LCX), Patent LIMA to LAD, patent radial graft to ramus, patent SVG to OM2, LVEDP4.    -Continue Atorvastatin 80mg Qhs, Metoprolol 25mg QD and ASA 81mg QD

## 2018-05-19 NOTE — PROGRESS NOTE ADULT - SUBJECTIVE AND OBJECTIVE BOX
Transfer to my service      Hospital Course  76 yo male FORMER SMOKER with PMHx of HTN, HLD, pre- DM,  CAD s/p CABG (in 2016 @ [facility]), CHF (EF unknown), h/o DVT 6 months ago (on Eliquis; last dose last night) who presented to [facility] ED on 5/9/18 with CC of Cough and SOB X 5-6 days. Pt. reports that he initially had a cough with white sputum production associated with shortness of breath at rest, non-radiating L sided Chest tightness and palpitations. pt. reports that the chest tightness and SOB comes along with the cough  and is relieved when he stops coughing. He also reports feeling sore. At baseline, pt. reports that he can ambulate 4 blocks before getting SOB.  Today am, pt. reports having hot flashes , cough, SOB and chest tightness which prompted him to come to the ED. Denies dizziness, diaphoresis, fatigue, LE edema, orthopnea, PND, syncope.  Upon admit, temp 98.3F,  BPM, /86, RR 20, SpO2 96% on RA.  Labs significant for alk phos 500, , trop 0.08, BNP 6822. CXR wet read revealed R sided pleural effusion. EKG revealed NSR with 1st degree AV block @ 94 BPM with left axis deviation and Q wave in inferior leads and V1-V5.  While in ER, pt received IV Lasix 40 mg x1. Patient admitted to 5U for diuresis, ECHO, r/o ACS. CXR on 5/10: Pulmonary vascular congestion. Interstitial pulmonary edema. Echo 5/10: severe concentric left ventricular hypertrophy. There is severe global hypokinesis of the left ventricle, EF 30%.The left atrium is severely dilated, mitral inflow pattern is consistent with restrictive left ventricular filling, suggestive of severely elevated left atrial pressure, right atrium is moderately dilated, Tethered mitral valve leaflets. Pt was diuresed and is now euvolemic on PE. Pt ruled in NSTEMI  CE 0.08 -> 0.15, s/p diagnostic cardiac cath known 3VCAD (99% mLAD, 100% ramus, 90% OM2, 99% small OM3, 85% distal dominant LCX), Patent LIMA to LAD, patent radial graft to ramus, patent SVG to OM2, LVEDP4, pt optimized with medical therapy. CXR with incidental finding of sclerotic bone metastases, urology consulted PSA level 6264, De Leon was inserted in the setting of urinary retention. CT abdomen/pelvis 05/11 Prostate tumor. Distended urinary bladder. Extensive bony metastasis. Multiple solid pulmonary nodules. Pulmonary metastasis is a consideration. Pulm consulted. CT chest 05/14 revealed  discrete nodular opacities visualized some demonstrating feeding vessel sign concerning for metastatic prostate. Pt was then scheduled for bronchoscopy which was done 05/16, pending results. Pt. reported feeling depressed and anxious with cancer diagnosis psych consulted and recommended supportive therapy.           Subjective Assessment: Patient examined at bedside this AM. Resting comfortably. Denies CP, SOB, or palpitations.   	  MEDICATIONS:  furosemide    Tablet 40 milliGRAM(s) Oral daily  losartan 25 milliGRAM(s) Oral daily  metoprolol succinate ER 25 milliGRAM(s) Oral daily  tamsulosin 0.4 milliGRAM(s) Oral at bedtime  ALBUTerol/ipratropium for Nebulization 3 milliLiter(s) Nebulizer every 6 hours PRN  acetaminophen   Tablet. 650 milliGRAM(s) Oral every 6 hours PRN  melatonin 1 milliGRAM(s) Oral at bedtime  traMADol 25 milliGRAM(s) Oral every 6 hours PRN  docusate sodium 100 milliGRAM(s) Oral daily  pantoprazole    Tablet 40 milliGRAM(s) Oral before breakfast  senna Syrup 5 milliLiter(s) Oral daily  atorvastatin 80 milliGRAM(s) Oral at bedtime  methimazole 5 milliGRAM(s) Oral three times a day  apixaban 5 milliGRAM(s) Oral every 12 hours  benzocaine 15 mG/menthol 3.6 mG Lozenge 1 Lozenge Oral every 4 hours      	    [PHYSICAL EXAM:  TELEMETRY:  T(C): 35.9 (05-18-18 @ 10:05), Max: 36.6 (05-18-18 @ 01:43)  HR: 63 (05-18-18 @ 08:59) (58 - 68)  BP: 89/54 (05-18-18 @ 08:59) (89/54 - 116/61)  RR: 16 (05-18-18 @ 08:59) (15 - 18)  SpO2: 100% (05-18-18 @ 08:59) (98% - 100%)    I&O's Summary    17 May 2018 07:01  -  18 May 2018 07:00  --------------------------------------------------------  IN: 680 mL / OUT: 1500 mL / NET: -820 mL    18 May 2018 07:01  -  18 May 2018 13:25  --------------------------------------------------------  IN: 180 mL / OUT: 0 mL / NET: 180 mL        De Leon: Yes  Central/PICC/Mid Line: No                                         Appearance: Frail, elderly man laying comfortably in bed	  HEENT:   Normal oral mucosa, PERRL, EOMI	  Neck: Supple,  - JVD; Carotid Bruit   Cardiovascular: Normal S1 S2, No JVD, No murmurs,   Respiratory: Lungs clear to auscultation//No Rales, Rhonchi, Wheezing	  Gastrointestinal:  Mild tenderness to LLQ on palpation, no guarding or rebound tenderness  Skin: No rashes, No ecchymoses, No cyanosis  Extremities: Normal range of motion, No clubbing, cyanosis or edema  Vascular: 1+ B/L PT/DP B/L  Neurologic: Non-focal  Psychiatry: A & O x 3, Mood & affect appropriate    CT chest 05/14/2018: 1. Innumerable scattered pulmonary nodules are noted bilaterally. Most of which demonstrated a lobulated configuration, likely representing mucous   plugging. 2. However there are more discrete nodular opacities some demonstrating feeding vessel sign concerning for metastatic prostate. 3. Bulky mediastinal lymphadenopathy as described above and also thought to be prostatic. Correlation with PET/CT for more complete staging. 4. Views osteoblastic metastases involving the scapulae left greater than right as well as multiple ribs with a pathologic fracture involving the right eighth rib as well as the vertebra without compression fracture   deformity. 5. Patchy ground less opacities predominantly in the right upper and right lower lobes which may represent early pneumonitis. Short interval surveillance may be of value to confirm stability and/or resolution.     CT abdomen/pelvis 05/11/2018: Prostate tumor. Distended urinary bladder. Extensive bony metastasis. Multiple solid pulmonary nodules. Pulmonary metastasis is a   consideration. Recommend CT of chest for complete evaluation. Small right pleural effusion. Cardiomegaly. Heterogeneous hepatic enhancement likely due to altered perfusion. No discrete hepatic mass. Cholelithiasis. No biliary dilatation. Patent portal vein. Nonspecific thickening of the bilateral adrenal glands could be due to adenomatous hyperplasia. CT without contrast or PET/CT would be useful for differentiation from metastasis.     ECHO 05/10/2018: There is severe concentric left ventricular hypertrophy. There is severe global hypokinesis of the left ventricle. The left ventricular ejection fraction   is severely reduced. The left ventricular ejection fraction is 30%.The left atrium is severely dilated. The left atrial volume index is 58 cc/m2 (normal <34cc/m2)The mitral inflow pattern is consistent with restrictive left ventricular filling, suggestive of severely elevated left atrial pressure (>20mmHg).  The right atrium is moderately dilated. The right atrial   volume index is 44 cc/m2 (normal range 21+/-6 for women, 25 +/- 7 for men)  Calcified aortic valve. There is trace aortic regurgitation. Tethered mitral valve leaflets. There is mild mitral regurgitation. There is mild tricuspid regurgitation. There is mild pulmonary hypertension. The pulmonary artery systolic pressure is estimated to be 49 mmHg. Structurally normal pulmonic valve. There is mild pulmonic regurgitation. There is no pericardial effusion. Bilateral pleural effusion noted.     Chest X-ray 05/09/2018: Cardiomegaly. Pulmonary vascular congestion. Interstitial pulmonary edema. Sclerotic bone metastases, likely from prostate cancer.      	        LABS:	 	                        11.3   8.3   )-----------( 409      ( 18 May 2018 06:20 )             34.5     05-18    136  |  96  |  19  ----------------------------<  138<H>  4.0   |  26  |  1.00    Ca    8.9      18 May 2018 06:20  Mg     2.1     05-18      PT/INR - ( 18 May 2018 06:20 )   PT: 16.1 sec;   INR: 1.44          PTT - ( 17 May 2018 07:19 )  PTT:49.3 sec  Cytopathology - Non Gyn Report (05.17.18 @ 13:43)    Cytopathology - Non Gyn Report:   ACCESSION No:  [id number]    MANOJTANIKAVIDA MANRIQUE                   1        Cytopathology Report            Specimen(s) Submitted  BRONCHIAL WASH,RIGHT MIDDLE LOBE      Clinical History  History of mediastinal lymphadenopathy metastatic prostate cancer  adenocarcinoma      Gross Description  Received: 60  ml of bloody fluid received in [facility]  Prepared: 1 Thin Prep slide, 1 cell block      Statement of Adequacy  Satisfactory for interpretation.      Final Diagnosis  LUNG, RIGHT MIDDLE LOBE, BRONCHIAL WASH:    NEGATIVE FOR MALIGNANT CELLS    The specimen is composed of reactive bronchial epithelial cells  and some alveolar macrophages in a background of  blood.  The cell block shows similar findings.    Vikki Syed, MIMA(ASCP)  Michael Wong M.D.  (Electronic Signature)  Reported on: 05/18/18  ________________________________________________________________

## 2018-05-19 NOTE — PROGRESS NOTE ADULT - SUBJECTIVE AND OBJECTIVE BOX
Transfer to my service      Hospital Course  78 yo male FORMER SMOKER with PMHx of HTN, HLD, pre- DM,  CAD s/p CABG (in 2016 @ [facility]), CHF (EF unknown), h/o DVT 6 months ago (on Eliquis; last dose last night) who presented to [facility] ED on 5/9/18 with CC of Cough and SOB X 5-6 days. Pt. reports that he initially had a cough with white sputum production associated with shortness of breath at rest, non-radiating L sided Chest tightness and palpitations. pt. reports that the chest tightness and SOB comes along with the cough  and is relieved when he stops coughing. He also reports feeling sore. At baseline, pt. reports that he can ambulate 4 blocks before getting SOB.  Today am, pt. reports having hot flashes , cough, SOB and chest tightness which prompted him to come to the ED. Denies dizziness, diaphoresis, fatigue, LE edema, orthopnea, PND, syncope.  Upon admit, temp 98.3F,  BPM, /86, RR 20, SpO2 96% on RA.  Labs significant for alk phos 500, , trop 0.08, BNP 6822. CXR wet read revealed R sided pleural effusion. EKG revealed NSR with 1st degree AV block @ 94 BPM with left axis deviation and Q wave in inferior leads and V1-V5.  While in ER, pt received IV Lasix 40 mg x1. Patient admitted to 5U for diuresis, ECHO, r/o ACS. CXR on 5/10: Pulmonary vascular congestion. Interstitial pulmonary edema. Echo 5/10: severe concentric left ventricular hypertrophy. There is severe global hypokinesis of the left ventricle, EF 30%.The left atrium is severely dilated, mitral inflow pattern is consistent with restrictive left ventricular filling, suggestive of severely elevated left atrial pressure, right atrium is moderately dilated, Tethered mitral valve leaflets. Pt was diuresed and is now euvolemic on PE. Pt ruled in NSTEMI  CE 0.08 -> 0.15, s/p diagnostic cardiac cath known 3VCAD (99% mLAD, 100% ramus, 90% OM2, 99% small OM3, 85% distal dominant LCX), Patent LIMA to LAD, patent radial graft to ramus, patent SVG to OM2, LVEDP4, pt optimized with medical therapy. CXR with incidental finding of sclerotic bone metastases, urology consulted PSA level 6264, De Leon was inserted in the setting of urinary retention. CT abdomen/pelvis 05/11 Prostate tumor. Distended urinary bladder. Extensive bony metastasis. Multiple solid pulmonary nodules. Pulmonary metastasis is a consideration. Pulm consulted. CT chest 05/14 revealed  discrete nodular opacities visualized some demonstrating feeding vessel sign concerning for metastatic prostate. Pt was then scheduled for bronchoscopy which was done 05/16, pending results. Pt. reported feeling depressed and anxious with cancer diagnosis psych consulted and recommended supportive therapy. Pt now optimized from cardiac standpoint and now transferred to medicine under Dr. Joseph           Subjective Assessment: Patient examined at bedside this AM. Resting comfortably. Denies CP, SOB, or palpitations.   	  MEDICATIONS:  furosemide    Tablet 40 milliGRAM(s) Oral daily  losartan 25 milliGRAM(s) Oral daily  metoprolol succinate ER 25 milliGRAM(s) Oral daily  tamsulosin 0.4 milliGRAM(s) Oral at bedtime  ALBUTerol/ipratropium for Nebulization 3 milliLiter(s) Nebulizer every 6 hours PRN  acetaminophen   Tablet. 650 milliGRAM(s) Oral every 6 hours PRN  melatonin 1 milliGRAM(s) Oral at bedtime  traMADol 25 milliGRAM(s) Oral every 6 hours PRN  docusate sodium 100 milliGRAM(s) Oral daily  pantoprazole    Tablet 40 milliGRAM(s) Oral before breakfast  senna Syrup 5 milliLiter(s) Oral daily  atorvastatin 80 milliGRAM(s) Oral at bedtime  methimazole 5 milliGRAM(s) Oral three times a day  apixaban 5 milliGRAM(s) Oral every 12 hours  benzocaine 15 mG/menthol 3.6 mG Lozenge 1 Lozenge Oral every 4 hours      	    [PHYSICAL EXAM:  TELEMETRY:  T(C): 35.9 (05-18-18 @ 10:05), Max: 36.6 (05-18-18 @ 01:43)  HR: 63 (05-18-18 @ 08:59) (58 - 68)  BP: 89/54 (05-18-18 @ 08:59) (89/54 - 116/61)  RR: 16 (05-18-18 @ 08:59) (15 - 18)  SpO2: 100% (05-18-18 @ 08:59) (98% - 100%)    I&O's Summary    17 May 2018 07:01  -  18 May 2018 07:00  --------------------------------------------------------  IN: 680 mL / OUT: 1500 mL / NET: -820 mL    18 May 2018 07:01  -  18 May 2018 13:25  --------------------------------------------------------  IN: 180 mL / OUT: 0 mL / NET: 180 mL        De Leon: Yes  Central/PICC/Mid Line: No                                         Appearance: Frail, elderly man laying comfortably in bed	  HEENT:   Normal oral mucosa, PERRL, EOMI	  Neck: Supple,  - JVD; Carotid Bruit   Cardiovascular: Normal S1 S2, No JVD, No murmurs,   Respiratory: Lungs clear to auscultation//No Rales, Rhonchi, Wheezing	  Gastrointestinal:  Mild tenderness to LLQ on palpation, no guarding or rebound tenderness  Skin: No rashes, No ecchymoses, No cyanosis  Extremities: Normal range of motion, No clubbing, cyanosis or edema  Vascular: 1+ B/L PT/DP B/L  Neurologic: Non-focal  Psychiatry: A & O x 3, Mood & affect appropriate    CT chest 05/14/2018: 1. Innumerable scattered pulmonary nodules are noted bilaterally. Most of which demonstrated a lobulated configuration, likely representing mucous   plugging. 2. However there are more discrete nodular opacities some demonstrating feeding vessel sign concerning for metastatic prostate. 3. Bulky mediastinal lymphadenopathy as described above and also thought to be prostatic. Correlation with PET/CT for more complete staging. 4. Views osteoblastic metastases involving the scapulae left greater than right as well as multiple ribs with a pathologic fracture involving the right eighth rib as well as the vertebra without compression fracture   deformity. 5. Patchy ground less opacities predominantly in the right upper and right lower lobes which may represent early pneumonitis. Short interval surveillance may be of value to confirm stability and/or resolution.     CT abdomen/pelvis 05/11/2018: Prostate tumor. Distended urinary bladder. Extensive bony metastasis. Multiple solid pulmonary nodules. Pulmonary metastasis is a   consideration. Recommend CT of chest for complete evaluation. Small right pleural effusion. Cardiomegaly. Heterogeneous hepatic enhancement likely due to altered perfusion. No discrete hepatic mass. Cholelithiasis. No biliary dilatation. Patent portal vein. Nonspecific thickening of the bilateral adrenal glands could be due to adenomatous hyperplasia. CT without contrast or PET/CT would be useful for differentiation from metastasis.     ECHO 05/10/2018: There is severe concentric left ventricular hypertrophy. There is severe global hypokinesis of the left ventricle. The left ventricular ejection fraction   is severely reduced. The left ventricular ejection fraction is 30%.The left atrium is severely dilated. The left atrial volume index is 58 cc/m2 (normal <34cc/m2)The mitral inflow pattern is consistent with restrictive left ventricular filling, suggestive of severely elevated left atrial pressure (>20mmHg).  The right atrium is moderately dilated. The right atrial   volume index is 44 cc/m2 (normal range 21+/-6 for women, 25 +/- 7 for men)  Calcified aortic valve. There is trace aortic regurgitation. Tethered mitral valve leaflets. There is mild mitral regurgitation. There is mild tricuspid regurgitation. There is mild pulmonary hypertension. The pulmonary artery systolic pressure is estimated to be 49 mmHg. Structurally normal pulmonic valve. There is mild pulmonic regurgitation. There is no pericardial effusion. Bilateral pleural effusion noted.     Chest X-ray 05/09/2018: Cardiomegaly. Pulmonary vascular congestion. Interstitial pulmonary edema. Sclerotic bone metastases, likely from prostate cancer.      	        LABS:	 	                        11.3   8.3   )-----------( 409      ( 18 May 2018 06:20 )             34.5     05-18    136  |  96  |  19  ----------------------------<  138<H>  4.0   |  26  |  1.00    Ca    8.9      18 May 2018 06:20  Mg     2.1     05-18      PT/INR - ( 18 May 2018 06:20 )   PT: 16.1 sec;   INR: 1.44          PTT - ( 17 May 2018 07:19 )  PTT:49.3 sec

## 2018-05-19 NOTE — PROGRESS NOTE ADULT - SUBJECTIVE AND OBJECTIVE BOX
Interventional, Pulmonary, Critical, Chest Special Procedures.    Pt was seen and fully examined by myself.     Time spent with patient in minutes:37    Patient is a 77y old  Male who presents with a chief complaint of CP (09 May 2018 19:41)The patient ill appearing, breathing is now better, cough is minimal. Pt eupneic on RA.    HPI:  76 yo male FORMER SMOKER with PMHx of HTN, HLD, pre- DM,  CAD s/p CABG (in 2016 @ [facility]), CHF (EF unknown), h/o DVT 6 months ago (on Eliquis; last dose last night) who presented to [facility] ED on 5/9/18 with CC of Cough and SOB X 5-6 days. Pt. reports that he initially had a cough with white sputum production associated with shortness of breath at rest, non-radiating L sided Chest tightness and palpitations. pt. reports that the chest tightness and SOB comes along with the cough  and is relieved when he stops coughing. He also reports feeling sore. At baseline, pt. reports that he can ambulate 4 blocks before getting SOB.  Today am, pt. reports having hot flashes , cough, SOB and chest tightness which prompted him to come to the ED. Denies dizziness, diaphoresis, fatigue, LE edema, orthopnea, PND, syncope.  Upon admit, temp 98.3F,  BPM, /86, RR 20, SpO2 96% on RA.  Labs significant for alk phos 500, , trop 0.08, BNP 6822. CXR wet read revealed R sided pleural effusion. EKG revealed NSR with 1st degree AV block @ 94 BPM with left axis deviation and Q wave in inferior leads and V1-V5.  While in ER, pt received IV Lasix 40 mg x1. Patient admitted to 5U for diuresis, ECHO, r/o ACS. (09 May 2018 19:41)    REVIEW OF SYSTEMS:updated  Constitutional: No fever, weight loss, chills or fatigue  Eyes: No eye pain, visual disturbances, or discharge  ENMT:  No difficulty hearing, tinnitus, vertigo; No sinus or throat pain. No epistaxis, dysphagia, dysphonia, hoarseness or odynophagia  Neck: No pain, stiffness or neck swelling.  No masses or deformities  Respiratory: No cough, wheezing, chills or hemoptysis  - COPD  - ILD   - PE   - ASTHMA     - PNEUMONIA  Cardiovascular: No chest pain, dysrhythmia, palpitations, dizziness or edema   - COPD     - CAD   - CHF   - HTN  Gastrointestinal: No abdominal or epigastric pain. No nausea, vomiting or hematemesis; No diarrhea or constipation. No melena or hematochezia. No dysphagia or Icterus.          Genitourinary: No dysuria, frequency, hematuria or incontinence   - CKD/PAM      - ESRD  Neurological: No headaches, memory loss, loss of strength, numbness or tremors      -DEMENTIA     - STROKE    - SEIZURE  Skin: No itching, burning, rashes or lesions   Lymph Nodes: No enlarged glands  Endocrine: No heat or cold intolerance; No hair loss       - DM     - THYROID DISORDER  Musculoskeletal: No joint pain or swelling; No muscle, back or extremity pain  Psychiatric: No depression, anxiety, mood swings or difficulty sleeping  Heme/Lymph: No easy bruising or bleeding gums         - ANEMIA      - CANCER   -COAGULOPATHY  Allergy and Immunologic: No hives or eczema    PAST MEDICAL & SURGICAL HISTORY:  CHF (congestive heart failure)  CAD (coronary artery disease)  HTN (hypertension)  S/P hernia surgery  History of coronary artery bypass graft: 2017 @ [facility]    FAMILY HISTORY:  No pertinent family history in first degree relatives    SOCIAL HISTORY:      - Tobacco     - ETOH    Allergies    No Known Allergies    Intolerances      Vital Signs Last 24 Hrs  T(C): 37.2 (19 May 2018 14:12), Max: 37.2 (19 May 2018 14:12)  T(F): 99 (19 May 2018 14:12), Max: 99 (19 May 2018 14:12)  HR: 61 (19 May 2018 13:51) (56 - 66)  BP: 89/51 (19 May 2018 13:51) (87/58 - 105/58)  BP(mean): --  RR: 16 (19 May 2018 13:51) (16 - 16)  SpO2: 98% (19 May 2018 09:30) (96% - 100%)    05-18 @ 07:01  -  05-19 @ 07:00  --------------------------------------------------------  IN: 420 mL / OUT: 610 mL / NET: -190 mL    05-19 @ 07:01  -  05-19 @ 15:50  --------------------------------------------------------  IN: 360 mL / OUT: 700 mL / NET: -340 mL        PHYSICAL EXAM:  More  Comfortable, no immediate distress  Eyes: PERRL, EOM intact; conjunctiva and sclera clear  Head: Normocephalic;  No Trauma  ENMT: No nasal discharge, +hoarseness, less cough   Neck: Supple; non tender; no masses or deformities.    No JVD  Respiratory:  - WHEEZING   - RHONCHI  - RALES  + CRACKLES.  Diminished breath sounds  BILATERAL  RIGHT  LEFT bases  Cardiovascular: Regular rate and rhythm. S1 and S2 Normal; No murmurs, gallops or rubs     - PPM/AICD  Gastrointestinal: Soft non-tender, non-distended; Normal bowel sounds; No hepatosplenomegaly.     -PEG    -  GT   - LAU  Genitourinary: No costovertebral angle tenderness. No dysuria  Extremities: AROM, + clubbing, cyanosis or edema    Vascular: Peripheral pulses palpable 2+ bilaterally  Neurological: Alert and responisve to stimuli   Skin: Warm and dry. No obvious rash  Lymph Nodes: No acute cervical or supraclavicular adenopathy  Psychiatric: Cooperative and appropriate mood  DEVICES:  - DENTURES   +IV R / L     - ETUBE   -TRACH   -CTUBE  R / L      LABS:                          10.8   10.0  )-----------( 426      ( 19 May 2018 07:17 )             33.7     05-19    137  |  98  |  22  ----------------------------<  106<H>  4.3   |  28  |  1.04    Ca    8.9      19 May 2018 07:15  Mg     2.1     05-19      PT/INR - ( 19 May 2018 07:16 )   PT: 16.6 sec;   INR: 1.48            RADIOLOGY & ADDITIONAL STUDIES (The following images were personally reviewed):

## 2018-05-19 NOTE — PROGRESS NOTE ADULT - PROBLEM SELECTOR PLAN 6
-CXR w/ incidental finding of sclerotic bone metastases, likely from prostate CA.    -PSA elevated 6264   -CT abdomen/pelvis 05/11: Prostate tumor. Distended urinary bladder. Extensive bony metastasis. Multiple solid pulmonary nodules. Pulmonary metastasis is a consideration.  -Urology Team consulted (Dr. Brown). Continue oguld for urinary retention (placed by urology), Continue Tamsulosin 0.4mg. f/u further Recs  -Oncology Team consulted (Dr. Martinez), certainly prostate CA, will need ADT and bone modifying drug, continue to f/u recs   -Vitamin D 35.3 and Vitamin D 1, 25: 92.9, f/u heme/onc on possibly starting denosumab.   -Tramadol PRN for pain.

## 2018-05-19 NOTE — PROGRESS NOTE ADULT - PROBLEM SELECTOR PLAN 3
-SBP remains low, 80-100s today  -c/w lopressor 25 mg daily and losartan 25 mg daily.  -Continue to monitor.

## 2018-05-19 NOTE — PROGRESS NOTE ADULT - ASSESSMENT
76 yo M former smoker with CHF, CAD, incidental finding of bone mets on imaging and markedly elevated PSA  Imaging also with suspicious lung lesions   admitted with NSTEMI      Lung lesions:  s/p CT chest - bulky mediastinal nodes, nodules   s/p bronch    Certainly with metastatic prostate cancer given imaging and PSA  at a minimum, will need ADT and bone modifying drug . chemotherapy to be considered based on lung/mediastinal node pathology     will follow up with me as outpatient to initiate treatment.

## 2018-05-20 LAB
-  AMPICILLIN/SULBACTAM: SIGNIFICANT CHANGE UP
-  AMPICILLIN: SIGNIFICANT CHANGE UP
-  CEFTRIAXONE: SIGNIFICANT CHANGE UP
-  CHLORAMPHENICOL: SIGNIFICANT CHANGE UP
-  CIPROFLOXACIN: SIGNIFICANT CHANGE UP
-  CLARITHROMYCIN: SIGNIFICANT CHANGE UP
-  LEVOFLOXACIN: SIGNIFICANT CHANGE UP
-  TRIMETHOPRIM/SULFAMETHOXAZOLE: SIGNIFICANT CHANGE UP
ANION GAP SERPL CALC-SCNC: 11 MMOL/L — SIGNIFICANT CHANGE UP (ref 5–17)
BUN SERPL-MCNC: 16 MG/DL — SIGNIFICANT CHANGE UP (ref 7–23)
CALCIUM SERPL-MCNC: 8.9 MG/DL — SIGNIFICANT CHANGE UP (ref 8.4–10.5)
CHLORIDE SERPL-SCNC: 98 MMOL/L — SIGNIFICANT CHANGE UP (ref 96–108)
CO2 SERPL-SCNC: 27 MMOL/L — SIGNIFICANT CHANGE UP (ref 22–31)
CREAT SERPL-MCNC: 0.95 MG/DL — SIGNIFICANT CHANGE UP (ref 0.5–1.3)
CULTURE RESULTS: SIGNIFICANT CHANGE UP
GLUCOSE SERPL-MCNC: 91 MG/DL — SIGNIFICANT CHANGE UP (ref 70–99)
HCT VFR BLD CALC: 34.6 % — LOW (ref 39–50)
HGB BLD-MCNC: 11.3 G/DL — LOW (ref 13–17)
MAGNESIUM SERPL-MCNC: 2 MG/DL — SIGNIFICANT CHANGE UP (ref 1.6–2.6)
MCHC RBC-ENTMCNC: 27.9 PG — SIGNIFICANT CHANGE UP (ref 27–34)
MCHC RBC-ENTMCNC: 32.7 G/DL — SIGNIFICANT CHANGE UP (ref 32–36)
MCV RBC AUTO: 85.4 FL — SIGNIFICANT CHANGE UP (ref 80–100)
METHOD TYPE: SIGNIFICANT CHANGE UP
ORGANISM # SPEC MICROSCOPIC CNT: SIGNIFICANT CHANGE UP
ORGANISM # SPEC MICROSCOPIC CNT: SIGNIFICANT CHANGE UP
PLATELET # BLD AUTO: 402 K/UL — HIGH (ref 150–400)
POTASSIUM SERPL-MCNC: 3.7 MMOL/L — SIGNIFICANT CHANGE UP (ref 3.5–5.3)
POTASSIUM SERPL-SCNC: 3.7 MMOL/L — SIGNIFICANT CHANGE UP (ref 3.5–5.3)
RBC # BLD: 4.05 M/UL — LOW (ref 4.2–5.8)
RBC # FLD: 14.7 % — SIGNIFICANT CHANGE UP (ref 10.3–16.9)
SODIUM SERPL-SCNC: 136 MMOL/L — SIGNIFICANT CHANGE UP (ref 135–145)
SPECIMEN SOURCE: SIGNIFICANT CHANGE UP
WBC # BLD: 7.8 K/UL — SIGNIFICANT CHANGE UP (ref 3.8–10.5)
WBC # FLD AUTO: 7.8 K/UL — SIGNIFICANT CHANGE UP (ref 3.8–10.5)

## 2018-05-20 PROCEDURE — 99233 SBSQ HOSP IP/OBS HIGH 50: CPT

## 2018-05-20 RX ADMIN — APIXABAN 5 MILLIGRAM(S): 2.5 TABLET, FILM COATED ORAL at 17:59

## 2018-05-20 RX ADMIN — Medication 81 MILLIGRAM(S): at 11:35

## 2018-05-20 RX ADMIN — Medication 40 MILLIGRAM(S): at 06:56

## 2018-05-20 RX ADMIN — Medication 650 MILLIGRAM(S): at 14:30

## 2018-05-20 RX ADMIN — Medication 650 MILLIGRAM(S): at 22:41

## 2018-05-20 RX ADMIN — PANTOPRAZOLE SODIUM 40 MILLIGRAM(S): 20 TABLET, DELAYED RELEASE ORAL at 06:56

## 2018-05-20 RX ADMIN — APIXABAN 5 MILLIGRAM(S): 2.5 TABLET, FILM COATED ORAL at 06:56

## 2018-05-20 RX ADMIN — Medication 1 MILLIGRAM(S): at 21:02

## 2018-05-20 RX ADMIN — BENZOCAINE AND MENTHOL 1 LOZENGE: 5; 1 LIQUID ORAL at 06:58

## 2018-05-20 RX ADMIN — BENZOCAINE AND MENTHOL 1 LOZENGE: 5; 1 LIQUID ORAL at 14:08

## 2018-05-20 RX ADMIN — BENZOCAINE AND MENTHOL 1 LOZENGE: 5; 1 LIQUID ORAL at 10:40

## 2018-05-20 RX ADMIN — BENZOCAINE AND MENTHOL 1 LOZENGE: 5; 1 LIQUID ORAL at 21:01

## 2018-05-20 RX ADMIN — TAMSULOSIN HYDROCHLORIDE 0.4 MILLIGRAM(S): 0.4 CAPSULE ORAL at 21:02

## 2018-05-20 RX ADMIN — Medication 25 MILLIGRAM(S): at 06:56

## 2018-05-20 RX ADMIN — BENZOCAINE AND MENTHOL 1 LOZENGE: 5; 1 LIQUID ORAL at 18:00

## 2018-05-20 RX ADMIN — Medication 100 MILLIGRAM(S): at 11:35

## 2018-05-20 RX ADMIN — Medication 650 MILLIGRAM(S): at 23:30

## 2018-05-20 RX ADMIN — SENNA PLUS 5 MILLILITER(S): 8.6 TABLET ORAL at 11:42

## 2018-05-20 RX ADMIN — ATORVASTATIN CALCIUM 80 MILLIGRAM(S): 80 TABLET, FILM COATED ORAL at 21:02

## 2018-05-20 RX ADMIN — Medication 650 MILLIGRAM(S): at 13:30

## 2018-05-20 NOTE — PROGRESS NOTE ADULT - PROBLEM SELECTOR PLAN 6
-CXR w/ incidental finding of sclerotic bone metastases, likely from prostate CA.    -PSA elevated 6264   -CT abdomen/pelvis 05/11: Prostate tumor. Distended urinary bladder. Extensive bony metastasis. Multiple solid pulmonary nodules. Pulmonary metastasis is a consideration.  -Urology Team consulted (Dr. Brown). Continue gould for urinary retention (placed by urology), Continue Tamsulosin 0.4mg. f/u further Recs  -Oncology Team consulted to f/u with Dr. Martinez as outpatient to initiate treatment, certainly prostate CA, will need ADT and bone modifying drug, continue to f/u recs   -Vitamin D 35.3 and Vitamin D 1, 25: 92.9  -Tramadol PRN for pain.

## 2018-05-20 NOTE — PROGRESS NOTE ADULT - SUBJECTIVE AND OBJECTIVE BOX
Subjective Assessment: M. Pt states diffuse pain is improving and is feeling better. Denies CP or SOB  	 CV stable        MEDICATIONS:  furosemide    Tablet 40 milliGRAM(s) Oral daily  losartan 25 milliGRAM(s) Oral daily  metoprolol succinate ER 25 milliGRAM(s) Oral daily  tamsulosin 0.4 milliGRAM(s) Oral at bedtime  ALBUTerol/ipratropium for Nebulization 3 milliLiter(s) Nebulizer every 6 hours PRN  acetaminophen   Tablet. 650 milliGRAM(s) Oral every 6 hours PRN  melatonin 1 milliGRAM(s) Oral at bedtime  traMADol 25 milliGRAM(s) Oral every 6 hours PRN  docusate sodium 100 milliGRAM(s) Oral daily  pantoprazole    Tablet 40 milliGRAM(s) Oral before breakfast  senna Syrup 5 milliLiter(s) Oral daily  atorvastatin 80 milliGRAM(s) Oral at bedtime  methimazole 5 milliGRAM(s) Oral three times a day  apixaban 5 milliGRAM(s) Oral every 12 hours  aspirin enteric coated 81 milliGRAM(s) Oral daily  benzocaine 15 mG/menthol 3.6 mG Lozenge 1 Lozenge Oral every 4 hours      	    [PHYSICAL EXAM:  TELEMETRY:  T(C): 36.9 (05-20-18 @ 06:00), Max: 37.2 (05-19-18 @ 14:12)  HR: 85 (05-20-18 @ 06:54) (61 - 85)  BP: 113/58 (05-20-18 @ 06:54) (89/51 - 113/58)  RR: 16 (05-20-18 @ 06:54) (16 - 16)  SpO2: 100% (05-20-18 @ 06:54) (98% - 100%)    I&O's Summary    19 May 2018 07:01  -  20 May 2018 07:00  --------------------------------------------------------  IN: 480 mL / OUT: 1600 mL / NET: -1120 mL        De Leon: Yes  Central/PICC/Mid Line: No                                     Appearance: Normal	  HEENT:   Normal oral mucosa, PERRL, EOMI	  Neck: Supple, + JVD/ - JVD; Carotid Bruit   Cardiovascular: Normal S1 S2, No JVD, No murmurs,   Respiratory: Lungs clear to auscultation/Decreased Breath Sounds/No Rales, Rhonchi, Wheezing	  Gastrointestinal:  Soft, Non-tender, + BS	  Skin: No rashes, No ecchymoses, No cyanosis  Extremities: Normal range of motion, No clubbing, cyanosis or edema  Vascular: Peripheral pulses palpable 2+ bilaterally  Neurologic: Non-focal  Psychiatry: A & O x 3, Mood & affect appropriate      	    CT chest 05/14/2018: 1. Innumerable scattered pulmonary nodules are noted bilaterally. Most of which demonstrated a lobulated configuration, likely representing mucous   plugging. 2. However there are more discrete nodular opacities some demonstrating feeding vessel sign concerning for metastatic prostate. 3. Bulky mediastinal lymphadenopathy as described above and also thought to be prostatic. Correlation with PET/CT for more complete staging. 4. Views osteoblastic metastases involving the scapulae left greater than right as well as multiple ribs with a pathologic fracture involving the right eighth rib as well as the vertebra without compression fracture   deformity. 5. Patchy ground less opacities predominantly in the right upper and right lower lobes which may represent early pneumonitis. Short interval surveillance may be of value to confirm stability and/or resolution.     CT abdomen/pelvis 05/11/2018: Prostate tumor. Distended urinary bladder. Extensive bony metastasis. Multiple solid pulmonary nodules. Pulmonary metastasis is a   consideration. Recommend CT of chest for complete evaluation. Small right pleural effusion. Cardiomegaly. Heterogeneous hepatic enhancement likely due to altered perfusion. No discrete hepatic mass. Cholelithiasis. No biliary dilatation. Patent portal vein. Nonspecific thickening of the bilateral adrenal glands could be due to adenomatous hyperplasia. CT without contrast or PET/CT would be useful for differentiation from metastasis.     ECHO 05/10/2018: There is severe concentric left ventricular hypertrophy. There is severe global hypokinesis of the left ventricle. The left ventricular ejection fraction   is severely reduced. The left ventricular ejection fraction is 30%.The left atrium is severely dilated. The left atrial volume index is 58 cc/m2 (normal <34cc/m2)The mitral inflow pattern is consistent with restrictive left ventricular filling, suggestive of severely elevated left atrial pressure (>20mmHg).  The right atrium is moderately dilated. The right atrial   volume index is 44 cc/m2 (normal range 21+/-6 for women, 25 +/- 7 for men)  Calcified aortic valve. There is trace aortic regurgitation. Tethered mitral valve leaflets. There is mild mitral regurgitation. There is mild tricuspid regurgitation. There is mild pulmonary hypertension. The pulmonary artery systolic pressure is estimated to be 49 mmHg. Structurally normal pulmonic valve. There is mild pulmonic regurgitation. There is no pericardial effusion. Bilateral pleural effusion noted.     Chest X-ray 05/09/2018: Cardiomegaly. Pulmonary vascular congestion. Interstitial pulmonary edema. Sclerotic bone metastases, likely from prostate cancer.  LABS:	 	                          11.3   7.8   )-----------( 402      ( 20 May 2018 07:39 )             34.6     05-20    136  |  98  |  16  ----------------------------<  91  3.7   |  27  |  0.95    Ca    8.9      20 May 2018 07:39  Mg     2.0     05-20        PT/INR - ( 19 May 2018 07:16 )   PT: 16.6 sec;   INR: 1.48

## 2018-05-20 NOTE — PROGRESS NOTE ADULT - PROBLEM SELECTOR PLAN 7
-Multiple pulmonary nodules noted on CT Abd/Plevis  -CT chest 5/14 revealed innumerable scattered pulmonary nodules B/L, most of which demonstrated a lobulated configuration, likely representing mucous plugging. More discrete nodular opacities visualized some demonstrating feeding vessel sign concerning for metastatic prostate. Bulky mediastinal LAD also thought to be prostatic. 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 Patchy ground less opacities predominantly in the RUL & RLL which may represent early pneumonitis  -Per Oncology recs perform biopsy of nodules to determine cause from prostate CA vs. pulmonary CA.    -Dr. Woods consulted, bronchoscopy on 05/17 f/u results  -Eliquis on hold with plan for bronchoscopy scheduled today 05/17 (Last dose Eliquis 5/14 AM), restarted Eliquis today per Dr. Woods, INR continues to be elevated this AM 1.44 Dr. Murphy aware possibly likely to mets to liver, f/u coags in AM  -Duonebs q6hr PRN, aspiration precaution -Multiple pulmonary nodules noted on CT Abd/Plevis  -CT chest 5/14 revealed innumerable scattered pulmonary nodules B/L, most of which demonstrated a lobulated configuration, likely representing mucous plugging. More discrete nodular opacities visualized some demonstrating feeding vessel sign concerning for metastatic prostate. Bulky mediastinal LAD also thought to be prostatic. 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 Patchy ground less opacities predominantly in the RUL & RLL which may represent early pneumonitis  -Per Oncology recs perform biopsy of nodules to determine cause from prostate CA vs. pulmonary CA.    -Dr. Woods consulted, Bronch cx Moderate Haemophilus species with few gram + pairs and chains, AFB negative. Cytopathology RML negative for malignant cells  - continue Eliquis as per Dr. Woods,  -Duonebs q6hr PRN, aspiration precaution. -Multiple pulmonary nodules noted on CT Abd/Plevis  -CT chest 5/14 revealed innumerable scattered pulmonary nodules B/L, most of which demonstrated a lobulated configuration, likely representing mucous plugging. More discrete nodular opacities visualized some demonstrating feeding vessel sign concerning for metastatic prostate. Bulky mediastinal LAD also thought to be prostatic. 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 Patchy ground less opacities predominantly in the RUL & RLL which may represent early pneumonitis  -Dr. Woods consulted, Bronch cx Moderate Haemophilus species with few gram + pairs and chains, AFB negative. Cytopathology RML negative for malignant cells, continue to f/u complete results  - continue Eliquis as per Dr. Woods,  -Duonebs q6hr PRN, aspiration precaution. -Multiple pulmonary nodules noted on CT Abd/Plevis  -CT chest 5/14 revealed innumerable scattered pulmonary nodules B/L, most of which demonstrated a lobulated configuration, likely representing mucous plugging. More discrete nodular opacities visualized some demonstrating feeding vessel sign concerning for metastatic prostate. Bulky mediastinal LAD also thought to be prostatic. 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 Patchy ground less opacities predominantly in the RUL & RLL which may represent early pneumonitis  -Dr. Woods consulted, Bronch cx Moderate Haemophilus species with few gram + pairs and chains, AFB negative. Cytopathology wash RML negative for malignant cells, continue to f/u complete results  - continue Eliquis as per Dr. Woods,  -Duonebs q6hr PRN, aspiration precaution.

## 2018-05-20 NOTE — PROGRESS NOTE ADULT - SUBJECTIVE AND OBJECTIVE BOX
Interventional, Pulmonary, Critical, Chest Special Procedures.    Pt was seen and fully examined by myself.     Time spent with patient in minutes:37    Patient is a 77y old  Male who presents with a chief complaint of CP (09 May 2018 19:41)The patient ill appearing, OOB to chair today, eupneic on RA and pain free when seen    HPI:  78 yo male FOR[employer] with PMHx of HTN, HLD, pre- DM,  CAD s/p CABG (in 2016 @ [facility]), CHF (EF unknown), h/o DVT 6 months ago (on Eliquis; last dose last night) who presented to [facility] ED on 5/9/18 with CC of Cough and SOB X 5-6 days. Pt. reports that he initially had a cough with white sputum production associated with shortness of breath at rest, non-radiating L sided Chest tightness and palpitations. pt. reports that the chest tightness and SOB comes along with the cough  and is relieved when he stops coughing. He also reports feeling sore. At baseline, pt. reports that he can ambulate 4 blocks before getting SOB.  Today am, pt. reports having hot flashes , cough, SOB and chest tightness which prompted him to come to the ED. Denies dizziness, diaphoresis, fatigue, LE edema, orthopnea, PND, syncope.  Upon admit, temp 98.3F,  BPM, /86, RR 20, SpO2 96% on RA.  Labs significant for alk phos 500, , trop 0.08, BNP 6822. CXR wet read revealed R sided pleural effusion. EKG revealed NSR with 1st degree AV block @ 94 BPM with left axis deviation and Q wave in inferior leads and V1-V5.  While in ER, pt received IV Lasix 40 mg x1. Patient admitted to 5U for diuresis, ECHO, r/o ACS. (09 May 2018 19:41)    REVIEW OF SYSTEMS: updated  Constitutional: No fever, weight loss, chills or fatigue  Eyes: No eye pain, visual disturbances, or discharge  ENMT:  No difficulty hearing, tinnitus, vertigo; No sinus or throat pain. No epistaxis, dysphagia, dysphonia, hoarseness or odynophagia  Neck: No pain, stiffness or neck swelling.  No masses or deformities  Respiratory: No cough, wheezing, chills or hemoptysis  - COPD  - ILD   - PE   - ASTHMA     - PNEUMONIA  Cardiovascular: No chest pain, dysrhythmia, palpitations, dizziness or edema   - COPD     - CAD   - CHF   - HTN  Gastrointestinal: No abdominal or epigastric pain. No nausea, vomiting or hematemesis; No diarrhea or constipation. No melena or hematochezia. No dysphagia or Icterus.          Genitourinary: No dysuria, frequency, hematuria or incontinence   - CKD/PAM      - ESRD  Neurological: No headaches, memory loss, loss of strength, numbness or tremors      -DEMENTIA     - STROKE    - SEIZURE  Skin: No itching, burning, rashes or lesions   Lymph Nodes: No enlarged glands  Endocrine: No heat or cold intolerance; No hair loss       - DM     - THYROID DISORDER  Musculoskeletal: No joint pain or swelling; No muscle, back or extremity pain  Psychiatric: No depression, anxiety, mood swings or difficulty sleeping  Heme/Lymph: No easy bruising or bleeding gums         - ANEMIA      - CANCER   -COAGULOPATHY  Allergy and Immunologic: No hives or eczema    PAST MEDICAL & SURGICAL HISTORY:  CHF (congestive heart failure)  CAD (coronary artery disease)  HTN (hypertension)  S/P hernia surgery  History of coronary artery bypass graft: 2017 @ [employer]    FAMILY HISTORY:  No pertinent family history in first degree relatives    SOCIAL HISTORY:      - Tobacco     - ETOH    Allergies    No Known Allergies    Intolerances      Vital Signs Last 24 Hrs  T(C): 36.7 (20 May 2018 13:31), Max: 37.2 (19 May 2018 14:12)  T(F): 98.1 (20 May 2018 13:31), Max: 99 (19 May 2018 14:12)  HR: 79 (20 May 2018 09:15) (66 - 85)  BP: 97/53 (20 May 2018 09:15) (91/53 - 113/58)  BP(mean): --  RR: 16 (20 May 2018 09:15) (16 - 16)  SpO2: 99% (20 May 2018 09:15) (99% - 100%)    05-19 @ 07:01  -  05-20 @ 07:00  --------------------------------------------------------  IN: 480 mL / OUT: 1600 mL / NET: -1120 mL    05-20 @ 07:01 - 05-20 @ 13:54  --------------------------------------------------------  IN: 360 mL / OUT: 0 mL / NET: 360 mL        PHYSICAL EXAM:  More  Comfortable, no immediate distress  Eyes: PERRL, EOM intact; conjunctiva and sclera clear  Head: Normocephalic;  No Trauma  ENMT: No nasal discharge, +hoarseness, less cough   Neck: Supple; non tender; no masses or deformities.    No JVD  Respiratory:  - WHEEZING   - RHONCHI  - RALES  + CRACKLES.  Diminished breath sounds  BILATERAL  RIGHT  LEFT bases  Cardiovascular: Regular rate and rhythm. S1 and S2 Normal; No murmurs, gallops or rubs     - PPM/AICD  Gastrointestinal: Soft non-tender, non-distended; Normal bowel sounds; No hepatosplenomegaly.     -PEG    -  GT   +LAU  Genitourinary: No costovertebral angle tenderness. No dysuria  Extremities: AROM, + clubbing, cyanosis or edema    Vascular: Peripheral pulses palpable 2+ bilaterally  Neurological: Alert and responisve to stimuli   Skin: Warm and dry. No obvious rash  Lymph Nodes: No acute cervical or supraclavicular adenopathy  Psychiatric: Cooperative and appropriate mood  DEVICES:  - DENTURES   +IV R / L     - ETUBE   -TRACH   -CTUBE  R / L      LABS:                          11.3   7.8   )-----------( 402      ( 20 May 2018 07:39 )             34.6     05-20    136  |  98  |  16  ----------------------------<  91  3.7   |  27  |  0.95    Ca    8.9      20 May 2018 07:39  Mg     2.0     05-20      PT/INR - ( 19 May 2018 07:16 )   PT: 16.6 sec;   INR: 1.48            RADIOLOGY & ADDITIONAL STUDIES (The following images were personally reviewed):

## 2018-05-20 NOTE — PROGRESS NOTE ADULT - SUBJECTIVE AND OBJECTIVE BOX
Interventional Cardiology PA Adult Progress Note            Subjective Assessment: Pt was examined at bedside this AM. Pt states diffuse pain is improving and is feeling better. Denies CP or SOB  	        MEDICATIONS:  furosemide    Tablet 40 milliGRAM(s) Oral daily  losartan 25 milliGRAM(s) Oral daily  metoprolol succinate ER 25 milliGRAM(s) Oral daily  tamsulosin 0.4 milliGRAM(s) Oral at bedtime  ALBUTerol/ipratropium for Nebulization 3 milliLiter(s) Nebulizer every 6 hours PRN  acetaminophen   Tablet. 650 milliGRAM(s) Oral every 6 hours PRN  melatonin 1 milliGRAM(s) Oral at bedtime  traMADol 25 milliGRAM(s) Oral every 6 hours PRN  docusate sodium 100 milliGRAM(s) Oral daily  pantoprazole    Tablet 40 milliGRAM(s) Oral before breakfast  senna Syrup 5 milliLiter(s) Oral daily  atorvastatin 80 milliGRAM(s) Oral at bedtime  methimazole 5 milliGRAM(s) Oral three times a day  apixaban 5 milliGRAM(s) Oral every 12 hours  aspirin enteric coated 81 milliGRAM(s) Oral daily  benzocaine 15 mG/menthol 3.6 mG Lozenge 1 Lozenge Oral every 4 hours      	    [PHYSICAL EXAM:  TELEMETRY:  T(C): 36.9 (05-20-18 @ 06:00), Max: 37.2 (05-19-18 @ 14:12)  HR: 85 (05-20-18 @ 06:54) (61 - 85)  BP: 113/58 (05-20-18 @ 06:54) (89/51 - 113/58)  RR: 16 (05-20-18 @ 06:54) (16 - 16)  SpO2: 100% (05-20-18 @ 06:54) (98% - 100%)  Wt(kg): --  I&O's Summary    19 May 2018 07:01  -  20 May 2018 07:00  --------------------------------------------------------  IN: 480 mL / OUT: 1600 mL / NET: -1120 mL        De Leon:  Central/PICC/Mid Line:                                         Appearance: Normal	  HEENT:   Normal oral mucosa, PERRL, EOMI	  Neck: Supple, + JVD/ - JVD; Carotid Bruit   Cardiovascular: Normal S1 S2, No JVD, No murmurs,   Respiratory: Lungs clear to auscultation/Decreased Breath Sounds/No Rales, Rhonchi, Wheezing	  Gastrointestinal:  Soft, Non-tender, + BS	  Skin: No rashes, No ecchymoses, No cyanosis  Extremities: Normal range of motion, No clubbing, cyanosis or edema  Vascular: Peripheral pulses palpable 2+ bilaterally  Neurologic: Non-focal  Psychiatry: A & O x 3, Mood & affect appropriate      	    ECG:  	  RADIOLOGY:   DIAGNOSTIC TESTING:  [ ] Echocardiogram:  [ ]  Catheterization:  [ ] Stress Test:    [ ] SIMI  OTHER: 	    LABS:	 	  CARDIAC MARKERS:                                  11.3   7.8   )-----------( 402      ( 20 May 2018 07:39 )             34.6     05-20    136  |  98  |  16  ----------------------------<  91  3.7   |  27  |  0.95    Ca    8.9      20 May 2018 07:39  Mg     2.0     05-20      proBNP:   Lipid Profile:   HgA1c:   TSH:   PT/INR - ( 19 May 2018 07:16 )   PT: 16.6 sec;   INR: 1.48              ASSESSMENT/PLAN: 	        DVT ppx:  Dispo: Interventional Cardiology PA Adult Progress Note      5Uris TRANSFER TO MEDICINE NOTE    Hospital Course  76 yo male FORMER SMOKER with PMHx of HTN, HLD, pre- DM,  CAD s/p CABG (in 2016 @ [facility]), CHF (EF unknown), h/o DVT 6 months ago (on Eliquis; last dose last night) who presented to [facility] ED on 5/9/18 with CC of Cough and SOB X 5-6 days. Pt. reports that he initially had a cough with white sputum production associated with shortness of breath at rest, non-radiating L sided Chest tightness and palpitations. pt. reports that the chest tightness and SOB comes along with the cough  and is relieved when he stops coughing. He also reports feeling sore. At baseline, pt. reports that he can ambulate 4 blocks before getting SOB.  Today am, pt. reports having hot flashes , cough, SOB and chest tightness which prompted him to come to the ED. Denies dizziness, diaphoresis, fatigue, LE edema, orthopnea, PND, syncope.  Upon admit, temp 98.3F,  BPM, /86, RR 20, SpO2 96% on RA.  Labs significant for alk phos 500, , trop 0.08, BNP 6822. CXR wet read revealed R sided pleural effusion. EKG revealed NSR with 1st degree AV block @ 94 BPM with left axis deviation and Q wave in inferior leads and V1-V5.  While in ER, pt received IV Lasix 40 mg x1. Patient admitted to 5U for diuresis, ECHO, r/o ACS. CXR on 5/10: Pulmonary vascular congestion. Interstitial pulmonary edema. Echo 5/10: severe concentric left ventricular hypertrophy. There is severe global hypokinesis of the left ventricle, EF 30%.The left atrium is severely dilated, mitral inflow pattern is consistent with restrictive left ventricular filling, suggestive of severely elevated left atrial pressure, right atrium is moderately dilated, Tethered mitral valve leaflets. Pt was diuresed and is now euvolemic on PE. Pt ruled in NSTEMI  CE 0.08 -> 0.15, s/p diagnostic cardiac cath known 3VCAD (99% mLAD, 100% ramus, 90% OM2, 99% small OM3, 85% distal dominant LCX), Patent LIMA to LAD, patent radial graft to ramus, patent SVG to OM2, LVEDP4, pt optimized with medical therapy. CXR with incidental finding of sclerotic bone metastases, urology consulted PSA level [id number], De Leon was inserted in the setting of urinary retention. CT abdomen/pelvis 05/11 Prostate tumor. Distended urinary bladder. Extensive bony metastasis. Multiple solid pulmonary nodules. Pulmonary metastasis is a consideration. Pulm consulted. CT chest 05/14 revealed  discrete nodular opacities visualized some demonstrating feeding vessel sign concerning for metastatic prostate. Pt was then scheduled for bronchoscopy which was done 05/16, AFB negative, culture moderate haemophilus species, few gram positive pairs and chains, Cytopathology RML negative for malignant cells. Pt. reported feeling depressed and anxious with cancer diagnosis psych consulted and recommended supportive therapy. Pt now optimized from cardiac standpoint and now transferred to medicine under Dr. Joseph       Subjective Assessment: Pt was examined at bedside this AM. Pt states diffuse pain is improving and is feeling better. Denies CP or SOB  	        MEDICATIONS:  furosemide    Tablet 40 milliGRAM(s) Oral daily  losartan 25 milliGRAM(s) Oral daily  metoprolol succinate ER 25 milliGRAM(s) Oral daily  tamsulosin 0.4 milliGRAM(s) Oral at bedtime  ALBUTerol/ipratropium for Nebulization 3 milliLiter(s) Nebulizer every 6 hours PRN  acetaminophen   Tablet. 650 milliGRAM(s) Oral every 6 hours PRN  melatonin 1 milliGRAM(s) Oral at bedtime  traMADol 25 milliGRAM(s) Oral every 6 hours PRN  docusate sodium 100 milliGRAM(s) Oral daily  pantoprazole    Tablet 40 milliGRAM(s) Oral before breakfast  senna Syrup 5 milliLiter(s) Oral daily  atorvastatin 80 milliGRAM(s) Oral at bedtime  methimazole 5 milliGRAM(s) Oral three times a day  apixaban 5 milliGRAM(s) Oral every 12 hours  aspirin enteric coated 81 milliGRAM(s) Oral daily  benzocaine 15 mG/menthol 3.6 mG Lozenge 1 Lozenge Oral every 4 hours      	    [PHYSICAL EXAM:  TELEMETRY:  T(C): 36.9 (05-20-18 @ 06:00), Max: 37.2 (05-19-18 @ 14:12)  HR: 85 (05-20-18 @ 06:54) (61 - 85)  BP: 113/58 (05-20-18 @ 06:54) (89/51 - 113/58)  RR: 16 (05-20-18 @ 06:54) (16 - 16)  SpO2: 100% (05-20-18 @ 06:54) (98% - 100%)    I&O's Summary    19 May 2018 07:01  -  20 May 2018 07:00  --------------------------------------------------------  IN: 480 mL / OUT: 1600 mL / NET: -1120 mL        De Leon: Yes  Central/PICC/Mid Line: No                                     Appearance: Normal	  HEENT:   Normal oral mucosa, PERRL, EOMI	  Neck: Supple, + JVD/ - JVD; Carotid Bruit   Cardiovascular: Normal S1 S2, No JVD, No murmurs,   Respiratory: Lungs clear to auscultation/Decreased Breath Sounds/No Rales, Rhonchi, Wheezing	  Gastrointestinal:  Soft, Non-tender, + BS	  Skin: No rashes, No ecchymoses, No cyanosis  Extremities: Normal range of motion, No clubbing, cyanosis or edema  Vascular: Peripheral pulses palpable 2+ bilaterally  Neurologic: Non-focal  Psychiatry: A & O x 3, Mood & affect appropriate      	    ECG:  	  RADIOLOGY:   DIAGNOSTIC TESTING:  [ ] Echocardiogram:  [ ]  Catheterization:  [ ] Stress Test:    [ ] SIMI  OTHER: 	    LABS:	 	  CARDIAC MARKERS:                                  11.3   7.8   )-----------( 402      ( 20 May 2018 07:39 )             34.6     05-20    136  |  98  |  16  ----------------------------<  91  3.7   |  27  |  0.95    Ca    8.9      20 May 2018 07:39  Mg     2.0     05-20      proBNP:   Lipid Profile:   HgA1c:   TSH:   PT/INR - ( 19 May 2018 07:16 )   PT: 16.6 sec;   INR: 1.48              ASSESSMENT/PLAN: 	        DVT ppx:  Dispo: Interventional Cardiology PA Adult Progress Note      5Uris TRANSFER TO MEDICINE NOTE    Hospital Course  78 yo male FORMER SMOKER with PMHx of HTN, HLD, pre- DM,  CAD s/p CABG (in 2016 @ [facility]), CHF (EF unknown), h/o DVT 6 months ago (on Eliquis; last dose last night) who presented to [facility] ED on 5/9/18 with CC of Cough and SOB X 5-6 days. Pt. reports that he initially had a cough with white sputum production associated with shortness of breath at rest, non-radiating L sided Chest tightness and palpitations. pt. reports that the chest tightness and SOB comes along with the cough  and is relieved when he stops coughing. He also reports feeling sore. At baseline, pt. reports that he can ambulate 4 blocks before getting SOB.  Today am, pt. reports having hot flashes , cough, SOB and chest tightness which prompted him to come to the ED. Denies dizziness, diaphoresis, fatigue, LE edema, orthopnea, PND, syncope.  Upon admit, temp 98.3F,  BPM, /86, RR 20, SpO2 96% on RA.  Labs significant for alk phos 500, , trop 0.08, BNP 6822. CXR wet read revealed R sided pleural effusion. EKG revealed NSR with 1st degree AV block @ 94 BPM with left axis deviation and Q wave in inferior leads and V1-V5.  While in ER, pt received IV Lasix 40 mg x1. Patient admitted to 5U for diuresis, ECHO, r/o ACS. CXR on 5/10: Pulmonary vascular congestion. Interstitial pulmonary edema. Echo 5/10: severe concentric left ventricular hypertrophy. There is severe global hypokinesis of the left ventricle, EF 30%.The left atrium is severely dilated, mitral inflow pattern is consistent with restrictive left ventricular filling, suggestive of severely elevated left atrial pressure, right atrium is moderately dilated, Tethered mitral valve leaflets. Pt was diuresed and is now euvolemic on PE. Pt ruled in NSTEMI  CE 0.08 -> 0.15, s/p diagnostic cardiac cath known 3VCAD (99% mLAD, 100% ramus, 90% OM2, 99% small OM3, 85% distal dominant LCX), Patent LIMA to LAD, patent radial graft to ramus, patent SVG to OM2, LVEDP4, pt optimized with medical therapy. CXR with incidental finding of sclerotic bone metastases, urology consulted PSA level [id number], De Leon was inserted in the setting of urinary retention. CT abdomen/pelvis 05/11 Prostate tumor. Distended urinary bladder. Extensive bony metastasis. Multiple solid pulmonary nodules. Pulmonary metastasis is a consideration. Pulm consulted. CT chest 05/14 revealed  discrete nodular opacities visualized some demonstrating feeding vessel sign concerning for metastatic prostate. Pt was then scheduled for bronchoscopy which was done 05/16, AFB negative, culture moderate haemophilus species, few gram positive pairs and chains, Cytopathology RML negative for malignant cells. Pt. reported feeling depressed and anxious with cancer diagnosis psych consulted and recommended supportive therapy. Pt now optimized from cardiac standpoint and now transferred to medicine under Dr. Joseph       Subjective Assessment: Pt was examined at bedside this AM. Pt states diffuse pain is improving and is feeling better. Denies CP or SOB  	        MEDICATIONS:  furosemide    Tablet 40 milliGRAM(s) Oral daily  losartan 25 milliGRAM(s) Oral daily  metoprolol succinate ER 25 milliGRAM(s) Oral daily  tamsulosin 0.4 milliGRAM(s) Oral at bedtime  ALBUTerol/ipratropium for Nebulization 3 milliLiter(s) Nebulizer every 6 hours PRN  acetaminophen   Tablet. 650 milliGRAM(s) Oral every 6 hours PRN  melatonin 1 milliGRAM(s) Oral at bedtime  traMADol 25 milliGRAM(s) Oral every 6 hours PRN  docusate sodium 100 milliGRAM(s) Oral daily  pantoprazole    Tablet 40 milliGRAM(s) Oral before breakfast  senna Syrup 5 milliLiter(s) Oral daily  atorvastatin 80 milliGRAM(s) Oral at bedtime  methimazole 5 milliGRAM(s) Oral three times a day  apixaban 5 milliGRAM(s) Oral every 12 hours  aspirin enteric coated 81 milliGRAM(s) Oral daily  benzocaine 15 mG/menthol 3.6 mG Lozenge 1 Lozenge Oral every 4 hours      	    [PHYSICAL EXAM:  TELEMETRY:  T(C): 36.9 (05-20-18 @ 06:00), Max: 37.2 (05-19-18 @ 14:12)  HR: 85 (05-20-18 @ 06:54) (61 - 85)  BP: 113/58 (05-20-18 @ 06:54) (89/51 - 113/58)  RR: 16 (05-20-18 @ 06:54) (16 - 16)  SpO2: 100% (05-20-18 @ 06:54) (98% - 100%)    I&O's Summary    19 May 2018 07:01  -  20 May 2018 07:00  --------------------------------------------------------  IN: 480 mL / OUT: 1600 mL / NET: -1120 mL        De Leon: Yes  Central/PICC/Mid Line: No                                     Appearance: Normal	  HEENT:   Normal oral mucosa, PERRL, EOMI	  Neck: Supple, + JVD/ - JVD; Carotid Bruit   Cardiovascular: Normal S1 S2, No JVD, No murmurs,   Respiratory: Lungs clear to auscultation/Decreased Breath Sounds/No Rales, Rhonchi, Wheezing	  Gastrointestinal:  Soft, Non-tender, + BS	  Skin: No rashes, No ecchymoses, No cyanosis  Extremities: Normal range of motion, No clubbing, cyanosis or edema  Vascular: Peripheral pulses palpable 2+ bilaterally  Neurologic: Non-focal  Psychiatry: A & O x 3, Mood & affect appropriate      	    CT chest 05/14/2018: 1. Innumerable scattered pulmonary nodules are noted bilaterally. Most of which demonstrated a lobulated configuration, likely representing mucous   plugging. 2. However there are more discrete nodular opacities some demonstrating feeding vessel sign concerning for metastatic prostate. 3. Bulky mediastinal lymphadenopathy as described above and also thought to be prostatic. Correlation with PET/CT for more complete staging. 4. Views osteoblastic metastases involving the scapulae left greater than right as well as multiple ribs with a pathologic fracture involving the right eighth rib as well as the vertebra without compression fracture   deformity. 5. Patchy ground less opacities predominantly in the right upper and right lower lobes which may represent early pneumonitis. Short interval surveillance may be of value to confirm stability and/or resolution.     CT abdomen/pelvis 05/11/2018: Prostate tumor. Distended urinary bladder. Extensive bony metastasis. Multiple solid pulmonary nodules. Pulmonary metastasis is a   consideration. Recommend CT of chest for complete evaluation. Small right pleural effusion. Cardiomegaly. Heterogeneous hepatic enhancement likely due to altered perfusion. No discrete hepatic mass. Cholelithiasis. No biliary dilatation. Patent portal vein. Nonspecific thickening of the bilateral adrenal glands could be due to adenomatous hyperplasia. CT without contrast or PET/CT would be useful for differentiation from metastasis.     ECHO 05/10/2018: There is severe concentric left ventricular hypertrophy. There is severe global hypokinesis of the left ventricle. The left ventricular ejection fraction   is severely reduced. The left ventricular ejection fraction is 30%.The left atrium is severely dilated. The left atrial volume index is 58 cc/m2 (normal <34cc/m2)The mitral inflow pattern is consistent with restrictive left ventricular filling, suggestive of severely elevated left atrial pressure (>20mmHg).  The right atrium is moderately dilated. The right atrial   volume index is 44 cc/m2 (normal range 21+/-6 for women, 25 +/- 7 for men)  Calcified aortic valve. There is trace aortic regurgitation. Tethered mitral valve leaflets. There is mild mitral regurgitation. There is mild tricuspid regurgitation. There is mild pulmonary hypertension. The pulmonary artery systolic pressure is estimated to be 49 mmHg. Structurally normal pulmonic valve. There is mild pulmonic regurgitation. There is no pericardial effusion. Bilateral pleural effusion noted.     Chest X-ray 05/09/2018: Cardiomegaly. Pulmonary vascular congestion. Interstitial pulmonary edema. Sclerotic bone metastases, likely from prostate cancer.  LABS:	 	                          11.3   7.8   )-----------( 402      ( 20 May 2018 07:39 )             34.6     05-20    136  |  98  |  16  ----------------------------<  91  3.7   |  27  |  0.95    Ca    8.9      20 May 2018 07:39  Mg     2.0     05-20        PT/INR - ( 19 May 2018 07:16 )   PT: 16.6 sec;   INR: 1.48

## 2018-05-20 NOTE — PROGRESS NOTE ADULT - PROBLEM SELECTOR PLAN 7
-Multiple pulmonary nodules noted on CT Abd/Plevis  -CT chest 5/14 revealed innumerable scattered pulmonary nodules B/L, most of which demonstrated a lobulated configuration, likely representing mucous plugging. More discrete nodular opacities visualized some demonstrating feeding vessel sign concerning for metastatic prostate. Bulky mediastinal LAD also thought to be prostatic. 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 Patchy ground less opacities predominantly in the RUL & RLL which may represent early pneumonitis  -Dr. Woods consulted, Bronch cx Moderate Haemophilus species with few gram + pairs and chains, AFB negative. Cytopathology wash RML negative for malignant cells, continue to f/u complete results  - continue Eliquis as per Dr. Woods,  -Duonebs q6hr PRN, aspiration precaution.

## 2018-05-20 NOTE — PROGRESS NOTE ADULT - PROBLEM SELECTOR PLAN 6
-CXR w/ incidental finding of sclerotic bone metastases, likely from prostate CA.    -PSA elevated 6264   -CT abdomen/pelvis 05/11: Prostate tumor. Distended urinary bladder. Extensive bony metastasis. Multiple solid pulmonary nodules. Pulmonary metastasis is a consideration.  -Urology Team consulted (Dr. Brown). Continue gould for urinary retention (placed by urology), Continue Tamsulosin 0.4mg. f/u further Recs  -Oncology Team consulted (Dr. Martinez), certainly prostate CA, will need ADT and bone modifying drug, continue to f/u recs   -Vitamin D 35.3 and Vitamin D 1, 25: 92.9, f/u heme/onc on possibly starting denosumab.   -Tramadol PRN for pain. -CXR w/ incidental finding of sclerotic bone metastases, likely from prostate CA.    -PSA elevated 6264   -CT abdomen/pelvis 05/11: Prostate tumor. Distended urinary bladder. Extensive bony metastasis. Multiple solid pulmonary nodules. Pulmonary metastasis is a consideration.  -Urology Team consulted (Dr. Brown). Continue gould for urinary retention (placed by urology), Continue Tamsulosin 0.4mg. f/u further Recs  -Oncology Team consulted to f/u with Dr. Martinez as outpatient to initiate treatment, certainly prostate CA, will need ADT and bone modifying drug, continue to f/u recs   -Vitamin D 35.3 and Vitamin D 1, 25: 92.9  -Tramadol PRN for pain.

## 2018-05-20 NOTE — PROGRESS NOTE ADULT - ASSESSMENT
76 yo male FORMER SMOKER with PMHx of HTN, HLD, pre- DM, CAD s/p CABG (in 2016 @ [facility]), chronic systolic CHF, h/o DVT 6 months ago (on Eliquis)  admitted for r/o ACS s/p non obstructed Cath, being worked up for possible prostate CA, pt now optimized from cardiac standpoint and now transferred to medicine under Dr. Joseph

## 2018-05-20 NOTE — PROGRESS NOTE ADULT - ASSESSMENT
ASSESSMENT/PLAN77 yo male FORMER SMOKER with PMHx of HTN, HLD, pre- DM, CAD s/p CABG (in 2016 @ [facility]), chronic systolic CHF, h/o DVT 6 months ago (on Eliquis)  admitted for r/o ACS s/p non obstructed Cath, being worked up for possible prostate CA, found to have multiple  lung nodules, diffuse mediastinal LAD. Clinically metastatic cancer.    1. O2 attempt off  2. Bronchodilators:  Atrovent/ albuterol q 4 – 6 hours as needed  3. Corticosteroids: off  4. ID/Antibiotics: off pending bronchoscopy CX  5. Cardiac/HTN: optimize BP  6. GI: Rx/ prophylaxis c PPI/H2B  7. Heme: Rx/VT prophylaxis c SQH/SCD/AC continue   Eliquis  8. Aspiration precautions  9 Mobilize, OOB  10. Surgical pathology is pending  Discussed with managing team,

## 2018-05-21 DIAGNOSIS — Z29.9 ENCOUNTER FOR PROPHYLACTIC MEASURES, UNSPECIFIED: ICD-10-CM

## 2018-05-21 LAB
ANION GAP SERPL CALC-SCNC: 12 MMOL/L — SIGNIFICANT CHANGE UP (ref 5–17)
BUN SERPL-MCNC: 15 MG/DL — SIGNIFICANT CHANGE UP (ref 7–23)
CALCIUM SERPL-MCNC: 8.6 MG/DL — SIGNIFICANT CHANGE UP (ref 8.4–10.5)
CHLORIDE SERPL-SCNC: 98 MMOL/L — SIGNIFICANT CHANGE UP (ref 96–108)
CO2 SERPL-SCNC: 26 MMOL/L — SIGNIFICANT CHANGE UP (ref 22–31)
CREAT SERPL-MCNC: 1 MG/DL — SIGNIFICANT CHANGE UP (ref 0.5–1.3)
GLUCOSE SERPL-MCNC: 93 MG/DL — SIGNIFICANT CHANGE UP (ref 70–99)
HCT VFR BLD CALC: 32.7 % — LOW (ref 39–50)
HGB BLD-MCNC: 10.4 G/DL — LOW (ref 13–17)
INR BLD: 2.18 — HIGH (ref 0.88–1.16)
MAGNESIUM SERPL-MCNC: 2 MG/DL — SIGNIFICANT CHANGE UP (ref 1.6–2.6)
MCHC RBC-ENTMCNC: 27 PG — SIGNIFICANT CHANGE UP (ref 27–34)
MCHC RBC-ENTMCNC: 31.8 G/DL — LOW (ref 32–36)
MCV RBC AUTO: 84.9 FL — SIGNIFICANT CHANGE UP (ref 80–100)
NON-GYNECOLOGICAL CYTOLOGY STUDY: SIGNIFICANT CHANGE UP
PLATELET # BLD AUTO: 385 K/UL — SIGNIFICANT CHANGE UP (ref 150–400)
POTASSIUM SERPL-MCNC: 3.8 MMOL/L — SIGNIFICANT CHANGE UP (ref 3.5–5.3)
POTASSIUM SERPL-SCNC: 3.8 MMOL/L — SIGNIFICANT CHANGE UP (ref 3.5–5.3)
PROTHROM AB SERPL-ACNC: 24.6 SEC — HIGH (ref 9.8–12.7)
RBC # BLD: 3.85 M/UL — LOW (ref 4.2–5.8)
RBC # FLD: 14.8 % — SIGNIFICANT CHANGE UP (ref 10.3–16.9)
SODIUM SERPL-SCNC: 136 MMOL/L — SIGNIFICANT CHANGE UP (ref 135–145)
WBC # BLD: 8.8 K/UL — SIGNIFICANT CHANGE UP (ref 3.8–10.5)
WBC # FLD AUTO: 8.8 K/UL — SIGNIFICANT CHANGE UP (ref 3.8–10.5)

## 2018-05-21 PROCEDURE — 93010 ELECTROCARDIOGRAM REPORT: CPT | Mod: 76

## 2018-05-21 RX ORDER — POTASSIUM CHLORIDE 20 MEQ
20 PACKET (EA) ORAL ONCE
Qty: 0 | Refills: 0 | Status: COMPLETED | OUTPATIENT
Start: 2018-05-21 | End: 2018-05-21

## 2018-05-21 RX ADMIN — ATORVASTATIN CALCIUM 80 MILLIGRAM(S): 80 TABLET, FILM COATED ORAL at 22:16

## 2018-05-21 RX ADMIN — Medication 1 MILLIGRAM(S): at 22:16

## 2018-05-21 RX ADMIN — LOSARTAN POTASSIUM 25 MILLIGRAM(S): 100 TABLET, FILM COATED ORAL at 12:32

## 2018-05-21 RX ADMIN — Medication 40 MILLIGRAM(S): at 12:31

## 2018-05-21 RX ADMIN — BENZOCAINE AND MENTHOL 1 LOZENGE: 5; 1 LIQUID ORAL at 06:15

## 2018-05-21 RX ADMIN — Medication 1 TABLET(S): at 22:17

## 2018-05-21 RX ADMIN — BENZOCAINE AND MENTHOL 1 LOZENGE: 5; 1 LIQUID ORAL at 12:32

## 2018-05-21 RX ADMIN — TAMSULOSIN HYDROCHLORIDE 0.4 MILLIGRAM(S): 0.4 CAPSULE ORAL at 22:16

## 2018-05-21 RX ADMIN — PANTOPRAZOLE SODIUM 40 MILLIGRAM(S): 20 TABLET, DELAYED RELEASE ORAL at 06:10

## 2018-05-21 RX ADMIN — Medication 25 MILLIGRAM(S): at 06:10

## 2018-05-21 RX ADMIN — Medication 100 MILLIGRAM(S): at 12:32

## 2018-05-21 RX ADMIN — Medication 20 MILLIEQUIVALENT(S): at 12:33

## 2018-05-21 RX ADMIN — Medication 650 MILLIGRAM(S): at 07:00

## 2018-05-21 RX ADMIN — Medication 650 MILLIGRAM(S): at 06:10

## 2018-05-21 RX ADMIN — Medication 650 MILLIGRAM(S): at 22:59

## 2018-05-21 RX ADMIN — Medication 650 MILLIGRAM(S): at 22:17

## 2018-05-21 RX ADMIN — APIXABAN 5 MILLIGRAM(S): 2.5 TABLET, FILM COATED ORAL at 06:10

## 2018-05-21 RX ADMIN — BENZOCAINE AND MENTHOL 1 LOZENGE: 5; 1 LIQUID ORAL at 22:17

## 2018-05-21 RX ADMIN — Medication 650 MILLIGRAM(S): at 16:53

## 2018-05-21 RX ADMIN — Medication 81 MILLIGRAM(S): at 12:31

## 2018-05-21 RX ADMIN — Medication 650 MILLIGRAM(S): at 15:53

## 2018-05-21 RX ADMIN — SENNA PLUS 5 MILLILITER(S): 8.6 TABLET ORAL at 12:32

## 2018-05-21 RX ADMIN — APIXABAN 5 MILLIGRAM(S): 2.5 TABLET, FILM COATED ORAL at 17:05

## 2018-05-21 NOTE — PROGRESS NOTE ADULT - PROBLEM SELECTOR PLAN 9
PPx: Apixaban; PPI  FEN: No fluids indicated; replete electrolytes PRN; regular diet  Needs: Heme f/u / placement  De Leon: Yes  Access: Peripheral  Ulcers: None  Sepsis: Does not meet SIRS criteria  Goals of Care: Full code  Dispo: Transfer to medicine

## 2018-05-21 NOTE — PROGRESS NOTE ADULT - PROBLEM SELECTOR PLAN 5
S/p Diagnostic Cath on 5/11: known 3VCAD (99% mLAD, 100% ramus, 90% OM2, 99% small OM3, 85% distal dominant LCX), patent LIMA to LAD, patent radial graft to ramus, patent SVG to OM2, LVEDP4.    - C/w Atorvastatin 80mg Qhs, Metoprolol 25mg QD and ASA 81mg QD

## 2018-05-21 NOTE — PROGRESS NOTE ADULT - ASSESSMENT
77M admitted for possible NSTEMI vs CHF, now optimizied, being transferred to medicine for dispo planning in the setting of new w/u for prostate ca.    //INCOMPLETE -- PENDING SIGN OUT. 77M admitted for possible NSTEMI vs CHF, now optimizied, being transferred to medicine for dispo planning in the setting of new w/u for prostate ca.

## 2018-05-21 NOTE — PROGRESS NOTE ADULT - PROBLEM SELECTOR PLAN 2
CXR w/ incidental finding of sclerotic bone metastases, likely from prostate CA.  PSA elevated 6264.  CT abdomen/pelvis 05/11: Prostate tumor. Distended urinary bladder. Extensive bony metastasis. Multiple solid pulmonary nodules. Pulmonary metastasis is a consideration.  - C/w gould for urinary retention (placed by urology)  - C/w Tamsulosin 0.4mg  - f/u uro recs (Dr. Brown)  -Oncology Team consulted to f/u with Dr. Martinez as outpatient to initiate treatment, certainly prostate CA, will need ADT and bone modifying drug, continue to f/u recs   -Vitamin D 35.3 and Vitamin D 1, 25: 92.9  -Tramadol PRN for pain.

## 2018-05-21 NOTE — PROGRESS NOTE ADULT - PROBLEM SELECTOR PLAN 6
-CXR w/ incidental finding of sclerotic bone metastases, likely from prostate CA.    -PSA elevated 6264   -CT abdomen/pelvis 05/11: Prostate tumor. Distended urinary bladder. Extensive bony metastasis. Multiple solid pulmonary nodules. Pulmonary metastasis is a consideration.  -Urology Team consulted (Dr. Brown). Continue gould for urinary retention (placed by urology), Continue Tamsulosin 0.4mg. f/u further Recs  -Oncology Team consulted to f/u with Dr. Martinez as outpatient to initiate treatment, certainly prostate CA, will need ADT and bone modifying drug, continue to f/u recs   -Vitamin D 35.3 and Vitamin D 1, 25: 92.9  -Tramadol PRN for pain. - c/w lopressor 25 mg daily and losartan 25 mg daily

## 2018-05-21 NOTE — PROGRESS NOTE ADULT - PROBLEM SELECTOR PLAN 7
-Multiple pulmonary nodules noted on CT Abd/Plevis  -CT chest 5/14 revealed innumerable scattered pulmonary nodules B/L, most of which demonstrated a lobulated configuration, likely representing mucous plugging. More discrete nodular opacities visualized some demonstrating feeding vessel sign concerning for metastatic prostate. Bulky mediastinal LAD also thought to be prostatic. 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 Patchy ground less opacities predominantly in the RUL & RLL which may represent early pneumonitis  -Dr. Woods consulted, Bronch cx Moderate Haemophilus species with few gram + pairs and chains, AFB negative. Cytopathology wash RML negative for malignant cells, continue to f/u complete results  - continue Eliquis as per Dr. Woods,  -Duonebs q6hr PRN, aspiration precaution. - c/w home methimazole

## 2018-05-21 NOTE — PROGRESS NOTE ADULT - PROBLEM SELECTOR PLAN 8
-Pt reporting sadness since receiving prostate CA diagnosis  -Denies SI/HI  -psych recs: adjustment reaction with anxiety and depression- supportive therapy        DVT: on Eliquis  Dispo: transfer to medicine under Dr. Joseph   PT recommending [facility]      Case d/w Dr. Murphy PPx: Apixaban; PPI  FEN: No fluids indicated; replete electrolytes PRN; regular diet  Needs: Heme f/u / placement ([facility])  De Leon: Yes  Access: Peripheral  Ulcers: None  Sepsis: Does not meet SIRS criteria  Goals of Care: Full code  Dispo: Transfer to medicine

## 2018-05-21 NOTE — PROGRESS NOTE ADULT - ASSESSMENT
78 yo M former smoker with CHF, CAD, incidental finding of bone mets on imaging and markedly elevated PSA  Imaging also with suspicious lung lesions   admitted with NSTEMI      Lung lesions:  s/p CT chest - bulky mediastinal nodes, nodules   s/p bronch  wash negative for malignant cells     Certainly with metastatic prostate cancer given imaging and PSA  best approach to treatment will most likely be abiraterone , prednisone and androgen deprivation therapy in addition to bone modifying drugs.

## 2018-05-21 NOTE — PROGRESS NOTE ADULT - SUBJECTIVE AND OBJECTIVE BOX
Hospital Course  77M w/ CAD, CHF, prior DVT (on A/C) presents w/ cough and SOB X5-6 days. In the ED, his vitals were T98.3F,  BPM, /86, RR 20, SpO2 96% on RA.  Labs significant for alk phos 500, , trop 0.08, BNP 6822. CXR w/ R sided pleural effusion. EKG w/ NSR w/ 1st degree AV block @ 94 BPM with left axis deviation and Q wave in inferior leads and V1-V5.  While in ER, pt received IV Lasix 40 mg x1. Patient admitted to 5U for diuresis, ECHO, r/o ACS. There is severe global hypokinesis of the left ventricle, EF 30%. The left atrium is severely dilated, mitral inflow pattern is consistent with restrictive left ventricular filling, suggestive of severely elevated left atrial pressure, right atrium is moderately dilated, Tethered mitral valve leaflets. Pt was diuresed and is now euvolemic. Pt ruled for NSTEMI  CE 0.08 -> 0.15, s/p diagnostic cardiac cath known 3VCAD (99% mLAD, 100% ramus, 90% OM2, 99% small OM3, 85% distal dominant LCX), patent LIMA to LAD, patent radial graft to ramus, patent SVG to OM2, LVEDP4, pt optimized with medical therapy. CXR with incidental finding of sclerotic bone metastases, urology consulted PSA level 6264, De Leon was inserted in the setting of urinary retention. CT abdomen/pelvis  Prostate tumor. Distended urinary bladder. Extensive bony metastasis. Multiple solid pulmonary nodules. Pulmonary metastasis is a consideration. Pulm consulted. CT chest  revealed  discrete nodular opacities visualized some demonstrating feeding vessel sign concerning for metastatic prostate. Pt was then scheduled for bronchoscopy which was done , pending results. Pt. reported feeling depressed and anxious with cancer diagnosis psych consulted.    //INCOMPLETE  Subjective/ROS: Denies HA, CP, SOB, n/v, changes in bowel/urinary habits.  12pt ROS otherwise negative.    VITALS  Vital Signs Last 24 Hrs  T(C): 36 (21 May 2018 10:03), Max: 37.6 (21 May 2018 00:48)  T(F): 96.8 (21 May 2018 10:03), Max: 99.6 (21 May 2018 00:48)  HR: 68 (21 May 2018 06:05) (64 - 70)  BP: 95/54 (21 May 2018 06:05) (79/49 - 105/58)  BP(mean): --  RR: 18 (21 May 2018 06:05) (16 - 18)  SpO2: 99% (20 May 2018 20:56) (99% - 99%)    PHYSICAL EXAM  General: A&Ox3; NAD  Head: NC/AT; MMM; PERRL; EOMI;  Neck: Supple; no JVD  Respiratory: CTA B/L; no wheezes/crackles; mild dullness to percussion at Rbase  Cardiovascular: Regular rhythm/rate; S1/S2;  Gastrointestinal: Soft; NTND; normoactive BS  Extremities: WWP; no edema/cyanosis  Neurological:  CNII-XII grossly intact; no obvious focal deficits    MEDICATIONS  (STANDING):  apixaban 5 milliGRAM(s) Oral every 12 hours  aspirin enteric coated 81 milliGRAM(s) Oral daily  atorvastatin 80 milliGRAM(s) Oral at bedtime  benzocaine 15 mG/menthol 3.6 mG Lozenge 1 Lozenge Oral every 4 hours  docusate sodium 100 milliGRAM(s) Oral daily  furosemide    Tablet 40 milliGRAM(s) Oral daily  losartan 25 milliGRAM(s) Oral daily  melatonin 1 milliGRAM(s) Oral at bedtime  methimazole 5 milliGRAM(s) Oral three times a day  metoprolol succinate ER 25 milliGRAM(s) Oral daily  pantoprazole    Tablet 40 milliGRAM(s) Oral before breakfast  potassium chloride   Powder 20 milliEquivalent(s) Oral once  senna Syrup 5 milliLiter(s) Oral daily  tamsulosin 0.4 milliGRAM(s) Oral at bedtime    MEDICATIONS  (PRN):  acetaminophen   Tablet. 650 milliGRAM(s) Oral every 6 hours PRN Mild Pain (1 - 3)  ALBUTerol/ipratropium for Nebulization 3 milliLiter(s) Nebulizer every 6 hours PRN Shortness of Breath and/or Wheezing    No Known Allergies    LABS                      10.4   8.8   )-----------( 385      ( 21 May 2018 05:31 )             32.7     05-21    136  |  98  |  15  ----------------------------<  93  3.8   |  26  |  1.00    Ca    8.6      21 May 2018 05:31  Mg     2.0     05-21      PT/INR - ( 21 May 2018 05:31 )   PT: 24.6 sec;   INR: 2.18     < from: CT Chest No Cont (18 @ 16:27) >  IMPRESSION:       1. Innumerable scattered pulmonary nodules are noted bilaterally. Most of   which demonstrated a lobulated configuration, likely representing mucous   plugging.     2. However there are more discrete nodular opacities some demonstrating   feeding vessel sign concerning for metastatic prostate.     3. Bulky mediastinal lymphadenopathy as described above and also thought   to be prostatic. Correlation with PET/CT for more complete staging.    4. Views osteoblastic metastases involving the scapulae left greater than   right as well as multiple ribs with a pathologic fracture involving the   right eighth rib as well as the vertebra without compression fracture   deformity.    5. Patchy ground less opacities predominantly in the right upper and   right lower lobes which may represent early pneumonitis. Short interval   surveillance may be of value to confirm stability and/or resolution.     < end of copied text >    < from: CT Abdomen and Pelvis w/ IV Cont (18 @ 11:32) >  IMPRESSION:  1.  Prostate tumor. Distended urinary bladder.  2.  Extensive bony metastasis.  3.  Multiple solid pulmonary nodules. Pulmonary metastasis is a   consideration. Recommend CT of chest for complete evaluation.  4.  Small right pleural effusion. Cardiomegaly.  5.  Heterogeneous hepatic enhancement likely due to altered perfusion. No   discrete hepatic mass. Cholelithiasis. No biliary dilatation. Patent   portal vein.  6.  Nonspecific thickening of the bilateral adrenal glands could be due   to adenomatous hyperplasia. CT without contrast or PET/CT would be useful   for differentiation from metastasis.    < end of copied text >    EKst degree AV block, NSR

## 2018-05-21 NOTE — PROGRESS NOTE ADULT - SUBJECTIVE AND OBJECTIVE BOX
78 yo M former smoker with CHF, CAD, incidental finding of bone mets on imaging and markedly elevated PSA  Imaging also with suspicious lung lesions     Interval history:  reports he is ambulating with the walker  no new symptoms  no back pain or bone pain     s/p bronchoscopy   bronchial wash negative for malignant cells    vitals reviewed    frail, chronically ill appearing  out of chair in bed  NAD  s1s2 nml  ctab  no edema    PAST MEDICAL & SURGICAL HISTORY:  CHF (congestive heart failure)  CAD (coronary artery disease)  HTN (hypertension)  S/P hernia surgery  History of coronary artery bypass graft: 2017 @ [facility]    Social Hx:  lives alone, with HHA  ambulates with cane  quit smoking 40 yrs ago    He denies a family history of cancer          Labs: reviewed          Imaging:   reviewed

## 2018-05-21 NOTE — PROGRESS NOTE ADULT - ASSESSMENT
ASSESSMENT/PLAN77 yo male FORMER SMOKER with PMHx of HTN, HLD, pre- DM, CAD s/p CABG (in 2016 @ ), chronic systolic CHF, h/o DVT 6 months ago (on Eliquis)  admitted for r/o ACS s/p non obstructed Cath, being worked up for possible prostate CA, found to have multiple  lung nodules, diffuse mediastinal LAD. Metastatic prostate cancer to lung and mediastinum. Hemophilus parainfluenzae tracheobronchitis.    1. O2 attempt off  2. Bronchodilators:  Atrovent/ albuterol q 4 – 6 hours as needed  3. Corticosteroids: off  4. ID/Antibiotics: recommend Augmentin 875 Q 12 hrs X 7 days   5. Cardiac/HTN: optimize BP  6. GI: Rx/ prophylaxis c PPI/H2B  7. Heme: Rx/VT prophylaxis c SQH/SCD/AC continue   Eliquis  8. Aspiration precautions  9 Mobilize, OOB  10. Oncology mngmnt priority.  Discussed with managing team,

## 2018-05-21 NOTE — PROGRESS NOTE ADULT - ASSESSMENT
77M admitted for possible NSTEMI vs CHF, now optimizied, being transferred to medicine for dispo planning in the setting of new w/u for prostate ca.

## 2018-05-21 NOTE — PROGRESS NOTE ADULT - PROBLEM SELECTOR PLAN 2
-Currently CP free today and HD stable.  -5 beats of NSVT 5/15/18 AM, asymptomatic, continue on BB, no events on tele o/n  -R/I NSTEMI peak CE 0.24. CE likely in setting of CHF.  -s/p Diagnostic Cath 5/11: known 3VCAD (99% mLAD, 100% ramus, 90% OM2, 99% small OM3, 85% distal dominant LCX), Patent LIMA to LAD, patent radial graft to ramus, patent SVG to OM2, LVEDP4.    -Continue Atorvastatin 80mg Qhs, Metoprolol 25mg QD and ASA 81mg QD CXR w/ incidental finding of sclerotic bone metastases, likely from prostate CA.  PSA elevated 6264.  CT abdomen/pelvis 05/11: Prostate tumor. Distended urinary bladder. Extensive bony metastasis. Multiple solid pulmonary nodules. Pulmonary metastasis is a consideration.  - C/w gould for urinary retention (placed by urology)  - C/w Tamsulosin 0.4mg  - f/u uro recs (Dr. Brown)  -Oncology Team consulted to f/u with Dr. Martinez as outpatient to initiate treatment, certainly prostate CA, will need ADT and bone modifying drug, continue to f/u recs   -Vitamin D 35.3 and Vitamin D 1, 25: 92.9  -Tramadol PRN for pain.

## 2018-05-21 NOTE — PROGRESS NOTE ADULT - PROBLEM SELECTOR PLAN 1
-Now euvolemic, breathing improved.   -CXR 5/10: Pulmonary vascular congestion. Interstitial pulmonary edema. f/u repeat CXR  -EKG revealed NSR with 1st degree AV block @ 94 BPM with left axis deviation and Q wave in inferior leads and V1-V5.  -Echo 5/10: severe concentric LVH, severe global hypokinesis of the left ventricle, EF 30%, left atrium is severely dilated, mitral inflow pattern is c/w restrictive left ventricular filling, suggestive of severely elevated left atrial pressure, right atrium is moderately dilated, tethered mitral valve leaflets.  -Echo possibly concerning for amyloidosis but no Cardiac MRI at this time per Dr. Murphy.   -Continue Lasix 40mg PO QD and Losartan 25mg QD.   -Strict I&O's, daily weights. Currently euvolemic.  - C/w Lasix 40mg PO QD and Losartan 25mg QD.   - Strict I&O's, daily weights.

## 2018-05-21 NOTE — PROGRESS NOTE ADULT - SUBJECTIVE AND OBJECTIVE BOX
Interventional, Pulmonary, Critical, Chest Special Procedures.    Pt was seen and fully examined by myself.     Time spent with patient in minutes:37    Patient is a 77y old  Male who presents with a chief complaint of CP (09 May 2018 19:41)The patient feels fatigued, eupneic on RA when seen, ill appearing.    HPI:  76 yo male FORMER SMOKER with PMHx of HTN, HLD, pre- DM,  CAD s/p CABG (in 2016 @ [facility]), CHF (EF unknown), h/o DVT 6 months ago (on Eliquis; last dose last night) who presented to [facility] ED on 5/9/18 with CC of Cough and SOB X 5-6 days. Pt. reports that he initially had a cough with white sputum production associated with shortness of breath at rest, non-radiating L sided Chest tightness and palpitations. pt. reports that the chest tightness and SOB comes along with the cough  and is relieved when he stops coughing. He also reports feeling sore. At baseline, pt. reports that he can ambulate 4 blocks before getting SOB.  Today am, pt. reports having hot flashes , cough, SOB and chest tightness which prompted him to come to the ED. Denies dizziness, diaphoresis, fatigue, LE edema, orthopnea, PND, syncope.  Upon admit, temp 98.3F,  BPM, /86, RR 20, SpO2 96% on RA.  Labs significant for alk phos 500, , trop 0.08, BNP 6822. CXR wet read revealed R sided pleural effusion. EKG revealed NSR with 1st degree AV block @ 94 BPM with left axis deviation and Q wave in inferior leads and V1-V5.  While in ER, pt received IV Lasix 40 mg x1. Patient admitted to 5U for diuresis, ECHO, r/o ACS. (09 May 2018 19:41)    REVIEW OF SYSTEMS:updated  Constitutional: No fever, weight loss, chills or fatigue  Eyes: No eye pain, visual disturbances, or discharge  ENMT:  No difficulty hearing, tinnitus, vertigo; No sinus or throat pain. No epistaxis, dysphagia, dysphonia, hoarseness or odynophagia  Neck: No pain, stiffness or neck swelling.  No masses or deformities  Respiratory: No cough, wheezing, chills or hemoptysis  - COPD  - ILD   - PE   - ASTHMA     - PNEUMONIA  Cardiovascular: No chest pain, dysrhythmia, palpitations, dizziness or edema   - COPD     - CAD   - CHF   - HTN  Gastrointestinal: No abdominal or epigastric pain. No nausea, vomiting or hematemesis; No diarrhea or constipation. No melena or hematochezia. No dysphagia or Icterus.          Genitourinary: No dysuria, frequency, hematuria or incontinence   - CKD/PAM      - ESRD  Neurological: No headaches, memory loss, loss of strength, numbness or tremors      -DEMENTIA     - STROKE    - SEIZURE  Skin: No itching, burning, rashes or lesions   Lymph Nodes: No enlarged glands  Endocrine: No heat or cold intolerance; No hair loss       - DM     - THYROID DISORDER  Musculoskeletal: No joint pain or swelling; No muscle, back or extremity pain  Psychiatric: No depression, anxiety, mood swings or difficulty sleeping  Heme/Lymph: No easy bruising or bleeding gums         - ANEMIA      - CANCER   -COAGULOPATHY  Allergy and Immunologic: No hives or eczema    PAST MEDICAL & SURGICAL HISTORY:  CHF (congestive heart failure)  CAD (coronary artery disease)  HTN (hypertension)  S/P hernia surgery  History of coronary artery bypass graft: 2017 @ [facility]    FAMILY HISTORY:  No pertinent family history in first degree relatives    SOCIAL HISTORY:      - Tobacco     - ETOH    Allergies    No Known Allergies    Intolerances      Vital Signs Last 24 Hrs  T(C): 36.4 (21 May 2018 12:43), Max: 37.6 (21 May 2018 00:48)  T(F): 97.5 (21 May 2018 12:43), Max: 99.6 (21 May 2018 00:48)  HR: 64 (21 May 2018 12:43) (64 - 70)  BP: 87/53 (21 May 2018 12:43) (84/51 - 105/58)  BP(mean): --  RR: 18 (21 May 2018 12:43) (16 - 18)  SpO2: 99% (20 May 2018 20:56) (99% - 99%)    05-20 @ 07:01  -  05-21 @ 07:00  --------------------------------------------------------  IN: 730 mL / OUT: 1300 mL / NET: -570 mL    05-21 @ 07:01  -  05-21 @ 17:45  --------------------------------------------------------  IN: 120 mL / OUT: 250 mL / NET: -130 mL        PHYSICAL EXAM:  More  Comfortable, no immediate distress  Eyes: PERRL, EOM intact; conjunctiva and sclera clear  Head: Normocephalic;  No Trauma  ENMT: No nasal discharge, +hoarseness, less cough   Neck: Supple; non tender; no masses or deformities.    No JVD  Respiratory:  - WHEEZING   - RHONCHI  - RALES  + CRACKLES.  Diminished breath sounds  BILATERAL  RIGHT  LEFT bases  Cardiovascular: Regular rate and rhythm. S1 and S2 Normal; No murmurs, gallops or rubs     - PPM/AICD  Gastrointestinal: Soft non-tender, non-distended; Normal bowel sounds; No hepatosplenomegaly.     -PEG    -  GT   +LAU  Genitourinary: No costovertebral angle tenderness. No dysuria  Extremities: AROM, + clubbing, cyanosis or edema    Vascular: Peripheral pulses palpable 2+ bilaterally  Neurological: Alert and responisve to stimuli   Skin: Warm and dry. No obvious rash  Lymph Nodes: No acute cervical or supraclavicular adenopathy  Psychiatric: Cooperative and appropriate mood    DEVICES:  - DENTURES   +IV R / L     - ETUBE   -TRACH   -CTUBE  R / L      LABS:                          10.4   8.8   )-----------( 385      ( 21 May 2018 05:31 )             32.7     05-21    136  |  98  |  15  ----------------------------<  93  3.8   |  26  |  1.00    Ca    8.6      21 May 2018 05:31  Mg     2.0     05-21      PT/INR - ( 21 May 2018 05:31 )   PT: 24.6 sec;   INR: 2.18            RADIOLOGY & ADDITIONAL STUDIES (The following images were personally reviewed):

## 2018-05-21 NOTE — PROGRESS NOTE ADULT - PROBLEM SELECTOR PLAN 4
CT chest 5/14 revealed innumerable scattered pulmonary nodules B/L.  Discrete nodular opacities concerning for metastatic prostate Ca. Bulky mediastinal LAD.  S/p Bronch: Cx w/ haemophilus species with few gram + pairs and chains, AFB negative. Cytopathology wash RML negative for malignant cells  - C/w Eliquis  - Duonebs q6hr PRN, aspiration precaution  - f/u pulm recs

## 2018-05-21 NOTE — PROGRESS NOTE ADULT - PROBLEM SELECTOR PLAN 5
-hx of L DVT (dx: 6 months ago: on eliquis), given cancer diagnosis and unknown reason for prior DVT.  -per Dr. Peggy may to restart Eliquis after bronchoscopy 05/17. Continue Eliquis 5mg BID S/p Diagnostic Cath on 5/11: known 3VCAD (99% mLAD, 100% ramus, 90% OM2, 99% small OM3, 85% distal dominant LCX), patent LIMA to LAD, patent radial graft to ramus, patent SVG to OM2, LVEDP4.    - C/w Atorvastatin 80mg Qhs, Metoprolol 25mg QD and ASA 81mg QD

## 2018-05-21 NOTE — PROGRESS NOTE ADULT - PROBLEM SELECTOR PLAN 4
--c/w methimazole CT chest 5/14 revealed innumerable scattered pulmonary nodules B/L.  Discrete nodular opacities concerning for metastatic prostate Ca. Bulky mediastinal LAD.  S/p Bronch: Cx w/ haemophilus species with few gram + pairs and chains, AFB negative. Cytopathology wash RML negative for malignant cells  - C/w Eliquis  - Duonebs q6hr PRN, aspiration precaution  - f/u pulm recs

## 2018-05-21 NOTE — PROGRESS NOTE ADULT - SUBJECTIVE AND OBJECTIVE BOX
PGY2 MEDICINE TRANSFER ACCEPT NOTE    Hospital Course  77M w/ CAD, CHF, prior DVT (on A/C) presents w/ cough and SOB X5-6 days. In the ED, his vitals were T98.3F,  BPM, /86, RR 20, SpO2 96% on RA.  Labs significant for alk phos 500, , trop 0.08, BNP 6822. CXR w/ R sided pleural effusion. EKG w/ NSR w/ 1st degree AV block @ 94 BPM with left axis deviation and Q wave in inferior leads and V1-V5.  While in ER, pt received IV Lasix 40 mg x1. Patient admitted to 5U for diuresis, ECHO, r/o ACS. There is severe global hypokinesis of the left ventricle, EF 30%. The left atrium is severely dilated, mitral inflow pattern is consistent with restrictive left ventricular filling, suggestive of severely elevated left atrial pressure, right atrium is moderately dilated, Tethered mitral valve leaflets. Pt was diuresed and is now euvolemic. Pt ruled for NSTEMI  CE 0.08 -> 0.15, s/p diagnostic cardiac cath known 3VCAD (99% mLAD, 100% ramus, 90% OM2, 99% small OM3, 85% distal dominant LCX), patent LIMA to LAD, patent radial graft to ramus, patent SVG to OM2, LVEDP4, pt optimized with medical therapy. CXR with incidental finding of sclerotic bone metastases, urology consulted PSA level 6264, De Leon was inserted in the setting of urinary retention. CT abdomen/pelvis 05/11 Prostate tumor. Distended urinary bladder. Extensive bony metastasis. Multiple solid pulmonary nodules. Pulmonary metastasis is a consideration. Pulm consulted. CT chest 05/14 revealed  discrete nodular opacities visualized some demonstrating feeding vessel sign concerning for metastatic prostate. Pt was then scheduled for bronchoscopy which was done 05/16, pending results. Pt. reported feeling depressed and anxious with cancer diagnosis psych consulted and recommended supportive therapy.  Patient ready for transfer to medicine for dispo planning.     //INCOMPLETE  Subjective/ROS: Denies HA, CP, SOB, n/v, changes in bowel/urinary habits.  12pt ROS otherwise negative.    VITALS  Vital Signs Last 24 Hrs  T(C): 36 (21 May 2018 10:03), Max: 37.6 (21 May 2018 00:48)  T(F): 96.8 (21 May 2018 10:03), Max: 99.6 (21 May 2018 00:48)  HR: 68 (21 May 2018 06:05) (64 - 70)  BP: 95/54 (21 May 2018 06:05) (79/49 - 105/58)  BP(mean): --  RR: 18 (21 May 2018 06:05) (16 - 18)  SpO2: 99% (20 May 2018 20:56) (99% - 99%)    PHYSICAL EXAM  General: A&Ox3; NAD  Head: NC/AT; MMM; PERRL; EOMI;  Neck: Supple; no JVD  Respiratory: CTA B/L; no wheezes/crackles   Cardiovascular: Regular rhythm/rate; S1/S2   Gastrointestinal: Soft; NTND; normoactive BS  Extremities: WWP; no edema/cyanosis  Neurological:  CNII-XII grossly intact; no obvious focal deficits    MEDICATIONS  (STANDING):  apixaban 5 milliGRAM(s) Oral every 12 hours  aspirin enteric coated 81 milliGRAM(s) Oral daily  atorvastatin 80 milliGRAM(s) Oral at bedtime  benzocaine 15 mG/menthol 3.6 mG Lozenge 1 Lozenge Oral every 4 hours  docusate sodium 100 milliGRAM(s) Oral daily  furosemide    Tablet 40 milliGRAM(s) Oral daily  losartan 25 milliGRAM(s) Oral daily  melatonin 1 milliGRAM(s) Oral at bedtime  methimazole 5 milliGRAM(s) Oral three times a day  metoprolol succinate ER 25 milliGRAM(s) Oral daily  pantoprazole    Tablet 40 milliGRAM(s) Oral before breakfast  potassium chloride   Powder 20 milliEquivalent(s) Oral once  senna Syrup 5 milliLiter(s) Oral daily  tamsulosin 0.4 milliGRAM(s) Oral at bedtime    MEDICATIONS  (PRN):  acetaminophen   Tablet. 650 milliGRAM(s) Oral every 6 hours PRN Mild Pain (1 - 3)  ALBUTerol/ipratropium for Nebulization 3 milliLiter(s) Nebulizer every 6 hours PRN Shortness of Breath and/or Wheezing    No Known Allergies    LABS                      10.4   8.8   )-----------( 385      ( 21 May 2018 05:31 )             32.7     05-21    136  |  98  |  15  ----------------------------<  93  3.8   |  26  |  1.00    Ca    8.6      21 May 2018 05:31  Mg     2.0     05-21      PT/INR - ( 21 May 2018 05:31 )   PT: 24.6 sec;   INR: 2.18     < from: CT Chest No Cont (05.14.18 @ 16:27) >  IMPRESSION:       1. Innumerable scattered pulmonary nodules are noted bilaterally. Most of   which demonstrated a lobulated configuration, likely representing mucous   plugging.     2. However there are more discrete nodular opacities some demonstrating   feeding vessel sign concerning for metastatic prostate.     3. Bulky mediastinal lymphadenopathy as described above and also thought   to be prostatic. Correlation with PET/CT for more complete staging.    4. Views osteoblastic metastases involving the scapulae left greater than   right as well as multiple ribs with a pathologic fracture involving the   right eighth rib as well as the vertebra without compression fracture   deformity.    5. Patchy ground less opacities predominantly in the right upper and   right lower lobes which may represent early pneumonitis. Short interval   surveillance may be of value to confirm stability and/or resolution.     < end of copied text >    < from: CT Abdomen and Pelvis w/ IV Cont (05.12.18 @ 11:32) >  IMPRESSION:  1.  Prostate tumor. Distended urinary bladder.  2.  Extensive bony metastasis.  3.  Multiple solid pulmonary nodules. Pulmonary metastasis is a   consideration. Recommend CT of chest for complete evaluation.  4.  Small right pleural effusion. Cardiomegaly.  5.  Heterogeneous hepatic enhancement likely due to altered perfusion. No   discrete hepatic mass. Cholelithiasis. No biliary dilatation. Patent   portal vein.  6.  Nonspecific thickening of the bilateral adrenal glands could be due   to adenomatous hyperplasia. CT without contrast or PET/CT would be useful   for differentiation from metastasis.    < end of copied text >                 IMAGING/EKG/ETC  EKG:  Xray:  CT:  MRI: PGY2 MEDICINE TRANSFER ACCEPT NOTE    Hospital Course  77M w/ CAD, CHF, prior DVT (on A/C) presents w/ cough and SOB X5-6 days. In the ED, his vitals were T98.3F,  BPM, /86, RR 20, SpO2 96% on RA.  Labs significant for alk phos 500, , trop 0.08, BNP 6822. CXR w/ R sided pleural effusion. EKG w/ NSR w/ 1st degree AV block @ 94 BPM with left axis deviation and Q wave in inferior leads and V1-V5.  While in ER, pt received IV Lasix 40 mg x1. Patient admitted to 5U for diuresis, ECHO, r/o ACS. There is severe global hypokinesis of the left ventricle, EF 30%. The left atrium is severely dilated, mitral inflow pattern is consistent with restrictive left ventricular filling, suggestive of severely elevated left atrial pressure, right atrium is moderately dilated, Tethered mitral valve leaflets. Pt was diuresed and is now euvolemic. Pt ruled for NSTEMI  CE 0.08 -> 0.15, s/p diagnostic cardiac cath known 3VCAD (99% mLAD, 100% ramus, 90% OM2, 99% small OM3, 85% distal dominant LCX), patent LIMA to LAD, patent radial graft to ramus, patent SVG to OM2, LVEDP4, pt optimized with medical therapy. CXR with incidental finding of sclerotic bone metastases, urology consulted PSA level 6264, De Leon was inserted in the setting of urinary retention. CT abdomen/pelvis  Prostate tumor. Distended urinary bladder. Extensive bony metastasis. Multiple solid pulmonary nodules. Pulmonary metastasis is a consideration. Pulm consulted. CT chest  revealed  discrete nodular opacities visualized some demonstrating feeding vessel sign concerning for metastatic prostate. Pt was then scheduled for bronchoscopy which was done , pending results. Pt. reported feeling depressed and anxious with cancer diagnosis psych consulted and recommended supportive therapy.  Patient ready for transfer to medicine for dispo planning.     //INCOMPLETE  Subjective/ROS: Denies HA, CP, SOB, n/v, changes in bowel/urinary habits.  12pt ROS otherwise negative.    VITALS  Vital Signs Last 24 Hrs  T(C): 36 (21 May 2018 10:03), Max: 37.6 (21 May 2018 00:48)  T(F): 96.8 (21 May 2018 10:03), Max: 99.6 (21 May 2018 00:48)  HR: 68 (21 May 2018 06:05) (64 - 70)  BP: 95/54 (21 May 2018 06:05) (79/49 - 105/58)  BP(mean): --  RR: 18 (21 May 2018 06:05) (16 - 18)  SpO2: 99% (20 May 2018 20:56) (99% - 99%)    PHYSICAL EXAM  General: A&Ox3; NAD  Head: NC/AT; MMM; PERRL; EOMI;  Neck: Supple; no JVD  Respiratory: CTA B/L; no wheezes/crackles; mild dullness to percussion at Rbase  Cardiovascular: Regular rhythm/rate; S1/S2;  Gastrointestinal: Soft; NTND; normoactive BS  Extremities: WWP; no edema/cyanosis  Neurological:  CNII-XII grossly intact; no obvious focal deficits    MEDICATIONS  (STANDING):  apixaban 5 milliGRAM(s) Oral every 12 hours  aspirin enteric coated 81 milliGRAM(s) Oral daily  atorvastatin 80 milliGRAM(s) Oral at bedtime  benzocaine 15 mG/menthol 3.6 mG Lozenge 1 Lozenge Oral every 4 hours  docusate sodium 100 milliGRAM(s) Oral daily  furosemide    Tablet 40 milliGRAM(s) Oral daily  losartan 25 milliGRAM(s) Oral daily  melatonin 1 milliGRAM(s) Oral at bedtime  methimazole 5 milliGRAM(s) Oral three times a day  metoprolol succinate ER 25 milliGRAM(s) Oral daily  pantoprazole    Tablet 40 milliGRAM(s) Oral before breakfast  potassium chloride   Powder 20 milliEquivalent(s) Oral once  senna Syrup 5 milliLiter(s) Oral daily  tamsulosin 0.4 milliGRAM(s) Oral at bedtime    MEDICATIONS  (PRN):  acetaminophen   Tablet. 650 milliGRAM(s) Oral every 6 hours PRN Mild Pain (1 - 3)  ALBUTerol/ipratropium for Nebulization 3 milliLiter(s) Nebulizer every 6 hours PRN Shortness of Breath and/or Wheezing    No Known Allergies    LABS                      10.4   8.8   )-----------( 385      ( 21 May 2018 05:31 )             32.7         136  |  98  |  15  ----------------------------<  93  3.8   |  26  |  1.00    Ca    8.6      21 May 2018 05:31  Mg     2.0           PT/INR - ( 21 May 2018 05:31 )   PT: 24.6 sec;   INR: 2.18     < from: CT Chest No Cont (18 @ 16:27) >  IMPRESSION:       1. Innumerable scattered pulmonary nodules are noted bilaterally. Most of   which demonstrated a lobulated configuration, likely representing mucous   plugging.     2. However there are more discrete nodular opacities some demonstrating   feeding vessel sign concerning for metastatic prostate.     3. Bulky mediastinal lymphadenopathy as described above and also thought   to be prostatic. Correlation with PET/CT for more complete staging.    4. Views osteoblastic metastases involving the scapulae left greater than   right as well as multiple ribs with a pathologic fracture involving the   right eighth rib as well as the vertebra without compression fracture   deformity.    5. Patchy ground less opacities predominantly in the right upper and   right lower lobes which may represent early pneumonitis. Short interval   surveillance may be of value to confirm stability and/or resolution.     < end of copied text >    < from: CT Abdomen and Pelvis w/ IV Cont (18 @ 11:32) >  IMPRESSION:  1.  Prostate tumor. Distended urinary bladder.  2.  Extensive bony metastasis.  3.  Multiple solid pulmonary nodules. Pulmonary metastasis is a   consideration. Recommend CT of chest for complete evaluation.  4.  Small right pleural effusion. Cardiomegaly.  5.  Heterogeneous hepatic enhancement likely due to altered perfusion. No   discrete hepatic mass. Cholelithiasis. No biliary dilatation. Patent   portal vein.  6.  Nonspecific thickening of the bilateral adrenal glands could be due   to adenomatous hyperplasia. CT without contrast or PET/CT would be useful   for differentiation from metastasis.    < end of copied text >    EKst degree AV block, NSR

## 2018-05-21 NOTE — PROGRESS NOTE ADULT - PROBLEM SELECTOR PLAN 8
PPx: Apixaban; PPI  FEN: No fluids indicated; replete electrolytes PRN; regular diet  Needs: Heme f/u / placement (BAILEY)  De Leon: Yes  Access: Peripheral  Ulcers: None  Sepsis: Does not meet SIRS criteria  Goals of Care: Full code  Dispo: Transfer to medicine

## 2018-05-21 NOTE — PROGRESS NOTE ADULT - PROBLEM SELECTOR PLAN 3
-SBP remains low, 80-100s today  -c/w lopressor 25 mg daily and losartan 25 mg daily.  -Continue to monitor. -hx of L DVT (dx: 6 months ago: on eliquis), given cancer diagnosis and unknown reason for prior DVT.  -per Dr. Peggy may to restart Eliquis after bronchoscopy 05/17. Continue Eliquis 5mg BID

## 2018-05-22 DIAGNOSIS — A49.2 HEMOPHILUS INFLUENZAE INFECTION, UNSPECIFIED SITE: ICD-10-CM

## 2018-05-22 DIAGNOSIS — C61 MALIGNANT NEOPLASM OF PROSTATE: ICD-10-CM

## 2018-05-22 LAB
ANION GAP SERPL CALC-SCNC: 11 MMOL/L — SIGNIFICANT CHANGE UP (ref 5–17)
APTT BLD: 37.9 SEC — HIGH (ref 27.5–37.4)
BUN SERPL-MCNC: 14 MG/DL — SIGNIFICANT CHANGE UP (ref 7–23)
CALCIUM SERPL-MCNC: 8.4 MG/DL — SIGNIFICANT CHANGE UP (ref 8.4–10.5)
CHLORIDE SERPL-SCNC: 97 MMOL/L — SIGNIFICANT CHANGE UP (ref 96–108)
CO2 SERPL-SCNC: 26 MMOL/L — SIGNIFICANT CHANGE UP (ref 22–31)
CREAT SERPL-MCNC: 0.94 MG/DL — SIGNIFICANT CHANGE UP (ref 0.5–1.3)
GLUCOSE SERPL-MCNC: 102 MG/DL — HIGH (ref 70–99)
HCT VFR BLD CALC: 30.6 % — LOW (ref 39–50)
HGB BLD-MCNC: 9.9 G/DL — LOW (ref 13–17)
INR BLD: 2.7 — HIGH (ref 0.88–1.16)
MAGNESIUM SERPL-MCNC: 2 MG/DL — SIGNIFICANT CHANGE UP (ref 1.6–2.6)
MCHC RBC-ENTMCNC: 27.2 PG — SIGNIFICANT CHANGE UP (ref 27–34)
MCHC RBC-ENTMCNC: 32.4 G/DL — SIGNIFICANT CHANGE UP (ref 32–36)
MCV RBC AUTO: 84.1 FL — SIGNIFICANT CHANGE UP (ref 80–100)
PHOSPHATE SERPL-MCNC: 2.6 MG/DL — SIGNIFICANT CHANGE UP (ref 2.5–4.5)
PLATELET # BLD AUTO: 388 K/UL — SIGNIFICANT CHANGE UP (ref 150–400)
POTASSIUM SERPL-MCNC: 3.8 MMOL/L — SIGNIFICANT CHANGE UP (ref 3.5–5.3)
POTASSIUM SERPL-SCNC: 3.8 MMOL/L — SIGNIFICANT CHANGE UP (ref 3.5–5.3)
PROTHROM AB SERPL-ACNC: 30.6 SEC — HIGH (ref 9.8–12.7)
RBC # BLD: 3.64 M/UL — LOW (ref 4.2–5.8)
RBC # FLD: 14.7 % — SIGNIFICANT CHANGE UP (ref 10.3–16.9)
SODIUM SERPL-SCNC: 134 MMOL/L — LOW (ref 135–145)
WBC # BLD: 9.2 K/UL — SIGNIFICANT CHANGE UP (ref 3.8–10.5)
WBC # FLD AUTO: 9.2 K/UL — SIGNIFICANT CHANGE UP (ref 3.8–10.5)

## 2018-05-22 RX ORDER — CHOLECALCIFEROL (VITAMIN D3) 125 MCG
1000 CAPSULE ORAL DAILY
Qty: 0 | Refills: 0 | Status: DISCONTINUED | OUTPATIENT
Start: 2018-05-22 | End: 2018-05-24

## 2018-05-22 RX ORDER — BICALUTAMIDE 50 MG/1
50 TABLET, FILM COATED ORAL DAILY
Qty: 0 | Refills: 0 | Status: DISCONTINUED | OUTPATIENT
Start: 2018-05-22 | End: 2018-05-24

## 2018-05-22 RX ORDER — DENOSUMAB 60 MG/ML
120 INJECTION SUBCUTANEOUS ONCE
Qty: 0 | Refills: 0 | Status: COMPLETED | OUTPATIENT
Start: 2018-05-22 | End: 2018-05-22

## 2018-05-22 RX ORDER — CALCIUM CARBONATE 500(1250)
2 TABLET ORAL DAILY
Qty: 0 | Refills: 0 | Status: DISCONTINUED | OUTPATIENT
Start: 2018-05-22 | End: 2018-05-24

## 2018-05-22 RX ADMIN — Medication 40 MILLIGRAM(S): at 11:16

## 2018-05-22 RX ADMIN — APIXABAN 5 MILLIGRAM(S): 2.5 TABLET, FILM COATED ORAL at 18:02

## 2018-05-22 RX ADMIN — BENZOCAINE AND MENTHOL 1 LOZENGE: 5; 1 LIQUID ORAL at 11:17

## 2018-05-22 RX ADMIN — Medication 81 MILLIGRAM(S): at 11:16

## 2018-05-22 RX ADMIN — APIXABAN 5 MILLIGRAM(S): 2.5 TABLET, FILM COATED ORAL at 05:26

## 2018-05-22 RX ADMIN — Medication 30 MILLILITER(S): at 00:15

## 2018-05-22 RX ADMIN — BENZOCAINE AND MENTHOL 1 LOZENGE: 5; 1 LIQUID ORAL at 05:29

## 2018-05-22 RX ADMIN — SENNA PLUS 5 MILLILITER(S): 8.6 TABLET ORAL at 11:17

## 2018-05-22 RX ADMIN — BENZOCAINE AND MENTHOL 1 LOZENGE: 5; 1 LIQUID ORAL at 18:02

## 2018-05-22 RX ADMIN — TAMSULOSIN HYDROCHLORIDE 0.4 MILLIGRAM(S): 0.4 CAPSULE ORAL at 22:04

## 2018-05-22 RX ADMIN — Medication 1 TABLET(S): at 18:02

## 2018-05-22 RX ADMIN — BICALUTAMIDE 50 MILLIGRAM(S): 50 TABLET, FILM COATED ORAL at 11:17

## 2018-05-22 RX ADMIN — BENZOCAINE AND MENTHOL 1 LOZENGE: 5; 1 LIQUID ORAL at 22:05

## 2018-05-22 RX ADMIN — DENOSUMAB 120 MILLIGRAM(S): 60 INJECTION SUBCUTANEOUS at 11:16

## 2018-05-22 RX ADMIN — Medication 3 MILLILITER(S): at 22:26

## 2018-05-22 RX ADMIN — ATORVASTATIN CALCIUM 80 MILLIGRAM(S): 80 TABLET, FILM COATED ORAL at 22:04

## 2018-05-22 RX ADMIN — Medication 100 MILLIGRAM(S): at 11:16

## 2018-05-22 RX ADMIN — PANTOPRAZOLE SODIUM 40 MILLIGRAM(S): 20 TABLET, DELAYED RELEASE ORAL at 05:26

## 2018-05-22 RX ADMIN — Medication 1000 UNIT(S): at 15:14

## 2018-05-22 RX ADMIN — Medication 1 MILLIGRAM(S): at 22:04

## 2018-05-22 RX ADMIN — Medication 650 MILLIGRAM(S): at 22:05

## 2018-05-22 RX ADMIN — Medication 25 MILLIGRAM(S): at 05:26

## 2018-05-22 RX ADMIN — Medication 1 TABLET(S): at 05:26

## 2018-05-22 RX ADMIN — Medication 650 MILLIGRAM(S): at 23:05

## 2018-05-22 RX ADMIN — Medication 2 TABLET(S): at 14:43

## 2018-05-22 RX ADMIN — Medication 650 MILLIGRAM(S): at 05:26

## 2018-05-22 NOTE — PROGRESS NOTE ADULT - ASSESSMENT
76 yo M former smoker with CHF, CAD, incidental finding of bone mets on imaging and markedly elevated PSA  Imaging also with suspicious lung lesions   admitted with NSTEMI      Lung lesions:  s/p CT chest - bulky mediastinal nodes, nodules   s/p bronch  wash negative for malignant cells however lymph node mediastinal positive for carcinoma consistent with metastatic prostate cancer.   I discussed these findings with the patient at length.     Certainly with metastatic prostate cancer given imaging and PSA  best approach to treatment will most likely be abiraterone , prednisone and androgen deprivation therapy in addition to bone modifying drugs. We discussed the role of upfront chemotherapy , but given patient's performance status and the data for upfront abiraterone - the later is a safer option.    Abiraterone is not available as inpatient.  Will start patient on casodex (bicalutamide) for now - will plan on giving for 2-4 weeks.  Will then need to start lupron as outpatient.  We can then start abiraterone as outpatient.    Given bone metastasis - will start denosumab while inpatient.   Please give with 1000 mg po calcium and 1000 IU vitamin D.

## 2018-05-22 NOTE — PROGRESS NOTE ADULT - SUBJECTIVE AND OBJECTIVE BOX
Interventional, Pulmonary, Critical, Chest Special Procedures.    Pt was seen and fully examined by myself.     Time spent with patient in minutes:37    Patient is a 77y old  Male who presents with a chief complaint of CP (09 May 2018 19:41)The patient anxious, ill appearing, no new cough, no new sob.    HPI:  76 yo male FORMER SMOKER with PMHx of HTN, HLD, pre- DM,  CAD s/p CABG (in 2016 @ [facility]), CHF (EF unknown), h/o DVT 6 months ago (on Eliquis; last dose last night) who presented to [facility] ED on 5/9/18 with CC of Cough and SOB X 5-6 days. Pt. reports that he initially had a cough with white sputum production associated with shortness of breath at rest, non-radiating L sided Chest tightness and palpitations. pt. reports that the chest tightness and SOB comes along with the cough  and is relieved when he stops coughing. He also reports feeling sore. At baseline, pt. reports that he can ambulate 4 blocks before getting SOB.  Today am, pt. reports having hot flashes , cough, SOB and chest tightness which prompted him to come to the ED. Denies dizziness, diaphoresis, fatigue, LE edema, orthopnea, PND, syncope.  Upon admit, temp 98.3F,  BPM, /86, RR 20, SpO2 96% on RA.  Labs significant for alk phos 500, , trop 0.08, BNP 6822. CXR wet read revealed R sided pleural effusion. EKG revealed NSR with 1st degree AV block @ 94 BPM with left axis deviation and Q wave in inferior leads and V1-V5.  While in ER, pt received IV Lasix 40 mg x1. Patient admitted to 5U for diuresis, ECHO, r/o ACS. (09 May 2018 19:41)    REVIEW OF SYSTEMS: updated  Constitutional: No fever, weight loss, chills or fatigue  Eyes: No eye pain, visual disturbances, or discharge  ENMT:  No difficulty hearing, tinnitus, vertigo; No sinus or throat pain. No epistaxis, dysphagia, dysphonia, hoarseness or odynophagia  Neck: No pain, stiffness or neck swelling.  No masses or deformities  Respiratory: No cough, wheezing, chills or hemoptysis  - COPD  - ILD   - PE   - ASTHMA     - PNEUMONIA  Cardiovascular: No chest pain, dysrhythmia, palpitations, dizziness or edema   - COPD     - CAD   - CHF   - HTN  Gastrointestinal: No abdominal or epigastric pain. No nausea, vomiting or hematemesis; No diarrhea or constipation. No melena or hematochezia. No dysphagia or Icterus.          Genitourinary: No dysuria, frequency, hematuria or incontinence   - CKD/PAM      - ESRD  Neurological: No headaches, memory loss, loss of strength, numbness or tremors      -DEMENTIA     - STROKE    - SEIZURE  Skin: No itching, burning, rashes or lesions   Lymph Nodes: No enlarged glands  Endocrine: No heat or cold intolerance; No hair loss       - DM     - THYROID DISORDER  Musculoskeletal: No joint pain or swelling; No muscle, back or extremity pain  Psychiatric: No depression, anxiety, mood swings or difficulty sleeping  Heme/Lymph: No easy bruising or bleeding gums         - ANEMIA      - CANCER   -COAGULOPATHY  Allergy and Immunologic: No hives or eczema    PAST MEDICAL & SURGICAL HISTORY:  CHF (congestive heart failure)  CAD (coronary artery disease)  HTN (hypertension)  S/P hernia surgery  History of coronary artery bypass graft: 2017 @ [facility]    FAMILY HISTORY:  No pertinent family history in first degree relatives    SOCIAL HISTORY:      - Tobacco     - ETOH    Allergies    No Known Allergies    Intolerances      Vital Signs Last 24 Hrs  T(C): 36.4 (22 May 2018 14:14), Max: 36.8 (22 May 2018 00:06)  T(F): 97.5 (22 May 2018 14:14), Max: 98.3 (22 May 2018 00:06)  HR: 69 (22 May 2018 14:41) (61 - 79)  BP: 92/54 (22 May 2018 14:41) (81/47 - 92/54)  BP(mean): --  RR: 18 (22 May 2018 14:41) (18 - 18)  SpO2: 100% (22 May 2018 10:42) (100% - 100%)    05-21 @ 07:01  -  05-22 @ 07:00  --------------------------------------------------------  IN: 120 mL / OUT: 550 mL / NET: -430 mL        PHYSICAL EXAM:  More  Comfortable, no immediate distress  Eyes: PERRL, EOM intact; conjunctiva and sclera clear  Head: Normocephalic;  No Trauma  ENMT: No nasal discharge, +hoarseness, less cough   Neck: Supple; non tender; no masses or deformities.    No JVD  Respiratory:  - WHEEZING   - RHONCHI  - RALES  + CRACKLES.  Diminished breath sounds  BILATERAL  RIGHT  LEFT bases  Cardiovascular: Regular rate and rhythm. S1 and S2 Normal; No murmurs, gallops or rubs     - PPM/AICD  Gastrointestinal: Soft non-tender, non-distended; Normal bowel sounds; No hepatosplenomegaly.     -PEG    -  GT   +LAU  Genitourinary: No costovertebral angle tenderness. No dysuria  Extremities: AROM, + clubbing, cyanosis or edema    Vascular: Peripheral pulses palpable 2+ bilaterally  Neurological: Alert and responisve to stimuli   Skin: Warm and dry. No obvious rash  Lymph Nodes: No acute cervical or supraclavicular adenopathy  Psychiatric: Cooperative and appropriate mood  DEVICES:  - DENTURES   +IV R / L     - ETUBE   -TRACH   -CTUBE  R / L      LABS:                          9.9    9.2   )-----------( 388      ( 22 May 2018 06:11 )             30.6     05-22    134<L>  |  97  |  14  ----------------------------<  102<H>  3.8   |  26  |  0.94    Ca    8.4      22 May 2018 06:11  Phos  2.6     05-22  Mg     2.0     05-22      PT/INR - ( 22 May 2018 06:11 )   PT: 30.6 sec;   INR: 2.70          PTT - ( 22 May 2018 06:11 )  PTT:37.9 sec  RADIOLOGY & ADDITIONAL STUDIES (The following images were personally reviewed):

## 2018-05-22 NOTE — PROGRESS NOTE ADULT - PROBLEM SELECTOR PLAN 8
PPx: Apixaban; PPI  FEN: No fluids indicated; replete electrolytes PRN; regular diet  Needs: Heme f/u / placement (BAILEY)  De Leon: Yes  Access: Peripheral  Ulcers: None  Sepsis: Does not meet SIRS criteria  Goals of Care: Full code  Dispo: Transfer to medicine - c/w home methimazole5 mg TID

## 2018-05-22 NOTE — PROGRESS NOTE ADULT - SUBJECTIVE AND OBJECTIVE BOX
76 yo M former smoker with CHF, CAD, incidental finding of bone mets on imaging and markedly elevated PSA  Imaging also with suspicious lung lesions     Interval history:  reports he is ambulating with the walker  no new symptoms  no back pain or bone pain     s/p bronchoscopy   bronchial wash negative for malignant cells  mediastinal LN positive for carcinoma    vitals reviewed    frail, chronically ill appearing  NAD  s1s2 nml  ctab  gould cath  no edema    PAST MEDICAL & SURGICAL HISTORY:  CHF (congestive heart failure)  CAD (coronary artery disease)  HTN (hypertension)  S/P hernia surgery  History of coronary artery bypass graft: 2017 @ [facility]    Social Hx:  lives alone, with HHA  ambulates with cane  quit smoking 40 yrs ago    He denies a family history of cancer            CBC Full  -  ( 22 May 2018 06:11 )  WBC Count : 9.2 K/uL  Hemoglobin : 9.9 g/dL  Hematocrit : 30.6 %  Platelet Count - Automated : 388 K/uL  Mean Cell Volume : 84.1 fL  Mean Cell Hemoglobin : 27.2 pg  Mean Cell Hemoglobin Concentration : 32.4 g/dL  Auto Neutrophil # : x  Auto Lymphocyte # : x  Auto Monocyte # : x  Auto Eosinophil # : x  Auto Basophil # : x  Auto Neutrophil % : x  Auto Lymphocyte % : x  Auto Monocyte % : x  Auto Eosinophil % : x  Auto Basophil % : x      05-22    134<L>  |  97  |  14  ----------------------------<  102<H>  3.8   |  26  |  0.94    Ca    8.4      22 May 2018 06:11  Phos  2.6     05-22  Mg     2.0     05-22        PT/INR - ( 22 May 2018 06:11 )   PT: 30.6 sec;   INR: 2.70          PTT - ( 22 May 2018 06:11 )  PTT:37.9 sec    pathology: reviewed          Imaging:   reviewed

## 2018-05-22 NOTE — PROGRESS NOTE ADULT - PROBLEM SELECTOR PLAN 4
CT chest 5/14 revealed innumerable scattered pulmonary nodules B/L.  Discrete nodular opacities concerning for metastatic prostate Ca. Bulky mediastinal LAD.  S/p Bronch: Cx w/ haemophilus species with few gram + pairs and chains, AFB negative. Cytopathology wash RML negative for malignant cells  - C/w Eliquis  - Duonebs q6hr PRN, aspiration precaution  - f/u pulm recs Bronch cultures growing H. Influnza  -c/w augmentin 875 mg BID (5/21-)

## 2018-05-22 NOTE — PROGRESS NOTE ADULT - PROBLEM SELECTOR PLAN 5
S/p Diagnostic Cath on 5/11: known 3VCAD (99% mLAD, 100% ramus, 90% OM2, 99% small OM3, 85% distal dominant LCX), patent LIMA to LAD, patent radial graft to ramus, patent SVG to OM2, LVEDP4.    - C/w Atorvastatin 80mg Qhs, Metoprolol 25mg QD and ASA 81mg QD -hx of L DVT (dx: 6 months ago: on eliquis), given cancer diagnosis and unknown reason for prior DVT.  -per Dr. Peggy may to restart Eliquis after bronchoscopy 05/17. Continue Eliquis 5mg BID

## 2018-05-22 NOTE — CHART NOTE - NSCHARTNOTEFT_GEN_A_CORE
Admitting Diagnosis:   Patient is a 77y old  Male who presents with a chief complaint of CP (09 May 2018 19:41)      PAST MEDICAL & SURGICAL HISTORY:  CHF (congestive heart failure)  CAD (coronary artery disease)  HTN (hypertension)  S/P hernia surgery  History of coronary artery bypass graft: 2017 @ [facility]      Current Nutrition Order: DASH/TLC, soft, 1200cc fluid restriction + Ensure Enlive BID (700kcal, 40g pro)    PO Intake: Good (%) [   ]  Fair (50-75%) [ x  ] Poor (<25%) [   ]    GI Issues: Pt denies GI distress     Pain: Pt denies pain     Skin Integrity: surgical incision     Labs:       134<L>  |  97  |  14  ----------------------------<  102<H>  3.8   |  26  |  0.94    Ca    8.4      22 May 2018 06:11  Phos  2.6     -  Mg     2.0     -      CAPILLARY BLOOD GLUCOSE          Medications:  MEDICATIONS  (STANDING):  amoxicillin  875 milliGRAM(s)/clavulanate 1 Tablet(s) Oral two times a day  apixaban 5 milliGRAM(s) Oral every 12 hours  aspirin enteric coated 81 milliGRAM(s) Oral daily  atorvastatin 80 milliGRAM(s) Oral at bedtime  benzocaine 15 mG/menthol 3.6 mG Lozenge 1 Lozenge Oral every 4 hours  bicalutamide 50 milliGRAM(s) Oral daily  calcium carbonate 1250 mG (OsCal) 2 Tablet(s) Oral daily  cholecalciferol 1000 Unit(s) Oral daily  docusate sodium 100 milliGRAM(s) Oral daily  furosemide    Tablet 40 milliGRAM(s) Oral daily  losartan 25 milliGRAM(s) Oral daily  melatonin 1 milliGRAM(s) Oral at bedtime  methimazole 5 milliGRAM(s) Oral three times a day  metoprolol succinate ER 25 milliGRAM(s) Oral daily  pantoprazole    Tablet 40 milliGRAM(s) Oral before breakfast  senna Syrup 5 milliLiter(s) Oral daily  tamsulosin 0.4 milliGRAM(s) Oral at bedtime    MEDICATIONS  (PRN):  acetaminophen   Tablet. 650 milliGRAM(s) Oral every 6 hours PRN Mild Pain (1 - 3)  ALBUTerol/ipratropium for Nebulization 3 milliLiter(s) Nebulizer every 6 hours PRN Shortness of Breath and/or Wheezing  aluminum hydroxide/magnesium hydroxide/simethicone Suspension 30 milliLiter(s) Oral every 6 hours PRN Dyspepsia      Daily Weight in k.7 (22 May 2018 05:30)    Weight Change: () 57.2kg    Estimated energy needs: ABW - increased needs for repletion in older adults  1695-3733 (25-30cal/kg)  75-87g pro (1.2-1.4g pro/kg)  Fluids per team secondary to CHF     Subjective: Pt admitted with CP; CHFe - r/o CS s/p non obstructive cath.  Pt is now being worked up for possible malignancy; found with multiple metastatic lung nodules.  Pt with fair appetite/intake; consuming ~50% of meals.      Previous Nutrition Diagnosis: Inadequate oral intake r/t decreased appetite AEB ~50% meal intake     Active [  x ]  Resolved [   ]    Goal: Pt to consistently meet at least 75% estimated nutrient needs    Recommendations:  1. Monitor PO intake  2. Honor food preferences as compliant  3. Appreciate continued assistance and encouragement with meals   4. Monitor weight status     Education: Minimal nutrition education able to be completed.  Encouraged PO intake.     Risk Level: High [  x ] Moderate [   ] Low [   ] Admitting Diagnosis:   Patient is a 77y old  Male who presents with a chief complaint of CP (09 May 2018 19:41)      PAST MEDICAL & SURGICAL HISTORY:  CHF (congestive heart failure)  CAD (coronary artery disease)  HTN (hypertension)  S/P hernia surgery  History of coronary artery bypass graft: 2017 @ [facility]      Current Nutrition Order: DASH/TLC, soft, 1200cc fluid restriction + Ensure Enlive BID (700kcal, 40g pro)    PO Intake: Good (%) [   ]  Fair (50-75%) [ x  ] Poor (<25%) [   ]    GI Issues: Pt denies GI distress     Pain: Pt denies pain     Skin Integrity: surgical incision     Labs:       134<L>  |  97  |  14  ----------------------------<  102<H>  3.8   |  26  |  0.94    Ca    8.4      22 May 2018 06:11  Phos  2.6     -  Mg     2.0     -      CAPILLARY BLOOD GLUCOSE          Medications:  MEDICATIONS  (STANDING):  amoxicillin  875 milliGRAM(s)/clavulanate 1 Tablet(s) Oral two times a day  apixaban 5 milliGRAM(s) Oral every 12 hours  aspirin enteric coated 81 milliGRAM(s) Oral daily  atorvastatin 80 milliGRAM(s) Oral at bedtime  benzocaine 15 mG/menthol 3.6 mG Lozenge 1 Lozenge Oral every 4 hours  bicalutamide 50 milliGRAM(s) Oral daily  calcium carbonate 1250 mG (OsCal) 2 Tablet(s) Oral daily  cholecalciferol 1000 Unit(s) Oral daily  docusate sodium 100 milliGRAM(s) Oral daily  furosemide    Tablet 40 milliGRAM(s) Oral daily  losartan 25 milliGRAM(s) Oral daily  melatonin 1 milliGRAM(s) Oral at bedtime  methimazole 5 milliGRAM(s) Oral three times a day  metoprolol succinate ER 25 milliGRAM(s) Oral daily  pantoprazole    Tablet 40 milliGRAM(s) Oral before breakfast  senna Syrup 5 milliLiter(s) Oral daily  tamsulosin 0.4 milliGRAM(s) Oral at bedtime    MEDICATIONS  (PRN):  acetaminophen   Tablet. 650 milliGRAM(s) Oral every 6 hours PRN Mild Pain (1 - 3)  ALBUTerol/ipratropium for Nebulization 3 milliLiter(s) Nebulizer every 6 hours PRN Shortness of Breath and/or Wheezing  aluminum hydroxide/magnesium hydroxide/simethicone Suspension 30 milliLiter(s) Oral every 6 hours PRN Dyspepsia      Daily Weight in k.7 (22 May 2018 05:30)    Weight Change: () 57.2kg    Estimated energy needs: ABW - increased needs for repletion in older adults  2497-5516 (25-30cal/kg)  75-87g pro (1.2-1.4g pro/kg)  Fluids per team secondary to CHF     Subjective: Pt admitted with CP; CHFe - r/o CS s/p non obstructive cath.  Pt is now being worked up for possible malignancy; found with multiple metastatic lung nodules.  Pt with fair appetite/intake; consuming ~50% of meals which is consistent from previous assessment.     Previous Nutrition Diagnosis: Inadequate oral intake r/t decreased appetite AEB ~50% meal intake     Active [  x ]  Resolved [   ]    Goal: Pt to consistently meet at least 75% estimated nutrient needs    Recommendations:  1. Monitor PO intake  2. Honor food preferences as compliant  3. Appreciate continued assistance and encouragement with meals   4. Monitor weight status     Education: Minimal nutrition education able to be completed.  Encouraged PO intake.     Risk Level: High [  x ] Moderate [   ] Low [   ]

## 2018-05-22 NOTE — PROGRESS NOTE ADULT - SUBJECTIVE AND OBJECTIVE BOX
INTERVAL HPI/OVERNIGHT EVENTS: No acute events overnight. BP 79/47 overnight on repeat 85/47 without intervention. Pt was mentating well. He has no new complaints this AM.     ROS  CV: Denies chest pain, palpitations  RESP: Denies SOB  GI: Denies abdominal pain, constipation, diarrhea, nausea, vomiting  : Denies dysuria, hematuria, flank or back pain  ID: Denies fevers, chills  MSK: Denies joint pain     VITAL SIGNS:  T(F): 97.5 (05-22-18 @ 14:14), Max: 98.3 (05-22-18 @ 00:06)  HR: 69 (05-22-18 @ 14:41) (61 - 79)  BP: 92/54 (05-22-18 @ 14:41) (81/47 - 92/54)  RR: 18 (05-22-18 @ 14:41) (18 - 18)  SpO2: 100% (05-22-18 @ 10:42) (100% - 100%)    05-21-18 @ 07:01  -  05-22-18 @ 07:00  --------------------------------------------------------  IN: 120 mL / OUT: 550 mL / NET: -430 mL    PHYSICAL EXAM:  Constitutional: WDWN, NAD, resting comfortably   Head: NC/AT  Eyes: PERRL, EOMI, anicteric sclera, no mucosal pallor  ENT: no nasal discharge; uvula midline, no oropharyngeal erythema or exudates; MMM  Neck: supple; no JVD or thyromegaly, no lymphadenopathy  Respiratory: CTA B/L; no W/R/R, no retractions  Cardiac: +S1/S2; RRR; no M/R/G  Gastrointestinal: abdomen soft, NT/ND; no rebound or guarding; +BSx4  : De Leon in place draining clear urine  Back: spine midline, no bony tenderness or step-offs; no CVAT B/L  Extremities: warm; no calf tenderness, no pedal edema, no cyanosis, no clubbing  Musculoskeletal: NROM x4; no joint swelling, tenderness or erythema  Vascular: 2+ radial; DP/PT pulses B/L  Neurologic: AAOx3; CNII-XII grossly intact; 5/5 strength throughout, sensory symmetrically intact in B/L LE   Psychiatric: pleasant and conversive; affect and characteristics of appearance, verbalizations, behaviors are appropriate      MEDICATIONS  (STANDING):  amoxicillin  875 milliGRAM(s)/clavulanate 1 Tablet(s) Oral two times a day  apixaban 5 milliGRAM(s) Oral every 12 hours  aspirin enteric coated 81 milliGRAM(s) Oral daily  atorvastatin 80 milliGRAM(s) Oral at bedtime  benzocaine 15 mG/menthol 3.6 mG Lozenge 1 Lozenge Oral every 4 hours  bicalutamide 50 milliGRAM(s) Oral daily  calcium carbonate 1250 mG (OsCal) 2 Tablet(s) Oral daily  cholecalciferol 1000 Unit(s) Oral daily  docusate sodium 100 milliGRAM(s) Oral daily  furosemide    Tablet 40 milliGRAM(s) Oral daily  losartan 25 milliGRAM(s) Oral daily  melatonin 1 milliGRAM(s) Oral at bedtime  methimazole 5 milliGRAM(s) Oral three times a day  metoprolol succinate ER 25 milliGRAM(s) Oral daily  pantoprazole    Tablet 40 milliGRAM(s) Oral before breakfast  senna Syrup 5 milliLiter(s) Oral daily  tamsulosin 0.4 milliGRAM(s) Oral at bedtime    MEDICATIONS  (PRN):  acetaminophen   Tablet. 650 milliGRAM(s) Oral every 6 hours PRN Mild Pain (1 - 3)  ALBUTerol/ipratropium for Nebulization 3 milliLiter(s) Nebulizer every 6 hours PRN Shortness of Breath and/or Wheezing  aluminum hydroxide/magnesium hydroxide/simethicone Suspension 30 milliLiter(s) Oral every 6 hours PRN Dyspepsia      Allergies    No Known Allergies    Intolerances        LABS:                        9.9    9.2   )-----------( 388      ( 22 May 2018 06:11 )             30.6     05-22    134<L>  |  97  |  14  ----------------------------<  102<H>  3.8   |  26  |  0.94    Ca    8.4      22 May 2018 06:11  Phos  2.6     05-22  Mg     2.0     05-22      PT/INR - ( 22 May 2018 06:11 )   PT: 30.6 sec;   INR: 2.70          PTT - ( 22 May 2018 06:11 )  PTT:37.9 sec      RADIOLOGY & ADDITIONAL TESTS:

## 2018-05-22 NOTE — PROGRESS NOTE ADULT - ASSESSMENT
ASSESSMENT/PLAN77 yo male FORMER SMOKER with PMHx of HTN, HLD, pre- DM, CAD s/p CABG (in 2016 @ [facility]), chronic systolic CHF, h/o DVT 6 months ago (on Eliquis)  admitted for r/o ACS s/p non obstructed Cath, being worked up for possible prostate CA, found to have multiple  lung nodules, diffuse mediastinal LAD. Clinically metastatic cancer.    1. O2 attempt off  2. Bronchodilators:  Atrovent/ albuterol q 4 – 6 hours as needed  3. Corticosteroids: off  4. ID/Antibiotics: Augmentin 875 Q 12 hrs for seven days  5. Cardiac/HTN: optimize BP  6. GI: Rx/ prophylaxis c PPI/H2B  7. Heme: Rx/VT prophylaxis c SQH/SCD/AC continue   Eliquis  8. Aspiration precautions  9 Mobilize, OOB, use incentive spirometry  10.Oncology mngmnt appreciated and priority  Discussed with managing team,

## 2018-05-22 NOTE — PROGRESS NOTE ADULT - PROBLEM SELECTOR PLAN 2
CXR w/ incidental finding of sclerotic bone metastases, likely from prostate CA.  PSA elevated 6264.  CT abdomen/pelvis 05/11: Prostate tumor. Distended urinary bladder. Extensive bony metastasis. Multiple solid pulmonary nodules. Pulmonary metastasis is a consideration.  - C/w gould for urinary retention (placed by urology)  - C/w Tamsulosin 0.4mg  - f/u uro recs (Dr. Brown)  -Oncology Team consulted to f/u with Dr. Martinez as outpatient to initiate treatment, certainly prostate CA, will need ADT and bone modifying drug, continue to f/u recs   -Vitamin D 35.3 and Vitamin D 1, 25: 92.9  -Tramadol PRN for pain. CXR w/ incidental finding of sclerotic bone metastases, likely from prostate CA.  PSA elevated 6264.  CT abdomen/pelvis 05/11: Prostate tumor. Distended urinary bladder. Extensive bony metastasis. Multiple solid pulmonary nodules. Pulmonary metastasis is a consideration.  -wash negative for malignant cells however lymph node mediastinal positive for carcinoma consistent with metastatic prostate cancer.   - C/w gould for urinary retention (placed by urology)  - C/w Tamsulosin 0.4mg  - f/u uro recs (Dr. Brown)  -Started on casodex (bicalutamide) for now - plan on giving for 2-4 weeks.  Will then need to start lupron as outpatient.  We can then start abiraterone as outpatient.  -s/p denosumab 120 mg while inpatient.   -c/w calcium carbonate give with 1000 mg po calcium and 1000 IU vitamin D.     -Oncology Team consulted to f/u with Dr. Martinez as outpatient to initiate treatment, certainly prostate CA, will need ADT and bone modifying drug, continue to f/u recs   -Vitamin D 35.3 and Vitamin D 1, 25: 92.9  -Tramadol PRN for pain. CXR w/ incidental finding of sclerotic bone metastases, likely from prostate CA.  PSA elevated 6264.  CT abdomen/pelvis 05/11: Prostate tumor. Distended urinary bladder. Extensive bony metastasis. Multiple solid pulmonary nodules. Pulmonary metastasis is a consideration.  -wash negative for malignant cells however lymph node mediastinal positive for carcinoma consistent with metastatic prostate cancer.   - C/w gould for urinary retention (placed by urology)  - C/w Tamsulosin 0.4mg  - f/u uro recs (Dr. Brown)  -Started on casodex (bicalutamide) for now - plan on giving for 2-4 weeks.  Will then need to start lupron as outpatient.  We can then start abiraterone as outpatient.  -s/p denosumab 120 mg while inpatient.   -c/w calcium carbonate give with 1250 mg po calcium and 1000 IU vitamin D3 daily.   -pt to f/u with Dr. Martinez as outpatient   -Vitamin D 35.3 and Vitamin D 1, 25: 92.9  -Tramadol PRN for pain.

## 2018-05-22 NOTE — PROGRESS NOTE ADULT - SUBJECTIVE AND OBJECTIVE BOX
INTERVAL HPI/OVERNIGHT EVENTS: No acute events overnight. BP 79/47 overnight on repeat 85/47 without intervention. Pt was mentating well. He has no new complaints this AM.     ROS  CV: Denies chest pain, palpitations  RESP: Denies SOB  GI: Denies abdominal pain, constipation, diarrhea, nausea, vomiting  : Denies dysuria, hematuria, flank or back pain  ID: Denies fevers, chills  MSK: Denies joint pain     VITAL SIGNS:  T(F): 97.5 (05-22-18 @ 14:14), Max: 98.3 (05-22-18 @ 00:06)  HR: 69 (05-22-18 @ 14:41) (61 - 79)  BP: 92/54 (05-22-18 @ 14:41) (81/47 - 92/54)  RR: 18 (05-22-18 @ 14:41) (18 - 18)  SpO2: 100% (05-22-18 @ 10:42) (100% - 100%)    05-21-18 @ 07:01  -  05-22-18 @ 07:00  --------------------------------------------------------  IN: 120 mL / OUT: 550 mL / NET: -430 mL    PHYSICAL EXAM:  Constitutional: WDWN, NAD, resting comfortably   Head: NC/AT  Eyes: PERRL, EOMI, anicteric sclera, no mucosal pallor  ENT: no nasal discharge; uvula midline, no oropharyngeal erythema or exudates; MMM  Neck: supple; no JVD or thyromegaly, no lymphadenopathy  Respiratory: CTA B/L; no W/R/R, no retractions  Cardiac: +S1/S2; RRR; no M/R/G; PMI non-displaced  Gastrointestinal: abdomen soft, NT/ND; no rebound or guarding; +BSx4  Back: spine midline, no bony tenderness or step-offs; no CVAT B/L  Extremities: warm; no calf tenderness, no pedal edema, no cyanosis, no clubbing  Musculoskeletal: NROM x4; no joint swelling, tenderness or erythema  Vascular: 2+ radial, femoral; DP/PT pulses B/L  Dermatologic: no rash, no petechia   Lymphatic: no submandibular or cervical LAD  Neurologic: AAOx3; CNII-XII grossly intact; 5/5 strength throughout, sensory symmetrically intact in B/L LE   Psychiatric: pleasant and conversive; affect and characteristics of appearance, verbalizations, behaviors are appropriate      MEDICATIONS  (STANDING):  amoxicillin  875 milliGRAM(s)/clavulanate 1 Tablet(s) Oral two times a day  apixaban 5 milliGRAM(s) Oral every 12 hours  aspirin enteric coated 81 milliGRAM(s) Oral daily  atorvastatin 80 milliGRAM(s) Oral at bedtime  benzocaine 15 mG/menthol 3.6 mG Lozenge 1 Lozenge Oral every 4 hours  bicalutamide 50 milliGRAM(s) Oral daily  calcium carbonate 1250 mG (OsCal) 2 Tablet(s) Oral daily  cholecalciferol 1000 Unit(s) Oral daily  docusate sodium 100 milliGRAM(s) Oral daily  furosemide    Tablet 40 milliGRAM(s) Oral daily  losartan 25 milliGRAM(s) Oral daily  melatonin 1 milliGRAM(s) Oral at bedtime  methimazole 5 milliGRAM(s) Oral three times a day  metoprolol succinate ER 25 milliGRAM(s) Oral daily  pantoprazole    Tablet 40 milliGRAM(s) Oral before breakfast  senna Syrup 5 milliLiter(s) Oral daily  tamsulosin 0.4 milliGRAM(s) Oral at bedtime    MEDICATIONS  (PRN):  acetaminophen   Tablet. 650 milliGRAM(s) Oral every 6 hours PRN Mild Pain (1 - 3)  ALBUTerol/ipratropium for Nebulization 3 milliLiter(s) Nebulizer every 6 hours PRN Shortness of Breath and/or Wheezing  aluminum hydroxide/magnesium hydroxide/simethicone Suspension 30 milliLiter(s) Oral every 6 hours PRN Dyspepsia      Allergies    No Known Allergies    Intolerances        LABS:                        9.9    9.2   )-----------( 388      ( 22 May 2018 06:11 )             30.6     05-22    134<L>  |  97  |  14  ----------------------------<  102<H>  3.8   |  26  |  0.94    Ca    8.4      22 May 2018 06:11  Phos  2.6     05-22  Mg     2.0     05-22      PT/INR - ( 22 May 2018 06:11 )   PT: 30.6 sec;   INR: 2.70          PTT - ( 22 May 2018 06:11 )  PTT:37.9 sec      RADIOLOGY & ADDITIONAL TESTS: INTERVAL HPI/OVERNIGHT EVENTS: No acute events overnight. BP 79/47 overnight on repeat 85/47 without intervention. Pt was mentating well. He has no new complaints this AM.     ROS  CV: Denies chest pain, palpitations  RESP: Denies SOB  GI: Denies abdominal pain, constipation, diarrhea, nausea, vomiting  : Denies dysuria, hematuria, flank or back pain  ID: Denies fevers, chills  MSK: Denies joint pain     VITAL SIGNS:  T(F): 97.5 (05-22-18 @ 14:14), Max: 98.3 (05-22-18 @ 00:06)  HR: 69 (05-22-18 @ 14:41) (61 - 79)  BP: 92/54 (05-22-18 @ 14:41) (81/47 - 92/54)  RR: 18 (05-22-18 @ 14:41) (18 - 18)  SpO2: 100% (05-22-18 @ 10:42) (100% - 100%)    05-21-18 @ 07:01  -  05-22-18 @ 07:00  --------------------------------------------------------  IN: 120 mL / OUT: 550 mL / NET: -430 mL    PHYSICAL EXAM:  Constitutional: WDWN, NAD, resting comfortably   Head: NC/AT  Eyes: PERRL, EOMI, anicteric sclera, no mucosal pallor  ENT: no nasal discharge; uvula midline, no oropharyngeal erythema or exudates; MMM  Neck: supple; no JVD or thyromegaly, no lymphadenopathy  Respiratory: CTA B/L; no W/R/R, no retractions  Cardiac: +S1/S2; RRR; no M/R/G  Gastrointestinal: abdomen soft, NT/ND; no rebound or guarding; +BSx4  : De Leon in place draining clear urine  Back: spine midline, no bony tenderness or step-offs; no CVAT B/L  Extremities: warm; no calf tenderness, no pedal edema, no cyanosis, no clubbing  Musculoskeletal: NROM x4; no joint swelling, tenderness or erythema  Vascular: 2+ radial; DP/PT pulses B/L  Neurologic: AAOx3; CNII-XII grossly intact; 5/5 strength throughout, sensory symmetrically intact in B/L LE   Psychiatric: pleasant and conversive; affect and characteristics of appearance, verbalizations, behaviors are appropriate      MEDICATIONS  (STANDING):  amoxicillin  875 milliGRAM(s)/clavulanate 1 Tablet(s) Oral two times a day  apixaban 5 milliGRAM(s) Oral every 12 hours  aspirin enteric coated 81 milliGRAM(s) Oral daily  atorvastatin 80 milliGRAM(s) Oral at bedtime  benzocaine 15 mG/menthol 3.6 mG Lozenge 1 Lozenge Oral every 4 hours  bicalutamide 50 milliGRAM(s) Oral daily  calcium carbonate 1250 mG (OsCal) 2 Tablet(s) Oral daily  cholecalciferol 1000 Unit(s) Oral daily  docusate sodium 100 milliGRAM(s) Oral daily  furosemide    Tablet 40 milliGRAM(s) Oral daily  losartan 25 milliGRAM(s) Oral daily  melatonin 1 milliGRAM(s) Oral at bedtime  methimazole 5 milliGRAM(s) Oral three times a day  metoprolol succinate ER 25 milliGRAM(s) Oral daily  pantoprazole    Tablet 40 milliGRAM(s) Oral before breakfast  senna Syrup 5 milliLiter(s) Oral daily  tamsulosin 0.4 milliGRAM(s) Oral at bedtime    MEDICATIONS  (PRN):  acetaminophen   Tablet. 650 milliGRAM(s) Oral every 6 hours PRN Mild Pain (1 - 3)  ALBUTerol/ipratropium for Nebulization 3 milliLiter(s) Nebulizer every 6 hours PRN Shortness of Breath and/or Wheezing  aluminum hydroxide/magnesium hydroxide/simethicone Suspension 30 milliLiter(s) Oral every 6 hours PRN Dyspepsia      Allergies    No Known Allergies    Intolerances        LABS:                        9.9    9.2   )-----------( 388      ( 22 May 2018 06:11 )             30.6     05-22    134<L>  |  97  |  14  ----------------------------<  102<H>  3.8   |  26  |  0.94    Ca    8.4      22 May 2018 06:11  Phos  2.6     05-22  Mg     2.0     05-22      PT/INR - ( 22 May 2018 06:11 )   PT: 30.6 sec;   INR: 2.70          PTT - ( 22 May 2018 06:11 )  PTT:37.9 sec      RADIOLOGY & ADDITIONAL TESTS:

## 2018-05-22 NOTE — PROGRESS NOTE ADULT - PROBLEM SELECTOR PLAN 6
- c/w lopressor 25 mg daily and losartan 25 mg daily S/p Diagnostic Cath on 5/11: known 3VCAD (99% mLAD, 100% ramus, 90% OM2, 99% small OM3, 85% distal dominant LCX), patent LIMA to LAD, patent radial graft to ramus, patent SVG to OM2, LVEDP4.    - C/w Atorvastatin 80mg Qhs, Metoprolol 25mg QD and ASA 81mg QD

## 2018-05-22 NOTE — PROGRESS NOTE ADULT - PROBLEM SELECTOR PLAN 3
-hx of L DVT (dx: 6 months ago: on eliquis), given cancer diagnosis and unknown reason for prior DVT.  -per Dr. Peggy may to restart Eliquis after bronchoscopy 05/17. Continue Eliquis 5mg BID CT chest 5/14 revealed innumerable scattered pulmonary nodules B/L.  Discrete nodular opacities concerning for metastatic prostate Ca. Bulky mediastinal LAD.  S/p Bronch: Cx w/ haemophilus species with few gram + pairs and chains, AFB negative. Cytopathology wash RML negative for malignant cells  - C/w Eliquis  - Duonebs q6hr PRN, aspiration precaution  - f/u pulm recs

## 2018-05-23 ENCOUNTER — TRANSCRIPTION ENCOUNTER (OUTPATIENT)
Age: 78
End: 2018-05-23

## 2018-05-23 DIAGNOSIS — D50.9 IRON DEFICIENCY ANEMIA, UNSPECIFIED: ICD-10-CM

## 2018-05-23 LAB
ANION GAP SERPL CALC-SCNC: 14 MMOL/L — SIGNIFICANT CHANGE UP (ref 5–17)
BUN SERPL-MCNC: 14 MG/DL — SIGNIFICANT CHANGE UP (ref 7–23)
CALCIUM SERPL-MCNC: 8.1 MG/DL — LOW (ref 8.4–10.5)
CHLORIDE SERPL-SCNC: 98 MMOL/L — SIGNIFICANT CHANGE UP (ref 96–108)
CO2 SERPL-SCNC: 25 MMOL/L — SIGNIFICANT CHANGE UP (ref 22–31)
CREAT SERPL-MCNC: 0.86 MG/DL — SIGNIFICANT CHANGE UP (ref 0.5–1.3)
FERRITIN SERPL-MCNC: <0.5 NG/ML — SIGNIFICANT CHANGE UP (ref 30–400)
GLUCOSE SERPL-MCNC: 98 MG/DL — SIGNIFICANT CHANGE UP (ref 70–99)
HCT VFR BLD CALC: 29.8 % — LOW (ref 39–50)
HGB BLD-MCNC: 9.9 G/DL — LOW (ref 13–17)
IRON SATN MFR SERPL: 19 % — SIGNIFICANT CHANGE UP (ref 16–55)
IRON SATN MFR SERPL: 40 UG/DL — LOW (ref 45–165)
MAGNESIUM SERPL-MCNC: 2.1 MG/DL — SIGNIFICANT CHANGE UP (ref 1.6–2.6)
MCHC RBC-ENTMCNC: 27.5 PG — SIGNIFICANT CHANGE UP (ref 27–34)
MCHC RBC-ENTMCNC: 33.2 G/DL — SIGNIFICANT CHANGE UP (ref 32–36)
MCV RBC AUTO: 82.8 FL — SIGNIFICANT CHANGE UP (ref 80–100)
PLATELET # BLD AUTO: 414 K/UL — HIGH (ref 150–400)
POTASSIUM SERPL-MCNC: 4.1 MMOL/L — SIGNIFICANT CHANGE UP (ref 3.5–5.3)
POTASSIUM SERPL-SCNC: 4.1 MMOL/L — SIGNIFICANT CHANGE UP (ref 3.5–5.3)
RBC # BLD: 3.6 M/UL — LOW (ref 4.2–5.8)
RBC # FLD: 15.1 % — SIGNIFICANT CHANGE UP (ref 10.3–16.9)
SODIUM SERPL-SCNC: 137 MMOL/L — SIGNIFICANT CHANGE UP (ref 135–145)
TIBC SERPL-MCNC: 211 UG/DL — LOW (ref 220–430)
TRANSFERRIN SERPL-MCNC: 175 MG/DL — LOW (ref 200–360)
UIBC SERPL-MCNC: 171 UG/DL — SIGNIFICANT CHANGE UP (ref 110–370)
WBC # BLD: 8.1 K/UL — SIGNIFICANT CHANGE UP (ref 3.8–10.5)
WBC # FLD AUTO: 8.1 K/UL — SIGNIFICANT CHANGE UP (ref 3.8–10.5)

## 2018-05-23 RX ORDER — SODIUM FERRIC GLUCONAT/SUCROSE 62.5MG/5ML
125 AMPUL (ML) INTRAVENOUS ONCE
Qty: 0 | Refills: 0 | Status: COMPLETED | OUTPATIENT
Start: 2018-05-24 | End: 2018-05-24

## 2018-05-23 RX ORDER — IPRATROPIUM/ALBUTEROL SULFATE 18-103MCG
3 AEROSOL WITH ADAPTER (GRAM) INHALATION
Qty: 0 | Refills: 0 | DISCHARGE
Start: 2018-05-23

## 2018-05-23 RX ORDER — APIXABAN 2.5 MG/1
1 TABLET, FILM COATED ORAL
Qty: 0 | Refills: 0 | COMMUNITY

## 2018-05-23 RX ORDER — SENNA PLUS 8.6 MG/1
0 TABLET ORAL
Qty: 0 | Refills: 0 | COMMUNITY

## 2018-05-23 RX ORDER — ASPIRIN/CALCIUM CARB/MAGNESIUM 324 MG
1 TABLET ORAL
Qty: 0 | Refills: 0 | DISCHARGE
Start: 2018-05-23

## 2018-05-23 RX ORDER — CALCIUM CARBONATE 500(1250)
2 TABLET ORAL
Qty: 0 | Refills: 0 | DISCHARGE
Start: 2018-05-23

## 2018-05-23 RX ORDER — SODIUM FERRIC GLUCONAT/SUCROSE 62.5MG/5ML
25 AMPUL (ML) INTRAVENOUS ONCE
Qty: 0 | Refills: 0 | Status: COMPLETED | OUTPATIENT
Start: 2018-05-23 | End: 2018-05-23

## 2018-05-23 RX ORDER — FUROSEMIDE 40 MG
0 TABLET ORAL
Qty: 0 | Refills: 0 | COMMUNITY

## 2018-05-23 RX ORDER — METHIMAZOLE 10 MG/1
0 TABLET ORAL
Qty: 0 | Refills: 0 | COMMUNITY

## 2018-05-23 RX ORDER — FERROUS SULFATE 325(65) MG
325 TABLET ORAL DAILY
Qty: 0 | Refills: 0 | Status: DISCONTINUED | OUTPATIENT
Start: 2018-05-23 | End: 2018-05-24

## 2018-05-23 RX ORDER — SODIUM FERRIC GLUCONAT/SUCROSE 62.5MG/5ML
100 AMPUL (ML) INTRAVENOUS ONCE
Qty: 0 | Refills: 0 | Status: COMPLETED | OUTPATIENT
Start: 2018-05-23 | End: 2018-05-23

## 2018-05-23 RX ORDER — METOPROLOL TARTRATE 50 MG
0 TABLET ORAL
Qty: 0 | Refills: 0 | COMMUNITY

## 2018-05-23 RX ORDER — METHIMAZOLE 10 MG/1
1 TABLET ORAL
Qty: 0 | Refills: 0 | DISCHARGE
Start: 2018-05-23

## 2018-05-23 RX ORDER — ACETAMINOPHEN 500 MG
2 TABLET ORAL
Qty: 0 | Refills: 0 | DISCHARGE
Start: 2018-05-23

## 2018-05-23 RX ORDER — DOCUSATE SODIUM 100 MG
1 CAPSULE ORAL
Qty: 0 | Refills: 0 | DISCHARGE
Start: 2018-05-23

## 2018-05-23 RX ORDER — METOPROLOL TARTRATE 50 MG
1 TABLET ORAL
Qty: 0 | Refills: 0 | DISCHARGE
Start: 2018-05-23

## 2018-05-23 RX ORDER — BICALUTAMIDE 50 MG/1
1 TABLET, FILM COATED ORAL
Qty: 0 | Refills: 0 | DISCHARGE
Start: 2018-05-23

## 2018-05-23 RX ORDER — APIXABAN 2.5 MG/1
1 TABLET, FILM COATED ORAL
Qty: 0 | Refills: 0 | DISCHARGE
Start: 2018-05-23

## 2018-05-23 RX ORDER — PANTOPRAZOLE SODIUM 20 MG/1
1 TABLET, DELAYED RELEASE ORAL
Qty: 0 | Refills: 0 | DISCHARGE
Start: 2018-05-23

## 2018-05-23 RX ORDER — FUROSEMIDE 40 MG
1 TABLET ORAL
Qty: 0 | Refills: 0 | DISCHARGE
Start: 2018-05-23

## 2018-05-23 RX ORDER — LOSARTAN POTASSIUM 100 MG/1
1 TABLET, FILM COATED ORAL
Qty: 0 | Refills: 0 | COMMUNITY

## 2018-05-23 RX ORDER — LANOLIN ALCOHOL/MO/W.PET/CERES
1 CREAM (GRAM) TOPICAL
Qty: 0 | Refills: 0 | DISCHARGE
Start: 2018-05-23

## 2018-05-23 RX ORDER — FERROUS SULFATE 325(65) MG
1 TABLET ORAL
Qty: 0 | Refills: 0 | DISCHARGE
Start: 2018-05-23

## 2018-05-23 RX ORDER — ATORVASTATIN CALCIUM 80 MG/1
1 TABLET, FILM COATED ORAL
Qty: 0 | Refills: 0 | COMMUNITY

## 2018-05-23 RX ORDER — TAMSULOSIN HYDROCHLORIDE 0.4 MG/1
1 CAPSULE ORAL
Qty: 0 | Refills: 0 | DISCHARGE
Start: 2018-05-23

## 2018-05-23 RX ORDER — ATORVASTATIN CALCIUM 80 MG/1
1 TABLET, FILM COATED ORAL
Qty: 0 | Refills: 0 | DISCHARGE
Start: 2018-05-23

## 2018-05-23 RX ORDER — LOSARTAN POTASSIUM 100 MG/1
1 TABLET, FILM COATED ORAL
Qty: 0 | Refills: 0 | DISCHARGE
Start: 2018-05-23

## 2018-05-23 RX ORDER — SENNA PLUS 8.6 MG/1
5 TABLET ORAL
Qty: 0 | Refills: 0 | DISCHARGE
Start: 2018-05-23

## 2018-05-23 RX ORDER — ASCORBIC ACID 60 MG
500 TABLET,CHEWABLE ORAL DAILY
Qty: 0 | Refills: 0 | Status: DISCONTINUED | OUTPATIENT
Start: 2018-05-23 | End: 2018-05-24

## 2018-05-23 RX ORDER — CHOLECALCIFEROL (VITAMIN D3) 125 MCG
1000 CAPSULE ORAL
Qty: 0 | Refills: 0 | DISCHARGE
Start: 2018-05-23

## 2018-05-23 RX ADMIN — Medication 325 MILLIGRAM(S): at 13:06

## 2018-05-23 RX ADMIN — ATORVASTATIN CALCIUM 80 MILLIGRAM(S): 80 TABLET, FILM COATED ORAL at 22:07

## 2018-05-23 RX ADMIN — Medication 102 MILLIGRAM(S): at 15:04

## 2018-05-23 RX ADMIN — APIXABAN 5 MILLIGRAM(S): 2.5 TABLET, FILM COATED ORAL at 17:25

## 2018-05-23 RX ADMIN — Medication 650 MILLIGRAM(S): at 23:07

## 2018-05-23 RX ADMIN — Medication 1 MILLIGRAM(S): at 22:09

## 2018-05-23 RX ADMIN — Medication 100 MILLIGRAM(S): at 11:02

## 2018-05-23 RX ADMIN — PANTOPRAZOLE SODIUM 40 MILLIGRAM(S): 20 TABLET, DELAYED RELEASE ORAL at 06:01

## 2018-05-23 RX ADMIN — BENZOCAINE AND MENTHOL 1 LOZENGE: 5; 1 LIQUID ORAL at 13:07

## 2018-05-23 RX ADMIN — SENNA PLUS 5 MILLILITER(S): 8.6 TABLET ORAL at 11:02

## 2018-05-23 RX ADMIN — Medication 1000 UNIT(S): at 11:01

## 2018-05-23 RX ADMIN — APIXABAN 5 MILLIGRAM(S): 2.5 TABLET, FILM COATED ORAL at 06:00

## 2018-05-23 RX ADMIN — Medication 1 TABLET(S): at 06:00

## 2018-05-23 RX ADMIN — Medication 2 TABLET(S): at 11:02

## 2018-05-23 RX ADMIN — Medication 81 MILLIGRAM(S): at 11:01

## 2018-05-23 RX ADMIN — Medication 40 MILLIGRAM(S): at 06:01

## 2018-05-23 RX ADMIN — Medication 25 MILLIGRAM(S): at 06:01

## 2018-05-23 RX ADMIN — Medication 650 MILLIGRAM(S): at 22:07

## 2018-05-23 RX ADMIN — Medication 108 MILLIGRAM(S): at 17:35

## 2018-05-23 RX ADMIN — Medication 1 TABLET(S): at 17:25

## 2018-05-23 RX ADMIN — BICALUTAMIDE 50 MILLIGRAM(S): 50 TABLET, FILM COATED ORAL at 11:02

## 2018-05-23 RX ADMIN — Medication 500 MILLIGRAM(S): at 13:07

## 2018-05-23 RX ADMIN — TAMSULOSIN HYDROCHLORIDE 0.4 MILLIGRAM(S): 0.4 CAPSULE ORAL at 22:07

## 2018-05-23 NOTE — DISCHARGE NOTE ADULT - CARE PLAN
Principal Discharge DX:	CHF (congestive heart failure)  Goal:	Follow up with your cardiologist  Assessment and plan of treatment:	You were treated for CHF exacerbation. Continue to take Lasix 40mg daily and Losartan 25mg daily  Secondary Diagnosis:	Prostate cancer  Goal:	Follow up with Heme/Onc  Assessment and plan of treatment:	Chest X ray performed on this admission found incidental sclerotic bone lesions. PSA levels were elevated to 6264.  CT abdomen/pelvis showed prostate tumor with distended urinary bladder, extensive bony metastasis, multiple solid pulmonary nodules consistent with pulmonary metastasis.  - You had a gould placed by urology for urinary retention. You will need to follow up with urology   - Continue with Tamsulosin 0.4mg daily    You had a bronchoscopy and mediastinal lymph node biopsy for carcinoma consistent with metastatic prostate cancer. You were seen by Oncology, received a shot of Denosumab and started on casodex (bicalutamide) for now with plan on giving for 2-4 weeks and follow up with Oncology for further regiment as outpatient.  -continue with calcium and vitamin D supplements  Secondary Diagnosis:	DVT (deep venous thrombosis)  Assessment and plan of treatment:	Continue with Eliquis 5 mg twice a day and follow up with your primary care doctor  Secondary Diagnosis:	Haemophilus influenzae infection  Assessment and plan of treatment:	Bronchoscopy showed cultures growing H. Influnza. You were started on Augmentin 875 mg twice daily for a total of 5 days (5/21-5/25)  Secondary Diagnosis:	Hyperthyroidism  Assessment and plan of treatment:	Continue with home methimazole 5 mg three times a day and follow up with your primary care doctor  Secondary Diagnosis:	CAD (coronary artery disease)  Assessment and plan of treatment:	You had a diagnostic heart cath with showed known 3 vessel CAD (99% mLAD, 100% ramus, 90% OM2, 99% small OM3, 85% distal dominant LCX), patent LIMA to LAD, patent radial graft to ramus, patent SVG to OM2.  - Continue with Atorvastatin 80mg daily, Metoprolol 25mg ER daily, and Aspirin 81mg daily  Secondary Diagnosis:	HTN (hypertension)  Assessment and plan of treatment:	Continue with lopressor 25 mg daily and losartan 25 mg daily Principal Discharge DX:	CHF (congestive heart failure)  Goal:	Follow up with your cardiologist  Assessment and plan of treatment:	You were treated for CHF exacerbation. Continue to take Lasix 40mg daily and Losartan 25mg daily  Secondary Diagnosis:	Prostate cancer  Goal:	Follow up with Heme/Onc  Assessment and plan of treatment:	Chest X ray performed on this admission found incidental sclerotic bone lesions. PSA levels were elevated to 6264.  CT abdomen/pelvis showed prostate tumor with distended urinary bladder, extensive bony metastasis, multiple solid pulmonary nodules consistent with pulmonary metastasis.  - You had a gould placed by urology for urinary retention. You will need to follow up with urology   - Continue with Tamsulosin 0.4mg daily    You had a bronchoscopy and mediastinal lymph node biopsy for carcinoma consistent with metastatic prostate cancer. You were seen by Oncology, received a shot of Denosumab and started on casodex (bicalutamide) for now with plan on giving for 2-4 weeks and follow up with Oncology for further regiment as outpatient.  -continue with calcium and vitamin D supplements  Secondary Diagnosis:	DVT (deep venous thrombosis)  Assessment and plan of treatment:	Continue with Eliquis 5 mg twice a day and follow up with your primary care doctor  Secondary Diagnosis:	Haemophilus influenzae infection  Assessment and plan of treatment:	Bronchoscopy showed cultures growing H. Influnza. You were started on Augmentin 875 mg twice daily for a total of 5 days (5/21-5/28)  Secondary Diagnosis:	Hyperthyroidism  Assessment and plan of treatment:	Continue with home methimazole 5 mg three times a day and follow up with your primary care doctor  Secondary Diagnosis:	CAD (coronary artery disease)  Assessment and plan of treatment:	You had a diagnostic heart cath with showed known 3 vessel CAD (99% mLAD, 100% ramus, 90% OM2, 99% small OM3, 85% distal dominant LCX), patent LIMA to LAD, patent radial graft to ramus, patent SVG to OM2.  - Continue with Atorvastatin 80mg daily, Metoprolol 25mg ER daily, and Aspirin 81mg daily  Secondary Diagnosis:	HTN (hypertension)  Assessment and plan of treatment:	Continue with lopressor 25 mg daily and losartan 25 mg daily    #Iron deficiency anemia  Your labs showed severe iron deficiency anemia. You received IV iron while in the hospital and were started on daily iron supplements. Continue with ferrous sulfate 325 mg daily Principal Discharge DX:	CHF (congestive heart failure)  Goal:	Follow up with your cardiologist  Assessment and plan of treatment:	You were treated for CHF exacerbation. Continue to take Lasix 40mg daily and Losartan 25mg daily  Secondary Diagnosis:	Prostate cancer  Goal:	Follow up with Heme/Onc  Assessment and plan of treatment:	Chest X ray performed on this admission found incidental sclerotic bone lesions. PSA levels were elevated to 6264.  CT abdomen/pelvis showed prostate tumor with distended urinary bladder, extensive bony metastasis, multiple solid pulmonary nodules consistent with pulmonary metastasis.  - You had a gould placed by urology for urinary retention. You will need to follow up with urology for evaluation for removal  - Continue with Tamsulosin 0.4mg daily    You had a bronchoscopy and mediastinal lymph node biopsy for carcinoma consistent with metastatic prostate cancer. You were seen by Oncology, received a shot of Denosumab and started on casodex (bicalutamide) for now with plan on giving for 2-4 weeks and follow up with Oncology for further regiment as outpatient.  -continue with calcium and vitamin D supplements  Secondary Diagnosis:	DVT (deep venous thrombosis)  Assessment and plan of treatment:	Continue with Eliquis 5 mg twice a day and follow up with your primary care doctor  Secondary Diagnosis:	Haemophilus influenzae infection  Assessment and plan of treatment:	Bronchoscopy showed cultures growing Haemophilus Influenza. You were started on Augmentin 875 mg twice daily for a total of 7 days (5/21-5/28)  Secondary Diagnosis:	Hyperthyroidism  Assessment and plan of treatment:	Continue with home methimazole 5 mg three times a day and follow up with your primary care doctor  Secondary Diagnosis:	CAD (coronary artery disease)  Assessment and plan of treatment:	You had a diagnostic heart cath with showed known 3 vessel CAD (99% mLAD, 100% ramus, 90% OM2, 99% small OM3, 85% distal dominant LCX), patent LIMA to LAD, patent radial graft to ramus, patent SVG to OM2.  - Continue with Atorvastatin 80mg daily, Metoprolol 25mg ER daily, and Aspirin 81mg daily  Secondary Diagnosis:	HTN (hypertension)  Assessment and plan of treatment:	Continue with lopressor 25 mg daily and losartan 25 mg daily    #Iron deficiency anemia  Your labs showed severe iron deficiency anemia. You received IV iron while in the hospital and were started on daily iron supplements. Continue with ferrous sulfate 325 mg daily

## 2018-05-23 NOTE — DISCHARGE NOTE ADULT - NS AS ACTIVITY OBS
Attempted to call patient bad phoned number.   Message placed to Dr. Kimmy Martinez for review and approval
Patient calling stating that her medication was not sent to the pharmacy after her appointment yesterday. She is requesting percocet sent to the pharmacy.      Please call Chintan Hutchinson   [phone]    Please call   [phone]
Walking-Outdoors allowed/Stairs allowed/Showering allowed/Driving allowed/Walking-Indoors allowed

## 2018-05-23 NOTE — PROGRESS NOTE ADULT - ASSESSMENT
78 yo M former smoker with CHF, CAD, incidental finding of bone mets on imaging and markedly elevated PSA  Imaging also with suspicious lung lesions   admitted with NSTEMI      Lung lesions:  s/p CT chest - bulky mediastinal nodes, nodules   s/p bronch  wash negative for malignant cells however lymph node mediastinal positive for carcinoma consistent with metastatic prostate cancer.   I discussed these findings with the patient at length.     Certainly with metastatic prostate cancer given imaging and PSA  best approach to treatment will most likely be abiraterone , prednisone and androgen deprivation therapy in addition to bone modifying drugs. We discussed the role of upfront chemotherapy , but given patient's performance status and the data for upfront abiraterone - the later is a safer option.    Abiraterone is not available as inpatient.  Will start patient on casodex (bicalutamide) for now - will plan on giving for 2-4 weeks.  Will then need to start lupron as outpatient.  We can then start abiraterone as outpatient.    Given bone metastasis - will start denosumab while inpatient.   Please give with 1000 mg po calcium and 1000 IU vitamin D.     hb decreasing and platelets rising - check ferritin, iron , iron saturation, TIBC

## 2018-05-23 NOTE — DISCHARGE NOTE ADULT - PLAN OF CARE
Follow up with your cardiologist You were treated for CHF exacerbation. Continue to take Lasix 40mg daily and Losartan 25mg daily Follow up with Heme/Onc Chest X ray performed on this admission found incidental sclerotic bone lesions. PSA levels were elevated to 6264.  CT abdomen/pelvis showed prostate tumor with distended urinary bladder, extensive bony metastasis, multiple solid pulmonary nodules consistent with pulmonary metastasis.  - You had a gould placed by urology for urinary retention. You will need to follow up with urology   - Continue with Tamsulosin 0.4mg daily    You had a bronchoscopy and mediastinal lymph node biopsy for carcinoma consistent with metastatic prostate cancer. You were seen by Oncology, received a shot of Denosumab and started on casodex (bicalutamide) for now with plan on giving for 2-4 weeks and follow up with Oncology for further regiment as outpatient.  -continue with calcium and vitamin D supplements Continue with Eliquis 5 mg twice a day and follow up with your primary care doctor Bronchoscopy showed cultures growing H. Influnza. You were started on Augmentin 875 mg twice daily for a total of 5 days (5/21-5/25) Continue with home methimazole 5 mg three times a day and follow up with your primary care doctor You had a diagnostic heart cath with showed known 3 vessel CAD (99% mLAD, 100% ramus, 90% OM2, 99% small OM3, 85% distal dominant LCX), patent LIMA to LAD, patent radial graft to ramus, patent SVG to OM2.  - Continue with Atorvastatin 80mg daily, Metoprolol 25mg ER daily, and Aspirin 81mg daily Continue with lopressor 25 mg daily and losartan 25 mg daily Bronchoscopy showed cultures growing H. Influnza. You were started on Augmentin 875 mg twice daily for a total of 5 days (5/21-5/28) Continue with lopressor 25 mg daily and losartan 25 mg daily    #Iron deficiency anemia  Your labs showed severe iron deficiency anemia. You received IV iron while in the hospital and were started on daily iron supplements. Continue with ferrous sulfate 325 mg daily Chest X ray performed on this admission found incidental sclerotic bone lesions. PSA levels were elevated to 6264.  CT abdomen/pelvis showed prostate tumor with distended urinary bladder, extensive bony metastasis, multiple solid pulmonary nodules consistent with pulmonary metastasis.  - You had a gould placed by urology for urinary retention. You will need to follow up with urology for evaluation for removal  - Continue with Tamsulosin 0.4mg daily    You had a bronchoscopy and mediastinal lymph node biopsy for carcinoma consistent with metastatic prostate cancer. You were seen by Oncology, received a shot of Denosumab and started on casodex (bicalutamide) for now with plan on giving for 2-4 weeks and follow up with Oncology for further regiment as outpatient.  -continue with calcium and vitamin D supplements Bronchoscopy showed cultures growing Haemophilus Influenza. You were started on Augmentin 875 mg twice daily for a total of 7 days (5/21-5/28)

## 2018-05-23 NOTE — PROGRESS NOTE ADULT - PROBLEM SELECTOR PLAN 5
-hx of L DVT (dx: 6 months ago: on eliquis), given cancer diagnosis and unknown reason for prior DVT.  -per Dr. Peggy may to restart Eliquis after bronchoscopy 05/17. Continue Eliquis 5mg BID Pt with severe iron deficiency anemia.   -will give IV iron 100 mg and start ferrous sulfate 325 mg daily

## 2018-05-23 NOTE — PROGRESS NOTE ADULT - PROBLEM SELECTOR PLAN 5
Pt with severe iron deficiency anemia.   -will give IV iron 100 mg and start ferrous sulfate 325 mg daily

## 2018-05-23 NOTE — PROGRESS NOTE ADULT - PROBLEM SELECTOR PLAN 10
PPx: Apixaban; PPI  FEN: No fluids indicated; replete electrolytes PRN; regular diet  Needs: Heme f/u / placement (BAILEY)  De Leon: Yes  Access: Peripheral  Ulcers: None  Sepsis: Does not meet SIRS criteria  Goals of Care: Full code  Dispo: Transfer to medicine
PPx: Apixaban; PPI  FEN: No fluids indicated; replete electrolytes PRN; regular diet  Needs: Heme f/u / placement (BAILEY)  De Leon: Yes  Access: Peripheral  Ulcers: None  Sepsis: Does not meet SIRS criteria  Goals of Care: Full code  Dispo: Transfer to medicine

## 2018-05-23 NOTE — PROGRESS NOTE ADULT - ASSESSMENT
ASSESSMENT/PLAN77 yo male FORMER SMOKER with PMHx of HTN, HLD, pre- DM, CAD s/p CABG (in 2016 @ [facility]), chronic systolic CHF, h/o DVT 6 months ago (on Eliquis)  admitted for r/o ACS s/p non obstructed Cath, being worked up for prostate CA, found to have multiple  lung nodules, diffuse mediastinal LAD. 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 O2 attempt off  2. Bronchodilators:  Atrovent/ albuterol q 4 – 6 hours as needed  3. Corticosteroids: off  4. ID/Antibiotics: Augmentin 875 Q 12 hrs for seven days  5. Cardiac/HTN: optimize BP  6. GI: Rx/ prophylaxis c PPI/H2B  7. Heme: Rx/VT prophylaxis c SQH/SCD/AC continue   Eliquis  8. Aspiration precautions  9 Mobilize, OOB, use incentive spirometry  10.Oncology mngmnt appreciated and priority  Discussed with managing team,

## 2018-05-23 NOTE — DISCHARGE NOTE ADULT - HOSPITAL COURSE
77M w/ CAD, CHF, prior DVT (on A/C) presents w/ cough and SOB X5-6 days. In the ED, his vitals were T98.3F,  BPM, /86, RR 20, SpO2 96% on RA.  Labs significant for alk phos 500, , trop 0.08, BNP 6822. CXR w/ R sided pleural effusion. EKG w/ NSR w/ 1st degree AV block @ 94 BPM with left axis deviation and Q wave in inferior leads and V1-V5.  While in ER, pt received IV Lasix 40 mg x1. Patient admitted to 5U for diuresis, ECHO, r/o ACS. There is severe global hypokinesis of the left ventricle, EF 30%. The left atrium is severely dilated, mitral inflow pattern is consistent with restrictive left ventricular filling, suggestive of severely elevated left atrial pressure, right atrium is moderately dilated, Tethered mitral valve leaflets. Pt was diuresed and is now euvolemic. Pt ruled for NSTEMI  CE 0.08 -> 0.15, s/p diagnostic cardiac cath known 3VCAD (99% mLAD, 100% ramus, 90% OM2, 99% small OM3, 85% distal dominant LCX), patent LIMA to LAD, patent radial graft to ramus, patent SVG to OM2, LVEDP4, pt optimized with medical therapy. CXR with incidental finding of sclerotic bone metastases, urology consulted PSA level 6264, De Leon was inserted in the setting of urinary retention. CT abdomen/pelvis 05/11 Prostate tumor. Distended urinary bladder. Extensive bony metastasis. Multiple solid pulmonary nodules. Pulmonary metastasis is a consideration. Pulm consulted. CT chest 05/14 revealed  discrete nodular opacities visualized some demonstrating feeding vessel sign concerning for metastatic prostate. Pt was then scheduled for bronchoscopy which was done 05/16.  He was restarted on Eliquis 5 mg BID. Bronch wash was negative for malignant cells but mediastinal lymph node biopsy was positive for carcinoma consistent with metastatic prostate cancer. He received a shot of Denosumab, Calcium and Vangie D supplements and was started on casodex (bicalutamide) for now with plan on giving for 2-4 weeks and follow up with Oncology for further regiment as outpatient. Bronch cultures grew H. Influnza, he was started on augmentin 875 mg BID x 5 days.  On the day of discharge, the patient was seen and examined. Symptoms improved. Vital signs are stable. Labs and imaging reviewed. Patient is medically optimized and hemodynamically stable. Return precautions discussed, medication teach back done, and importance of physician followup emphasized. The patient verbalized understanding. 77M w/ CAD, CHF, prior DVT (on A/C) presents w/ cough and SOB X5-6 days. In the ED, his vitals were T98.3F,  BPM, /86, RR 20, SpO2 96% on RA.  Labs significant for alk phos 500, , trop 0.08, BNP 6822. CXR w/ R sided pleural effusion. EKG w/ NSR w/ 1st degree AV block @ 94 BPM with left axis deviation and Q wave in inferior leads and V1-V5.  While in ER, pt received IV Lasix 40 mg x1. Patient admitted to 5U for diuresis, ECHO, r/o ACS. There is severe global hypokinesis of the left ventricle, EF 30%. The left atrium is severely dilated, mitral inflow pattern is consistent with restrictive left ventricular filling, suggestive of severely elevated left atrial pressure, right atrium is moderately dilated, Tethered mitral valve leaflets. Pt was diuresed and is now euvolemic. Pt ruled for NSTEMI  CE 0.08 -> 0.15, s/p diagnostic cardiac cath known 3VCAD (99% mLAD, 100% ramus, 90% OM2, 99% small OM3, 85% distal dominant LCX), patent LIMA to LAD, patent radial graft to ramus, patent SVG to OM2, LVEDP4, pt optimized with medical therapy. CXR with incidental finding of sclerotic bone metastases, urology consulted PSA level 6264, De Leon was inserted in the setting of urinary retention. CT abdomen/pelvis 05/11 Prostate tumor. Distended urinary bladder. Extensive bony metastasis. Multiple solid pulmonary nodules. Pulmonary metastasis is a consideration. Pulm consulted. CT chest 05/14 revealed  discrete nodular opacities visualized some demonstrating feeding vessel sign concerning for metastatic prostate. Pt was then scheduled for bronchoscopy which was done 05/16.  He was restarted on Eliquis 5 mg BID. Bronch wash was negative for malignant cells but mediastinal lymph node biopsy was positive for carcinoma consistent with metastatic prostate cancer. He received a shot of Denosumab, Calcium and Vangie D supplements and was started on casodex (bicalutamide) for now with plan on giving for 2-4 weeks and follow up with Oncology for further regiment as outpatient. Bronch cultures grew H. Influenza he was started on augmenting 875 mg BID x 7 days. He received IV iron and was started on daily ferous sulfate 325 mg supplement.  On the day of discharge, the patient was seen and examined. Symptoms improved. Vital signs are stable. Labs and imaging reviewed. Patient is medically optimized and hemodynamically stable. Return precautions discussed, medication teach back done, and importance of physician followup emphasized. The patient verbalized understanding.

## 2018-05-23 NOTE — DISCHARGE NOTE ADULT - PATIENT PORTAL LINK FT
You can access the [place] [facility], offered by [facility], by registering with the following website: [email or web address]

## 2018-05-23 NOTE — DISCHARGE NOTE ADULT - CARE PROVIDER_API CALL
Steve Murphy), Internal Medicine  [address] [id number]  Phone: [phone]  Fax: [phone]    Rojas Brown), Urology  [address] [address]  Phone: [phone]  Fax: [phone]    Gracie Martinez), Hematology; Internal Medicine; Medical Oncology  [address]  [place] [address]  Phone: [phone]  Fax: [phone]

## 2018-05-23 NOTE — DISCHARGE NOTE ADULT - SECONDARY DIAGNOSIS.
Prostate cancer DVT (deep venous thrombosis) Haemophilus influenzae infection Hyperthyroidism CAD (coronary artery disease) HTN (hypertension)

## 2018-05-23 NOTE — PROGRESS NOTE ADULT - PROBLEM SELECTOR PROBLEM 1
CHF exacerbation
Hypoxia
CHF exacerbation

## 2018-05-23 NOTE — PROGRESS NOTE ADULT - ATTENDING COMMENTS
Thank you,      Gracie Martinez MD  [phone]
Thank you,      Gracie Martinez MD  [phone]
Thank you,      Gracie Martinez MD  [phone]
Thank you,      Gracie Martinez MD  [phone]
Thank you,      Gracie Martinez MD  [phone]
Thank you,      Gracie Martinez MD  [phone]
Thank you,      Gracie Martinez MD  [phone]
CAD-stable  Ca-p with mets   retention    Bronch-H flu, Augmentin      d/c paln rehab--UES?
CV stable  OOB  PT  Ca-p  further eval and rx  d/c paln--BAILEY--UES          eval 5/18
CCM > 120
Assessment: Patient personally seen and examined myself during rounds with the Physician Assistant/House Staff/Nurse Practitioner   Physician Assistant/House Staff/Nurse Practitioner note read, including vitals, physical findings, laboratory data, and radiological reports.   Revisions included below.   Direct personal management at bed side and extensive interpretation of the data.    Plan was outlined and discussed in details with the Physician Assistant/House Staff/Nurse Practitioner.    Decision making of high complexity   Risk high of complications, morbidity, and/or mortality  Assessment and Action taken for acute disease activity to reflect the level of care provided:  -Hemodynamic evaluation and support  -Medication reconciliation  -Review laboratory data  -EKG reviewed   -    TIME SPENT in evaluation and management, reassessments, review and interpretation of labs and x-rays, and hemodynamic management, formulating a plan and coordinating care: ___25____ MIN.  Time does not include procedural time.
CHF/CAD  DVT--eliquis  Ca-p   further evaluation
Assessment: Patient personally seen and examined myself during rounds with the Physician Assistant/House Staff/Nurse Practitioner   Physician Assistant/House Staff/Nurse Practitioner note read, including vitals, physical findings, laboratory data, and radiological reports.   Revisions included below.   Direct personal management at bed side and extensive interpretation of the data.    Plan was outlined and discussed in details with the Physician Assistant/House Staff/Nurse Practitioner.    Decision making of high complexity   Risk high of complications, morbidity, and/or mortality  Assessment and Action taken for acute disease activity to reflect the level of care provided:  -Hemodynamic evaluation and support  -Medication reconciliation  -Review laboratory data  -EKG reviewed   -    TIME SPENT in evaluation and management, reassessments, review and interpretation of labs and x-rays, and hemodynamic management, formulating a plan and coordinating care: ___25____ MIN.  Time does not include procedural time.
CHF  CAD  prob mets Ca prostate---new dg  LFT--monitor  CV stable
Bronch  5/17  CV stable  supp  rx
CHF--improved  Glu-monitor  OOB  PT
CV stable  Ca-p    gu keep LAU    d/c plan
Ca-p  further evaluation  CAD/CHF
Assessment: Patient personally seen and examined myself during rounds with the Physician Assistant/House Staff/Nurse Practitioner   Physician Assistant/House Staff/Nurse Practitioner note read, including vitals, physical findings, laboratory data, and radiological reports.   Revisions included below.   Direct personal management at bed side and extensive interpretation of the data.    Plan was outlined and discussed in details with the Physician Assistant/House Staff/Nurse Practitioner.    Decision making of high complexity   Risk high of complications, morbidity, and/or mortality  Assessment and Action taken for acute disease activity to reflect the level of care provided:  -Hemodynamic evaluation and support  -Medication reconciliation  -Review laboratory data  -EKG reviewed   - cardiac optimized for open prostatectomy RCRI/Chen CV scores low.  Normal cors.    TIME SPENT in evaluation and management, reassessments, review and interpretation of labs and x-rays, and hemodynamic management, formulating a plan and coordinating care: ___25____ MIN.  Time does not include procedural time.
Bronch  5/17  CV stable  supp  rx  No s/s of CHF  Prostate--ca
CV stable  OOB  PT  Ca-p  further eval and rx  d/c paln--BAILEY--UES  bronch--cytology neg
CV stable  mets ca-p  Psych--eval p    d/c paln with further rx as outpt  ??BAILEY
Pulm nodule--biopsy p  prob ca-p with mets  CV stable
CV stable  Malignancy--eval p  OOB  supp rx
Assessment: Patient personally seen and examined myself during rounds with the Physician Assistant/House Staff/Nurse Practitioner   Physician Assistant/House Staff/Nurse Practitioner note read, including vitals, physical findings, laboratory data, and radiological reports.   Revisions included below.   Direct personal management at bed side and extensive interpretation of the data.    Plan was outlined and discussed in details with the Physician Assistant/House Staff/Nurse Practitioner.    Decision making of high complexity   Risk high of complications, morbidity, and/or mortality  Assessment and Action taken for acute disease activity to reflect the level of care provided:  -Hemodynamic evaluation and support  -Medication reconciliation  -Review laboratory data  -EKG reviewed   -    TIME SPENT in evaluation and management, reassessments, review and interpretation of labs and x-rays, and hemodynamic management, formulating a plan and coordinating care: ___25____ MIN.  Time does not include procedural time.

## 2018-05-23 NOTE — DISCHARGE NOTE ADULT - MEDICATION SUMMARY - MEDICATIONS TO STOP TAKING
I will STOP taking the medications listed below when I get home from the hospital:    methIMAzole 5 mg oral tablet  -- orally 2 times a day

## 2018-05-23 NOTE — PROGRESS NOTE ADULT - PROBLEM SELECTOR PLAN 2
CXR w/ incidental finding of sclerotic bone metastases, likely from prostate CA.  PSA elevated 6264.  CT abdomen/pelvis 05/11: Prostate tumor. Distended urinary bladder. Extensive bony metastasis. Multiple solid pulmonary nodules. Pulmonary metastasis is a consideration.  -wash negative for malignant cells however lymph node mediastinal positive for carcinoma consistent with metastatic prostate cancer.   - C/w gould for urinary retention (placed by urology)  - C/w Tamsulosin 0.4mg  - f/u uro recs (Dr. Brown)  -Started on casodex (bicalutamide)- plan on giving for 2-4 weeks.  Will then need to start lupron and abiraterone as outpatient.  -s/p denosumab 120 mg.   -c/w calcium carbonate give with 1250 mg po calcium and 1000 IU vitamin D3 daily.   -pt to f/u with Dr. Martinez as outpatient   -Vitamin D 35.3 and Vitamin D 1, 25: 92.9  -Tramadol PRN for pain.

## 2018-05-23 NOTE — DISCHARGE NOTE ADULT - MEDICATION SUMMARY - MEDICATIONS TO TAKE
I will START or STAY ON the medications listed below when I get home from the hospital:    acetaminophen 325 mg oral tablet  -- 2 tab(s) by mouth every 6 hours, As needed, Mild Pain (1 - 3)  -- Indication: For Pain    aspirin 81 mg oral delayed release tablet  -- 1 tab(s) by mouth once a day  -- Indication: For CAD (coronary artery disease)    losartan 25 mg oral tablet  -- 1 tab(s) by mouth once a day  -- Indication: For CHF (congestive heart failure)    aluminum hydroxide-magnesium hydroxide 200 mg-200 mg/5 mL oral suspension  -- 30 milliliter(s) by mouth every 6 hours, As needed, Dyspepsia  -- Indication: For Dyspepsia    calcium carbonate 1250 mg (500 mg elemental calcium) oral tablet  -- 2 tab(s) by mouth once a day  -- Indication: For Prostate cancer    tamsulosin 0.4 mg oral capsule  -- 1 cap(s) by mouth once a day (at bedtime)  -- Indication: For Prostate cancer    apixaban 5 mg oral tablet  -- 1 tab(s) by mouth every 12 hours  -- Indication: For DVT (deep venous thrombosis)    atorvastatin 80 mg oral tablet  -- 1 tab(s) by mouth once a day (at bedtime)  -- Indication: For CAD (coronary artery disease)    bicalutamide 50 mg oral tablet  -- 1 tab(s) by mouth once a day  -- Indication: For Prostate cancer    methIMAzole 5 mg oral tablet  -- 1 tab(s) by mouth 3 times a day  -- Indication: For Hyperthyroidism    metoprolol succinate 25 mg oral tablet, extended release  -- 1 tab(s) by mouth once a day  -- Indication: For CHF (congestive heart failure)    ipratropium-albuterol 0.5 mg-2.5 mg/3 mLinhalation solution  -- 3 milliliter(s) inhaled every 6 hours, As needed, Shortness of Breath and/or Wheezing  -- Indication: For Dyspnea    furosemide 40 mg oral tablet  -- 1 tab(s) by mouth once a day  -- Indication: For CHF (congestive heart failure)    docusate sodium 100 mg oral capsule  -- 1 cap(s) by mouth once a day  -- Indication: For Constipation    senna 8.8 mg/5 mL oral syrup  -- 5 milliliter(s) by mouth once a day  -- Indication: For Constipation    melatonin 1 mg oral tablet  -- 1 tab(s) by mouth once a day (at bedtime)  -- Indication: For Insomnia    Augmentin 875 mg-125 mg oral tablet  -- 1 tab(s) by mouth 2 times a day- Last day 5/26  -- Indication: For Haemophilus influenzae infection    pantoprazole 40 mg oral delayed release tablet  -- 1 tab(s) by mouth once a day (before a meal)  -- Indication: For Dyspepsia     cholecalciferol oral tablet  -- 1000 unit(s) by mouth once a day  -- Indication: For Prostate cancer I will START or STAY ON the medications listed below when I get home from the hospital:    acetaminophen 325 mg oral tablet  -- 2 tab(s) by mouth every 6 hours, As needed, Mild Pain (1 - 3)  -- Indication: For Pain    aspirin 81 mg oral delayed release tablet  -- 1 tab(s) by mouth once a day  -- Indication: For CAD (coronary artery disease)    losartan 25 mg oral tablet  -- 1 tab(s) by mouth once a day  -- Indication: For CHF (congestive heart failure)    aluminum hydroxide-magnesium hydroxide 200 mg-200 mg/5 mL oral suspension  -- 30 milliliter(s) by mouth every 6 hours, As needed, Dyspepsia  -- Indication: For Dyspepsia    calcium carbonate 1250 mg (500 mg elemental calcium) oral tablet  -- 2 tab(s) by mouth once a day  -- Indication: For Prostate cancer    tamsulosin 0.4 mg oral capsule  -- 1 cap(s) by mouth once a day (at bedtime)  -- Indication: For Prostate cancer    apixaban 5 mg oral tablet  -- 1 tab(s) by mouth every 12 hours  -- Indication: For DVT (deep venous thrombosis)    atorvastatin 80 mg oral tablet  -- 1 tab(s) by mouth once a day (at bedtime)  -- Indication: For CAD (coronary artery disease)    bicalutamide 50 mg oral tablet  -- 1 tab(s) by mouth once a day  -- Indication: For Prostate cancer    methIMAzole 5 mg oral tablet  -- 1 tab(s) by mouth 3 times a day  -- Indication: For Hyperthyroidism    metoprolol succinate 25 mg oral tablet, extended release  -- 1 tab(s) by mouth once a day  -- Indication: For CHF (congestive heart failure)    ipratropium-albuterol 0.5 mg-2.5 mg/3 mLinhalation solution  -- 3 milliliter(s) inhaled every 6 hours, As needed, Shortness of Breath and/or Wheezing  -- Indication: For Dyspnea    furosemide 40 mg oral tablet  -- 1 tab(s) by mouth once a day  -- Indication: For CHF (congestive heart failure)    ferrous sulfate 325 mg (65 mg elemental iron) oral tablet  -- 1 tab(s) by mouth 3 times a day  -- Indication: For Iron deficiency anemia    docusate sodium 100 mg oral capsule  -- 1 cap(s) by mouth once a day  -- Indication: For Constipation    senna 8.8 mg/5 mL oral syrup  -- 5 milliliter(s) by mouth once a day  -- Indication: For Constipation    melatonin 1 mg oral tablet  -- 1 tab(s) by mouth once a day (at bedtime)  -- Indication: For Insomnia    Augmentin 875 mg-125 mg oral tablet  -- 1 tab(s) by mouth 2 times a day- Last day 5/26  -- Indication: For Haemophilus influenzae infection    pantoprazole 40 mg oral delayed release tablet  -- 1 tab(s) by mouth once a day (before a meal)  -- Indication: For Dyspepsia     cholecalciferol oral tablet  -- 1000 unit(s) by mouth once a day  -- Indication: For Prostate cancer I will START or STAY ON the medications listed below when I get home from the hospital:    acetaminophen 325 mg oral tablet  -- 2 tab(s) by mouth every 6 hours, As needed, Mild Pain (1 - 3)  -- Indication: For Pain    aspirin 81 mg oral delayed release tablet  -- 1 tab(s) by mouth once a day  -- Indication: For CAD (coronary artery disease)    losartan 25 mg oral tablet  -- 1 tab(s) by mouth once a day  -- Indication: For CHF (congestive heart failure)    aluminum hydroxide-magnesium hydroxide 200 mg-200 mg/5 mL oral suspension  -- 30 milliliter(s) by mouth every 6 hours, As needed, Dyspepsia  -- Indication: For Dyspepsia    calcium carbonate 1250 mg (500 mg elemental calcium) oral tablet  -- 2 tab(s) by mouth once a day  -- Indication: For Prostate cancer    tamsulosin 0.4 mg oral capsule  -- 1 cap(s) by mouth once a day (at bedtime)  -- Indication: For Prostate cancer    apixaban 5 mg oral tablet  -- 1 tab(s) by mouth every 12 hours  -- Indication: For DVT (deep venous thrombosis)    atorvastatin 80 mg oral tablet  -- 1 tab(s) by mouth once a day (at bedtime)  -- Indication: For CAD (coronary artery disease)    bicalutamide 50 mg oral tablet  -- 1 tab(s) by mouth once a day  -- Indication: For Prostate cancer    methIMAzole 5 mg oral tablet  -- 1 tab(s) by mouth 3 times a day  -- Indication: For Hyperthyroidism    metoprolol succinate 25 mg oral tablet, extended release  -- 1 tab(s) by mouth once a day  -- Indication: For CHF (congestive heart failure)    ipratropium-albuterol 0.5 mg-2.5 mg/3 mLinhalation solution  -- 3 milliliter(s) inhaled every 6 hours, As needed, Shortness of Breath and/or Wheezing  -- Indication: For Dyspnea    furosemide 40 mg oral tablet  -- 1 tab(s) by mouth once a day  -- Indication: For CHF (congestive heart failure)    ferrous sulfate 325 mg (65 mg elemental iron) oral tablet  -- 1 tab(s) by mouth 3 times a day  -- Indication: For Iron deficiency anemia    docusate sodium 100 mg oral capsule  -- 1 cap(s) by mouth once a day  -- Indication: For Constipation    senna 8.8 mg/5 mL oral syrup  -- 5 milliliter(s) by mouth once a day  -- Indication: For Constipation    melatonin 1 mg oral tablet  -- 1 tab(s) by mouth once a day (at bedtime)  -- Indication: For Insomnia    Augmentin 875 mg-125 mg oral tablet  -- 1 tab(s) by mouth 2 times a day- Last day 5/28  -- Indication: For Haemophilus influenzae infection    pantoprazole 40 mg oral delayed release tablet  -- 1 tab(s) by mouth once a day (before a meal)  -- Indication: For Dyspepsia     cholecalciferol oral tablet  -- 1000 unit(s) by mouth once a day  -- Indication: For Prostate cancer

## 2018-05-23 NOTE — PROGRESS NOTE ADULT - SUBJECTIVE AND OBJECTIVE BOX
Interventional, Pulmonary, Critical, Chest Special Procedures.    Pt was seen and fully examined by myself.     Time spent with patient in minutes:37    Patient is a 77y old  Male who presents with a chief complaint of CHF exacerbation (23 May 2018 10:53)The patient c no new symptoms, eupneic on RA, pain free when seen. The patient discussed extensively c Oncology .     HPI:  76 yo male FORMER SMOKER with PMHx of HTN, HLD, pre- DM,  CAD s/p CABG (in 2016 @ [facility]), CHF (EF unknown), h/o DVT 6 months ago (on Eliquis; last dose last night) who presented to [facility] ED on 5/9/18 with CC of Cough and SOB X 5-6 days. Pt. reports that he initially had a cough with white sputum production associated with shortness of breath at rest, non-radiating L sided Chest tightness and palpitations. pt. reports that the chest tightness and SOB comes along with the cough  and is relieved when he stops coughing. He also reports feeling sore. At baseline, pt. reports that he can ambulate 4 blocks before getting SOB.  Today am, pt. reports having hot flashes , cough, SOB and chest tightness which prompted him to come to the ED. Denies dizziness, diaphoresis, fatigue, LE edema, orthopnea, PND, syncope.  Upon admit, temp 98.3F,  BPM, /86, RR 20, SpO2 96% on RA.  Labs significant for alk phos 500, , trop 0.08, BNP 6822. CXR wet read revealed R sided pleural effusion. EKG revealed NSR with 1st degree AV block @ 94 BPM with left axis deviation and Q wave in inferior leads and V1-V5.  While in ER, pt received IV Lasix 40 mg x1. Patient admitted to 5U for diuresis, ECHO, r/o ACS. (09 May 2018 19:41)    REVIEW OF SYSTEMS: updated  Constitutional: No fever, weight loss, chills or fatigue  Eyes: No eye pain, visual disturbances, or discharge  ENMT:  No difficulty hearing, tinnitus, vertigo; No sinus or throat pain. No epistaxis, dysphagia, dysphonia, hoarseness or odynophagia  Neck: No pain, stiffness or neck swelling.  No masses or deformities  Respiratory: No cough, wheezing, chills or hemoptysis  - COPD  - ILD   - PE   - ASTHMA     - PNEUMONIA  Cardiovascular: No chest pain, dysrhythmia, palpitations, dizziness or edema   - COPD     - CAD   - CHF   - HTN  Gastrointestinal: No abdominal or epigastric pain. No nausea, vomiting or hematemesis; No diarrhea or constipation. No melena or hematochezia. No dysphagia or Icterus.          Genitourinary: No dysuria, frequency, hematuria or incontinence   - CKD/PAM      - ESRD  Neurological: No headaches, memory loss, loss of strength, numbness or tremors      -DEMENTIA     - STROKE    - SEIZURE  Skin: No itching, burning, rashes or lesions   Lymph Nodes: No enlarged glands  Endocrine: No heat or cold intolerance; No hair loss       - DM     - THYROID DISORDER  Musculoskeletal: No joint pain or swelling; No muscle, back or extremity pain  Psychiatric: No depression, anxiety, mood swings or difficulty sleeping  Heme/Lymph: No easy bruising or bleeding gums         - ANEMIA      - CANCER   -COAGULOPATHY  Allergy and Immunologic: No hives or eczema    PAST MEDICAL & SURGICAL HISTORY:  CHF (congestive heart failure)  CAD (coronary artery disease)  HTN (hypertension)  S/P hernia surgery  History of coronary artery bypass graft: 2017 @ [facility]    FAMILY HISTORY:  No pertinent family history in first degree relatives    SOCIAL HISTORY:      - Tobacco     - ETOH    Allergies    No Known Allergies    Intolerances      Vital Signs Last 24 Hrs  T(C): 36.4 (23 May 2018 10:05), Max: 37.7 (22 May 2018 21:31)  T(F): 97.5 (23 May 2018 10:05), Max: 99.8 (22 May 2018 21:31)  HR: 65 (23 May 2018 11:03) (65 - 80)  BP: 92/55 (23 May 2018 11:03) (81/46 - 100/56)  BP(mean): --  RR: 18 (23 May 2018 11:03) (18 - 18)  SpO2: 99% (23 May 2018 11:03) (99% - 100%)    05-22 @ 07:01  -  05-23 @ 07:00  --------------------------------------------------------  IN: 100 mL / OUT: 850 mL / NET: -750 mL    05-23 @ 07:01  -  05-23 @ 13:14  --------------------------------------------------------  IN: 120 mL / OUT: 0 mL / NET: 120 mL        PHYSICAL EXAM:  More  Comfortable, no immediate distress  Eyes: PERRL, EOM intact; conjunctiva and sclera clear  Head: Normocephalic;  No Trauma  ENMT: No nasal discharge, +hoarseness, less cough   Neck: Supple; non tender; no masses or deformities.    No JVD  Respiratory:  - WHEEZING   - RHONCHI  - RALES  + CRACKLES.  Diminished breath sounds  BILATERAL  RIGHT  LEFT bases  Cardiovascular: Regular rate and rhythm. S1 and S2 Normal; No murmurs, gallops or rubs     - PPM/AICD  Gastrointestinal: Soft non-tender, non-distended; Normal bowel sounds; No hepatosplenomegaly.     -PEG    -  GT   +LAU  Genitourinary: No costovertebral angle tenderness. No dysuria  Extremities: AROM, + clubbing, cyanosis or edema    Vascular: Peripheral pulses palpable 2+ bilaterally  Neurological: Alert and responisve to stimuli   Skin: Warm and dry. No obvious rash  Lymph Nodes: No acute cervical or supraclavicular adenopathy  Psychiatric: Cooperative and appropriate mood  DEVICES:  - DENTURES   +IV R / L     - ETUBE   -TRACH   -CTUBE  R / L    Labs reviewed.

## 2018-05-23 NOTE — PROGRESS NOTE ADULT - SUBJECTIVE AND OBJECTIVE BOX
Notes reviewed; discussed with staff; patient seen. Patient is sitting up in a chair at the bedside. Patient scheduled to go to Rehab today or tomorrow and is looking foward to it.Patient remains reluctant to talk about his illness but says he will take his medication and follow up with the doctor. He speaks of "Chemotherapy" which he says he is reluctant to take because he has seen what it can do to people. Will continue to discuss it with him as he progresses with his treatment.

## 2018-05-23 NOTE — PROGRESS NOTE ADULT - SUBJECTIVE AND OBJECTIVE BOX
INTERVAL HPI/OVERNIGHT EVENTS: No acute events. Pt felt nauseous yesterday after starting prostate CA treatment but resolved. He has no new complaints.    ROS  CV: Denies chest pain, palpitations  RESP: Denies SOB  GI: Denies abdominal pain, constipation, diarrhea, nausea, vomiting  : Denies dysuria, hematuria, flank or back pain  ID: Denies fevers, chills  MSK: Denies joint pain     VITAL SIGNS:  T(F): 97.5 (05-23-18 @ 10:05), Max: 99.8 (05-22-18 @ 21:31)  HR: 65 (05-23-18 @ 11:03) (65 - 80)  BP: 92/55 (05-23-18 @ 11:03) (81/46 - 100/56)  RR: 18 (05-23-18 @ 11:03) (18 - 18)  SpO2: 99% (05-23-18 @ 11:03) (99% - 100%)    05-22-18 @ 07:01  -  05-23-18 @ 07:00  --------------------------------------------------------  IN: 100 mL / OUT: 850 mL / NET: -750 mL    05-23-18 @ 07:01  -  05-23-18 @ 13:35  --------------------------------------------------------  IN: 120 mL / OUT: 0 mL / NET: 120 mL    PHYSICAL EXAM:  Constitutional: WDWN, NAD, resting comfortably   Head: NC/AT  Eyes: PERRL, EOMI, anicteric sclera, no mucosal pallor  ENT: no nasal discharge; uvula midline, no oropharyngeal erythema or exudates; MMM  Neck: supple; no JVD or thyromegaly, no lymphadenopathy  Respiratory: CTA B/L; no W/R/R, no retractions  Cardiac: +S1/S2; RRR; no M/R/G  Gastrointestinal: abdomen soft, NT/ND; no rebound or guarding; +BSx4  : De [place] in place draining clear urine  Back: spine midline, no bony tenderness or step-offs; no CVAT B/L  Extremities: warm; no calf tenderness, no pedal edema, no cyanosis, no clubbing  Musculoskeletal: NROM x4; no joint swelling, tenderness or erythema  Vascular: 2+ radial; DP/PT pulses B/L  Neurologic: AAOx3; CNII-XII grossly intact; 5/5 strength throughout, sensory symmetrically intact in B/L LE   Psychiatric: pleasant and conversive; affect and characteristics of appearance, verbalizations, behaviors are appropriate      MEDICATIONS  (STANDING):  amoxicillin  875 milliGRAM(s)/clavulanate 1 Tablet(s) Oral two times a day  apixaban 5 milliGRAM(s) Oral every 12 hours  ascorbic acid 500 milliGRAM(s) Oral daily  aspirin enteric coated 81 milliGRAM(s) Oral daily  atorvastatin 80 milliGRAM(s) Oral at bedtime  benzocaine 15 mG/menthol 3.6 mG Lozenge 1 Lozenge Oral every 4 hours  bicalutamide 50 milliGRAM(s) Oral daily  calcium carbonate 1250 mG (OsCal) 2 Tablet(s) Oral daily  cholecalciferol 1000 Unit(s) Oral daily  docusate sodium 100 milliGRAM(s) Oral daily  ferrous    sulfate 325 milliGRAM(s) Oral daily  furosemide    Tablet 40 milliGRAM(s) Oral daily  losartan 25 milliGRAM(s) Oral daily  melatonin 1 milliGRAM(s) Oral at bedtime  methimazole 5 milliGRAM(s) Oral three times a day  metoprolol succinate ER 25 milliGRAM(s) Oral daily  pantoprazole    Tablet 40 milliGRAM(s) Oral before breakfast  senna Syrup 5 milliLiter(s) Oral daily  sodium ferric gluconate complex IVPB 25 milliGRAM(s) IV Intermittent once  sodium ferric gluconate complex IVPB 100 milliGRAM(s) IV Intermittent once  tamsulosin 0.4 milliGRAM(s) Oral at bedtime    MEDICATIONS  (PRN):  acetaminophen   Tablet. 650 milliGRAM(s) Oral every 6 hours PRN Mild Pain (1 - 3)  ALBUTerol/ipratropium for Nebulization 3 milliLiter(s) Nebulizer every 6 hours PRN Shortness of Breath and/or Wheezing  aluminum hydroxide/magnesium hydroxide/simethicone Suspension 30 milliLiter(s) Oral every 6 hours PRN Dyspepsia      Allergies    No Known Allergies    Intolerances        LABS:                        9.9    8.1   )-----------( 414      ( 23 May 2018 06:38 )             29.8     05-23    137  |  98  |  14  ----------------------------<  98  4.1   |  25  |  0.86    Ca    8.1<L>      23 May 2018 06:38  Phos  2.6     05-22  Mg     2.1     05-23      PT/INR - ( 22 May 2018 06:11 )   PT: 30.6 sec;   INR: 2.70          PTT - ( 22 May 2018 06:11 )  PTT:37.9 sec      RADIOLOGY & ADDITIONAL TESTS:

## 2018-05-23 NOTE — DISCHARGE NOTE ADULT - ADDITIONAL INSTRUCTIONS
Please make an appointment ot follow up with urology, hematology/oncology and cardiology within 1-2 week of discharge Please make an appointment ot follow up with urology, hematology/oncology and cardiology within 1-2 week of discharge. The contact information are listed bellow

## 2018-05-24 VITALS — TEMPERATURE: 97 F

## 2018-05-24 LAB
ANION GAP SERPL CALC-SCNC: 11 MMOL/L — SIGNIFICANT CHANGE UP (ref 5–17)
BUN SERPL-MCNC: 13 MG/DL — SIGNIFICANT CHANGE UP (ref 7–23)
CALCIUM SERPL-MCNC: 7.8 MG/DL — LOW (ref 8.4–10.5)
CHLORIDE SERPL-SCNC: 100 MMOL/L — SIGNIFICANT CHANGE UP (ref 96–108)
CO2 SERPL-SCNC: 26 MMOL/L — SIGNIFICANT CHANGE UP (ref 22–31)
CREAT SERPL-MCNC: 0.89 MG/DL — SIGNIFICANT CHANGE UP (ref 0.5–1.3)
GLUCOSE SERPL-MCNC: 95 MG/DL — SIGNIFICANT CHANGE UP (ref 70–99)
HCT VFR BLD CALC: 28 % — LOW (ref 39–50)
HGB BLD-MCNC: 9.2 G/DL — LOW (ref 13–17)
MAGNESIUM SERPL-MCNC: 2.1 MG/DL — SIGNIFICANT CHANGE UP (ref 1.6–2.6)
MCHC RBC-ENTMCNC: 27.3 PG — SIGNIFICANT CHANGE UP (ref 27–34)
MCHC RBC-ENTMCNC: 32.9 G/DL — SIGNIFICANT CHANGE UP (ref 32–36)
MCV RBC AUTO: 83.1 FL — SIGNIFICANT CHANGE UP (ref 80–100)
PHOSPHATE SERPL-MCNC: 1.8 MG/DL — LOW (ref 2.5–4.5)
PLATELET # BLD AUTO: 397 K/UL — SIGNIFICANT CHANGE UP (ref 150–400)
POTASSIUM SERPL-MCNC: 4.1 MMOL/L — SIGNIFICANT CHANGE UP (ref 3.5–5.3)
POTASSIUM SERPL-SCNC: 4.1 MMOL/L — SIGNIFICANT CHANGE UP (ref 3.5–5.3)
RBC # BLD: 3.37 M/UL — LOW (ref 4.2–5.8)
RBC # FLD: 15.2 % — SIGNIFICANT CHANGE UP (ref 10.3–16.9)
SODIUM SERPL-SCNC: 137 MMOL/L — SIGNIFICANT CHANGE UP (ref 135–145)
WBC # BLD: 6.1 K/UL — SIGNIFICANT CHANGE UP (ref 3.8–10.5)
WBC # FLD AUTO: 6.1 K/UL — SIGNIFICANT CHANGE UP (ref 3.8–10.5)

## 2018-05-24 PROCEDURE — 96374 THER/PROPH/DIAG INJ IV PUSH: CPT

## 2018-05-24 PROCEDURE — 82962 GLUCOSE BLOOD TEST: CPT

## 2018-05-24 PROCEDURE — 87633 RESP VIRUS 12-25 TARGETS: CPT

## 2018-05-24 PROCEDURE — 99285 EMERGENCY DEPT VISIT HI MDM: CPT | Mod: 25

## 2018-05-24 PROCEDURE — 82553 CREATINE MB FRACTION: CPT

## 2018-05-24 PROCEDURE — 83735 ASSAY OF MAGNESIUM: CPT

## 2018-05-24 PROCEDURE — 87798 DETECT AGENT NOS DNA AMP: CPT

## 2018-05-24 PROCEDURE — 85025 COMPLETE CBC W/AUTO DIFF WBC: CPT

## 2018-05-24 PROCEDURE — 84100 ASSAY OF PHOSPHORUS: CPT

## 2018-05-24 PROCEDURE — 87102 FUNGUS ISOLATION CULTURE: CPT

## 2018-05-24 PROCEDURE — 71046 X-RAY EXAM CHEST 2 VIEWS: CPT

## 2018-05-24 PROCEDURE — 83880 ASSAY OF NATRIURETIC PEPTIDE: CPT

## 2018-05-24 PROCEDURE — 83550 IRON BINDING TEST: CPT

## 2018-05-24 PROCEDURE — 94640 AIRWAY INHALATION TREATMENT: CPT

## 2018-05-24 PROCEDURE — 71045 X-RAY EXAM CHEST 1 VIEW: CPT

## 2018-05-24 PROCEDURE — G0103: CPT

## 2018-05-24 PROCEDURE — C1894: CPT

## 2018-05-24 PROCEDURE — 87086 URINE CULTURE/COLONY COUNT: CPT

## 2018-05-24 PROCEDURE — 93306 TTE W/DOPPLER COMPLETE: CPT

## 2018-05-24 PROCEDURE — 80061 LIPID PANEL: CPT

## 2018-05-24 PROCEDURE — 87486 CHLMYD PNEUM DNA AMP PROBE: CPT

## 2018-05-24 PROCEDURE — 87184 SC STD DISK METHOD PER PLATE: CPT

## 2018-05-24 PROCEDURE — C1889: CPT

## 2018-05-24 PROCEDURE — 80076 HEPATIC FUNCTION PANEL: CPT

## 2018-05-24 PROCEDURE — 87581 M.PNEUMON DNA AMP PROBE: CPT

## 2018-05-24 PROCEDURE — 82728 ASSAY OF FERRITIN: CPT

## 2018-05-24 PROCEDURE — 88305 TISSUE EXAM BY PATHOLOGIST: CPT

## 2018-05-24 PROCEDURE — 87070 CULTURE OTHR SPECIMN AEROBIC: CPT

## 2018-05-24 PROCEDURE — 84481 FREE ASSAY (FT-3): CPT

## 2018-05-24 PROCEDURE — 82652 VIT D 1 25-DIHYDROXY: CPT

## 2018-05-24 PROCEDURE — 85730 THROMBOPLASTIN TIME PARTIAL: CPT

## 2018-05-24 PROCEDURE — 36415 COLL VENOUS BLD VENIPUNCTURE: CPT

## 2018-05-24 PROCEDURE — 97116 GAIT TRAINING THERAPY: CPT

## 2018-05-24 PROCEDURE — 93005 ELECTROCARDIOGRAM TRACING: CPT

## 2018-05-24 PROCEDURE — 85027 COMPLETE CBC AUTOMATED: CPT

## 2018-05-24 PROCEDURE — 85610 PROTHROMBIN TIME: CPT

## 2018-05-24 PROCEDURE — 84439 ASSAY OF FREE THYROXINE: CPT

## 2018-05-24 PROCEDURE — 88112 CYTOPATH CELL ENHANCE TECH: CPT

## 2018-05-24 PROCEDURE — 88342 IMHCHEM/IMCYTCHM 1ST ANTB: CPT

## 2018-05-24 PROCEDURE — 87015 SPECIMEN INFECT AGNT CONCNTJ: CPT

## 2018-05-24 PROCEDURE — 82306 VITAMIN D 25 HYDROXY: CPT

## 2018-05-24 PROCEDURE — C1769: CPT

## 2018-05-24 PROCEDURE — 82550 ASSAY OF CK (CPK): CPT

## 2018-05-24 PROCEDURE — 87116 MYCOBACTERIA CULTURE: CPT

## 2018-05-24 PROCEDURE — 71250 CT THORAX DX C-: CPT

## 2018-05-24 PROCEDURE — 84484 ASSAY OF TROPONIN QUANT: CPT

## 2018-05-24 PROCEDURE — 84443 ASSAY THYROID STIM HORMONE: CPT

## 2018-05-24 PROCEDURE — 81001 URINALYSIS AUTO W/SCOPE: CPT

## 2018-05-24 PROCEDURE — C1760: CPT

## 2018-05-24 PROCEDURE — 88173 CYTOPATH EVAL FNA REPORT: CPT

## 2018-05-24 PROCEDURE — 97161 PT EVAL LOW COMPLEX 20 MIN: CPT

## 2018-05-24 PROCEDURE — 74177 CT ABD & PELVIS W/CONTRAST: CPT

## 2018-05-24 PROCEDURE — 84466 ASSAY OF TRANSFERRIN: CPT

## 2018-05-24 PROCEDURE — C1887: CPT

## 2018-05-24 PROCEDURE — 87206 SMEAR FLUORESCENT/ACID STAI: CPT

## 2018-05-24 PROCEDURE — 80048 BASIC METABOLIC PNL TOTAL CA: CPT

## 2018-05-24 PROCEDURE — 80053 COMPREHEN METABOLIC PANEL: CPT

## 2018-05-24 PROCEDURE — C1757: CPT

## 2018-05-24 PROCEDURE — 88341 IMHCHEM/IMCYTCHM EA ADD ANTB: CPT

## 2018-05-24 PROCEDURE — 83036 HEMOGLOBIN GLYCOSYLATED A1C: CPT

## 2018-05-24 RX ADMIN — BENZOCAINE AND MENTHOL 1 LOZENGE: 5; 1 LIQUID ORAL at 15:28

## 2018-05-24 RX ADMIN — Medication 2 TABLET(S): at 10:23

## 2018-05-24 RX ADMIN — Medication 1 TABLET(S): at 06:20

## 2018-05-24 RX ADMIN — BICALUTAMIDE 50 MILLIGRAM(S): 50 TABLET, FILM COATED ORAL at 10:24

## 2018-05-24 RX ADMIN — Medication 100 MILLIGRAM(S): at 10:23

## 2018-05-24 RX ADMIN — PANTOPRAZOLE SODIUM 40 MILLIGRAM(S): 20 TABLET, DELAYED RELEASE ORAL at 06:22

## 2018-05-24 RX ADMIN — Medication 1000 UNIT(S): at 10:24

## 2018-05-24 RX ADMIN — BENZOCAINE AND MENTHOL 1 LOZENGE: 5; 1 LIQUID ORAL at 10:23

## 2018-05-24 RX ADMIN — LOSARTAN POTASSIUM 25 MILLIGRAM(S): 100 TABLET, FILM COATED ORAL at 10:23

## 2018-05-24 RX ADMIN — Medication 500 MILLIGRAM(S): at 10:23

## 2018-05-24 RX ADMIN — Medication 81 MILLIGRAM(S): at 10:24

## 2018-05-24 RX ADMIN — APIXABAN 5 MILLIGRAM(S): 2.5 TABLET, FILM COATED ORAL at 06:20

## 2018-05-24 RX ADMIN — Medication 25 MILLIGRAM(S): at 06:20

## 2018-05-24 RX ADMIN — Medication 325 MILLIGRAM(S): at 10:22

## 2018-05-24 RX ADMIN — Medication 40 MILLIGRAM(S): at 06:20

## 2018-05-24 RX ADMIN — SENNA PLUS 5 MILLILITER(S): 8.6 TABLET ORAL at 10:23

## 2018-05-24 NOTE — PROGRESS NOTE ADULT - ASSESSMENT
ASSESSMENT/PLAN77 yo male FORMER SMO[employer] with PMHx of HTN, HLD, pre- DM, CAD s/p CABG (in 2016 @ [facility]), chronic systolic CHF, h/o DVT 6 months ago (on Eliquis)  admitted for r/o ACS s/p non obstructed Cath, being worked up for prostate CA, found to have multiple  lung nodules, diffuse mediastinal LAD. 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 O2 attempt off  2. Bronchodilators:  Atrovent/ albuterol q 4 – 6 hours as needed  3. Corticosteroids: off  4. ID/Antibiotics: Augmentin 875 Q 12 hrs for seven days  5. Cardiac/HTN: optimize BP  6. GI: Rx/ prophylaxis c PPI/H2B  7. Heme: Rx/VT prophylaxis c SQH/SCD/AC continue   Eliquis  8. Aspiration precautions  9 Mobilize, OOB, use incentive spirometry  10.Oncology mngmnt appreciated and priority  Discussed with managing team

## 2018-05-24 NOTE — PROGRESS NOTE ADULT - SUBJECTIVE AND OBJECTIVE BOX
Interventional, Pulmonary, Critical, Chest Special Procedures.    Pt was seen and fully examined by myself.     Time spent with patient in minutes:37    Patient is a 77y old  Male who presents with a chief complaint of CHF exacerbation (23 May 2018 10:53)The patient c gross resolution of cough, comfortable and eupneic on RA. I discussed importance of follow up and treatment c pt. He stated, he understood.    HPI:  76 yo male FORMER S[employer] with PMHx of HTN, HLD, pre- DM,  CAD s/p CABG (in 2016 @ [facility]), CHF (EF unknown), h/o DVT 6 months ago (on Eliquis; last dose last night) who presented to [facility] ED on 5/9/18 with CC of Cough and SOB X 5-6 days. Pt. reports that he initially had a cough with white sputum production associated with shortness of breath at rest, non-radiating L sided Chest tightness and palpitations. pt. reports that the chest tightness and SOB comes along with the cough  and is relieved when he stops coughing. He also reports feeling sore. At baseline, pt. reports that he can ambulate 4 blocks before getting SOB.  Today am, pt. reports having hot flashes , cough, SOB and chest tightness which prompted him to come to the ED. Denies dizziness, diaphoresis, fatigue, LE edema, orthopnea, PND, syncope.  Upon admit, temp 98.3F,  BPM, /86, RR 20, SpO2 96% on RA.  Labs significant for alk phos 500, , trop 0.08, BNP 6822. CXR wet read revealed R sided pleural effusion. EKG revealed NSR with 1st degree AV block @ 94 BPM with left axis deviation and Q wave in inferior leads and V1-V5.  While in ER, pt received IV Lasix 40 mg x1. Patient admitted to 5U for diuresis, ECHO, r/o ACS. (09 May 2018 19:41)    REVIEW OF SYSTEMS: updated  Constitutional: No fever, weight loss, chills or fatigue  Eyes: No eye pain, visual disturbances, or discharge  ENMT:  No difficulty hearing, tinnitus, vertigo; No sinus or throat pain. No epistaxis, dysphagia, dysphonia, hoarseness or odynophagia  Neck: No pain, stiffness or neck swelling.  No masses or deformities  Respiratory: No cough, wheezing, chills or hemoptysis  - COPD  - ILD   - PE   - ASTHMA     - PNEUMONIA  Cardiovascular: No chest pain, dysrhythmia, palpitations, dizziness or edema   - COPD     - CAD   - CHF   - HTN  Gastrointestinal: No abdominal or epigastric pain. No nausea, vomiting or hematemesis; No diarrhea or constipation. No melena or hematochezia. No dysphagia or Icterus.          Genitourinary: No dysuria, frequency, hematuria or incontinence   - CKD/PAM      - ESRD  Neurological: No headaches, memory loss, loss of strength, numbness or tremors      -DEMENTIA     - STROKE    - SEIZURE  Skin: No itching, burning, rashes or lesions   Lymph Nodes: No enlarged glands  Endocrine: No heat or cold intolerance; No hair loss       - DM     - THYROID DISORDER  Musculoskeletal: No joint pain or swelling; No muscle, back or extremity pain  Psychiatric: No depression, anxiety, mood swings or difficulty sleeping  Heme/Lymph: No easy bruising or bleeding gums         - ANEMIA      - CANCER   -COAGULOPATHY  Allergy and Immunologic: No hives or eczema    PAST MEDICAL & SURGICAL HISTORY:  CHF (congestive heart failure)  CAD (coronary artery disease)  HTN (hypertension)  S/P hernia surgery  History of coronary artery bypass graft: 2017 [facility]    FAMILY HISTORY:  No pertinent family history in first degree relatives    SOCIAL HISTORY:      - Tobacco     - ETOH    Allergies    No Known Allergies    Intolerances      Vital Signs Last 24 Hrs  T(C): 36.1 (24 May 2018 14:36), Max: 36.6 (24 May 2018 00:39)  T(F): 97 (24 May 2018 14:36), Max: 97.9 (24 May 2018 00:39)  HR: 66 (24 May 2018 08:30) (66 - 80)  BP: 95/54 (24 May 2018 08:30) (92/52 - 105/58)  BP(mean): --  RR: 18 (24 May 2018 08:30) (18 - 18)  SpO2: 100% (24 May 2018 08:30) (99% - 100%)    05-23 @ 07:01  -  05-24 @ 07:00  --------------------------------------------------------  IN: 440 mL / OUT: 1650 mL / NET: -1210 mL    05-24 @ 07:01  -  05-24 @ 14:59  --------------------------------------------------------  IN: 240 mL / OUT: 0 mL / NET: 240 mL        PHYSICAL EXAM:  More  Comfortable, no immediate distress  Eyes: PERRL, EOM intact; conjunctiva and sclera clear  Head: Normocephalic;  No Trauma  ENMT: No nasal discharge, +hoarseness, less cough   Neck: Supple; non tender; no masses or deformities.    No JVD  Respiratory:  - WHEEZING   - RHONCHI  - RALES  + CRACKLES.  Diminished breath sounds  BILATERAL  RIGHT  LEFT bases  Cardiovascular: Regular rate and rhythm. S1 and S2 Normal; No murmurs, gallops or rubs     - PPM/AICD  Gastrointestinal: Soft non-tender, non-distended; Normal bowel sounds; No hepatosplenomegaly.     -PEG    -  GT   +LAU  Genitourinary: No costovertebral angle tenderness. No dysuria  Extremities: AROM, + clubbing, cyanosis or edema    Vascular: Peripheral pulses palpable 2+ bilaterally  Neurological: Alert and responisve to stimuli   Skin: Warm and dry. No obvious rash  Lymph Nodes: No acute cervical or supraclavicular adenopathy  Psychiatric: Cooperative and appropriate mood    DEVICES:  - DENTURES   +IV R / L     - ETUBE   -TRACH   -CTUBE  R / L      LABS:                          9.2    6.1   )-----------( 397      ( 24 May 2018 07:27 )             28.0     05-24    137  |  100  |  13  ----------------------------<  95  4.1   |  26  |  0.89    Ca    7.8<L>      24 May 2018 07:27  Phos  1.8     05-24  Mg     2.1     05-24        RADIOLOGY & ADDITIONAL STUDIES (The following images were personally reviewed):

## 2018-05-24 NOTE — PROGRESS NOTE ADULT - PROVIDER SPECIALTY LIST ADULT
Cardiology
Critical Care
Heme/Onc
Internal Medicine
Intervent Cardiology
Intervent Cardiology
Psychiatry
Psychiatry
Pulmonology
Internal Medicine
Internal Medicine
Intervent Cardiology
Internal Medicine
Internal Medicine
Cardiology
Internal Medicine

## 2018-05-30 DIAGNOSIS — I44.0 ATRIOVENTRICULAR BLOCK, FIRST DEGREE: ICD-10-CM

## 2018-05-30 DIAGNOSIS — Z87.891 PERSONAL HISTORY OF NICOTINE DEPENDENCE: ICD-10-CM

## 2018-05-30 DIAGNOSIS — I11.0 HYPERTENSIVE HEART DISEASE WITH HEART FAILURE: ICD-10-CM

## 2018-05-30 DIAGNOSIS — B96.3 HEMOPHILUS INFLUENZAE [H. INFLUENZAE] AS THE CAUSE OF DISEASES CLASSIFIED ELSEWHERE: ICD-10-CM

## 2018-05-30 DIAGNOSIS — D50.9 IRON DEFICIENCY ANEMIA, UNSPECIFIED: ICD-10-CM

## 2018-05-30 DIAGNOSIS — C79.51 SECONDARY MALIGNANT NEOPLASM OF BONE: ICD-10-CM

## 2018-05-30 DIAGNOSIS — E43 UNSPECIFIED SEVERE PROTEIN-CALORIE MALNUTRITION: ICD-10-CM

## 2018-05-30 DIAGNOSIS — R91.8 OTHER NONSPECIFIC ABNORMAL FINDING OF LUNG FIELD: ICD-10-CM

## 2018-05-30 DIAGNOSIS — Z95.1 PRESENCE OF AORTOCORONARY BYPASS GRAFT: ICD-10-CM

## 2018-05-30 DIAGNOSIS — T17.890A OTHER FOREIGN OBJECT IN OTHER PARTS OF RESPIRATORY TRACT CAUSING ASPHYXIATION, INITIAL ENCOUNTER: ICD-10-CM

## 2018-05-30 DIAGNOSIS — Z86.718 PERSONAL HISTORY OF OTHER VENOUS THROMBOSIS AND EMBOLISM: ICD-10-CM

## 2018-05-30 DIAGNOSIS — R04.2 HEMOPTYSIS: ICD-10-CM

## 2018-05-30 DIAGNOSIS — E85.4 ORGAN-LIMITED AMYLOIDOSIS: ICD-10-CM

## 2018-05-30 DIAGNOSIS — R07.9 CHEST PAIN, UNSPECIFIED: ICD-10-CM

## 2018-05-30 DIAGNOSIS — F43.23 ADJUSTMENT DISORDER WITH MIXED ANXIETY AND DEPRESSED MOOD: ICD-10-CM

## 2018-05-30 DIAGNOSIS — C61 MALIGNANT NEOPLASM OF PROSTATE: ICD-10-CM

## 2018-05-30 DIAGNOSIS — I21.4 NON-ST ELEVATION (NSTEMI) MYOCARDIAL INFARCTION: ICD-10-CM

## 2018-05-30 DIAGNOSIS — E05.90 THYROTOXICOSIS, UNSPECIFIED WITHOUT THYROTOXIC CRISIS OR STORM: ICD-10-CM

## 2018-05-30 DIAGNOSIS — I50.23 ACUTE ON CHRONIC SYSTOLIC (CONGESTIVE) HEART FAILURE: ICD-10-CM

## 2018-05-30 DIAGNOSIS — R73.03 PREDIABETES: ICD-10-CM

## 2018-05-30 DIAGNOSIS — C77.1 SECONDARY AND UNSPECIFIED MALIGNANT NEOPLASM OF INTRATHORACIC LYMPH NODES: ICD-10-CM

## 2018-05-30 DIAGNOSIS — R33.9 RETENTION OF URINE, UNSPECIFIED: ICD-10-CM

## 2018-05-30 DIAGNOSIS — I25.110 ATHEROSCLEROTIC HEART DISEASE OF NATIVE CORONARY ARTERY WITH UNSTABLE ANGINA PECTORIS: ICD-10-CM

## 2018-05-30 DIAGNOSIS — J38.7 OTHER DISEASES OF LARYNX: ICD-10-CM

## 2018-05-30 DIAGNOSIS — Z79.01 LONG TERM (CURRENT) USE OF ANTICOAGULANTS: ICD-10-CM

## 2018-05-30 DIAGNOSIS — R94.5 ABNORMAL RESULTS OF LIVER FUNCTION STUDIES: ICD-10-CM

## 2018-06-16 LAB
CULTURE RESULTS: SIGNIFICANT CHANGE UP
SPECIMEN SOURCE: SIGNIFICANT CHANGE UP

## 2018-06-20 PROBLEM — I25.10 ATHEROSCLEROTIC HEART DISEASE OF NATIVE CORONARY ARTERY WITHOUT ANGINA PECTORIS: Chronic | Status: ACTIVE | Noted: 2018-05-09

## 2018-06-20 PROBLEM — I50.9 HEART FAILURE, UNSPECIFIED: Chronic | Status: ACTIVE | Noted: 2018-05-09

## 2018-06-20 PROBLEM — I10 ESSENTIAL (PRIMARY) HYPERTENSION: Chronic | Status: ACTIVE | Noted: 2018-05-09

## 2018-06-20 PROBLEM — Z00.00 ENCOUNTER FOR PREVENTIVE HEALTH EXAMINATION: Status: ACTIVE | Noted: 2018-06-20

## 2018-07-02 ENCOUNTER — APPOINTMENT (OUTPATIENT)
Dept: HEART AND VASCULAR | Facility: CLINIC | Age: 78
End: 2018-07-02

## 2018-07-07 LAB
CULTURE RESULTS: SIGNIFICANT CHANGE UP
SPECIMEN SOURCE: SIGNIFICANT CHANGE UP

## 2018-11-14 ENCOUNTER — OUTPATIENT (OUTPATIENT)
Dept: OUTPATIENT SERVICES | Facility: HOSPITAL | Age: 78
LOS: 1 days | End: 2018-11-14
Payer: MEDICARE

## 2018-11-14 ENCOUNTER — APPOINTMENT (OUTPATIENT)
Dept: CT IMAGING | Facility: HOSPITAL | Age: 78
End: 2018-11-14

## 2018-11-14 DIAGNOSIS — Z95.1 PRESENCE OF AORTOCORONARY BYPASS GRAFT: Chronic | ICD-10-CM

## 2018-11-14 DIAGNOSIS — Z98.890 OTHER SPECIFIED POSTPROCEDURAL STATES: Chronic | ICD-10-CM

## 2018-11-14 PROCEDURE — 71260 CT THORAX DX C+: CPT

## 2018-11-14 PROCEDURE — A9503: CPT

## 2018-11-14 PROCEDURE — 74177 CT ABD & PELVIS W/CONTRAST: CPT

## 2018-11-14 PROCEDURE — 78306 BONE IMAGING WHOLE BODY: CPT

## 2018-11-14 PROCEDURE — 71260 CT THORAX DX C+: CPT | Mod: 26

## 2018-11-14 PROCEDURE — 74177 CT ABD & PELVIS W/CONTRAST: CPT | Mod: 26

## 2018-11-14 PROCEDURE — 78306 BONE IMAGING WHOLE BODY: CPT | Mod: 26

## 2019-02-06 NOTE — ED ADULT TRIAGE NOTE - ADDITIONAL SAFETY/BANDS...
Biopsy Wound Care:    Type: Shave / Punch / Excisional    1.  Wash your hands with soap and water.    2.  Cleanse the biopsy site with gentle soap and water    3.   Thoroughly dry the area and apply a small amount of ointment such as Vaseline or white petrolatum.  Antibiotic ointments such as polysporin, bacitracin, and triple antibiotic ointment are not necessary and should be avoided.      4.  Keep wound covered with a small dressing or band-aid until the wound has healed.  Studies have shown that that wounds heal better when covered with ointment and a dressing than when they are left open to air and form a scab or crust.  Ideally, a crust should not form.  However if a crust has formed, you can soak it off with a wet warm cloth.  Scabs should not be forcefully removed.     5.  Change the dressing daily as needed until healed.    6.  If your biopsy site is in an area that you shave, please take care to avoid letting the razor come near the stitches or healing wound.    *It is normal to have mild bleeding from biopsy sites after the procedure, especially if you are on any blood thinners such as aspirin, plavix, coumadin, vitamin E, or fish oil.  If you notice significant bleeding from any biopsy site, apply clean gauze to the area and hold firm pressure for 15 minutes without removing pressure.  You may also apply a cold compress on top of the gauze, but maintain pressure on the wound.  If the biopsy site continues to ooze, again hold firm pressure to the area for 15 minutes.  At this point, if there is significant bleeding that soaks clean gauze pads, and the bleeding has not resolved with consistent firm pressure, you should call our office to be seen.  If we are not in the office, you may need to be evaluated by urgent care.      
Additional Safety/Bands:

## 2019-04-18 NOTE — DIETITIAN INITIAL EVALUATION ADULT. - NS FNS REASON FOR WEIGHT CHANG
CARYLTERESA DRAKE is a 89yMale , patient examined and chart reviewed.     INTERVAL HPI/ OVERNIGHT EVENTS:   Afebrile. No events.   Off CBI and fitch dc.    PAST MEDICAL & SURGICAL HISTORY:  Dementia  Arteriosclerotic heart disease (ASHD)  Prostate CA  Atrial fibrillation  DM (diabetes mellitus)      For details regarding the patient's social history, family history, and other miscellaneous elements, please refer the initial infectious diseases consultation and/or the admitting history and physical examination for this admission.    ROS:  Unable to obtain due to : confusion    ALLERGIES:  latex (Unknown)    Current inpatient medications :    ANTIBIOTICS/RELEVANT:  cefTRIAXone   IVPB 1 Gram(s) IV Intermittent every 24 hours    MEDICATIONS  (STANDING):  buDESOnide   0.5 milliGRAM(s) Respule 0.5 milliGRAM(s) Inhalation two times a day  dextrose 5%. 1000 milliLiter(s) (50 mL/Hr) IV Continuous <Continuous>  dextrose 50% Injectable 12.5 Gram(s) IV Push once  dextrose 50% Injectable 25 Gram(s) IV Push once  dextrose 50% Injectable 25 Gram(s) IV Push once  insulin lispro (HumaLOG) corrective regimen sliding scale   SubCutaneous three times a day before meals  midodrine 5 milliGRAM(s) Oral every 8 hours  simvastatin 20 milliGRAM(s) Oral at bedtime  sodium chloride 0.9%. 1000 milliLiter(s) (50 mL/Hr) IV Continuous <Continuous>    MEDICATIONS  (PRN):  acetaminophen   Tablet .. 650 milliGRAM(s) Oral every 6 hours PRN Temp greater or equal to 38C (100.4F), Mild Pain (1 - 3)  ALBUTerol    90 MICROgram(s) HFA Inhaler 2 Puff(s) Inhalation every 6 hours PRN Shortness of Breath  dextrose 40% Gel 15 Gram(s) Oral once PRN Blood Glucose LESS THAN 70 milliGRAM(s)/deciliter  glucagon  Injectable 1 milliGRAM(s) IntraMuscular once PRN Glucose LESS THAN 70 milligrams/deciliter    Objective:  Vital Signs Last 24 Hrs  T(C): 36.7 (2019 09:11), Max: 37.3 (15 Apr 2019 18:43)  T(F): 98.1 (2019 09:11), Max: 99.1 (15 Apr 2019 18:43)  HR: 93 (2019 09:11) (61 - 93)  BP: 120/84 (2019 09:11) (100/69 - 120/84)  RR: 18 (2019 09:11) (17 - 18)  SpO2: 96% (2019 09:11) (94% - 96%)    Physical Exam:  General: no acute distress  Eyes: sclera anicteric, pupils equal and reactive to light  ENMT: buccal mucosa moist, pharynx not injected  Neck: supple, trachea midline  Lungs: clear, no wheeze/rhonchi  Cardiovascular: regular rate and rhythm, S1 S2  Abdomen: soft, nontender, no organomegaly present, bowel sounds normal  Neurological: alert and oriented x0, Cranial Nerves II-XII grossly intact  Skin: no increased ecchymosis/petechiae/purpura  Lymph Nodes: no palpable cervical/supraclavicular lymph nodes enlargements  Extremities: no cyanosis/clubbing/edema      LABS:                        9.3    7.92  )-----------( 287      ( 2019 08:10 )             27.7   04-16    139  |  103  |  12  ----------------------------<  117<H>  3.8   |  26  |  0.48<L>    Ca    9.0      2019 08:10    Urinalysis Basic - ( 2019 20:36 )    Color: Red / Appearance: Slightly Turbid / S.005 / pH: x  Gluc: x / Ketone: Negative  / Bili: Negative / Urobili: Negative mg/dL   Blood: x / Protein: 30 mg/dL / Nitrite: Positive   Leuk Esterase: Small / RBC: 25-50 /HPF / WBC 3-5   Sq Epi: x / Non Sq Epi: x / Bacteria: x    MICROBIOLOGY:    Culture - Urine (collected 2019 12:26)  Source: .Urine  Final Report (15 Apr 2019 08:01):    No growth    Culture - Blood (collected 2019 23:39)  Source: .Blood  Preliminary Report (15 Apr 2019 01:01):    No growth to date.    Culture - Blood (collected 2019 23:39)  Source: .Blood  Preliminary Report (15 Apr 2019 01:01):    No growth to date.    Culture - Blood (collected 2019 11:25)  Source: .Blood  Gram Stain (2019 00:48):    Growth in anaerobic bottle: Gram Negative Rods  Preliminary Report (2019 17:45):    Growth in anaerobic bottle: Escherichia coli Susceptibility to follow.    "Due to technical problems, Proteus sp. will Not be reported as part of    the BCID panel until further notice"    ***Blood Panel PCR results on this specimen are available    approximately 3 hours after the Gram stain result.***    Gram stain, PCR, and/or culture results may not always    correspond due to difference in methodologies.    ************************************************************    This PCR assay was performed using [employer].    The following targets are tested for: Enterococcus,    vancomycin resistant enterococci, Listeria monocytogenes,    coagulase negative staphylococci, S. aureus,    methicillin resistant S. aureus, Streptococcus agalactiae    (Group B), S. pneumoniae, S. pyogenes (Group A),    Acinetobacter baumannii, Enterobacter cloacae, E. coli,    Klebsiella oxytoca, K. pneumoniae, Proteus sp.,    Serratia marcescens, Haemophilus influenzae,    Neisseria meningitidis, Pseudomonas aeruginosa, Candida    albicans, C. glabrata, C krusei, C parapsilosis,    C. tropicalis and the KPC resistance gene.  Organism: Blood Culture PCR (2019 02:28)  Organism: Blood Culture PCR (2019 02:28)      -  Escherichia coli: Detec      Method Type: PCR      RADIOLOGY & ADDITIONAL STUDIES:    EXAM:  US KIDNEYS AND BLADDER                                  PROCEDURE DATE:  2019          INTERPRETATION:  CLINICAL INFORMATION: Hematuria. Prostate cancer.    COMPARISON: None available.    TECHNIQUE: Sonography of the kidneys and bladder.     FINDINGS:    Right kidney:  10.2 cm. No hydronephrosis or intrarenal calcification is   noted.    Left kidney:  10.9 cm. No hydronephrosis or intrarenal calcification is   noted. Evaluation of the left kidney is limited.    Urinary bladder:   There is a balloon Fitch catheter within the urinary bladder. The Fitch   catheter was clamped prior to exam.  Bladder volume measures approximately 287 cc.  There is a minimal, 3 cc post void residual.    IMPRESSION:     No sonographic evidence for hydronephrosis.       Assessment :   89 YO W male pmh Dementia resident of [facility] admitted with hemorrhagic   cystitis/UTi [facility] Ecoli sepsis  Hematuria resolved sp CBI  Sp fever  Overall stable    Plan :   On Rocephin  Will change to po vantin in am  CBI and Fitch off- for TOV  Urology following    D/w Dr Ring    Continue with present regiment.  Appropriate use of antibiotics and adverse effects reviewed.      I have discussed the above plan of care with patient/ family in detail. They expressed understanding of the  treatment plan . Risks, benefits and alternatives discussed in detail. I have asked if they have any questions or concerns and appropriately addressed them to the best of my ability .    > 35 minutes were spent in direct patient care reviewing notes, medications ,labs data/ imaging , discussion with multidisciplinary team.    Thank you for allowing me to participate in care of your patient .    Heber Issa MD  Infectious Disease  [phone] CHIEF COMPLAINT/ PRESENT FINDINGS:  hx gross hematuria  Hx prostate Cancer with RT  in past -and very difficult 3 way fitch insertion  now urine clear  no evidence of any allergic reaction to latex since admission.  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 They were in agreement to continue with the3 way fitch to CBI fitch in place. I spoke to Dr Pearce also to make he aware of this . There was no similar 20 fr 3 way fitch in silicone available.  family included son and daughter in law as well as speaking to another daughter on phone.         ****************************************************************************************************  PHYSICAL EXAM:    Vital Signs Last 24 Hrs  T(C): 36.7 (13 Apr 2019 06:33), Max: 38.2 (12 Apr 2019 13:30)  T(F): 98 (13 Apr 2019 06:33), Max: 100.7 (12 Apr 2019 13:30)  HR: 73 (13 Apr 2019 07:35) (59 - 92)  BP: 107/57 (13 Apr 2019 06:33) (92/51 - 128/68)  BP(mean): --  RR: 23 (13 Apr 2019 06:33) (18 - 23)  SpO2: 97% (13 Apr 2019 07:35) (95% - 98%)    GENERAL: resting  comfortably  non communicative     PENIS: has 20 fr fokley draining clear on CBI     TESTES: soft     PROSTATE/ RECTAL:    ABDOMEN: soft  no distention     BACK: no cva tenderness     LOWER EXTREMITIES:    NEUROLOGICAL:    **********************************************************************************************************  LABS:                        10.8   12.63 )-----------( 269      ( 12 Apr 2019 08:18 )             32.9     04-12    143  |  101  |  22  ----------------------------<  179<H>  3.6   |  32<H>  |  0.78    Ca    9.5      12 Apr 2019 08:18    TPro  7.1  /  Alb  3.1<L>  /  TBili  0.7  /  DBili  x   /  AST  16  /  ALT  16  /  AlkPhos  94  04-12    PT/INR - ( 12 Apr 2019 08:18 )   PT: 22.4 sec;   INR: 2.01 ratio         PTT - ( 12 Apr 2019 08:18 )  PTT:34.7 sec     Urine Culture: 04-12 @ 11:25  Urine Culure Results   Growth in anaerobic bottle: Gram Negative Rods  "Due to technical problems, Proteus sp. will Not be reported as part of  the BCID panel until further notice"  ***Blood Panel PCR results on this specimen are available  approximately 3 hours after the Gram stain result.***  Gram stain, PCR, and/or culture results may not always  correspond due to difference in methodologies.  ************************************************************  This PCR assay was performed using [employer].  The following targets are tested for: Enterococcus,  vancomycin resistant enterococci, Listeria monocytogenes,  coagulase negative staphylococci, S. aureus,  methicillin resistant S. aureus, Streptococcus agalactiae  (Group B), S. pneumoniae, S. pyogenes (Group A),  Acinetobacter baumannii, Enterobacter cloacae, E. coli,  Klebsiella oxytoca, K. pneumoniae, Proteus sp.,  Serratia marcescens, Haemophilus influenzae,  Neisseria meningitidis, Pseudomonas aeruginosa, Candida  albicans, C. glabrata, C krusei, C parapsilosis,  C. tropicalis and the KPC resistance gene.  Organism Blood Culture PCR      RADIOLOGY & ADDITIONAL STUDIES:      IMPRESSION: Sepsis  Gross hematuria  Hx cancer Prostate with Rt and hx uti's     PLAN: stop CBI           discussed reducing or eliminating  midodrine with Dr samuel as is alpha agent that affects voiding             No need to change fitch at this time as no evidence of latex allergy  reaction and very difficult to insert yesterday              will order renal and bladder ultrasound today            on zosyn IV             is off blood thinners and off metformin in case needs CT with IV constrast PROGRESS NOTE  Patient is a 89y old  Male who presents with a chief complaint of hematuria (2019 08:58)  Chart and available morning labs /imaging are reviewed electronically , urgent issues addressed . More information  is being added upon completion of rounds , when more information is collected and management discussed with consultants , medical staff and social service/case management on the floor     OVERNIGHT  No new issues reported by medical staff . All above noted Patient is resting in a bed comfortably .Confused ,poor mentation .No distress noted Hematuria resolved ,cindy colored urine in fitch bag     HPI:  89 YO W male with hx of from [facility]  with hematuria today. Pt with dementia cannot give hx. Denies any symptoms at present.Admitted for management of hemorrhagic cystitis Admitted for septic workup and evaluation,send blood and urine cx,serial lactate levels,monitor vitals closley,ivfs hydration,monitor urine output and renal profile,iv abx initiated Seen by urologist Dr Maynard and fitch cath was placed Palliative care consult requested ,to discuss advance directives and complete MOLST (2019 12:42)    PAST MEDICAL & SURGICAL HISTORY:  Dementia  Arteriosclerotic heart disease (ASHD)  Prostate CA  Atrial fibrillation  DM (diabetes mellitus)      MEDICATIONS  (STANDING):  buDESOnide   0.5 milliGRAM(s) Respule 0.5 milliGRAM(s) Inhalation two times a day  dextrose 5%. 1000 milliLiter(s) (50 mL/Hr) IV Continuous <Continuous>  dextrose 50% Injectable 12.5 Gram(s) IV Push once  dextrose 50% Injectable 25 Gram(s) IV Push once  dextrose 50% Injectable 25 Gram(s) IV Push once  insulin lispro (HumaLOG) corrective regimen sliding scale   SubCutaneous three times a day before meals  midodrine 5 milliGRAM(s) Oral every 8 hours  piperacillin/tazobactam IVPB. 3.375 Gram(s) IV Intermittent every 8 hours  simvastatin 20 milliGRAM(s) Oral at bedtime  sodium chloride 0.9%. 1000 milliLiter(s) (50 mL/Hr) IV Continuous <Continuous>    MEDICATIONS  (PRN):  acetaminophen   Tablet .. 650 milliGRAM(s) Oral every 6 hours PRN Temp greater or equal to 38C (100.4F), Mild Pain (1 - 3)  ALBUTerol    90 MICROgram(s) HFA Inhaler 2 Puff(s) Inhalation every 6 hours PRN Shortness of Breath  dextrose 40% Gel 15 Gram(s) Oral once PRN Blood Glucose LESS THAN 70 milliGRAM(s)/deciliter  glucagon  Injectable 1 milliGRAM(s) IntraMuscular once PRN Glucose LESS THAN 70 milligrams/deciliter      OBJECTIVE    T(C): 36.7 (19 @ 09:31), Max: 36.9 (19 @ 15:22)  HR: 93 (19 @ 09:31) (72 - 93)  BP: 102/58 (19 @ 09:31) (102/58 - 126/74)  RR: 24 (19 @ 09:31) (16 - 24)  SpO2: 94% (19 @ 09:31) (93% - 98%)  Wt(kg): --  I&O's Summary    2019 07:  -  2019 07:00  --------------------------------------------------------  IN: 29691 mL / OUT: 3200 mL / NET: 47405 mL    2019 07:  -  2019 12:33  --------------------------------------------------------  IN: 2000 mL / OUT: 2500 mL / NET: -500 mL          REVIEW OF SYSTEMS:  ROS is unobtainable due to dementia and confusion     PHYSICAL EXAM:  Appearance: NAD. VS past 24 hrs -as above   HEENT:   Moist oral mucosa. Conjunctiva clear b/l.   Neck : supple  Respiratory: Lungs CTAB.  Gastrointestinal:  Soft, nontender. No rebound. No rigidity. BS present	  Cardiovascular: RRR ,S1S2 present  Neurologic: Non-focal. Moving all extremities.  Extremities: No edema. No erythema. No calf tenderness.  Skin: No rashes, No ecchymoses, No cyanosis.	  wounds ,skin lesions-See skin assesment flow sheet   LABS:                        9.3    6.99  )-----------( 251      ( 2019 07:45 )             28.7     04-14    140  |  104  |  13  ----------------------------<  122<H>  3.5   |  29  |  0.58    Ca    9.1      2019 07:45      CAPILLARY BLOOD GLUCOSE      POCT Blood Glucose.: 151 mg/dL (2019 12:14)  POCT Blood Glucose.: 111 mg/dL (2019 08:02)  POCT Blood Glucose.: 138 mg/dL (2019 22:25)  POCT Blood Glucose.: 162 mg/dL (2019 16:43)    PT/INR - ( 2019 07:45 )   PT: 20.7 sec;   INR: 1.86 ratio           Urinalysis Basic - ( 2019 20:36 )    Color: Red / Appearance: Slightly Turbid / S.005 / pH: x  Gluc: x / Ketone: Negative  / Bili: Negative / Urobili: Negative mg/dL   Blood: x / Protein: 30 mg/dL / Nitrite: Positive   Leuk Esterase: Small / RBC: 25-50 /HPF / WBC 3-5   Sq Epi: x / Non Sq Epi: x / Bacteria: x        Culture - Blood (collected 2019 11:25)  Source: .Blood  Preliminary Report (2019 12:01):    No growth to date.    Culture - Blood (collected 2019 11:25)  Source: .Blood  Gram Stain (2019 00:48):    Growth in anaerobic bottle: Gram Negative Rods  Preliminary Report (2019 17:45):    Growth in anaerobic bottle: Escherichia coli Susceptibility to follow.    "Due to technical problems, Proteus sp. will Not be reported as part of    the BCID panel until further notice"    ***Blood Panel PCR results on this specimen are available    approximately 3 hours after the Gram stain result.***    Gram stain, PCR, and/or culture results may not always    correspond due to difference in methodologies.    ************************************************************    This PCR assay was performed using [employer].    The following targets are tested for: Enterococcus,    vancomycin resistant enterococci, Listeria monocytogenes,    coagulase negative staphylococci, S. aureus,    methicillin resistant S. aureus, Streptococcus agalactiae    (Group B), S. pneumoniae, S. pyogenes (Group A),    Acinetobacter baumannii, Enterobacter cloacae, E. coli,    Klebsiella oxytoca, K. pneumoniae, Proteus sp.,    Serratia marcescens, Haemophilus influenzae,    Neisseria meningitidis, Pseudomonas aeruginosa, Candida    albicans, C. glabrata, C krusei, C parapsilosis,    C. tropicalis and the KPC resistance gene.  Organism: Blood Culture PCR (2019 02:28)  Organism: Blood Culture PCR (2019 02:28)      RADIOLOGY & ADDITIONAL TESTS:< from: US Kidney and Bladder (19 @ 10:55) >  EXAM:  US KIDNEYS AND BLADDER                                  PROCEDURE DATE:  2019          INTERPRETATION:  CLINICAL INFORMATION: Hematuria. Prostate cancer.    COMPARISON: None available.    TECHNIQUE: Sonography of the kidneys and bladder.     FINDINGS:    Right kidney:  10.2 cm. No hydronephrosis or intrarenal calcification is   noted.    Left kidney:  10.9 cm. No hydronephrosis or intrarenal calcification is   noted. Evaluation of the left kidney is limited.    Urinary bladder:   There is a balloon Fitch catheter within the urinary bladder. The Fitch   catheter was clamped prior to exam.  Bladder volume measures approximately 287 cc.  There is a minimal, 3 cc post void residual.    IMPRESSION:     No sonographic evidence for hydronephrosis.    < end of copied text >     reviewed elctronically  ASSESSMENT/PLAN: Patient is a 89y old  Male who presents with a chief complaint of hematuria (16 Apr 2019 13:21)      INTERVAL HPI/OVERNIGHT EVENTS:no acute event overnight    Home Medications:  aspirin 81 mg oral tablet: 1 tab(s) orally once a day (12 Apr 2019 12:20)  budesonide 0.5 mg/2 mL inhalation suspension: 2 milliliter(s) inhaled 2 times a day, As Needed (12 Apr 2019 12:20)  donepezil 5 mg oral tablet: 1 tab(s) orally once a day (at bedtime) (12 Apr 2019 12:20)  DuoNeb 0.5 mg-2.5 mg/3 mL inhalation solution: 3 milliliter(s) inhaled 4 times a day, As Needed (12 Apr 2019 12:20)  furosemide 40 mg oral tablet: 1 tab(s) orally once a day (12 Apr 2019 12:20)  metFORMIN 500 mg oral tablet: 1 tab(s) orally 2 times a day (12 Apr 2019 12:20)  midodrine 10 mg oral tablet: 1 tab(s) orally 3 times a day (12 Apr 2019 12:20)  potassium chloride 10 mEq oral capsule, extended release: 1 cap(s) orally once a day (12 Apr 2019 12:20)  simvastatin 20 mg oral tablet: 1 tab(s) orally once a day (at bedtime) (12 Apr 2019 12:20)  warfarin 6 mg oral tablet: 1 tab(s) orally once a day (12 Apr 2019 12:20)      MEDICATIONS  (STANDING):  buDESOnide   0.5 milliGRAM(s) Respule 0.5 milliGRAM(s) Inhalation two times a day  dextrose 5%. 1000 milliLiter(s) (50 mL/Hr) IV Continuous <Continuous>  dextrose 50% Injectable 12.5 Gram(s) IV Push once  dextrose 50% Injectable 25 Gram(s) IV Push once  dextrose 50% Injectable 25 Gram(s) IV Push once  insulin lispro (HumaLOG) corrective regimen sliding scale   SubCutaneous three times a day before meals  midodrine 5 milliGRAM(s) Oral every 8 hours  simvastatin 20 milliGRAM(s) Oral at bedtime  sodium chloride 0.9%. 1000 milliLiter(s) (50 mL/Hr) IV Continuous <Continuous>    MEDICATIONS  (PRN):  acetaminophen   Tablet .. 650 milliGRAM(s) Oral every 6 hours PRN Temp greater or equal to 38C (100.4F), Mild Pain (1 - 3)  ALBUTerol    90 MICROgram(s) HFA Inhaler 2 Puff(s) Inhalation every 6 hours PRN Shortness of Breath  dextrose 40% Gel 15 Gram(s) Oral once PRN Blood Glucose LESS THAN 70 milliGRAM(s)/deciliter  glucagon  Injectable 1 milliGRAM(s) IntraMuscular once PRN Glucose LESS THAN 70 milligrams/deciliter      Allergies    latex (Unknown)  No Known Drug Allergies    Intolerances        REVIEW OF SYSTEMS:  unable as confused  Vital Signs Last 24 Hrs  T(C): 36.7 (16 Apr 2019 09:11), Max: 37.3 (15 Apr 2019 18:43)  T(F): 98.1 (16 Apr 2019 09:11), Max: 99.1 (15 Apr 2019 18:43)  HR: 93 (16 Apr 2019 09:11) (61 - 93)  BP: 120/84 (16 Apr 2019 09:11) (100/69 - 120/84)  BP(mean): --  RR: 18 (16 Apr 2019 09:11) (17 - 18)  SpO2: 96% (16 Apr 2019 09:11) (94% - 96%)    PHYSICAL EXAM:  GENERAL:  well-groomed, well-developed  HEAD:  Atraumatic, Normocephalic  EYES: EOMI, PERRLA, conjunctiva and sclera clear  ENMT: Moist mucous membranes,   NECK: Supple, No JVD, Normal thyroid  NERVOUS SYSTEM:  Alert & Oriented X1, confused , unstable gait  CHEST/LUNG: Clear to percussion bilaterally; No rales, rhonchi, wheezing, or rubs  HEART: Regular rate and rhythm; No murmurs, rubs, or gallops  ABDOMEN: Soft, Nontender, Nondistended; Bowel sounds present  EXTREMITIES:  2+ Peripheral Pulses, No clubbing, cyanosis, or edema  SKIN: No rashes or lesions    LABS:                        9.3    7.92  )-----------( 287      ( 16 Apr 2019 08:10 )             27.7     04-16    139  |  103  |  12  ----------------------------<  117<H>  3.8   |  26  |  0.48<L>    Ca    9.0      16 Apr 2019 08:10      PT/INR - ( 15 Apr 2019 07:51 )   PT: 19.8 sec;   INR: 1.78 ratio             CAPILLARY BLOOD GLUCOSE      POCT Blood Glucose.: 155 mg/dL (16 Apr 2019 12:16)  POCT Blood Glucose.: 105 mg/dL (16 Apr 2019 07:49)  POCT Blood Glucose.: 121 mg/dL (15 Apr 2019 22:33)  POCT Blood Glucose.: 119 mg/dL (15 Apr 2019 16:58)        Culture - Urine (collected 04-14-19 @ 12:26)  Source: .Urine  Final Report (04-15-19 @ 08:01):    No growth    Culture - Blood (collected 04-13-19 @ 23:39)  Source: .Blood  Preliminary Report (04-15-19 @ 01:01):    No growth to date.    Culture - Blood (collected 04-13-19 @ 23:39)  Source: .Blood  Preliminary Report (04-15-19 @ 01:01):    No growth to date.    Culture - Blood (collected 04-13-19 @ 13:13)  Source: .Blood  Preliminary Report (04-14-19 @ 14:00):    No growth to date.    Culture - Blood (collected 04-12-19 @ 11:25)  Source: .Blood  Preliminary Report (04-13-19 @ 12:01):    No growth to date.    Culture - Blood (collected 04-12-19 @ 11:25)  Source: .Blood  Gram Stain (04-13-19 @ 00:48):    Growth in anaerobic bottle: Gram Negative Rods  Final Report (04-14-19 @ 16:59):    Growth in anaerobic bottle: Escherichia coli    "Due to technical problems, Proteus sp. will Not be reported as part of    the BCID panel until further notice"    ***Blood Panel PCR results on this specimen are available    approximately 3 hours after the Gram stain result.***    Gram stain, PCR, and/or culture results may not always    correspond due to difference in methodologies.    ************************************************************    This PCR assay was performed using [employer].    The following targets are tested for: Enterococcus,    vancomycin resistant enterococci, Listeria monocytogenes,    coagulase negative staphylococci, S. aureus,    methicillin resistant S. aureus, Streptococcus agalactiae    (Group B), S. pneumoniae, S. pyogenes (Group A),    Acinetobacter baumannii, Enterobacter cloacae, E. coli,    Klebsiella oxytoca, K. pneumoniae, Proteus sp.,    Serratia marcescens, Haemophilus influenzae,    Neisseria meningitidis, Pseudomonas aeruginosa, Candida    albicans, C. glabrata, C krusei, C parapsilosis,    C. tropicalis and the KPC resistance gene.  Organism: Blood Culture PCR  Escherichia coli (04-14-19 @ 16:59)  Organism: Escherichia coli (04-14-19 @ 16:59)      -  Amikacin: S <=16      -  Ampicillin: R >16 These ampicillin results predict results for amoxicillin      -  Ampicillin/Sulbactam: S <=8/4      -  Aztreonam: S <=4      -  Cefazolin: S <=8      -  Cefepime: S <=4      -  Cefoxitin: S <=8      -  Ceftriaxone: S <=1 Enterobacter, Citrobacter, and Serratia may develop resistance during prolonged therapy      -  Ciprofloxacin: S <=1      -  Ertapenem: S <=1      -  Gentamicin: S <=4      -  Imipenem: S <=1      -  Levofloxacin: S <=2      -  Meropenem: S <=1      -  Piperacillin/Tazobactam: S <=16      -  Tobramycin: S <=4      -  Trimethoprim/Sulfamethoxazole: S <=2/38      Method Type: CHARITO  Organism: Blood Culture PCR (04-14-19 @ 16:59)      -  Escherichia coli: Detec      Method Type: PCR        I&O's Summary    15 Apr 2019 07:01  -  16 Apr 2019 07:00  --------------------------------------------------------  IN: 6890 mL / OUT: 9650 mL / NET: -2760 mL    16 Apr 2019 07:01  -  16 Apr 2019 13:32  --------------------------------------------------------  IN: 420 mL / OUT: 1800 mL / NET: -1380 mL        RADIOLOGY & ADDITIONAL TESTS:    Imaging Personally Reviewed:  [x ] YES  [ ] NO    Consultant(s) Notes Reviewed:  [x ] YES  [ ] NO    Care Discussed with Consultants/Other Providers [x] YES  [ ] NO CARYLTERESA DRAKE is a 89yMale , patient examined and chart reviewed.     INTERVAL HPI/ OVERNIGHT EVENTS:   Afebrile. No events.   Doing well. Still on CBI. urine clear.    PAST MEDICAL & SURGICAL HISTORY:  Dementia  Arteriosclerotic heart disease (ASHD)  Prostate CA  Atrial fibrillation  DM (diabetes mellitus)      For details regarding the patient's social history, family history, and other miscellaneous elements, please refer the initial infectious diseases consultation and/or the admitting history and physical examination for this admission.    ROS:  Unable to obtain due to : confusion    ALLERGIES:  latex (Unknown)    Current inpatient medications :    ANTIBIOTICS/RELEVANT:  cefTRIAXone   IVPB 1 Gram(s) IV Intermittent every 24 hours    MEDICATIONS  (STANDING):  buDESOnide   0.5 milliGRAM(s) Respule 0.5 milliGRAM(s) Inhalation two times a day  dextrose 5%. 1000 milliLiter(s) (50 mL/Hr) IV Continuous <Continuous>  dextrose 50% Injectable 12.5 Gram(s) IV Push once  dextrose 50% Injectable 25 Gram(s) IV Push once  dextrose 50% Injectable 25 Gram(s) IV Push once  insulin lispro (HumaLOG) corrective regimen sliding scale   SubCutaneous three times a day before meals  midodrine 5 milliGRAM(s) Oral every 8 hours  simvastatin 20 milliGRAM(s) Oral at bedtime  sodium chloride 0.9%. 1000 milliLiter(s) (50 mL/Hr) IV Continuous <Continuous>    MEDICATIONS  (PRN):  acetaminophen   Tablet .. 650 milliGRAM(s) Oral every 6 hours PRN Temp greater or equal to 38C (100.4F), Mild Pain (1 - 3)  ALBUTerol    90 MICROgram(s) HFA Inhaler 2 Puff(s) Inhalation every 6 hours PRN Shortness of Breath  dextrose 40% Gel 15 Gram(s) Oral once PRN Blood Glucose LESS THAN 70 milliGRAM(s)/deciliter  glucagon  Injectable 1 milliGRAM(s) IntraMuscular once PRN Glucose LESS THAN 70 milligrams/deciliter      Objective:  Vital Signs Last 24 Hrs  T(C): 36.8 (15 Apr 2019 10:38), Max: 37.3 (2019 21:20)  T(F): 98.3 (15 Apr 2019 10:38), Max: 99.1 (2019 21:20)  HR: 96 (15 Apr 2019 10:38) (73 - 108)  BP: 114/63 (15 Apr 2019 10:38) (100/66 - 120/69)  RR: 18 (15 Apr 2019 10:38) (18 - 20)  SpO2: 96% (15 Apr 2019 10:38) (93% - 97%)      Physical Exam:  General: no acute distress  Eyes: sclera anicteric, pupils equal and reactive to light  ENMT: buccal mucosa moist, pharynx not injected  Neck: supple, trachea midline  Lungs: clear, no wheeze/rhonchi  Cardiovascular: regular rate and rhythm, S1 S2  Abdomen: soft, nontender, no organomegaly present, bowel sounds normal  : Urine clear on CBI  Neurological: alert and oriented x0, Cranial Nerves II-XII grossly intact  Skin: no increased ecchymosis/petechiae/purpura  Lymph Nodes: no palpable cervical/supraclavicular lymph nodes enlargements  Extremities: no cyanosis/clubbing/edema      LABS:             9.3    6.99  )-----------( 251      ( 2019 07:45 )             28.7   04-14    140  |  104  |  13  ----------------------------<  122<H>  3.5   |  29  |  0.58    Ca    9.1      2019 07:45      Urinalysis Basic - ( 2019 20:36 )    Color: Red / Appearance: Slightly Turbid / S.005 / pH: x  Gluc: x / Ketone: Negative  / Bili: Negative / Urobili: Negative mg/dL   Blood: x / Protein: 30 mg/dL / Nitrite: Positive   Leuk Esterase: Small / RBC: 25-50 /HPF / WBC 3-5   Sq Epi: x / Non Sq Epi: x / Bacteria: x    MICROBIOLOGY:    Culture - Urine (collected 2019 12:26)  Source: .Urine  Final Report (15 Apr 2019 08:01):    No growth    Culture - Blood (collected 2019 23:39)  Source: .Blood  Preliminary Report (15 Apr 2019 01:01):    No growth to date.    Culture - Blood (collected 2019 23:39)  Source: .Blood  Preliminary Report (15 Apr 2019 01:01):    No growth to date.    Culture - Blood (collected 2019 11:25)  Source: .Blood  Gram Stain (2019 00:48):    Growth in anaerobic bottle: Gram Negative Rods  Preliminary Report (2019 17:45):    Growth in anaerobic bottle: Escherichia coli Susceptibility to follow.    "Due to technical problems, Proteus sp. will Not be reported as part of    the BCID panel until further notice"    ***Blood Panel PCR results on this specimen are available    approximately 3 hours after the Gram stain result.***    Gram stain, PCR, and/or culture results may not always    correspond due to difference in methodologies.    ************************************************************    This PCR assay was performed using [employer].    The following targets are tested for: Enterococcus,    vancomycin resistant enterococci, Listeria monocytogenes,    coagulase negative staphylococci, S. aureus,    methicillin resistant S. aureus, Streptococcus agalactiae    (Group B), S. pneumoniae, S. pyogenes (Group A),    Acinetobacter baumannii, Enterobacter cloacae, E. coli,    Klebsiella oxytoca, K. pneumoniae, Proteus sp.,    Serratia marcescens, Haemophilus influenzae,    Neisseria meningitidis, Pseudomonas aeruginosa, Candida    albicans, C. glabrata, C krusei, C parapsilosis,    C. tropicalis and the KPC resistance gene.  Organism: Blood Culture PCR (2019 02:28)  Organism: Blood Culture PCR (2019 02:28)      -  Escherichia coli: Detec      Method Type: PCR      RADIOLOGY & ADDITIONAL STUDIES:    EXAM:  US KIDNEYS AND BLADDER                                  PROCEDURE DATE:  2019          INTERPRETATION:  CLINICAL INFORMATION: Hematuria. Prostate cancer.    COMPARISON: None available.    TECHNIQUE: Sonography of the kidneys and bladder.     FINDINGS:    Right kidney:  10.2 cm. No hydronephrosis or intrarenal calcification is   noted.    Left kidney:  10.9 cm. No hydronephrosis or intrarenal calcification is   noted. Evaluation of the left kidney is limited.    Urinary bladder:   There is a balloon Galicia catheter within the urinary bladder. The Galicia   catheter was clamped prior to exam.  Bladder volume measures approximately 287 cc.  There is a minimal, 3 cc post void residual.    IMPRESSION:     No sonographic evidence for hydronephrosis.       Assessment :   89 YO W male pmh Dementia resident of [facility] admitted with hemorrhagic   cystitis/UTi wih GNR sepsis  Seen by  and started on CBI   Hematuria resolving  Sp fever  Overall stable    Plan :   Cont Rocephin  Will change to po vantin soon   CBI per urology  Urology following    D/w Dr Ring    Continue with present regiment.  Appropriate use of antibiotics and adverse effects reviewed.      I have discussed the above plan of care with patient/ family in detail. They expressed understanding of the  treatment plan . Risks, benefits and alternatives discussed in detail. I have asked if they have any questions or concerns and appropriately addressed them to the best of my ability .    > 35 minutes were spent in direct patient care reviewing notes, medications ,labs data/ imaging , discussion with multidisciplinary team.    Thank you for allowing me to participate in care of your patient .    Heber Issa MD  Infectious Disease  [phone] CHIEF COMPLAINT/ PRESENT FINDINGS:    hx hematuria  required cbi  had very difficult fitch insertion on admit  bleeding resolved   fitch removed yesterday and voiding well will no hematuria         ****************************************************************************************************  PHYSICAL EXAM:    Vital Signs Last 24 Hrs  T(C): 37 (17 Apr 2019 09:24), Max: 37.5 (16 Apr 2019 22:02)  T(F): 98.6 (17 Apr 2019 09:24), Max: 99.5 (16 Apr 2019 22:02)  HR: 76 (17 Apr 2019 09:24) (76 - 105)  BP: 131/71 (17 Apr 2019 09:24) (99/61 - 131/71)  BP(mean): --  RR: 17 (17 Apr 2019 09:24) (17 - 19)  SpO2: 97% (17 Apr 2019 09:24) (91% - 98%)    GENERAL: confused     PENIS: circ     TESTES: soft     PROSTATE/ RECTAL:    ABDOMEN: soft     BACK:    LOWER EXTREMITIES:    NEUROLOGICAL:    **********************************************************************************************************  LABS:                        9.3    7.92  )-----------( 287      ( 16 Apr 2019 08:10 )             27.7     04-16    139  |  103  |  12  ----------------------------<  117<H>  3.8   |  26  |  0.48<L>    Ca    9.0      16 Apr 2019 08:10          Urine Culture: 04-14 @ 12:26  Urine Culure Results   No growth  Organism --      RADIOLOGY & ADDITIONAL STUDIES:      IMPRESSION: hx sepsis  hx ca prostate hx hematuria   now voiding well and urine culture was negative     PLAN: is on midodrine for hypotension   if possible stop it - is an alpha agent that can affect voiding CHIEF COMPLAINT/ PRESENT FINDINGS:  hx ca prostate and rt on midodrine for hypotension came with gross hematuria on cbi -  had some hematuria overnight but now CBI clear   irrigation with Mariah syringe did evacuate some clots  blood culture positive on zosyn       ****************************************************************************************************  PHYSICAL EXAM:    Vital Signs Last 24 Hrs  T(C): 36.8 (2019 05:25), Max: 36.9 (2019 15:22)  T(F): 98.3 (2019 05:25), Max: 98.5 (2019 15:22)  HR: 76 (2019 07:57) (72 - 89)  BP: 108/66 (2019 05:25) (108/66 - 126/74)  BP(mean): --  RR: 20 (2019 05:25) (16 - 20)  SpO2: 97% (2019 07:57) (93% - 98%)    GENERAL: alert  non communicative     PENIS: with fitch     TESTES:    PROSTATE/ RECTAL:    ABDOMEN: soft     BACK:    LOWER EXTREMITIES:    NEUROLOGICAL:    **********************************************************************************************************  LABS:                        9.3    6.99  )-----------( 251      ( 2019 07:45 )             28.7     04-14    140  |  104  |  13  ----------------------------<  122<H>  3.5   |  29  |  0.58    Ca    9.1      2019 07:45      PT/INR - ( 2019 07:45 )   PT: 20.7 sec;   INR: 1.86 ratio           Urinalysis Basic - ( 2019 20:36 )    Color: Red / Appearance: Slightly Turbid / S.005 / pH: x  Gluc: x / Ketone: Negative  / Bili: Negative / Urobili: Negative mg/dL   Blood: x / Protein: 30 mg/dL / Nitrite: Positive   Leuk Esterase: Small / RBC: 25-50 /HPF / WBC 3-5   Sq Epi: x / Non Sq Epi: x / Bacteria: x      Urine Culture:  @ 11:25  Urine Culure Results   Growth in anaerobic bottle: Escherichia coli Susceptibility to follow.  "Due to technical problems, Proteus sp. will Not be reported as part of  the BCID panel until further notice"  ***Blood Panel PCR results on this specimen are available  approximately 3 hours after the Gram stain result.***  Gram stain, PCR, and/or culture results may not always  correspond due to difference in methodologies.  ************************************************************  This PCR assay was performed using [employer].  The following targets are tested for: Enterococcus,  vancomycin resistant enterococci, Listeria monocytogenes,  coagulase negative staphylococci, S. aureus,  methicillin resistant S. aureus, Streptococcus agalactiae  (Group B), S. pneumoniae, S. pyogenes (Group A),  Acinetobacter baumannii, Enterobacter cloacae, E. coli,  Klebsiella oxytoca, K. pneumoniae, Proteus sp.,  Serratia marcescens, Haemophilus influenzae,  Neisseria meningitidis, Pseudomonas aeruginosa, Candida  albicans, C. glabrata, C krusei, C parapsilosis,  C. tropicalis and the KPC resistance gene.  Organism Blood Culture PCR      RADIOLOGY & ADDITIONAL STUDIES:  EXAM:  US KIDNEYS AND BLADDER                                  PROCEDURE DATE:  2019          INTERPRETATION:  CLINICAL INFORMATION: Hematuria. Prostate cancer.    COMPARISON: None available.    TECHNIQUE: Sonography of the kidneys and bladder.     FINDINGS:    Right kidney:  10.2 cm. No hydronephrosis or intrarenal calcification is   noted.    Left kidney:  10.9 cm. No hydronephrosis or intrarenal calcification is   noted. Evaluation of the left kidney is limited.    Urinary bladder:   There is a balloon Fitch catheter within the urinary bladder. The Fitch   catheter was clamped prior to exam.  Bladder volume measures approximately 287 cc.  There is a minimal, 3 cc post void residual.    IMPRESSION:     No sonographic evidence for hydronephrosis.      IMPRESSION: hx sepsis   hx ca prostate and rt with hx uti's now with gross hematuria on blood thinners and sepsis -  difficult fitch insertion  no evidence of latex allergy from fitch   is on midodrine reduced dose as alpha agent can affect voiding  renal bladder sonogram  negative      PLAN: irrigated out any remaining clots  run cbi slow drip is on zosyn IV and off blood thinner Midodrine reduced LEEROYDEONTERESA DRAKE is a 89yMale , patient examined and chart reviewed.     INTERVAL HPI/ OVERNIGHT EVENTS:   Afebrile. No events.   Voiding. No hematuria.    PAST MEDICAL & SURGICAL HISTORY:  Dementia  Arteriosclerotic heart disease (ASHD)  Prostate CA  Atrial fibrillation  DM (diabetes mellitus)      For details regarding the patient's social history, family history, and other miscellaneous elements, please refer the initial infectious diseases consultation and/or the admitting history and physical examination for this admission.    ROS:  Unable to obtain due to : confusion    ALLERGIES:  latex (Unknown)    Current inpatient medications :    ANTIBIOTICS/RELEVANT:  cefpodoxime 100 milliGRAM(s) Oral every 12 hours    MEDICATIONS  (STANDING):  buDESOnide  80 MICROgram(s)/formoterol 4.5 MICROgram(s) Inhaler 2 Puff(s) Inhalation two times a day  dextrose 5%. 1000 milliLiter(s) (50 mL/Hr) IV Continuous <Continuous>  dextrose 50% Injectable 12.5 Gram(s) IV Push once  dextrose 50% Injectable 25 Gram(s) IV Push once  dextrose 50% Injectable 25 Gram(s) IV Push once  insulin lispro (HumaLOG) corrective regimen sliding scale   SubCutaneous three times a day before meals  simvastatin 20 milliGRAM(s) Oral at bedtime  sodium chloride 0.9%. 1000 milliLiter(s) (50 mL/Hr) IV Continuous <Continuous>    MEDICATIONS  (PRN):  acetaminophen   Tablet .. 650 milliGRAM(s) Oral every 6 hours PRN Temp greater or equal to 38C (100.4F), Mild Pain (1 - 3)  ALBUTerol    90 MICROgram(s) HFA Inhaler 2 Puff(s) Inhalation every 6 hours PRN Shortness of Breath  dextrose 40% Gel 15 Gram(s) Oral once PRN Blood Glucose LESS THAN 70 milliGRAM(s)/deciliter  glucagon  Injectable 1 milliGRAM(s) IntraMuscular once PRN Glucose LESS THAN 70 milligrams/deciliter      Objective:  Vital Signs Last 24 Hrs  T(C): 36.7 (2019 13:55), Max: 37.5 (2019 22:02)  T(F): 98 (2019 13:55), Max: 99.5 (2019 22:02)  HR: 121 (2019 13:55) (76 - 121)  BP: 130/85 (2019 13:55) (114/50 - 131/71)  RR: 18 (2019 13:55) (17 - 19)  SpO2: 97% (2019 13:55) (91% - 97%)    Physical Exam:  General: no acute distress  Eyes: sclera anicteric, pupils equal and reactive to light  ENMT: buccal mucosa moist, pharynx not injected  Neck: supple, trachea midline  Lungs: clear, no wheeze/rhonchi  Cardiovascular: regular rate and rhythm, S1 S2  Abdomen: soft, nontender, no organomegaly present, bowel sounds normal  Neurological: alert and oriented x9, Cranial Nerves II-XII grossly intact  Skin: no increased ecchymosis/petechiae/purpura  Lymph Nodes: no palpable cervical/supraclavicular lymph nodes enlargements  Extremities: no edema    LABS:                        9.3    7.92  )-----------( 287      ( 2019 08:10 )             27.7   04-16    139  |  103  |  12  ----------------------------<  117<H>  3.8   |  26  |  0.48<L>    Ca    9.0      2019 08:10    Urinalysis Basic - ( 2019 20:36 )    Color: Red / Appearance: Slightly Turbid / S.005 / pH: x  Gluc: x / Ketone: Negative  / Bili: Negative / Urobili: Negative mg/dL   Blood: x / Protein: 30 mg/dL / Nitrite: Positive   Leuk Esterase: Small / RBC: 25-50 /HPF / WBC 3-5   Sq Epi: x / Non Sq Epi: x / Bacteria: x    MICROBIOLOGY:    Culture - Urine (collected 2019 12:26)  Source: .Urine  Final Report (15 Apr 2019 08:01):    No growth    Culture - Blood (collected 2019 23:39)  Source: .Blood  Preliminary Report (15 Apr 2019 01:01):    No growth to date.    Culture - Blood (collected 2019 23:39)  Source: .Blood  Preliminary Report (15 Apr 2019 01:01):    No growth to date.    Culture - Blood (collected 2019 11:25)  Source: .Blood  Gram Stain (2019 00:48):    Growth in anaerobic bottle: Gram Negative Rods  Preliminary Report (2019 17:45):    Growth in anaerobic bottle: Escherichia coli Susceptibility to follow.    "Due to technical problems, Proteus sp. will Not be reported as part of    the BCID panel until further notice"    ***Blood Panel PCR results on this specimen are available    approximately 3 hours after the Gram stain result.***    Gram stain, PCR, and/or culture results may not always    correspond due to difference in methodologies.    ************************************************************    This PCR assay was performed using [employer]    The following targets are tested for: Enterococcus,    vancomycin resistant enterococci, Listeria monocytogenes,    coagulase negative staphylococci, S. aureus,    methicillin resistant S. aureus, Streptococcus agalactiae    (Group B), S. pneumoniae, S. pyogenes (Group A),    Acinetobacter baumannii, Enterobacter cloacae, E. coli,    Klebsiella oxytoca, K. pneumoniae, Proteus sp.,    Serratia marcescens, Haemophilus influenzae,    Neisseria meningitidis, Pseudomonas aeruginosa, Candida    albicans, C. glabrata, C krusei, C parapsilosis,    C. tropicalis and the KPC resistance gene.  Organism: Blood Culture PCR (2019 02:28)  Organism: Blood Culture PCR (2019 02:28)      -  Escherichia coli: Detec      Method Type: PCR      RADIOLOGY & ADDITIONAL STUDIES:    EXAM:  US KIDNEYS AND BLADDER                                  PROCEDURE DATE:  2019          INTERPRETATION:  CLINICAL INFORMATION: Hematuria. Prostate cancer.    COMPARISON: None available.    TECHNIQUE: Sonography of the kidneys and bladder.     FINDINGS:    Right kidney:  10.2 cm. No hydronephrosis or intrarenal calcification is   noted.    Left kidney:  10.9 cm. No hydronephrosis or intrarenal calcification is   noted. Evaluation of the left kidney is limited.    Urinary bladder:   There is a balloon Galicia catheter within the urinary bladder. The Galicia   catheter was clamped prior to exam.  Bladder volume measures approximately 287 cc.  There is a minimal, 3 cc post void residual.    IMPRESSION:     No sonographic evidence for hydronephrosis.       Assessment :   89 YO W male [facility] Dementia resident of [facility] admitted with hemorrhagic   cystitis/UTi wih Ecoli sepsis  Hematuria resolved sp CBI  Sp fever  Overall stable    Plan :   Off Rocephin  Complete course of  po vantin i  Urology following  Asp precautions  Stable from ID standpoint    D/w hospitalist    Continue with present regiment.  Appropriate use of antibiotics and adverse effects reviewed.      I have discussed the above plan of care with patient/ family in detail. They expressed understanding of the  treatment plan . Risks, benefits and alternatives discussed in detail. I have asked if they have any questions or concerns and appropriately addressed them to the best of my ability .    > 35 minutes were spent in direct patient care reviewing notes, medications ,labs data/ imaging , discussion with multidisciplinary team.    Thank you for allowing me to participate in care of your patient .    Heber Issa MD  Infectious Disease  [phone] PROGRESS NOTE  Chart and available morning labs /imaging are reviewed electronically , urgent issues addressed . More information  is being added upon completion of rounds , when more information is collected and management discussed with consultants , medical staff and social service/case management on the floor LATE ENTRY- patient was seen and examined earlier today around 1 pm  . Plan of care was discussed with med staff and unit coordinator .   OVERNIGHT  No new issues reported by medical staff . All above noted Patient is resting in a bed comfortably .Confused ,poor mentation .No distress noted   SUBJECTIVE    PAST MEDICAL & SURGICAL HISTORY:  Dementia  Arteriosclerotic heart disease (ASHD)  Prostate CA  Atrial fibrillation  DM (diabetes mellitus)    HEALTH ISSUES - PROBLEM Dx:  Prophylactic measure: Prophylactic measure  Dementia: Dementia  DM (diabetes mellitus): DM (diabetes mellitus)  Atrial fibrillation: Atrial fibrillation  Arteriosclerotic heart disease (ASHD): Arteriosclerotic heart disease (ASHD)  Cystitis: Cystitis  Hematuria: Hematuria        FAMILY HISTORY:    Allergies    latex (Unknown)  No Known Drug Allergies    Intolerances        MEDICATIONS  (STANDING):  buDESOnide   0.5 milliGRAM(s) Respule 0.5 milliGRAM(s) Inhalation two times a day  dextrose 5%. 1000 milliLiter(s) (50 mL/Hr) IV Continuous <Continuous>  dextrose 50% Injectable 12.5 Gram(s) IV Push once  dextrose 50% Injectable 25 Gram(s) IV Push once  dextrose 50% Injectable 25 Gram(s) IV Push once  insulin lispro (HumaLOG) corrective regimen sliding scale   SubCutaneous three times a day before meals  midodrine 5 milliGRAM(s) Oral every 8 hours  piperacillin/tazobactam IVPB. 3.375 Gram(s) IV Intermittent every 8 hours  simvastatin 20 milliGRAM(s) Oral at bedtime  sodium chloride 0.9%. 1000 milliLiter(s) (50 mL/Hr) IV Continuous <Continuous>    MEDICATIONS  (PRN):  acetaminophen   Tablet .. 650 milliGRAM(s) Oral every 6 hours PRN Temp greater or equal to 38C (100.4F), Mild Pain (1 - 3)  ALBUTerol    90 MICROgram(s) HFA Inhaler 2 Puff(s) Inhalation every 6 hours PRN Shortness of Breath  dextrose 40% Gel 15 Gram(s) Oral once PRN Blood Glucose LESS THAN 70 milliGRAM(s)/deciliter  glucagon  Injectable 1 milliGRAM(s) IntraMuscular once PRN Glucose LESS THAN 70 milligrams/deciliter      OBJECTIVE    T(C): 36.9 (04-13-19 @ 15:22), Max: 36.9 (04-12-19 @ 20:00)  HR: 79 (04-13-19 @ 15:22) (59 - 88)  BP: 122/76 (04-13-19 @ 15:22) (95/59 - 122/76)  RR: 20 (04-13-19 @ 15:22) (18 - 23)  SpO2: 96% (04-13-19 @ 15:22) (95% - 98%)  Wt(kg): --  I&O's Summary    12 Apr 2019 07:01  -  13 Apr 2019 07:00  --------------------------------------------------------  IN: 92252 mL / OUT: 23333 mL / NET: -93943 mL          REVIEW OF SYSTEMS:  ROS is unobtainable due to dementia and confusion       PHYSICAL EXAM:  Appearance: NAD.   HEENT:   Moist oral mucosa. Conjunctiva clear b/l.   Neck : supple  Cardiovascular: RRR ,S1S2 present  Respiratory: Lungs CTAB  Gastrointestinal:  Soft, nontender. No rebound. No rigidity. BS present 	  Skin: No rashes, No ecchymoses, No cyanosis.	  Neurologic: Non-focal. Moving all extremities. Following commands.  Extremities: No edema. No erythema. No calf tenderness.  HEMATURIA RESOLVED   RAMOS -ADRYAN URINE 	  LABS:                        9.9    9.78  )-----------( 265      ( 13 Apr 2019 10:54 )             30.2     04-13    142  |  105  |  15  ----------------------------<  180<H>  3.5   |  29  |  0.54    Ca    9.0      13 Apr 2019 10:54    TPro  7.1  /  Alb  3.1<L>  /  TBili  0.7  /  DBili  x   /  AST  16  /  ALT  16  /  AlkPhos  94  04-12    PT/INR - ( 13 Apr 2019 10:54 )   PT: 21.9 sec;   INR: 1.97 ratio         PTT - ( 12 Apr 2019 08:18 )  PTT:34.7 sec      RADIOLOGY & ADDITIONAL TESTS:    ASSESSMENT/PLAN: Patient is a 89y old  Male who presents with a chief complaint of hematuria (17 Apr 2019 09:40)      INTERVAL HPI/OVERNIGHT EVENTS:no ac event    Home Medications:  aspirin 81 mg oral tablet: 1 tab(s) orally once a day (12 Apr 2019 12:20)  budesonide 0.5 mg/2 mL inhalation suspension: 2 milliliter(s) inhaled 2 times a day, As Needed (12 Apr 2019 12:20)  donepezil 5 mg oral tablet: 1 tab(s) orally once a day (at bedtime) (12 Apr 2019 12:20)  DuoNeb 0.5 mg-2.5 mg/3 mL inhalation solution: 3 milliliter(s) inhaled 4 times a day, As Needed (12 Apr 2019 12:20)  furosemide 40 mg oral tablet: 1 tab(s) orally once a day (12 Apr 2019 12:20)  metFORMIN 500 mg oral tablet: 1 tab(s) orally 2 times a day (12 Apr 2019 12:20)  midodrine 10 mg oral tablet: 1 tab(s) orally 3 times a day (12 Apr 2019 12:20)  potassium chloride 10 mEq oral capsule, extended release: 1 cap(s) orally once a day (12 Apr 2019 12:20)  simvastatin 20 mg oral tablet: 1 tab(s) orally once a day (at bedtime) (12 Apr 2019 12:20)  warfarin 6 mg oral tablet: 1 tab(s) orally once a day (12 Apr 2019 12:20)      MEDICATIONS  (STANDING):  buDESOnide  80 MICROgram(s)/formoterol 4.5 MICROgram(s) Inhaler 2 Puff(s) Inhalation two times a day  cefpodoxime 100 milliGRAM(s) Oral every 12 hours  dextrose 5%. 1000 milliLiter(s) (50 mL/Hr) IV Continuous <Continuous>  dextrose 50% Injectable 12.5 Gram(s) IV Push once  dextrose 50% Injectable 25 Gram(s) IV Push once  dextrose 50% Injectable 25 Gram(s) IV Push once  insulin lispro (HumaLOG) corrective regimen sliding scale   SubCutaneous three times a day before meals  simvastatin 20 milliGRAM(s) Oral at bedtime  sodium chloride 0.9%. 1000 milliLiter(s) (50 mL/Hr) IV Continuous <Continuous>    MEDICATIONS  (PRN):  acetaminophen   Tablet .. 650 milliGRAM(s) Oral every 6 hours PRN Temp greater or equal to 38C (100.4F), Mild Pain (1 - 3)  ALBUTerol    90 MICROgram(s) HFA Inhaler 2 Puff(s) Inhalation every 6 hours PRN Shortness of Breath  dextrose 40% Gel 15 Gram(s) Oral once PRN Blood Glucose LESS THAN 70 milliGRAM(s)/deciliter  glucagon  Injectable 1 milliGRAM(s) IntraMuscular once PRN Glucose LESS THAN 70 milligrams/deciliter      Allergies    latex (Unknown)  No Known Drug Allergies    Intolerances        REVIEW OF SYSTEMS:  CONSTITUTIONAL: No fever, weight loss,has fatigue  EYES: No eye pain, visual disturbances, or discharge  ENMT:  No difficulty hearing, tinnitus, vertigo; No sinus or throat pain  NECK: No pain or stiffness  BREASTS: No pain, masses, or nipple discharge  RESPIRATORY: No cough, wheezing, chills or hemoptysis; No shortness of breath  CARDIOVASCULAR: No chest pain, palpitations, dizziness, or leg swelling  GASTROINTESTINAL: No abdominal or epigastric pain. No nausea, vomiting, or hematemesis; No diarrhea or constipation. No melena or hematochezia.  GENITOURINARY:hematuria resolved fitch discontinued , having difficulty voiding  NEUROLOGICAL: No headaches, memory loss, loss of strength, numbness, or tremors  SKIN: No itching, burning, rashes, or lesions   ENDOCRINE: No heat or cold intolerance; No hair loss  MUSCULOSKELETAL: No joint pain or swelling; No muscle, back, or extremity pain  PSYCHIATRIC: No depression, anxiety, mood swings, or difficulty sleeping  HEME/LYMPH: No easy bruising, or bleeding gums  ALLERGY AND IMMUNOLOGIC: No hives or eczema    Vital Signs Last 24 Hrs  T(C): 37 (17 Apr 2019 09:24), Max: 37.5 (16 Apr 2019 22:02)  T(F): 98.6 (17 Apr 2019 09:24), Max: 99.5 (16 Apr 2019 22:02)  HR: 76 (17 Apr 2019 09:24) (76 - 105)  BP: 131/71 (17 Apr 2019 09:24) (99/61 - 131/71)  BP(mean): --  RR: 17 (17 Apr 2019 09:24) (17 - 19)  SpO2: 97% (17 Apr 2019 09:24) (91% - 98%)    PHYSICAL EXAM:  GENERAL: NAD, well-groomed, well-developed  HEAD:  Atraumatic, Normocephalic  EYES: EOMI, PERRLA, conjunctiva and sclera clear  ENMT: Moist mucous membranes,   NECK: Supple, No JVD, Normal thyroid  NERVOUS SYSTEM:  Alert & Oriented X2, poor memory and concentration; Motor Strength 3/5 B/L upper and lower extremities; DTRs 2+ intact and symmetric  CHEST/LUNG: Clear to percussion bilaterally; No rales, rhonchi, wheezing, or rubs  HEART: Regular rate and rhythm; No murmurs, rubs, or gallops  ABDOMEN: Soft, Nontender, Nondistended; Bowel sounds present  EXTREMITIES:  2+ Peripheral Pulses, No clubbing, cyanosis, or edema  LYMPH: No lymphadenopathy noted  SKIN: No rashes or lesions    LABS:                        9.3    7.92  )-----------( 287      ( 16 Apr 2019 08:10 )             27.7     04-16    139  |  103  |  12  ----------------------------<  117<H>  3.8   |  26  |  0.48<L>    Ca    9.0      16 Apr 2019 08:10          CAPILLARY BLOOD GLUCOSE      POCT Blood Glucose.: 131 mg/dL (17 Apr 2019 07:47)  POCT Blood Glucose.: 175 mg/dL (16 Apr 2019 16:25)  POCT Blood Glucose.: 155 mg/dL (16 Apr 2019 12:16)        Culture - Urine (collected 04-14-19 @ 12:26)  Source: .Urine  Final Report (04-15-19 @ 08:01):    No growth    Culture - Blood (collected 04-13-19 @ 23:39)  Source: .Blood  Preliminary Report (04-15-19 @ 01:01):    No growth to date.    Culture - Blood (collected 04-13-19 @ 23:39)  Source: .Blood  Preliminary Report (04-15-19 @ 01:01):    No growth to date.    Culture - Blood (collected 04-13-19 @ 13:13)  Source: .Blood  Preliminary Report (04-14-19 @ 14:00):    No growth to date.    Culture - Blood (collected 04-12-19 @ 11:25)  Source: .Blood  Preliminary Report (04-13-19 @ 12:01):    No growth to date.    Culture - Blood (collected 04-12-19 @ 11:25)  Source: .Blood  Gram Stain (04-13-19 @ 00:48):    Growth in anaerobic bottle: Gram Negative Rods  Final Report (04-14-19 @ 16:59):    Growth in anaerobic bottle: Escherichia coli    "Due to technical problems, Proteus sp. will Not be reported as part of    the BCID panel until further notice"    ***Blood Panel PCR results on this specimen are available    approximately 3 hours after the Gram stain result.***    Gram stain, PCR, and/or culture results may not always    correspond due to difference in methodologies.    ************************************************************    This PCR assay was performed using [employer].    The following targets are tested for: Enterococcus,    vancomycin resistant enterococci, Listeria monocytogenes,    coagulase negative staphylococci, S. aureus,    methicillin resistant S. aureus, Streptococcus agalactiae    (Group B), S. pneumoniae, S. pyogenes (Group A),    Acinetobacter baumannii, Enterobacter cloacae, E. coli,    Klebsiella oxytoca, K. pneumoniae, Proteus sp.,    Serratia marcescens, Haemophilus influenzae,    Neisseria meningitidis, Pseudomonas aeruginosa, Candida    albicans, C. glabrata, C krusei, C parapsilosis,    C. tropicalis and the KPC resistance gene.  Organism: Blood Culture PCR  Escherichia coli (04-14-19 @ 16:59)  Organism: Escherichia coli (04-14-19 @ 16:59)      -  Amikacin: S <=16      -  Ampicillin: R >16 These ampicillin results predict results for amoxicillin      -  Ampicillin/Sulbactam: S <=8/4      -  Aztreonam: S <=4      -  Cefazolin: S <=8      -  Cefepime: S <=4      -  Cefoxitin: S <=8      -  Ceftriaxone: S <=1 Enterobacter, Citrobacter, and Serratia may develop resistance during prolonged therapy      -  Ciprofloxacin: S <=1      -  Ertapenem: S <=1      -  Gentamicin: S <=4      -  Imipenem: S <=1      -  Levofloxacin: S <=2      -  Meropenem: S <=1      -  Piperacillin/Tazobactam: S <=16      -  Tobramycin: S <=4      -  Trimethoprim/Sulfamethoxazole: S <=2/38      Method Type: CHARITO  Organism: Blood Culture PCR (04-14-19 @ 16:59)      -  Escherichia coli: Detec      Method Type: PCR        I&O's Summary    16 Apr 2019 07:01  -  17 Apr 2019 07:00  --------------------------------------------------------  IN: 970 mL / OUT: 1800 mL / NET: -830 mL        RADIOLOGY & ADDITIONAL TESTS:    Imaging Personally Reviewed:  [x ] YES  [ ] NO    Consultant(s) Notes Reviewed:  [x ] YES  [ ] NO    Care Discussed with Consultants/Other Providers [x ] YES  [ ] NO Patient is a 89y old  Male who presents with a chief complaint of hematuria (17 Apr 2019 17:07)      INTERVAL HPI/OVERNIGHT EVENTS:  no acute event  Home Medications:  aspirin 81 mg oral tablet: 1 tab(s) orally once a day (12 Apr 2019 12:20)  budesonide 0.5 mg/2 mL inhalation suspension: 2 milliliter(s) inhaled 2 times a day, As Needed (12 Apr 2019 12:20)  donepezil 5 mg oral tablet: 1 tab(s) orally once a day (at bedtime) (12 Apr 2019 12:20)  DuoNeb 0.5 mg-2.5 mg/3 mL inhalation solution: 3 milliliter(s) inhaled 4 times a day, As Needed (12 Apr 2019 12:20)  furosemide 40 mg oral tablet: 1 tab(s) orally once a day (12 Apr 2019 12:20)  metFORMIN 500 mg oral tablet: 1 tab(s) orally 2 times a day (12 Apr 2019 12:20)  midodrine 10 mg oral tablet: 1 tab(s) orally 3 times a day (12 Apr 2019 12:20)  potassium chloride 10 mEq oral capsule, extended release: 1 cap(s) orally once a day (12 Apr 2019 12:20)  simvastatin 20 mg oral tablet: 1 tab(s) orally once a day (at bedtime) (12 Apr 2019 12:20)  warfarin 6 mg oral tablet: 1 tab(s) orally once a day (12 Apr 2019 12:20)      MEDICATIONS  (STANDING):  buDESOnide  80 MICROgram(s)/formoterol 4.5 MICROgram(s) Inhaler 2 Puff(s) Inhalation two times a day  cefpodoxime 100 milliGRAM(s) Oral every 12 hours  dextrose 5%. 1000 milliLiter(s) (50 mL/Hr) IV Continuous <Continuous>  dextrose 50% Injectable 12.5 Gram(s) IV Push once  dextrose 50% Injectable 25 Gram(s) IV Push once  dextrose 50% Injectable 25 Gram(s) IV Push once  insulin lispro (HumaLOG) corrective regimen sliding scale   SubCutaneous three times a day before meals  simvastatin 20 milliGRAM(s) Oral at bedtime  sodium chloride 0.9%. 1000 milliLiter(s) (50 mL/Hr) IV Continuous <Continuous>    MEDICATIONS  (PRN):  acetaminophen   Tablet .. 650 milliGRAM(s) Oral every 6 hours PRN Temp greater or equal to 38C (100.4F), Mild Pain (1 - 3)  ALBUTerol    90 MICROgram(s) HFA Inhaler 2 Puff(s) Inhalation every 6 hours PRN Shortness of Breath  dextrose 40% Gel 15 Gram(s) Oral once PRN Blood Glucose LESS THAN 70 milliGRAM(s)/deciliter  glucagon  Injectable 1 milliGRAM(s) IntraMuscular once PRN Glucose LESS THAN 70 milligrams/deciliter      Allergies    latex (Unknown)  No Known Drug Allergies    Intolerances        REVIEW OF SYSTEMS:  CONSTITUTIONAL: No fever, weight loss, or fatigue  EYES: No eye pain, visual disturbances, or discharge  ENMT:  No difficulty hearing, tinnitus, vertigo; No sinus or throat pain  NECK: No pain or stiffness  BREASTS: No pain, masses, or nipple discharge  RESPIRATORY: No cough, wheezing, chills or hemoptysis; No shortness of breath  CARDIOVASCULAR: No chest pain, palpitations, dizziness, or leg swelling  GASTROINTESTINAL: No abdominal or epigastric pain. No nausea, vomiting, or hematemesis; No diarrhea or constipation. No melena or hematochezia.  GENITOURINARY: No dysuria, frequency, hematuria, or incontinence  NEUROLOGICAL: No headaches, memory loss, loss of strength, numbness, or tremors  SKIN: No itching, burning, rashes, or lesions   LYMPH NODES: No enlarged glands  ENDOCRINE: No heat or cold intolerance; No hair loss  MUSCULOSKELETAL: No joint pain or swelling; No muscle, back, or extremity pain  PSYCHIATRIC: No depression, anxiety, mood swings, or difficulty sleeping  HEME/LYMPH: No easy bruising, or bleeding gums  ALLERGY AND IMMUNOLOGIC: No hives or eczema    Vital Signs Last 24 Hrs  T(C): 37.5 (18 Apr 2019 09:36), Max: 37.7 (18 Apr 2019 05:41)  T(F): 99.5 (18 Apr 2019 09:36), Max: 99.9 (18 Apr 2019 05:41)  HR: 98 (18 Apr 2019 09:36) (83 - 121)  BP: 121/73 (18 Apr 2019 09:36) (121/73 - 130/85)  BP(mean): --  RR: 18 (18 Apr 2019 09:36) (18 - 19)  SpO2: 94% (18 Apr 2019 09:36) (93% - 97%)    PHYSICAL EXAM:  GENERAL: NAD, well-groomed, well-developed  HEAD:  Atraumatic, Normocephalic  EYES: EOMI, PERRLA, conjunctiva and sclera clear  ENMT: Moist mucous membranes,   NECK: Supple, No JVD, Normal thyroid  NERVOUS SYSTEM:  poor memoru unstable gait  CHEST/LUNG: Clear to percussion bilaterally; No rales, rhonchi, wheezing, or rubs  HEART: Regular rate and rhythm; No murmurs, rubs, or gallops  ABDOMEN: Soft, Nontender, Nondistended; Bowel sounds present  EXTREMITIES:  2+ Peripheral Pulses, No clubbing, cyanosis, or edema  SKIN: No rashes or lesions    LABS:              CAPILLARY BLOOD GLUCOSE      POCT Blood Glucose.: 209 mg/dL (18 Apr 2019 12:13)  POCT Blood Glucose.: 122 mg/dL (18 Apr 2019 07:55)  POCT Blood Glucose.: 172 mg/dL (17 Apr 2019 21:16)  POCT Blood Glucose.: 167 mg/dL (17 Apr 2019 16:55)        Culture - Urine (collected 04-14-19 @ 12:26)  Source: .Urine  Final Report (04-15-19 @ 08:01):    No growth    Culture - Blood (collected 04-13-19 @ 23:39)  Source: .Blood  Preliminary Report (04-15-19 @ 01:01):    No growth to date.    Culture - Blood (collected 04-13-19 @ 23:39)  Source: .Blood  Preliminary Report (04-15-19 @ 01:01):    No growth to date.    Culture - Blood (collected 04-13-19 @ 13:13)  Source: .Blood  Preliminary Report (04-14-19 @ 14:00):    No growth to date.    Culture - Blood (collected 04-12-19 @ 11:25)  Source: .Blood  Final Report (04-17-19 @ 12:01):    No growth at 5 days.    Culture - Blood (collected 04-12-19 @ 11:25)  Source: .Blood  Gram Stain (04-13-19 @ 00:48):    Growth in anaerobic bottle: Gram Negative Rods  Final Report (04-14-19 @ 16:59):    Growth in anaerobic bottle: Escherichia coli    "Due to technical problems, Proteus sp. will Not be reported as part of    the BCID panel until further notice"    ***Blood Panel PCR results on this specimen are available    approximately 3 hours after the Gram stain result.***    Gram stain, PCR, and/or culture results may not always    correspond due to difference in methodologies.    ************************************************************    This PCR assay was performed using [employer].    The following targets are tested for: Enterococcus,    vancomycin resistant enterococci, Listeria monocytogenes,    coagulase negative staphylococci, S. aureus,    methicillin resistant S. aureus, Streptococcus agalactiae    (Group B), S. pneumoniae, S. pyogenes (Group A),    Acinetobacter baumannii, Enterobacter cloacae, E. coli,    Klebsiella oxytoca, K. pneumoniae, Proteus sp.,    Serratia marcescens, Haemophilus influenzae,    Neisseria meningitidis, Pseudomonas aeruginosa, Candida    albicans, C. glabrata, C krusei, C parapsilosis,    C. tropicalis and the KPC resistance gene.  Organism: Blood Culture PCR  Escherichia coli (04-14-19 @ 16:59)  Organism: Escherichia coli (04-14-19 @ 16:59)      -  Amikacin: S <=16      -  Ampicillin: R >16 These ampicillin results predict results for amoxicillin      -  Ampicillin/Sulbactam: S <=8/4      -  Aztreonam: S <=4      -  Cefazolin: S <=8      -  Cefepime: S <=4      -  Cefoxitin: S <=8      -  Ceftriaxone: S <=1 Enterobacter, Citrobacter, and Serratia may develop resistance during prolonged therapy      -  Ciprofloxacin: S <=1      -  Ertapenem: S <=1      -  Gentamicin: S <=4      -  Imipenem: S <=1      -  Levofloxacin: S <=2      -  Meropenem: S <=1      -  Piperacillin/Tazobactam: S <=16      -  Tobramycin: S <=4      -  Trimethoprim/Sulfamethoxazole: S <=2/38      Method Type: CHARITO  Organism: Blood Culture PCR (04-14-19 @ 16:59)      -  Escherichia coli: Detec      Method Type: PCR        I&O's Summary    18 Apr 2019 07:01  -  18 Apr 2019 12:42  --------------------------------------------------------  IN: 250 mL / OUT: 0 mL / NET: 250 mL        RADIOLOGY & ADDITIONAL TESTS:    Imaging Personally Reviewed:  [x ] YES  [ ] NO    Consultant(s) Notes Reviewed:  [x ] YES  [ ] NO    Care Discussed with Consultants/Other Providers [x ] YES  [ ] NO TERESA FRANCIS is a 89yMale , patient examined and chart reviewed.     INTERVAL HPI/ OVERNIGHT EVENTS:   Afebrile. No events.     PAST MEDICAL & SURGICAL HISTORY:  Dementia  Arteriosclerotic heart disease (ASHD)  Prostate CA  Atrial fibrillation  DM (diabetes mellitus)      For details regarding the patient's social history, family history, and other miscellaneous elements, please refer the initial infectious diseases consultation and/or the admitting history and physical examination for this admission.    ROS:  Unable to obtain due to : confusion    ALLERGIES:  latex (Unknown)    Current inpatient medications :    ANTIBIOTICS/RELEVANT:  piperacillin/tazobactam IVPB. 3.375 Gram(s) IV Intermittent every 8 hours      acetaminophen   Tablet .. 650 milliGRAM(s) Oral every 6 hours PRN  ALBUTerol    90 MICROgram(s) HFA Inhaler 2 Puff(s) Inhalation every 6 hours PRN  buDESOnide   0.5 milliGRAM(s) Respule 0.5 milliGRAM(s) Inhalation two times a day  dextrose 40% Gel 15 Gram(s) Oral once PRN  dextrose 5%. 1000 milliLiter(s) IV Continuous <Continuous>  dextrose 50% Injectable 12.5 Gram(s) IV Push once  dextrose 50% Injectable 25 Gram(s) IV Push once  dextrose 50% Injectable 25 Gram(s) IV Push once  glucagon  Injectable 1 milliGRAM(s) IntraMuscular once PRN  insulin lispro (HumaLOG) corrective regimen sliding scale   SubCutaneous three times a day before meals  midodrine 5 milliGRAM(s) Oral every 8 hours  simvastatin 20 milliGRAM(s) Oral at bedtime  sodium chloride 0.9%. 1000 milliLiter(s) IV Continuous <Continuous>      Objective:     @ : @ 07:00  --------------------------------------------------------  IN: 39389 mL / OUT: 3200 mL / NET: 60646 mL     @ 07: @ 15:00  --------------------------------------------------------  IN: 2000 mL / OUT: 2500 mL / NET: -500 mL      T(C): 37.1 (19 @ 13:22), Max: 37.1 (19 @ 13:22)  HR: 108 (19 @ 13:22) (72 - 108)  BP: 105/65 (19 @ 13:22) (102/58 - 126/74)  RR: 20 (19 @ 13:22) (16 - 24)  SpO2: 95% (19 @ 13:22) (93% - 98%)  Wt(kg): --      Physical Exam:  General: no acute distress  Eyes: sclera anicteric, pupils equal and reactive to light  ENMT: buccal mucosa moist, pharynx not injected  Neck: supple, trachea midline  Lungs: clear, no wheeze/rhonchi  Cardiovascular: regular rate and rhythm, S1 S2  Abdomen: soft, nontender, no organomegaly present, bowel sounds normal  : Urine yellow clear  Neurological: alert and oriented x0, Cranial Nerves II-XII grossly intact  Skin: no increased ecchymosis/petechiae/purpura  Lymph Nodes: no palpable cervical/supraclavicular lymph nodes enlargements  Extremities: no cyanosis/clubbing/edema      LABS:                          9.3    6.99  )-----------( 251      ( 2019 07:45 )             28.7       04-14    140  |  104  |  13  ----------------------------<  122<H>  3.5   |  29  |  0.58    Ca    9.1      2019 07:45        PT/INR - ( 2019 07:45 )   PT: 20.7 sec;   INR: 1.86 ratio           Urinalysis Basic - ( 2019 20:36 )    Color: Red / Appearance: Slightly Turbid / S.005 / pH: x  Gluc: x / Ketone: Negative  / Bili: Negative / Urobili: Negative mg/dL   Blood: x / Protein: 30 mg/dL / Nitrite: Positive   Leuk Esterase: Small / RBC: 25-50 /HPF / WBC 3-5   Sq Epi: x / Non Sq Epi: x / Bacteria: x    MICROBIOLOGY:  Culture - Blood (collected 2019 11:25)  Source: .Blood  Preliminary Report (2019 12:01):    No growth to date.    Culture - Blood (collected 2019 11:25)  Source: .Blood  Gram Stain (2019 00:48):    Growth in anaerobic bottle: Gram Negative Rods  Preliminary Report (2019 17:45):    Growth in anaerobic bottle: Escherichia coli Susceptibility to follow.    "Due to technical problems, Proteus sp. will Not be reported as part of    the BCID panel until further notice"    ***Blood Panel PCR results on this specimen are available    approximately 3 hours after the Gram stain result.***    Gram stain, PCR, and/or culture results may not always    correspond due to difference in methodologies.    ************************************************************    This PCR assay was performed using [facility].    The following targets are tested for: Enterococcus,    vancomycin resistant enterococci, Listeria monocytogenes,    coagulase negative staphylococci, S. aureus,    methicillin resistant S. aureus, Streptococcus agalactiae    (Group B), S. pneumoniae, S. pyogenes (Group A),    Acinetobacter baumannii, Enterobacter cloacae, E. coli,    Klebsiella oxytoca, K. pneumoniae, Proteus sp.,    Serratia marcescens, Haemophilus influenzae,    Neisseria meningitidis, Pseudomonas aeruginosa, Candida    albicans, C. glabrata, C krusei, C parapsilosis,    C. tropicalis and the KPC resistance gene.  Organism: Blood Culture PCR (2019 02:28)  Organism: Blood Culture PCR (2019 02:28)      -  Escherichia coli: Detec      Method Type: PCR      RADIOLOGY & ADDITIONAL STUDIES:    EXAM:  US KIDNEYS AND BLADDER                                  PROCEDURE DATE:  2019          INTERPRETATION:  CLINICAL INFORMATION: Hematuria. Prostate cancer.    COMPARISON: None available.    TECHNIQUE: Sonography of the kidneys and bladder.     FINDINGS:    Right kidney:  10.2 cm. No hydronephrosis or intrarenal calcification is   noted.    Left kidney:  10.9 cm. No hydronephrosis or intrarenal calcification is   noted. Evaluation of the left kidney is limited.    Urinary bladder:   There is a balloon Galicia catheter within the urinary bladder. The Galicia   catheter was clamped prior to exam.  Bladder volume measures approximately 287 cc.  There is a minimal, 3 cc post void residual.    IMPRESSION:     No sonographic evidence for hydronephrosis.       Assessment :   89 YO W male [facility] Dementia resident of [facility] admitted with hemorrhagic   cystitis/UTi wih GNR sepsis  Seen by  and started on CBI   Hematuria resolving  Sp fever    Plan :   Cont Zosyn  Fu final cultures  Repeat blood cultures  Trend temp and wbc  Urology following    D/w Dr Ring    Continue with present regiment.  Appropriate use of antibiotics and adverse effects reviewed.      I have discussed the above plan of care with patient/ family in detail. They expressed understanding of the  treatment plan . Risks, benefits and alternatives discussed in detail. I have asked if they have any questions or concerns and appropriately addressed them to the best of my ability .    > 35 minutes were spent in direct patient care reviewing notes, medications ,labs data/ imaging , discussion with multidisciplinary team.    Thank you for allowing me to participate in care of your patient .    Heber Issa MD  Infectious Disease  [phone] afebrile  abdomen soft   cbi was off but see some blood in drainage tube  irrigated out a few small clots  will continue cbi today slow drip today   INR still extended 1.8  urine culture and repeat blood cultures negative  no growth   is on rocephin IV   if pressure is not low would like Dr Ring to consider stoping Midodrine which is an alpha agent that can affect voiding seems a little more alert  abdomen soft  cbi has been totally clear  i filled bladder with some saline and removed fitch for a voiding trial today   if fails to void will use coude fitch Chart and available morning labs /imaging are reviewed electronically , urgent issues addressed . More information  is being added upon completion of rounds , when more information is collected and management discussed with consultants , medical staff and social service/case management on the floor LATE ENTRY- patient was seen and examined 04/15/19 around 12.30-1pm  Plan of care was discussed with med staff and unit coordinator .    No new issues reported by medical staff . All above noted Patient is resting in a bed comfortably .Confused ,poor mentation .No distress noted Hematuria resolving ,still on CBI   followed by  and ID CONSULTS   INTERVAL HPI/ OVERNIGHT EVENTS:   Afebrile. No events.   Doing well. Still on CBI. urine clear.  PAST MEDICAL & SURGICAL HISTORY:  Dementia  Arteriosclerotic heart disease (ASHD)  Prostate CA  Atrial fibrillation  DM (diabetes mellitus)  For details regarding the patient's social history, family history, and other miscellaneous elements, please refer the initial infectious diseases consultation and/or the admitting history and physical examination for this admission.    ROS:  Unable to obtain due to : confusion  ALLERGIES:  latex (Unknown) probably not real allergy ,family is not aware of details and why it is documented   Current inpatient medications :  ANTIBIOTICS/RELEVANT:  cefTRIAXone   IVPB 1 Gram(s) IV Intermittent every 24 hours  MEDICATIONS  (STANDING):  buDESOnide   0.5 milliGRAM(s) Respule 0.5 milliGRAM(s) Inhalation two times a day  dextrose 5%. 1000 milliLiter(s) (50 mL/Hr) IV Continuous <Continuous>  dextrose 50% Injectable 12.5 Gram(s) IV Push once  dextrose 50% Injectable 25 Gram(s) IV Push once  dextrose 50% Injectable 25 Gram(s) IV Push once  insulin lispro (HumaLOG) corrective regimen sliding scale   SubCutaneous three times a day before meals  midodrine 5 milliGRAM(s) Oral every 8 hours  simvastatin 20 milliGRAM(s) Oral at bedtime  sodium chloride 0.9%. 1000 milliLiter(s) (50 mL/Hr) IV Continuous <Continuous>    MEDICATIONS  (PRN):  acetaminophen   Tablet .. 650 milliGRAM(s) Oral every 6 hours PRN Temp greater or equal to 38C (100.4F), Mild Pain (1 - 3)  ALBUTerol    90 MICROgram(s) HFA Inhaler 2 Puff(s) Inhalation every 6 hours PRN Shortness of Breath  dextrose 40% Gel 15 Gram(s) Oral once PRN Blood Glucose LESS THAN 70 milliGRAM(s)/deciliter  glucagon  Injectable 1 milliGRAM(s) IntraMuscular once PRN Glucose LESS THAN 70 milligrams/deciliter      Objective:  Vital Signs Last 24 Hrs  T(C): 36.8 (15 Apr 2019 10:38), Max: 37.3 (2019 21:20)  T(F): 98.3 (15 Apr 2019 10:38), Max: 99.1 (2019 21:20)  HR: 96 (15 Apr 2019 10:38) (73 - 108)  BP: 114/63 (15 Apr 2019 10:38) (100/66 - 120/69)  RR: 18 (15 Apr 2019 10:38) (18 - 20)  SpO2: 96% (15 Apr 2019 10:38) (93% - 97%)    ROS is unobtainable due to dementia and confusion   Physical Exam:  General: no acute distress  Eyes: sclera anicteric, pupils equal and reactive to light  ENMT: buccal mucosa moist, pharynx not injected  Neck: supple, trachea midline  Lungs: clear, no wheeze/rhonchi  Cardiovascular: regular rate and rhythm, S1 S2  Abdomen: soft, nontender, no organomegaly present, bowel sounds normal  : Urine clear on CBI  Neurological: alert and oriented x0, Cranial Nerves II-XII grossly intact  Skin: no increased ecchymosis/petechiae/purpura  Lymph Nodes: no palpable cervical/supraclavicular lymph nodes enlargements  Extremities: no cyanosis/clubbing/edema      LABS:             9.3    6.99  )-----------( 251      ( 2019 07:45 )             28.7   04-14    140  |  104  |  13  ----------------------------<  122<H>  3.5   |  29  |  0.58    Ca    9.1      2019 07:45      Urinalysis Basic - ( 2019 20:36 )    Color: Red / Appearance: Slightly Turbid / S.005 / pH: x  Gluc: x / Ketone: Negative  / Bili: Negative / Urobili: Negative mg/dL   Blood: x / Protein: 30 mg/dL / Nitrite: Positive   Leuk Esterase: Small / RBC: 25-50 /HPF / WBC 3-5   Sq Epi: x / Non Sq Epi: x / Bacteria: x    MICROBIOLOGY:    Culture - Urine (collected 2019 12:26)  Source: .Urine  Final Report (15 Apr 2019 08:01):    No growth    Culture - Blood (collected 2019 23:39)  Source: .Blood  Preliminary Report (15 Apr 2019 01:01):    No growth to date.    Culture - Blood (collected 2019 23:39)  Source: .Blood  Preliminary Report (15 Apr 2019 01:01):    No growth to date.    Culture - Blood (collected 2019 11:25)  Source: .Blood  Gram Stain (2019 00:48):    Growth in anaerobic bottle: Gram Negative Rods  Preliminary Report (2019 17:45):    Growth in anaerobic bottle: Escherichia coli Susceptibility to follow.    "Due to technical problems, Proteus sp. will Not be reported as part of    the BCID panel until further notice"    ***Blood Panel PCR results on this specimen are available    approximately 3 hours after the Gram stain result.***    Gram stain, PCR, and/or culture results may not always    correspond due to difference in methodologies.    ************************************************************    This PCR assay was performed using [email or web address].    The following targets are tested for: Enterococcus,    vancomycin resistant enterococci, Listeria monocytogenes,    coagulase negative staphylococci, S. aureus,    methicillin resistant S. aureus, Streptococcus agalactiae    (Group B), S. pneumoniae, S. pyogenes (Group A),    Acinetobacter baumannii, Enterobacter cloacae, E. coli,    Klebsiella oxytoca, K. pneumoniae, Proteus sp.,    Serratia marcescens, Haemophilus influenzae,    Neisseria meningitidis, Pseudomonas aeruginosa, Candida    albicans, C. glabrata, C krusei, C parapsilosis,    C. tropicalis and the KPC resistance gene.  Organism: Blood Culture PCR (2019 02:28)  Organism: Blood Culture PCR (2019 02:28)      -  Escherichia coli: Detec      Method Type: PCR      RADIOLOGY & ADDITIONAL STUDIES:    EXAM:  US KIDNEYS AND BLADDER                                  PROCEDURE DATE:  2019          INTERPRETATION:  CLINICAL INFORMATION: Hematuria. Prostate cancer.    COMPARISON: None available.    TECHNIQUE: Sonography of the kidneys and bladder.     FINDINGS:    Right kidney:  10.2 cm. No hydronephrosis or intrarenal calcification is   noted.    Left kidney:  10.9 cm. No hydronephrosis or intrarenal calcification is   noted. Evaluation of the left kidney is limited.    Urinary bladder:   There is a balloon Galicia catheter within the urinary bladder. The Galicia   catheter was clamped prior to exam.  Bladder volume measures approximately 287 cc.  There is a minimal, 3 cc post void residual.    IMPRESSION:     No sonographic evidence for hydronephrosis.       < from: US Kidney and Bladder (19 @ 10:55) >  EXAM:  US KIDNEYS AND BLADDER                                  PROCEDURE DATE:  2019          INTERPRETATION:  CLINICAL INFORMATION: Hematuria. Prostate cancer.    COMPARISON: None available.    TECHNIQUE: Sonography of the kidneys and bladder.     FINDINGS:    Right kidney:  10.2 cm. No hydronephrosis or intrarenal calcification is   noted.    Left kidney:  10.9 cm. No hydronephrosis or intrarenal calcification is   noted. Evaluation of the left kidney is limited.    Urinary bladder:   There is a balloon Galicia catheter within the urinary bladder. The Galicia   catheter was clamped prior to exam.  Bladder volume measures approximately 287 cc.  There is a minimal, 3 cc post void residual.    IMPRESSION:     No sonographic evidence for hydronephrosis.    < end of copied text > decreased po intake

## 2019-07-08 NOTE — PATIENT PROFILE ADULT. - HEALTHCARE INFORMATION NEEDED, PROFILE
Daily Note     Today's date: 2019  Patient name: Ross Lundberg  : 1954  MRN: [id number]  Referring provider: Livia Barrios MD  Dx:   Encounter Diagnosis     ICD-10-CM    1  Pain in both knees, unspecified chronicity M25 561     M25 562                   Subjective: Patient reports that she is feeling better and better  Notes that she has mild achiness only in her knee now      Objective: See treatment diary below  [facility]                  Manual knee AAROM  Flexion  / extension    LA  PTA LA  PTA LA  PTA                                                                                                                     Exercise Diary         bike 5 min 10 min 10'  10'   nv  5' 10'  10'       Qs, add sets 3s x 20   :05  10x2 each :05  10x2 each  3s x 20      :03  20x each 3s x 20       Slr, saq 20x /  B  20x each B:  10x2 each  1# x 20   1# x20  1# x20 B:  1 5#  20x each  1 5# x20       S/l abd 20x  B:  10x2 each B:  10x2 each  1# x20   1# x20  1#x20 B:  1 5#  20x each  1 5# x 20        hss w/ strap 20s x 5 B:  :20  3x each B:  :20  3x each  20s x 3 ea   20s x5 ea  20s x5 ea B:  :20  5x each  20s x 5       Prone quad stretch w/ strap   B:  :20  3x each B:  :20  3x each  20s x 5 ea   20s x 5 ea  20s x 5ea :20  5x each         Prone hs curls        1#  20  1# 20 1# 20 B:  1 5#  20x  1 5# x 20        Laq/ hip flexion   B:  10x2 each B:  10x2 each  1# x 20 ea  1# x 20 ea  1# x 25ea B:  25x each   1 5# x 20        Seated TB knee flexion     B:  YTB  20x each  rtb x 20 ea  rtb x20 ea  rtb x20 ea B:  RTB  20x each         SLR with ER   B:  :05  10x each B:  :05  10x2 each   np  np  np NP         Stand slr x 3        1# x20 ea   1# x20  1# x20 1 5#  25x each  1 5# x 20        Stand HS curls   B:  10x2 each B:  10x2 each  1# x 20  1# x20  1# x20 1 5#  25x  1 5# x 20       Standing B heel raises   20x 20x   20x 1#   20x 1#  25x 1# 1 5#  25x  1 5# x 20      Step ups  F/l                 20x ea                                                                                                                                                                                     Modalities                                                                                                      Assessment: Tolerated treatment better today-   No difficulty shown with requested tasks    Plan: Continue per plan of care  Progress treatment as tolerated  Increase weight as tolerated next session        Precautions: bilateral knee pain  R > L none

## 2019-12-11 NOTE — ED PROVIDER NOTE - CADM POA CENTRAL LINE
No 31F presenting with mass along her soft palate. reports change in voice but no "hot-potato" voice, drooling or stridor. lesion approximately 1cm and not blocking airway on exam. likely a hemangioma with one previous episode. plan for observation to assess airway risk. 31F presenting with mass along her soft palate. reports change in voice but no "hot-potato" voice, drooling or stridor. lesion approximately 1cm and not blocking airway on exam. 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 Excision in the ED may pose greater risk than benefits given possibility of uncontrolled hemorrhage that could compromise airway where patient currently has no airway compromise. After extended ED monitoring here, patient remains in NAD, speaking full sentences, and after shared decision making, will follow up with ENT tomorrow via [facility] for formal assessment and excision. Patient able to verbalize strict return precautions.

## 2019-12-20 ENCOUNTER — INPATIENT (INPATIENT)
Facility: HOSPITAL | Age: 79
LOS: 13 days | Discharge: NURSING, REHAB OR LONG-TERM CARE | DRG: 871 | End: 2020-01-03
Attending: INTERNAL MEDICINE | Admitting: INTERNAL MEDICINE
Payer: MEDICARE

## 2019-12-20 VITALS
HEART RATE: 78 BPM | SYSTOLIC BLOOD PRESSURE: 109 MMHG | OXYGEN SATURATION: 100 % | RESPIRATION RATE: 18 BRPM | DIASTOLIC BLOOD PRESSURE: 58 MMHG

## 2019-12-20 DIAGNOSIS — Z95.1 PRESENCE OF AORTOCORONARY BYPASS GRAFT: Chronic | ICD-10-CM

## 2019-12-20 DIAGNOSIS — Z98.890 OTHER SPECIFIED POSTPROCEDURAL STATES: Chronic | ICD-10-CM

## 2019-12-20 LAB
ALBUMIN SERPL ELPH-MCNC: 3.3 G/DL — SIGNIFICANT CHANGE UP (ref 3.3–5)
ALBUMIN SERPL ELPH-MCNC: 3.3 G/DL — SIGNIFICANT CHANGE UP (ref 3.3–5)
ALP SERPL-CCNC: 537 U/L — HIGH (ref 40–120)
ALP SERPL-CCNC: 585 U/L — HIGH (ref 40–120)
ALT FLD-CCNC: 24 U/L — SIGNIFICANT CHANGE UP (ref 10–45)
ALT FLD-CCNC: 25 U/L — SIGNIFICANT CHANGE UP (ref 10–45)
ANION GAP SERPL CALC-SCNC: 20 MMOL/L — HIGH (ref 5–17)
ANION GAP SERPL CALC-SCNC: 21 MMOL/L — HIGH (ref 5–17)
APPEARANCE UR: ABNORMAL
APTT BLD: 35.3 SEC — SIGNIFICANT CHANGE UP (ref 27.5–36.3)
AST SERPL-CCNC: 143 U/L — HIGH (ref 10–40)
AST SERPL-CCNC: 155 U/L — HIGH (ref 10–40)
BASOPHILS # BLD AUTO: 0 K/UL — SIGNIFICANT CHANGE UP (ref 0–0.2)
BASOPHILS NFR BLD AUTO: 0 % — SIGNIFICANT CHANGE UP (ref 0–2)
BILIRUB SERPL-MCNC: 2.8 MG/DL — HIGH (ref 0.2–1.2)
BILIRUB SERPL-MCNC: 2.9 MG/DL — HIGH (ref 0.2–1.2)
BILIRUB UR-MCNC: ABNORMAL
BUN SERPL-MCNC: 59 MG/DL — HIGH (ref 7–23)
BUN SERPL-MCNC: 60 MG/DL — HIGH (ref 7–23)
CALCIUM SERPL-MCNC: 7 MG/DL — LOW (ref 8.4–10.5)
CALCIUM SERPL-MCNC: 8.1 MG/DL — LOW (ref 8.4–10.5)
CHLORIDE SERPL-SCNC: 106 MMOL/L — SIGNIFICANT CHANGE UP (ref 96–108)
CHLORIDE SERPL-SCNC: 108 MMOL/L — SIGNIFICANT CHANGE UP (ref 96–108)
CK MB CFR SERPL CALC: 6.6 NG/ML — SIGNIFICANT CHANGE UP (ref 0–6.7)
CK SERPL-CCNC: 716 U/L — HIGH (ref 30–200)
CO2 SERPL-SCNC: 17 MMOL/L — LOW (ref 22–31)
CO2 SERPL-SCNC: 18 MMOL/L — LOW (ref 22–31)
COLOR SPEC: YELLOW — SIGNIFICANT CHANGE UP
CREAT SERPL-MCNC: 1.79 MG/DL — HIGH (ref 0.5–1.3)
CREAT SERPL-MCNC: 1.91 MG/DL — HIGH (ref 0.5–1.3)
DIFF PNL FLD: ABNORMAL
EOSINOPHIL # BLD AUTO: 0 K/UL — SIGNIFICANT CHANGE UP (ref 0–0.5)
EOSINOPHIL NFR BLD AUTO: 0 % — SIGNIFICANT CHANGE UP (ref 0–6)
GLUCOSE SERPL-MCNC: 108 MG/DL — HIGH (ref 70–99)
GLUCOSE SERPL-MCNC: 141 MG/DL — HIGH (ref 70–99)
GLUCOSE UR QL: NEGATIVE — SIGNIFICANT CHANGE UP
HCT VFR BLD CALC: 32.7 % — LOW (ref 39–50)
HGB BLD-MCNC: 10.1 G/DL — LOW (ref 13–17)
INR BLD: 1.39 — HIGH (ref 0.88–1.16)
KETONES UR-MCNC: ABNORMAL MG/DL
LACTATE SERPL-SCNC: 2.2 MMOL/L — HIGH (ref 0.5–2)
LACTATE SERPL-SCNC: 2.2 MMOL/L — HIGH (ref 0.5–2)
LDH SERPL L TO P-CCNC: 1364 U/L — HIGH (ref 50–242)
LEUKOCYTE ESTERASE UR-ACNC: NEGATIVE — SIGNIFICANT CHANGE UP
LYMPHOCYTES # BLD AUTO: 0.24 K/UL — LOW (ref 1–3.3)
LYMPHOCYTES # BLD AUTO: 2.7 % — LOW (ref 13–44)
MAGNESIUM SERPL-MCNC: 2.6 MG/DL — SIGNIFICANT CHANGE UP (ref 1.6–2.6)
MCHC RBC-ENTMCNC: 28.1 PG — SIGNIFICANT CHANGE UP (ref 27–34)
MCHC RBC-ENTMCNC: 30.9 GM/DL — LOW (ref 32–36)
MCV RBC AUTO: 91.1 FL — SIGNIFICANT CHANGE UP (ref 80–100)
MONOCYTES # BLD AUTO: 0.61 K/UL — SIGNIFICANT CHANGE UP (ref 0–0.9)
MONOCYTES NFR BLD AUTO: 7 % — SIGNIFICANT CHANGE UP (ref 2–14)
NEUTROPHILS # BLD AUTO: 7.9 K/UL — HIGH (ref 1.8–7.4)
NEUTROPHILS NFR BLD AUTO: 87.7 % — HIGH (ref 43–77)
NITRITE UR-MCNC: NEGATIVE — SIGNIFICANT CHANGE UP
NRBC # BLD: 0 /100 WBCS — SIGNIFICANT CHANGE UP (ref 0–0)
NT-PROBNP SERPL-SCNC: HIGH PG/ML (ref 0–300)
PH UR: 5.5 — SIGNIFICANT CHANGE UP (ref 5–8)
PHOSPHATE SERPL-MCNC: 3.1 MG/DL — SIGNIFICANT CHANGE UP (ref 2.5–4.5)
PHOSPHATE SERPL-MCNC: 3.3 MG/DL — SIGNIFICANT CHANGE UP (ref 2.5–4.5)
PLATELET # BLD AUTO: 193 K/UL — SIGNIFICANT CHANGE UP (ref 150–400)
POTASSIUM SERPL-MCNC: 4.2 MMOL/L — SIGNIFICANT CHANGE UP (ref 3.5–5.3)
POTASSIUM SERPL-MCNC: 4.7 MMOL/L — SIGNIFICANT CHANGE UP (ref 3.5–5.3)
POTASSIUM SERPL-SCNC: 4.2 MMOL/L — SIGNIFICANT CHANGE UP (ref 3.5–5.3)
POTASSIUM SERPL-SCNC: 4.7 MMOL/L — SIGNIFICANT CHANGE UP (ref 3.5–5.3)
PROCALCITONIN SERPL-MCNC: 1.44 NG/ML — HIGH (ref 0.02–0.1)
PROT SERPL-MCNC: 6.5 G/DL — SIGNIFICANT CHANGE UP (ref 6–8.3)
PROT SERPL-MCNC: 6.6 G/DL — SIGNIFICANT CHANGE UP (ref 6–8.3)
PROT UR-MCNC: 30 MG/DL
PROTHROM AB SERPL-ACNC: 15.9 SEC — HIGH (ref 10–12.9)
RBC # BLD: 3.59 M/UL — LOW (ref 4.2–5.8)
RBC # FLD: 16.5 % — HIGH (ref 10.3–14.5)
RETICS #: 31.7 K/UL — SIGNIFICANT CHANGE UP (ref 25–125)
RETICS/RBC NFR: 0.9 % — SIGNIFICANT CHANGE UP (ref 0.5–2.5)
SODIUM SERPL-SCNC: 145 MMOL/L — SIGNIFICANT CHANGE UP (ref 135–145)
SODIUM SERPL-SCNC: 145 MMOL/L — SIGNIFICANT CHANGE UP (ref 135–145)
SP GR SPEC: 1.02 — SIGNIFICANT CHANGE UP (ref 1–1.03)
T4 FREE SERPL-MCNC: 0.93 NG/DL — SIGNIFICANT CHANGE UP (ref 0.7–1.48)
TROPONIN T SERPL-MCNC: 0.63 NG/ML — CRITICAL HIGH (ref 0–0.01)
TROPONIN T SERPL-MCNC: 0.66 NG/ML — CRITICAL HIGH (ref 0–0.01)
TSH SERPL-MCNC: 5.15 UIU/ML — HIGH (ref 0.35–4.94)
URATE SERPL-MCNC: 16.6 MG/DL — HIGH (ref 3.4–8.8)
UROBILINOGEN FLD QL: 0.2 E.U./DL — SIGNIFICANT CHANGE UP
WBC # BLD: 8.75 K/UL — SIGNIFICANT CHANGE UP (ref 3.8–10.5)
WBC # FLD AUTO: 8.75 K/UL — SIGNIFICANT CHANGE UP (ref 3.8–10.5)

## 2019-12-20 PROCEDURE — 93010 ELECTROCARDIOGRAM REPORT: CPT

## 2019-12-20 PROCEDURE — 71045 X-RAY EXAM CHEST 1 VIEW: CPT | Mod: 26

## 2019-12-20 PROCEDURE — 76705 ECHO EXAM OF ABDOMEN: CPT | Mod: 26

## 2019-12-20 PROCEDURE — 70450 CT HEAD/BRAIN W/O DYE: CPT | Mod: 26

## 2019-12-20 PROCEDURE — 71250 CT THORAX DX C-: CPT | Mod: 26

## 2019-12-20 PROCEDURE — 74176 CT ABD & PELVIS W/O CONTRAST: CPT | Mod: 26

## 2019-12-20 PROCEDURE — 99291 CRITICAL CARE FIRST HOUR: CPT

## 2019-12-20 RX ORDER — ALBUTEROL 90 UG/1
2.5 AEROSOL, METERED ORAL
Refills: 0 | Status: DISCONTINUED | OUTPATIENT
Start: 2019-12-20 | End: 2019-12-20

## 2019-12-20 RX ORDER — SODIUM CHLORIDE 9 MG/ML
500 INJECTION INTRAMUSCULAR; INTRAVENOUS; SUBCUTANEOUS ONCE
Refills: 0 | Status: COMPLETED | OUTPATIENT
Start: 2019-12-20 | End: 2019-12-20

## 2019-12-20 RX ORDER — SODIUM CHLORIDE 9 MG/ML
500 INJECTION, SOLUTION INTRAVENOUS ONCE
Refills: 0 | Status: COMPLETED | OUTPATIENT
Start: 2019-12-20 | End: 2019-12-20

## 2019-12-20 RX ADMIN — SODIUM CHLORIDE 500 MILLILITER(S): 9 INJECTION INTRAMUSCULAR; INTRAVENOUS; SUBCUTANEOUS at 16:58

## 2019-12-20 RX ADMIN — SODIUM CHLORIDE 500 MILLILITER(S): 9 INJECTION INTRAMUSCULAR; INTRAVENOUS; SUBCUTANEOUS at 20:18

## 2019-12-20 RX ADMIN — SODIUM CHLORIDE 500 MILLILITER(S): 9 INJECTION, SOLUTION INTRAVENOUS at 23:04

## 2019-12-20 RX ADMIN — SODIUM CHLORIDE 500 MILLILITER(S): 9 INJECTION INTRAMUSCULAR; INTRAVENOUS; SUBCUTANEOUS at 15:11

## 2019-12-20 NOTE — ED PROVIDER NOTE - CLINICAL SUMMARY MEDICAL DECISION MAKING FREE TEXT BOX
Pt presenting with weakness and hypotension, hx of prostate cancer. Consider sepsis process related to metastatic canceremia. Will r/o PE, ACS. Pt presenting with weakness and hypotension, hx of prostate cancer. Consider sepsis, dehydration in light of poor PO intake, metastatic cancer, other.  Pt denies sob, cp, CXR not congested, no hypoxia, resp distress less likely acute CHF exac. suspect lab abnormalities from end stage metastatic disease including elev trop/lfts/cr. disc with Dr. Rios, given minimal improvement with chemo meds, plan for admission for palliative involvement. Pt presenting with weakness and hypotension, hx of prostate cancer. Consider sepsis, dehydration in light of poor PO intake, metastatic cancer, other.  Pt denies sob, cp, CXR not congested, no hypoxia, resp distress less likely acute CHF exac. suspect lab abnormalities from end stage metastatic disease/CKD including elev trop/lfts/cr. disc with Dr. Rios, given minimal improvement with chemo meds, plan for admission for palliative involvement.

## 2019-12-20 NOTE — ED ADULT NURSE NOTE - OBJECTIVE STATEMENT
80 yo M 78 yo M pmhx triple bypass, prostate CA BIBEMS c/o SOB and various medical complaints. Pt states, " I feel short of breath and my L shoulder and L knee are hurting me." Pt brought in from PCP d/t hypotension, BP on arrival 109/58. Pt sating 100% on room air, reporting SOB and put on 2L nasal cannula. Pt also reporting headache "for the past few months." Upon arrival to ED pt is AOx3, PERRLA, tachycardic, lung sounds diminished. No obvious deformities/ bruising/ limited ROM noted to L shoulder/ knee, pt denies any recent falls or trauma to area. Denies chest pain, N/V/D, fever, chills, blurred vision, dizziness. Pt on continuous cardiac monitor,2L nasal cannula, will continue to monitor. 80 yo M pmhx triple bypass, prostate CA BIBEMS c/o SOB and various medical complaints. Pt states, " I feel short of breath and my L shoulder and L knee are hurting me." Pt brought in from PCP d/t hypotension, BP on arrival 109/58. Pt sating 100% on room air, reporting SOB and put on 2L nasal cannula. Pt also reporting headache "for the past few months." Upon arrival to ED pt is AO, PERRLA, tachycardic, lung sounds diminished. No obvious deformities/ bruising/ limited ROM noted to L shoulder/ knee, pt denies any recent falls or trauma to area. Abdomen nondistended and nontender, hernia noted to pelvic region, edema noted, tender to palpation. Denies chest pain, N/V/D, fever, chills, blurred vision, dizziness. Pt on continuous cardiac monitor,2L nasal cannula, will continue to monitor.

## 2019-12-20 NOTE — ED PROVIDER NOTE - PSH
History of coronary artery bypass graft  2017 @ [facility]  S/P hernia surgery History of coronary artery bypass graft  2017 @ [facility]  S/P hernia surgery

## 2019-12-20 NOTE — ED ADULT NURSE NOTE - CHPI ED NUR SYMPTOMS NEG
no congestion/no dizziness/no fever/no back pain/no chills/no vomiting/no nausea/no syncope/no diaphoresis/no chest pain

## 2019-12-20 NOTE — ED ADULT TRIAGE NOTE - CHIEF COMPLAINT QUOTE
pt brought in by EMS from doctors office for low blood pressure. pt c/o mild SOB. denies chest pain. BP in /58. EKG in progress. pt went to doctor today c/o headache for a few months. hx prostate CA. pt unable to tolerate oral thermometer in triage.

## 2019-12-20 NOTE — ED PROVIDER NOTE - CARE PLAN
Principal Discharge DX:	Weakness  Secondary Diagnosis:	Prostate CA  Secondary Diagnosis:	PAM (acute kidney injury)

## 2019-12-20 NOTE — ED PROVIDER NOTE - PHYSICAL EXAMINATION
CONSTITUTIONAL: Well appearing, well nourished, awake, alert and in no apparent distress.  HEENT: Head is atraumatic. Eyes clear bilaterally, normal EOMI. Airway patent.  CARDIAC: Normal rate, regular rhythm.  Heart sounds S1, S2.   RESPIRATORY: Breath sounds clear and equal bilaterally. no tachypnea, respiratory distress.   GASTROINTESTINAL: Abdomen soft, non-tender, no guarding, distension.  MUSCULOSKELETAL: Spine appears normal, no midline spinal tenderness, range of motion is not limited, no muscle or joint tenderness. no bony tenderness. no JVD, peripheral edema.   NEUROLOGICAL: no motor or sensory deficits.  SKIN: Skin normal color for race, warm, dry and intact. No evidence of rash.  PSYCHIATRIC: Alert and oriented normal mood and affect. no apparent risk to self or others.

## 2019-12-20 NOTE — ED ADULT NURSE NOTE - NSIMPLEMENTINTERV_GEN_ALL_ED
Implemented All Fall Risk Interventions:  Jer[place] to call system. Call bell, personal items and telephone within reach. Instruct patient to call for assistance. Room bathroom lighting operational. Non-slip footwear when patient is off stretcher. Physically safe environment: no spills, clutter or unnecessary equipment. Stretcher in lowest position, wheels locked, appropriate side rails in place. Provide visual cue, wrist band, yellow gown, etc. Monitor gait and stability. Monitor for mental status changes and reorient to person, place, and time. Review medications for side effects contributing to fall risk. Reinforce activity limits and safety measures with patient and family.

## 2019-12-20 NOTE — ED PROVIDER NOTE - OBJECTIVE STATEMENT
CAD, CHF, prior DVT (on A/C) w complaints of low BP from oncologist's office, pt poor historian, complains of headache for one month, L shoulder pain and lower abd cramping. known inguinal hernia. 80 yo hx of CAD, CHF, prior DVT (on A/C) w complaints of low BP 70s from oncologist's office, pt poor historian, complains of headache for one month, L shoulder pain and lower abd cramping. known inguinal hernia.    Scribe: 80 y/o M poor historian with PMHx of CHF, prostate cancer, DVT and hypothyroidism presenting to the ED for hypotension while being evaluated at his Oncologist Dr. Marie. Per oncologist, pt was systolic in 70's and tachycardic. Pt is currently on monthly Lupron and daily Zytega/Prednisone, recent PSA above 75,000 as of Dec 18th. Pt is currently in rehab although oncologist is not certain of his compliance which medication there. Pt is additionally currently on Xgeva. Currently in ED, pt only offering complaints of headache and left shoulder pain. Pt also admits to chronic RLQ abdominal pain and a noted right inguinal hernia.

## 2019-12-20 NOTE — ED PROVIDER NOTE - PROGRESS NOTE DETAILS
Franky HPI: 80 y/o M poor historian with PMHx of CHF, prostate cancer, DVT and hypothyroidism presenting to the ED for hypotension while being evaluated at his Oncologist Dr. Marie. Per oncologist, pt was systolic in 70's and tachycardic. Pt is currently on monthly Lupron and daily Zytega/Prednisone, recent PSA above 75,000 as of Dec 18th. Pt is currently in rehab although oncologist is not certain of his compliance which medication there. Pt is additionally currently on Xgeva. Currently in ED, pt only offering complaints of headache and left shoulder pain. Pt also admits to chronic RLQ abdominal pain and a noted right inguinal hernia. Scribe HPI:

## 2019-12-20 NOTE — ED PROVIDER NOTE - PMH
CAD (coronary artery disease)    CHF (congestive heart failure)    HTN (hypertension) CAD (coronary artery disease)    CHF (congestive heart failure)    HTN (hypertension)    Hypothyroid    Prostate CA

## 2019-12-21 DIAGNOSIS — Z71.89 OTHER SPECIFIED COUNSELING: ICD-10-CM

## 2019-12-21 DIAGNOSIS — R74.8 ABNORMAL LEVELS OF OTHER SERUM ENZYMES: ICD-10-CM

## 2019-12-21 DIAGNOSIS — N17.9 ACUTE KIDNEY FAILURE, UNSPECIFIED: ICD-10-CM

## 2019-12-21 DIAGNOSIS — Z91.89 OTHER SPECIFIED PERSONAL RISK FACTORS, NOT ELSEWHERE CLASSIFIED: ICD-10-CM

## 2019-12-21 DIAGNOSIS — C61 MALIGNANT NEOPLASM OF PROSTATE: ICD-10-CM

## 2019-12-21 DIAGNOSIS — I50.20 UNSPECIFIED SYSTOLIC (CONGESTIVE) HEART FAILURE: ICD-10-CM

## 2019-12-21 DIAGNOSIS — R79.89 OTHER SPECIFIED ABNORMAL FINDINGS OF BLOOD CHEMISTRY: ICD-10-CM

## 2019-12-21 DIAGNOSIS — I25.10 ATHEROSCLEROTIC HEART DISEASE OF NATIVE CORONARY ARTERY WITHOUT ANGINA PECTORIS: ICD-10-CM

## 2019-12-21 DIAGNOSIS — E05.90 THYROTOXICOSIS, UNSPECIFIED WITHOUT THYROTOXIC CRISIS OR STORM: ICD-10-CM

## 2019-12-21 DIAGNOSIS — I10 ESSENTIAL (PRIMARY) HYPERTENSION: ICD-10-CM

## 2019-12-21 LAB
ALBUMIN SERPL ELPH-MCNC: 2.8 G/DL — LOW (ref 3.3–5)
ALBUMIN SERPL ELPH-MCNC: 2.8 G/DL — LOW (ref 3.3–5)
ALP SERPL-CCNC: 466 U/L — HIGH (ref 40–120)
ALP SERPL-CCNC: 469 U/L — HIGH (ref 40–120)
ALT FLD-CCNC: 22 U/L — SIGNIFICANT CHANGE UP (ref 10–45)
ALT FLD-CCNC: 23 U/L — SIGNIFICANT CHANGE UP (ref 10–45)
ANION GAP SERPL CALC-SCNC: 21 MMOL/L — HIGH (ref 5–17)
ANION GAP SERPL CALC-SCNC: 21 MMOL/L — HIGH (ref 5–17)
AST SERPL-CCNC: 135 U/L — HIGH (ref 10–40)
AST SERPL-CCNC: 136 U/L — HIGH (ref 10–40)
B-OH-BUTYR SERPL-SCNC: 2.9 MMOL/L — HIGH
BILIRUB SERPL-MCNC: 2.8 MG/DL — HIGH (ref 0.2–1.2)
BILIRUB SERPL-MCNC: 2.8 MG/DL — HIGH (ref 0.2–1.2)
BUN SERPL-MCNC: 60 MG/DL — HIGH (ref 7–23)
BUN SERPL-MCNC: 61 MG/DL — HIGH (ref 7–23)
CALCIUM SERPL-MCNC: 7.2 MG/DL — LOW (ref 8.4–10.5)
CALCIUM SERPL-MCNC: 7.4 MG/DL — LOW (ref 8.4–10.5)
CHLORIDE SERPL-SCNC: 109 MMOL/L — HIGH (ref 96–108)
CHLORIDE SERPL-SCNC: 111 MMOL/L — HIGH (ref 96–108)
CO2 SERPL-SCNC: 14 MMOL/L — LOW (ref 22–31)
CO2 SERPL-SCNC: 14 MMOL/L — LOW (ref 22–31)
CREAT SERPL-MCNC: 1.91 MG/DL — HIGH (ref 0.5–1.3)
CREAT SERPL-MCNC: 1.93 MG/DL — HIGH (ref 0.5–1.3)
ENTEROCOC DNA BLD POS QL NAA+NON-PROBE: SIGNIFICANT CHANGE UP
GGT SERPL-CCNC: 193 U/L — HIGH (ref 9–50)
GLUCOSE SERPL-MCNC: 91 MG/DL — SIGNIFICANT CHANGE UP (ref 70–99)
GLUCOSE SERPL-MCNC: 92 MG/DL — SIGNIFICANT CHANGE UP (ref 70–99)
GRAM STN FLD: SIGNIFICANT CHANGE UP
GRAM STN FLD: SIGNIFICANT CHANGE UP
HCT VFR BLD CALC: 28.9 % — LOW (ref 39–50)
HGB BLD-MCNC: 8.9 G/DL — LOW (ref 13–17)
LACTATE SERPL-SCNC: 1.7 MMOL/L — SIGNIFICANT CHANGE UP (ref 0.5–2)
MAGNESIUM SERPL-MCNC: 2.5 MG/DL — SIGNIFICANT CHANGE UP (ref 1.6–2.6)
MAGNESIUM SERPL-MCNC: 2.5 MG/DL — SIGNIFICANT CHANGE UP (ref 1.6–2.6)
MCHC RBC-ENTMCNC: 29.1 PG — SIGNIFICANT CHANGE UP (ref 27–34)
MCHC RBC-ENTMCNC: 30.8 GM/DL — LOW (ref 32–36)
MCV RBC AUTO: 94.4 FL — SIGNIFICANT CHANGE UP (ref 80–100)
METHOD TYPE: SIGNIFICANT CHANGE UP
NRBC # BLD: 1 /100 WBCS — HIGH (ref 0–0)
PHOSPHATE SERPL-MCNC: 3 MG/DL — SIGNIFICANT CHANGE UP (ref 2.5–4.5)
PLATELET # BLD AUTO: 190 K/UL — SIGNIFICANT CHANGE UP (ref 150–400)
POTASSIUM SERPL-MCNC: 4 MMOL/L — SIGNIFICANT CHANGE UP (ref 3.5–5.3)
POTASSIUM SERPL-MCNC: 4.1 MMOL/L — SIGNIFICANT CHANGE UP (ref 3.5–5.3)
POTASSIUM SERPL-SCNC: 4 MMOL/L — SIGNIFICANT CHANGE UP (ref 3.5–5.3)
POTASSIUM SERPL-SCNC: 4.1 MMOL/L — SIGNIFICANT CHANGE UP (ref 3.5–5.3)
PROT SERPL-MCNC: 6 G/DL — SIGNIFICANT CHANGE UP (ref 6–8.3)
PROT SERPL-MCNC: 6.1 G/DL — SIGNIFICANT CHANGE UP (ref 6–8.3)
RBC # BLD: 3.06 M/UL — LOW (ref 4.2–5.8)
RBC # FLD: 16.6 % — HIGH (ref 10.3–14.5)
SODIUM SERPL-SCNC: 144 MMOL/L — SIGNIFICANT CHANGE UP (ref 135–145)
SODIUM SERPL-SCNC: 146 MMOL/L — HIGH (ref 135–145)
T3 SERPL-MCNC: 80 NG/DL — SIGNIFICANT CHANGE UP (ref 80–200)
WBC # BLD: 8.62 K/UL — SIGNIFICANT CHANGE UP (ref 3.8–10.5)
WBC # FLD AUTO: 8.62 K/UL — SIGNIFICANT CHANGE UP (ref 3.8–10.5)

## 2019-12-21 PROCEDURE — 71045 X-RAY EXAM CHEST 1 VIEW: CPT | Mod: 26

## 2019-12-21 RX ORDER — TAMSULOSIN HYDROCHLORIDE 0.4 MG/1
0.4 CAPSULE ORAL AT BEDTIME
Refills: 0 | Status: DISCONTINUED | OUTPATIENT
Start: 2019-12-21 | End: 2020-01-03

## 2019-12-21 RX ORDER — ASPIRIN/CALCIUM CARB/MAGNESIUM 324 MG
81 TABLET ORAL DAILY
Refills: 0 | Status: DISCONTINUED | OUTPATIENT
Start: 2019-12-21 | End: 2019-12-25

## 2019-12-21 RX ORDER — SODIUM CHLORIDE 9 MG/ML
500 INJECTION, SOLUTION INTRAVENOUS ONCE
Refills: 0 | Status: COMPLETED | OUTPATIENT
Start: 2019-12-21 | End: 2019-12-21

## 2019-12-21 RX ORDER — ATORVASTATIN CALCIUM 80 MG/1
40 TABLET, FILM COATED ORAL AT BEDTIME
Refills: 0 | Status: DISCONTINUED | OUTPATIENT
Start: 2019-12-21 | End: 2020-01-03

## 2019-12-21 RX ORDER — ABIRATERONE ACETATE 250 MG/1
4 TABLET ORAL
Qty: 0 | Refills: 0 | DISCHARGE

## 2019-12-21 RX ORDER — CEFTRIAXONE 500 MG/1
2000 INJECTION, POWDER, FOR SOLUTION INTRAMUSCULAR; INTRAVENOUS EVERY 24 HOURS
Refills: 0 | Status: DISCONTINUED | OUTPATIENT
Start: 2019-12-21 | End: 2019-12-23

## 2019-12-21 RX ORDER — APIXABAN 2.5 MG/1
5 TABLET, FILM COATED ORAL EVERY 12 HOURS
Refills: 0 | Status: DISCONTINUED | OUTPATIENT
Start: 2019-12-21 | End: 2019-12-25

## 2019-12-21 RX ORDER — VANCOMYCIN HCL 1 G
1000 VIAL (EA) INTRAVENOUS ONCE
Refills: 0 | Status: COMPLETED | OUTPATIENT
Start: 2019-12-21 | End: 2019-12-22

## 2019-12-21 RX ORDER — ATORVASTATIN CALCIUM 80 MG/1
1 TABLET, FILM COATED ORAL
Qty: 0 | Refills: 0 | DISCHARGE

## 2019-12-21 RX ADMIN — SODIUM CHLORIDE 100 MILLILITER(S): 9 INJECTION, SOLUTION INTRAVENOUS at 01:25

## 2019-12-21 RX ADMIN — Medication 81 MILLIGRAM(S): at 14:22

## 2019-12-21 RX ADMIN — Medication 5 MILLIGRAM(S): at 07:13

## 2019-12-21 RX ADMIN — CEFTRIAXONE 100 MILLIGRAM(S): 500 INJECTION, POWDER, FOR SOLUTION INTRAMUSCULAR; INTRAVENOUS at 09:10

## 2019-12-21 NOTE — H&P ADULT - ATTENDING COMMENTS
Metasatic ca-p.  Hypotensive,  Supportive care  Palliative care eval--possible in pt HOSPICE  FULL CODE, as per chart pt is aware of poor prognosis

## 2019-12-21 NOTE — H&P ADULT - NSICDXPASTMEDICALHX_GEN_ALL_CORE_FT
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

## 2019-12-21 NOTE — H&P ADULT - PROBLEM SELECTOR PLAN 2
Pt names Mary as [phone]. Has Molst signed 12/16/19 stating FULL CODE and trial of intubation signed by patient and Francesca Steiner and Nova Castro. Pt states he is not familiar with Francesca and Nova. Pt affirms he is full code. Discussed poor prognosis and limited medical offerings, Pt has poor health insight into his condition, remains hopeful there will be treatment Presents with oliguric PAM (1.79<1.22) worsening after 2L fluid and uptrending AGMA. Not retaining on exam. suspect PAM on CKD 2/2 pre-renal.   -F/u urine lytes   -Will repeat labs at 10am and consider renal consult if worsening  -Bladder scan   -Renal u/s     #AGMA   Suspect 2/2 PAM, lactate negative, trops peaked   -F/u BHB

## 2019-12-21 NOTE — H&P ADULT - NSHPPHYSICALEXAM_GEN_ALL_CORE
.  VITAL SIGNS:  T(C): 36.7 (12-21-19 @ 04:47), Max: 37.3 (12-20-19 @ 14:31)  T(F): 98 (12-21-19 @ 04:47), Max: 99.2 (12-20-19 @ 14:31)  HR: 72 (12-21-19 @ 04:47) (72 - 112)  BP: 105/67 (12-21-19 @ 04:47) (94/64 - 111/70)  BP(mean): --  RR: 18 (12-21-19 @ 04:47) (18 - 28)  SpO2: 99% (12-21-19 @ 04:47) (98% - 100%)  Wt(kg): --    PHYSICAL EXAM:    Constitutional: cachectic, no respiratory distress   Head: NC/AT  Eyes: PERRL, EOMI, anicteric sclera  ENT: no nasal discharge; uvula midline, no oropharyngeal erythema or exudates; MMM  Neck: supple; no JVD or thyromegaly  Respiratory: CTA B/L; no W/R/R, no retractions  Cardiac: +S1/S2; RRR; no M/R/G; PMI non-displaced, sternotomy wires visible under skin   Gastrointestinal: thin, R inguinal hernia is reducible, BSx4   Extremities: WWP, no clubbing or cyanosis; no peripheral edema  Musculoskeletal: NROM x4; no joint swelling, tenderness or erythema  Vascular: 2+ radial, femoral, DP/PT pulses B/L  Dermatologic: skin warm, dry and intact; no rashes, wounds, or scars  Lymphatic: no submandibular or cervical LAD  Neurologic: AAOx3; CNII-XII grossly intact; no focal deficits  Psychiatric: affect and characteristics of appearance, verbalizations, behaviors are appropriate

## 2019-12-21 NOTE — CONSULT NOTE ADULT - SUBJECTIVE AND OBJECTIVE BOX
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 He has been on monthly lupron & Xgeva and daily oral Abiraterone w/prednisone. Questionable compliance with Abiraterone due to rehab stay (uncertain if pt has been receiving it appropriately) and pt himself (poor historian and unaware of exact meds he gets at the facility). PSA has rapidly increased since May 2019...28-->202-->386 (at this point Abiraterone started)-->972-->2632-->5000. On follow up this week noted to be more confused from baseline. Vitals showed hypotension and tachycardia. Decision was made to send to [facility] for close obs. Pt's health care proxy present during office visit yesterday. GOC discuss was held.     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    Social Hx:  X-smoker. No drugs. No EtOH abuse.    Family Hx:  Not contributory at this time.     Home Medications:  apixaban 5 mg oral tablet: 1 tab(s) orally every 12 hours (21 Dec 2019 07:12)  aspirin 81 mg oral delayed release tablet: 1 tab(s) orally once a day (21 Dec 2019 07:12)  atorvastatin 40 mg oral tablet: 1 tab(s) orally once a day (21 Dec 2019 07:12)  docusate sodium 100 mg oral capsule: 1 cap(s) orally once a day (21 Dec 2019 07:12)  furosemide 20 mg oral tablet: 1 tab(s) orally once a day (21 Dec 2019 07:12)  methIMAzole 5 mg oral tablet: 1 tab(s) orally once a day (21 Dec 2019 07:12)  predniSONE 5 mg oral tablet: 1 tab(s) orally once a day (21 Dec 2019 07:12)  senna 8.8 mg/5 mL oral syrup: 5 milliliter(s) orally once a day (21 Dec 2019 07:12)  tamsulosin 0.4 mg oral capsule: 1 cap(s) orally once a day (at bedtime) (21 Dec 2019 07:12)  Zytiga 250 mg oral tablet: 4 tab(s) orally once a day (21 Dec 2019 07:12)    Allergies  No Known Allergies    Exam:  Vital Signs Last 24 Hrs  T(C): 36.7 (21 Dec 2019 04:47), Max: 37.3 (20 Dec 2019 14:31)  T(F): 98 (21 Dec 2019 04:47), Max: 99.2 (20 Dec 2019 14:31)  HR: 72 (21 Dec 2019 04:47) (72 - 112)  BP: 105/67 (21 Dec 2019 04:47) (94/64 - 111/70)  BP(mean): --  RR: 18 (21 Dec 2019 04:47) (18 - 28)  SpO2: 99% (21 Dec 2019 04:47) (98% - 100%)    Confused. Very thin and weak. In wheel chair mainly. Cachexia.   Weak pulses yesterday.  Bibasilar crackles  Reg rate.  Dry mucus membranes.  +BS, soft. Some tenderness.  +Pulses/ equal. No edema.    Labs:  Wbc 8.6, Hgb 8.9, Plt 190, LDH 1364, Alk 469, Uric acid 16.  Blood cultures positive for gram pos cocci in pairs.    Imaging:  CT scans reviewed.

## 2019-12-21 NOTE — CONSULT NOTE ADULT - ASSESSMENT
78 yo M with hx as above known to me for met/tomasa res prostate ca. Sent to hospital from office for weakness, confusion, failure to thrive, hypotension.     Prostate ca- treatment hx as above. Progressive disease despite treatment. Not a candidate for next line of treatment which would involve chemo.   GOC discussed with Ms Talavera (pt's health care proxy). It was made clear that pt's disease is progressing and further treatment options are limited.  If he does not improve with treatment of his bacteremia then will push for palliative care/ comfort care.   Weakness/ confusion/ hypotension/ elevated LDH/ elevated lactic acid may be related to bacteremia.   On Ceftriaxone.   Elevated Alk phos due to bone mets. US of liver/gallbladder neg for cholecystitis.   Anemia- Hgb lower than outpt baseline (was in 13s on Nov 22, 2019). Will monitor closely.   Microangiopathy less likely given normal plts and only mildly abn coags.   Cont supportive care for now.   Monitor for fluid overload in setting of IV hydration- pt has a reduced EF of approx 30%.     Thank you to every for helping with this case.  Ledy Terry MD  [facility]- [phone]

## 2019-12-21 NOTE — H&P ADULT - PROBLEM SELECTOR PLAN 3
Presents with oliguric PAM (1.79<1.22) worsening after 2L fluid and uptrending AGMA. Not retaining on exam. suspect PAM on CKD 2/2 pre-renal.   -F/u urine lytes   -Will repeat labs at   - Alk phos 585, . Could be from liver disease vs bony mets. Of note, Zytiga can also cause transaminitisi   -F/u GGT Alk phos 585, . Could be from liver disease vs bony mets. Of note, Zytiga can also cause transaminitis   -F/u GGT

## 2019-12-21 NOTE — H&P ADULT - PROBLEM SELECTOR PLAN 9
1) PCP Contacted on Admission: (Y/N) --> Name & Phone #: Dr. Joseph  12/20  2) Date of Contact with PCP:  3) PCP Contacted at Discharge: (Y/N, N/A)  4) Summary of Handoff Given to PCP:   5) Post-Discharge Appointment Date and Location:

## 2019-12-21 NOTE — H&P ADULT - PROBLEM SELECTOR PLAN 7
Pt has hx hyperthyroidism, needs clarification with PCP bc was discharged on methimazole TID and now taking in qd per NH records   C/w Methimazole 5mg qd.

## 2019-12-21 NOTE — H&P ADULT - HISTORY OF PRESENT ILLNESS
79M PMH metastatic prostate cancer, CAD with CABG in 2016, HFrEF (EF 30%), DVT (still on Eliquis?), from Dr. Terry’s office for hypotension SBP 70s and tachycardic. Pt currently c/o left shoulder pain, lower abd hernia, HA. Living at [facility]/Rehab. Endorses decreases po intake, denies CP/pressure, fevers, chills, nightsweats, n/v/d/c, changes to urinary habits. Per labs on 11/23/19: Na 136, CO2 Alk phos 375, AST 66, Uric Acid 11.3, PSA 5000. ED provider spoke to Dr. Terry: requesting palliative consult.     ED Course   WBC 98.3, -98, BP 94//63, RR 24-20, 98%   CXR: no acute infiltrates   LabsHb 10.1, CO2 18, BUN Cre 59/1.79, TB 2.9, Alk phos 585, , lactate 2.2, LD 1364, uric acid 16.6, Trop 0.66, BNP 36432  Abd us: sludge, o biliary ductal dilatation.   CTa/p: n ew possible venous collaterals/varices in the left retroperitoneum, of uncertain etiology. New possible mildly enlarged retroperitoneal and pelvic lymph nodes.   CT: head: No acute intracranial hemorrhage or transcortical infarct, chronic microangiopathic disease and age-appropriate volume loss. 79M PMH prostate cancer with mets to bone and lung, CAD with CABG in 2016, HFrEF (EF 30%), DVT (still on Eliquis?), from Dr. Terry’s office for hypotension SBP 70s and tachycardic. Pt currently c/o left shoulder pain, lower abd hernia, HA. Living at [facility]/Rehab. Endorses decreases po intake, denies CP/pressure, fevers, chills, nightsweats, n/v/d/c, changes to urinary habits. Per labs on 11/23/19: Na 136, CO2 Alk phos 375, AST 66, Uric Acid 11.3, PSA 5000. ED provider spoke to Dr. Terry: requesting palliative consult.     ED Course   WBC 98.3, -98, BP 94//63, RR 24-20, 98%   CXR: no acute infiltrates   LabsHb 10.1, CO2 18, BUN Cre 59/1.79, TB 2.9, Alk phos 585, , lactate 2.2, LD 1364, uric acid 16.6, Trop 0.66, BNP 75256  Abd us: sludge, o biliary ductal dilatation.   CTa/p: n ew possible venous collaterals/varices in the left retroperitoneum, of uncertain etiology. New possible mildly enlarged retroperitoneal and pelvic lymph nodes.   CT: head: No acute intracranial hemorrhage or transcortical infarct, chronic microangiopathic disease and age-appropriate volume loss. 79M PMH prostate cancer with mets to bone and lung, CAD with CABG in 2016, HFrEF (EF 30%), DVT (on Eliquis), from Dr. Terry’s office for hypotension SBP 70s and tachycardic. Pt currently c/o left shoulder pain, lower abd pain and HA. Living at [facility]/Rehab. Endorses decreases po intake, denies CP/pressure, fevers, chills, nightsweats, n/v/d/c, changes to urinary habits. States he can walk. Per labs on 11/23/19: Na 136, CO2 Alk phos 375, AST 66, BUN 24, Cre 1.22 Uric Acid 11.3, PSA 5000. ED provider spoke to Dr. Terry: requesting palliative consult.     ED Course   WBC 98.3, -98, BP 94//63, RR 24-20, 98%   CXR: no acute infiltrates   LabsHb 10.1, CO2 18, BUN Cre 59/1.79, TB 2.9, Alk phos 585, , lactate 2.2, LD 1364, uric acid 16.6, Trop 0.66, BNP 58339  Abd us: sludge, o biliary ductal dilatation.   CTa/p: n ew possible venous collaterals/varices in the left retroperitoneum, of uncertain etiology. New possible mildly enlarged retroperitoneal and pelvic lymph nodes.   CT: head: No acute intracranial hemorrhage or transcortical infarct, chronic microangiopathic disease and age-appropriate volume loss.

## 2019-12-21 NOTE — H&P ADULT - ASSESSMENT
79M PMH prostate cancer with mets to bone and lung, CAD with CABG in 2016, HFrEF (EF 30%), DVT (still on Eliquis?), from Dr. Terry’s office for hypotension SBP 70s and tachycardia. Found to have PAM, with transaminitis, and tropinemia. Admitted for rehydration and palliative  care consult     Pt being treated with Lupron and daily Zytega/Prednisone, recent PSA above 75,000 complains of for bronchoscopy which was done 05/16.  He was restarted on Eliquis 5 mg BID. Bronch wash was negative for malignant cells but mediastinal lymph node biopsy was positive for carcinoma consistent with metastatic prostate cancer 79M PMH prostate cancer with mets to bone and lung, CAD with CABG in 2016, HFrEF (EF 30%), DVT (still on Eliquis?), from Dr. Terry’s office for hypotension SBP 70s and tachycardia. Found to have oliguric PAM (1.79<1.22 in Nov 2019) with transaminitis, and tropinemia. Admitted for rehydration and palliative  care consult

## 2019-12-21 NOTE — H&P ADULT - NSICDXPASTSURGICALHX_GEN_ALL_CORE_FT
PAST SURGICAL HISTORY:  History of coronary artery bypass graft 2017 @ [facility]    S/P hernia surgery

## 2019-12-21 NOTE — H&P ADULT - PROBLEM SELECTOR PLAN 4
C/a ASA and Lipitor 40 Pt presented with SoB and left shoulder pain and tropinin elevated 0.66 (chronically elevated 0.1-2). EKG with TWI in inferior and lateral leads c/w previous. Trops now peaked. Suspect trop accumulation in CKD less likely ACS, however will continue to monitor as Pt has signif cardiac hx Pt presented with SoB and left shoulder pain and tropinin elevated 0.66 (chronically elevated 0.1-2). EKG with TWI in inferior and lateral leads c/w previous. Trops now peaked. Suspect trop accumulation in CKD less likely ACS, however will continue to monitor as Pt has signif cardiac hx  -AM EKG

## 2019-12-21 NOTE — H&P ADULT - PROBLEM SELECTOR PLAN 8
Pt names Mary as -[phone]. Has Molst signed 12/16/19 stating FULL CODE and trial of intubation signed by patient and Francesca Steiner and Nova Castro. Pt states he is not familiar with Francesca and Nova. Pt affirms he is full code. Discussed poor prognosis and limited medical offerings, Pt has poor health insight into his condition, remains hopeful there will be treatment

## 2019-12-21 NOTE — H&P ADULT - PROBLEM SELECTOR PLAN 1
Pt was dx at [facility] in May 2018, bronchoscopy done at that time mediastinal lymph node biopsy was positive for carcinoma consistent with metastatic prostate cancer. Pt being treated with Lupron and daily Zytega/Prednisone, recent PSA above 75,000. Now presents for hypotension, severely cachectic/dehydrated one exam. Labs concerning for transaminitis, CT with new possible venous collaterals/varices in the left retroperitoneum, of uncertain etiology. New possible mildly enlarged retroperitoneal and pelvic  lymph nodes.  -C/w Prednisone 5mg  -Call Heme-Onc consult if Zytiga should be continued? Not on formulary would have to bring in  -Call palliative care consult  -F/u Dr. Lewis recs Pt was dx at [facility] in May 2018, bronchoscopy done at that time mediastinal lymph node biopsy was positive for carcinoma consistent with metastatic prostate cancer. Pt being treated with Lupron and daily Zytega/Prednisone, recent PSA above 75,000. Now presents for hypotension, severely cachectic/dehydrated one exam. Labs concerning for transaminitis, CT with new possible venous collaterals/varices in the left retroperitoneum, of uncertain etiology. New possible mildly enlarged retroperitoneal and pelvic  lymph nodes.  -C/w Prednisone 5mg and tamsulosin  -Call Heme-Onc consult if Zytiga should be continued? Not on formulary would have to bring in from home   -F/u Dr. Lewis recs Pt was dx at [facility] in May 2018, bronchoscopy done at that time mediastinal lymph node biopsy was positive for carcinoma consistent with metastatic prostate cancer. Pt being treated with Lupron and daily Zytega/Prednisone, recent PSA above 75,000. Now presents for hypotension, severely cachectic/dehydrated one exam. Labs concerning for transaminitis, CT with new possible venous collaterals/varices in the left retroperitoneum, of uncertain etiology. New possible mildly enlarged retroperitoneal and pelvic  lymph nodes suspect progressive mets   -C/w Prednisone 5mg and tamsulosin  -Call Heme-Onc consult if Zytiga should be continued? Not on formulary, Pt would have to bring in from home   -F/u Dr. Lewis recs

## 2019-12-21 NOTE — H&P ADULT - PROBLEM SELECTOR PLAN 6
Pt names Mary as -[phone]. Has Molst signed 12/16/19 stating FULL CODE and trial of intubation signed by patient and Francesca Steiner and Nova Castro. Pt states he is not familiar with Francesca and Nova. Pt affirms he is full code. Discussed poor prognosis and limited medical offerings, Pt has poor health insight into his condition, remains hopeful there will be treatment C/a ASA and Lipitor 40

## 2019-12-21 NOTE — H&P ADULT - PROBLEM SELECTOR PLAN 5
Pt has hx hyperthyroidism, needs clarification with PCP bc was discharged on methimazole TID and now taking in qd per NH records   C/w Methimazole 5mg qd. Echo 2018: global hypokinesis of the left ventricle, EF 30%. The left atrium is severely dilated, mitral inflow pattern is consistent with restrictive left ventricular filling, suggestive of severely elevated left atrial pressure, right atrium is moderately dilated, Tethered mitral valve leaflets. Pt returns severely cachectic/dehydrated on exam but BNP <30,000.   -Was d/c'd on metoprolol tartrate 25 BID but not on NH drug list  -Holding Lasix in setting of renal failure   -Cautious with additional fluids with frequent lung checks Echo 2018: global hypokinesis of the left ventricle, EF 30%. The left atrium is severely dilated, mitral inflow pattern is consistent with restrictive left ventricular filling, suggestive of severely elevated left atrial pressure, right atrium is moderately dilated, Tethered mitral valve leaflets. Pt returns severely cachectic/dehydrated on exam but BNP <30,000.   -Was d/c'd on metoprolol tartrate 25 BID but not on NH drug list??  -Holding Lasix in setting of renal failure   -Cautious with additional fluids with frequent lung checks  -AM EKG and CXR

## 2019-12-21 NOTE — H&P ADULT - NSHPLABSRESULTS_GEN_ALL_CORE
< end of copied text >    < from: CT Abdomen and Pelvis No Cont (12.20.19 @ 18:47) >    Liver:  Within normal limits.    Gallbladder: Cholelithiasis.    Spleen:  Normal.    Pancreas:  Normal.    Adrenal glands:  Unchanged 1.8 cm right adrenal nodule. Nodular adrenal thickening of the left adrenal gland is again seen.    Kidneys: Left renal cysts Additional subcentimeter hypodensity in the left upper pole too small to characterize. Mild nonspecific perinephric fat stranding.    Abdominal and pelvic adenopathy:  No lymphadenopathy in abdomen or pelvis.    Peritoneum/retroperitoneum: No ascites. Anterior to the left adrenal gland, there is a minimal 1.9 x 1.5 cm nodular soft tissue density (3:84). Along the left para-aortic retroperitoneum there is serpiginous soft tissue density which extends inferiorly to the level of left external iliac artery/left pelvic sidewall (3:131), which is new to prior study. There are few new mildly prominent probable retroperitoneal and pelvic lymph nodes, including 0.6 cm node at the level of renal lizz (3:90), and 0.6 cm retrocaval node (3:99).    Gastrointestinal tract: Evaluation of GI tract without the use of oral contrast again shows a large right inguinal hernia containing the cecum, terminal ileum and minimal fluid. No signs of bowel obstruction. The appendix is not clearly visualized. However, there are no periappendiceal inflammatory changes are seen to indicate acute appendicitis. There is colonic diverticulosis. Small amount of high density material is noted in the rectum.    Pelvic organs: Within normal limits.    Soft tissues: Large right inguinal hernia containing the largebowel. Minimal fluid within the herniated sac.    Bones: Again seen are numerous sclerotic lesions seen throughout the spine and pelvis. Diffuse degenerative changes. Median sternotomy with sternal wires.    Impression:    1.  New possible venous collaterals/varices in the left retroperitoneum, of uncertain etiology. New possible mildly enlarged retroperitoneal and pelvic  lymph nodes. However evaluation is limited on noncontrast study. Clinical correlation with CT/MR venogram is recommended.  2. Otherwise, no significant interval change in the chest, abdomen and pelvis, since 11/14/2018.       < end of copied text >

## 2019-12-22 LAB
ANION GAP SERPL CALC-SCNC: 21 MMOL/L — HIGH (ref 5–17)
B-OH-BUTYR SERPL-SCNC: 2.4 MMOL/L — HIGH
BUN SERPL-MCNC: 67 MG/DL — HIGH (ref 7–23)
CALCIUM SERPL-MCNC: 6.8 MG/DL — LOW (ref 8.4–10.5)
CHLORIDE SERPL-SCNC: 112 MMOL/L — HIGH (ref 96–108)
CO2 SERPL-SCNC: 14 MMOL/L — LOW (ref 22–31)
CREAT ?TM UR-MCNC: 82 MG/DL — SIGNIFICANT CHANGE UP
CREAT SERPL-MCNC: 2.21 MG/DL — HIGH (ref 0.5–1.3)
CULTURE RESULTS: NO GROWTH — SIGNIFICANT CHANGE UP
GLUCOSE SERPL-MCNC: 78 MG/DL — SIGNIFICANT CHANGE UP (ref 70–99)
HCT VFR BLD CALC: 27.9 % — LOW (ref 39–50)
HGB BLD-MCNC: 8.7 G/DL — LOW (ref 13–17)
LDH SERPL L TO P-CCNC: 1098 U/L — HIGH (ref 50–242)
MAGNESIUM SERPL-MCNC: 2.6 MG/DL — SIGNIFICANT CHANGE UP (ref 1.6–2.6)
MCHC RBC-ENTMCNC: 28.4 PG — SIGNIFICANT CHANGE UP (ref 27–34)
MCHC RBC-ENTMCNC: 31.2 GM/DL — LOW (ref 32–36)
MCV RBC AUTO: 91.2 FL — SIGNIFICANT CHANGE UP (ref 80–100)
NRBC # BLD: 1 /100 WBCS — HIGH (ref 0–0)
PLATELET # BLD AUTO: 165 K/UL — SIGNIFICANT CHANGE UP (ref 150–400)
POTASSIUM SERPL-MCNC: 4 MMOL/L — SIGNIFICANT CHANGE UP (ref 3.5–5.3)
POTASSIUM SERPL-SCNC: 4 MMOL/L — SIGNIFICANT CHANGE UP (ref 3.5–5.3)
RBC # BLD: 3.06 M/UL — LOW (ref 4.2–5.8)
RBC # FLD: 16.9 % — HIGH (ref 10.3–14.5)
SODIUM SERPL-SCNC: 147 MMOL/L — HIGH (ref 135–145)
SODIUM UR-SCNC: <20 MMOL/L — SIGNIFICANT CHANGE UP
SPECIMEN SOURCE: SIGNIFICANT CHANGE UP
URATE SERPL-MCNC: 19 MG/DL — HIGH (ref 3.4–8.8)
VANCOMYCIN FLD-MCNC: <4 UG/ML — SIGNIFICANT CHANGE UP
WBC # BLD: 8.3 K/UL — SIGNIFICANT CHANGE UP (ref 3.8–10.5)
WBC # FLD AUTO: 8.3 K/UL — SIGNIFICANT CHANGE UP (ref 3.8–10.5)

## 2019-12-22 PROCEDURE — 76770 US EXAM ABDO BACK WALL COMP: CPT | Mod: 26

## 2019-12-22 RX ORDER — SODIUM CHLORIDE 9 MG/ML
1000 INJECTION, SOLUTION INTRAVENOUS
Refills: 0 | Status: DISCONTINUED | OUTPATIENT
Start: 2019-12-22 | End: 2019-12-23

## 2019-12-22 RX ORDER — HYDROMORPHONE HYDROCHLORIDE 2 MG/ML
2 INJECTION INTRAMUSCULAR; INTRAVENOUS; SUBCUTANEOUS EVERY 6 HOURS
Refills: 0 | Status: DISCONTINUED | OUTPATIENT
Start: 2019-12-22 | End: 2019-12-22

## 2019-12-22 RX ORDER — ACETAMINOPHEN 500 MG
650 TABLET ORAL EVERY 6 HOURS
Refills: 0 | Status: DISCONTINUED | OUTPATIENT
Start: 2019-12-22 | End: 2020-01-03

## 2019-12-22 RX ORDER — ACETAMINOPHEN 500 MG
650 TABLET ORAL ONCE
Refills: 0 | Status: COMPLETED | OUTPATIENT
Start: 2019-12-22 | End: 2019-12-22

## 2019-12-22 RX ORDER — VANCOMYCIN HCL 1 G
1000 VIAL (EA) INTRAVENOUS ONCE
Refills: 0 | Status: COMPLETED | OUTPATIENT
Start: 2019-12-22 | End: 2019-12-22

## 2019-12-22 RX ADMIN — Medication 650 MILLIGRAM(S): at 21:12

## 2019-12-22 RX ADMIN — Medication 650 MILLIGRAM(S): at 06:17

## 2019-12-22 RX ADMIN — CEFTRIAXONE 100 MILLIGRAM(S): 500 INJECTION, POWDER, FOR SOLUTION INTRAMUSCULAR; INTRAVENOUS at 08:52

## 2019-12-22 RX ADMIN — SODIUM CHLORIDE 50 MILLILITER(S): 9 INJECTION, SOLUTION INTRAVENOUS at 03:41

## 2019-12-22 RX ADMIN — Medication 5 MILLIGRAM(S): at 06:17

## 2019-12-22 RX ADMIN — Medication 81 MILLIGRAM(S): at 12:35

## 2019-12-22 RX ADMIN — Medication 650 MILLIGRAM(S): at 06:45

## 2019-12-22 RX ADMIN — Medication 250 MILLIGRAM(S): at 00:36

## 2019-12-22 RX ADMIN — ATORVASTATIN CALCIUM 40 MILLIGRAM(S): 80 TABLET, FILM COATED ORAL at 21:12

## 2019-12-22 RX ADMIN — TAMSULOSIN HYDROCHLORIDE 0.4 MILLIGRAM(S): 0.4 CAPSULE ORAL at 21:12

## 2019-12-22 RX ADMIN — Medication 650 MILLIGRAM(S): at 22:39

## 2019-12-22 RX ADMIN — APIXABAN 5 MILLIGRAM(S): 2.5 TABLET, FILM COATED ORAL at 12:35

## 2019-12-22 RX ADMIN — APIXABAN 5 MILLIGRAM(S): 2.5 TABLET, FILM COATED ORAL at 23:23

## 2019-12-22 RX ADMIN — Medication 250 MILLIGRAM(S): at 23:54

## 2019-12-22 NOTE — PROGRESS NOTE ADULT - SUBJECTIVE AND OBJECTIVE BOX
Interval history:  Very weak and somnolent. Awakens to repeat verbal stimulus.     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 He has been on monthly lupron & Xgeva and daily oral Abiraterone w/prednisone. Questionable compliance with Abiraterone due to rehab stay (uncertain if pt has been receiving it appropriately) and pt himself (poor historian and unaware of exact meds he gets at the facility). PSA has rapidly increased since May 2019...28-->202-->386 (at this point Abiraterone started)-->972-->2632-->5000. On follow up this week noted to be more confused from baseline. Vitals showed hypotension and tachycardia. Decision was made to send to [facility] for close obs. Pt's health care proxy present during office visit yesterday. GOC discuss was held.     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    Social Hx:  X-smoker. No drugs. No EtOH abuse.    Family Hx:  Not contributory at this time.     Home Medications:  apixaban 5 mg oral tablet: 1 tab(s) orally every 12 hours (21 Dec 2019 07:12)  aspirin 81 mg oral delayed release tablet: 1 tab(s) orally once a day (21 Dec 2019 07:12)  atorvastatin 40 mg oral tablet: 1 tab(s) orally once a day (21 Dec 2019 07:12)  docusate sodium 100 mg oral capsule: 1 cap(s) orally once a day (21 Dec 2019 07:12)  furosemide 20 mg oral tablet: 1 tab(s) orally once a day (21 Dec 2019 07:12)  methIMAzole 5 mg oral tablet: 1 tab(s) orally once a day (21 Dec 2019 07:12)  predniSONE 5 mg oral tablet: 1 tab(s) orally once a day (21 Dec 2019 07:12)  senna 8.8 mg/5 mL oral syrup: 5 milliliter(s) orally once a day (21 Dec 2019 07:12)  tamsulosin 0.4 mg oral capsule: 1 cap(s) orally once a day (at bedtime) (21 Dec 2019 07:12)  Zytiga 250 mg oral tablet: 4 tab(s) orally once a day (21 Dec 2019 07:12)    Allergies  No Known Allergies    Exam:  Vital Signs Last 24 Hrs  T(C): 36.7 (22 Dec 2019 08:56), Max: 36.9 (22 Dec 2019 05:51)  T(F): 98.1 (22 Dec 2019 08:56), Max: 98.4 (22 Dec 2019 05:51)  HR: 68 (22 Dec 2019 08:56) (68 - 91)  BP: 100/62 (22 Dec 2019 08:56) (93/60 - 111/65)  BP(mean): --  RR: 16 (22 Dec 2019 08:56) (15 - 19)  SpO2: 95% (22 Dec 2019 08:56) (95% - 99%)    Confused. Very thin and weak. In wheel chair mainly. Cachexia.   Weak pulses yesterday.  Bibasilar crackles  Reg rate.  Dry mucus membranes.  +BS, soft. Some tenderness.  +Pulses/ equal. No edema.    Labs:  Wbc 8.3, Hgb 8.7, Plt 165 LDH 1364, Alk 469, Uric acid 16.  Nucleated red blood cells suggest bone marrow stress- likely from metastatic disease.   Blood cultures positive for gram pos cocci in pairs.    Imaging:  CT scans reviewed.

## 2019-12-23 DIAGNOSIS — A41.9 SEPSIS, UNSPECIFIED ORGANISM: ICD-10-CM

## 2019-12-23 DIAGNOSIS — E43 UNSPECIFIED SEVERE PROTEIN-CALORIE MALNUTRITION: ICD-10-CM

## 2019-12-23 DIAGNOSIS — R53.1 WEAKNESS: ICD-10-CM

## 2019-12-23 LAB
-  AMPICILLIN: SIGNIFICANT CHANGE UP
-  AMPICILLIN: SIGNIFICANT CHANGE UP
-  GENTAMICIN SYNERGY: SIGNIFICANT CHANGE UP
-  GENTAMICIN SYNERGY: SIGNIFICANT CHANGE UP
-  STREPTOMYCIN SYNERGY: SIGNIFICANT CHANGE UP
-  STREPTOMYCIN SYNERGY: SIGNIFICANT CHANGE UP
-  VANCOMYCIN: SIGNIFICANT CHANGE UP
-  VANCOMYCIN: SIGNIFICANT CHANGE UP
ANION GAP SERPL CALC-SCNC: 21 MMOL/L — HIGH (ref 5–17)
BASOPHILS # BLD AUTO: 0.01 K/UL — SIGNIFICANT CHANGE UP (ref 0–0.2)
BASOPHILS NFR BLD AUTO: 0.1 % — SIGNIFICANT CHANGE UP (ref 0–2)
BUN SERPL-MCNC: 74 MG/DL — HIGH (ref 7–23)
CALCIUM SERPL-MCNC: 6.7 MG/DL — LOW (ref 8.4–10.5)
CHLORIDE SERPL-SCNC: 106 MMOL/L — SIGNIFICANT CHANGE UP (ref 96–108)
CO2 SERPL-SCNC: 15 MMOL/L — LOW (ref 22–31)
CREAT SERPL-MCNC: 2.42 MG/DL — HIGH (ref 0.5–1.3)
EOSINOPHIL # BLD AUTO: 0.03 K/UL — SIGNIFICANT CHANGE UP (ref 0–0.5)
EOSINOPHIL NFR BLD AUTO: 0.4 % — SIGNIFICANT CHANGE UP (ref 0–6)
GLUCOSE SERPL-MCNC: 107 MG/DL — HIGH (ref 70–99)
HCT VFR BLD CALC: 29.2 % — LOW (ref 39–50)
HGB BLD-MCNC: 8.9 G/DL — LOW (ref 13–17)
IMM GRANULOCYTES NFR BLD AUTO: 3.8 % — HIGH (ref 0–1.5)
LYMPHOCYTES # BLD AUTO: 0.67 K/UL — LOW (ref 1–3.3)
LYMPHOCYTES # BLD AUTO: 8.8 % — LOW (ref 13–44)
MAGNESIUM SERPL-MCNC: 2.6 MG/DL — SIGNIFICANT CHANGE UP (ref 1.6–2.6)
MCHC RBC-ENTMCNC: 28.7 PG — SIGNIFICANT CHANGE UP (ref 27–34)
MCHC RBC-ENTMCNC: 30.5 GM/DL — LOW (ref 32–36)
MCV RBC AUTO: 94.2 FL — SIGNIFICANT CHANGE UP (ref 80–100)
METHOD TYPE: SIGNIFICANT CHANGE UP
METHOD TYPE: SIGNIFICANT CHANGE UP
MONOCYTES # BLD AUTO: 0.69 K/UL — SIGNIFICANT CHANGE UP (ref 0–0.9)
MONOCYTES NFR BLD AUTO: 9.1 % — SIGNIFICANT CHANGE UP (ref 2–14)
NEUTROPHILS # BLD AUTO: 5.92 K/UL — SIGNIFICANT CHANGE UP (ref 1.8–7.4)
NEUTROPHILS NFR BLD AUTO: 77.8 % — HIGH (ref 43–77)
NRBC # BLD: 2 /100 WBCS — HIGH (ref 0–0)
PHOSPHATE SERPL-MCNC: 2.7 MG/DL — SIGNIFICANT CHANGE UP (ref 2.5–4.5)
PLATELET # BLD AUTO: 171 K/UL — SIGNIFICANT CHANGE UP (ref 150–400)
POTASSIUM SERPL-MCNC: 4 MMOL/L — SIGNIFICANT CHANGE UP (ref 3.5–5.3)
POTASSIUM SERPL-SCNC: 4 MMOL/L — SIGNIFICANT CHANGE UP (ref 3.5–5.3)
RBC # BLD: 3.1 M/UL — LOW (ref 4.2–5.8)
RBC # FLD: 17.2 % — HIGH (ref 10.3–14.5)
SODIUM SERPL-SCNC: 142 MMOL/L — SIGNIFICANT CHANGE UP (ref 135–145)
VANCOMYCIN FLD-MCNC: 13.3 UG/ML — SIGNIFICANT CHANGE UP
WBC # BLD: 7.61 K/UL — SIGNIFICANT CHANGE UP (ref 3.8–10.5)
WBC # FLD AUTO: 7.61 K/UL — SIGNIFICANT CHANGE UP (ref 3.8–10.5)

## 2019-12-23 PROCEDURE — 93306 TTE W/DOPPLER COMPLETE: CPT | Mod: 26

## 2019-12-23 PROCEDURE — 99233 SBSQ HOSP IP/OBS HIGH 50: CPT

## 2019-12-23 RX ORDER — VANCOMYCIN HCL 1 G
1000 VIAL (EA) INTRAVENOUS EVERY 24 HOURS
Refills: 0 | Status: DISCONTINUED | OUTPATIENT
Start: 2019-12-23 | End: 2019-12-23

## 2019-12-23 RX ORDER — AMPICILLIN TRIHYDRATE 250 MG
2 CAPSULE ORAL EVERY 6 HOURS
Refills: 0 | Status: DISCONTINUED | OUTPATIENT
Start: 2019-12-23 | End: 2020-01-03

## 2019-12-23 RX ORDER — SODIUM CHLORIDE 9 MG/ML
1000 INJECTION, SOLUTION INTRAVENOUS
Refills: 0 | Status: DISCONTINUED | OUTPATIENT
Start: 2019-12-23 | End: 2019-12-25

## 2019-12-23 RX ADMIN — Medication 216 GRAM(S): at 17:08

## 2019-12-23 RX ADMIN — Medication 216 GRAM(S): at 21:57

## 2019-12-23 RX ADMIN — Medication 81 MILLIGRAM(S): at 12:47

## 2019-12-23 RX ADMIN — Medication 5 MILLIGRAM(S): at 05:17

## 2019-12-23 RX ADMIN — Medication 650 MILLIGRAM(S): at 21:57

## 2019-12-23 RX ADMIN — APIXABAN 5 MILLIGRAM(S): 2.5 TABLET, FILM COATED ORAL at 12:47

## 2019-12-23 RX ADMIN — APIXABAN 5 MILLIGRAM(S): 2.5 TABLET, FILM COATED ORAL at 21:57

## 2019-12-23 RX ADMIN — Medication 650 MILLIGRAM(S): at 22:58

## 2019-12-23 RX ADMIN — TAMSULOSIN HYDROCHLORIDE 0.4 MILLIGRAM(S): 0.4 CAPSULE ORAL at 21:57

## 2019-12-23 RX ADMIN — ATORVASTATIN CALCIUM 40 MILLIGRAM(S): 80 TABLET, FILM COATED ORAL at 21:57

## 2019-12-23 RX ADMIN — CEFTRIAXONE 100 MILLIGRAM(S): 500 INJECTION, POWDER, FOR SOLUTION INTRAMUSCULAR; INTRAVENOUS at 09:19

## 2019-12-23 NOTE — PROGRESS NOTE ADULT - PROBLEM SELECTOR PLAN 2
Presents with oliguric PAM (1.79<1.22) worsening after 2L fluid and uptrending AGMA. Not retaining on exam. suspect PAM on CKD 2/2 pre-renal due to poor PO intake and sepsis  -F/u urine lytes     #AGMA   Suspect 2/2 PAM, lactate negative, trops peaked Pt was dx at [facility] in May 2018, bronchoscopy done at that time mediastinal lymph node biopsy was positive for carcinoma consistent with metastatic prostate cancer. Pt being treated with Lupron and daily Zytega/Prednisone, recent PSA above 75,000. Now presents for hypotension, severely cachectic/dehydrated one exam. Labs concerning for transaminitis, CT with new possible venous collaterals/varices in the left retroperitoneum, of uncertain etiology. New possible mildly enlarged retroperitoneal and pelvic  lymph nodes suspect progressive mets   -C/w Prednisone 5mg and tamsulosin  -F/u Dr. Lewis recs

## 2019-12-23 NOTE — CONSULT NOTE ADULT - PROBLEM SELECTOR RECOMMENDATION 5
Support provided to patient and family. Patient to have access to supportive services during rest of hospital stay as the patient/family deemed necessary ie. Chaplaincy, Massage therapy, Music therapy, Patient and family supportive services, Palliative SW, etc.  As discussed during the palliative IDT meeting and as identified during the patients PSSA screening the patient would benefit from: massage and music therapy and  visit       Will meet with HCP tomorrow to discuss GOC, possible hospice

## 2019-12-23 NOTE — DIETITIAN INITIAL EVALUATION ADULT. - ENERGY NEEDS
Height: 66" Weight: 109lbs, IBW 142lbs+/-10%, %IBW 76.7%, BMI 17.6 kg/m2  IBW used for calculations as pt >120% of IBW. Needs adjusted for advance age and suspected severe malnutrition.

## 2019-12-23 NOTE — PROGRESS NOTE ADULT - PROBLEM SELECTOR PLAN 4
Pt presented with SoB and left shoulder pain and tropinin elevated 0.66 (chronically elevated 0.1-2). EKG with TWI in inferior and lateral leads c/w previous. Trops now peaked. Suspect trop accumulation in CKD less likely ACS, however will continue to monitor as Pt has signif cardiac hx Alk phos 585, . Could be from liver disease vs bony mets. Of note, Zytiga can also cause transaminitis   -F/u GGT

## 2019-12-23 NOTE — PROGRESS NOTE ADULT - PROBLEM SELECTOR PLAN 6
Echo 2018: global hypokinesis of the left ventricle, EF 30%. The left atrium is severely dilated, mitral inflow pattern is consistent with restrictive left ventricular filling, suggestive of severely elevated left atrial pressure, right atrium is moderately dilated, Tethered mitral valve leaflets. Pt returns severely cachectic/dehydrated on exam but BNP <30,000.   -Was d/c'd on metoprolol tartrate 25 BID but not on NH drug list??  -Holding Lasix in setting of renal failure   -Cautious with additional fluids with frequent lung checks  -ECHO showing EF 30% and possible cardiac amyloidosis

## 2019-12-23 NOTE — PROGRESS NOTE ADULT - PROBLEM SELECTOR PLAN 9
1) PCP Contacted on Admission: (Y/N) --> Name & Phone #: Dr. Joseph  12/20  2) Date of Contact with PCP:  3) PCP Contacted at Discharge: (Y/N, N/A)  4) Summary of Handoff Given to PCP:   5) Post-Discharge Appointment Date and Location: Pt names Mary as [phone]. Has Molst signed 12/16/19 stating FULL CODE and trial of intubation signed by patient and Francesca Steiner and Nova Castro. Pt states he is not familiar with Francesca and Nova. Pt affirms he is full code. Discussed poor prognosis and limited medical offerings, Pt has poor health insight into his condition, remains hopeful there will be treatment  -Palliative consulted

## 2019-12-23 NOTE — PROGRESS NOTE ADULT - PROBLEM SELECTOR PLAN 5
Echo 2018: global hypokinesis of the left ventricle, EF 30%. The left atrium is severely dilated, mitral inflow pattern is consistent with restrictive left ventricular filling, suggestive of severely elevated left atrial pressure, right atrium is moderately dilated, Tethered mitral valve leaflets. Pt returns severely cachectic/dehydrated on exam but BNP <30,000.   -Was d/c'd on metoprolol tartrate 25 BID but not on NH drug list??  -Holding Lasix in setting of renal failure   -Cautious with additional fluids with frequent lung checks  -ECHO showing EF 30% and possible cardiac amyloidosis Pt presented with SoB and left shoulder pain and tropinin elevated 0.66 (chronically elevated 0.1-2). EKG with TWI in inferior and lateral leads c/w previous. Trops now peaked. Suspect trop accumulation in CKD less likely ACS, however will continue to monitor as Pt has signif cardiac hx

## 2019-12-23 NOTE — DIETITIAN INITIAL EVALUATION ADULT. - PROBLEM SELECTOR PLAN 2
Presents with oliguric PAM (1.79<1.22) worsening after 2L fluid and uptrending AGMA. Not retaining on exam. suspect PAM on CKD 2/2 pre-renal.   -F/u urine lytes   -Will repeat labs at 10am and consider renal consult if worsening  -Bladder scan   -Renal u/s     #AGMA   Suspect 2/2 PAM, lactate negative, trops peaked   -F/u BHB

## 2019-12-23 NOTE — CHART NOTE - NSCHARTNOTEFT_GEN_A_CORE
Upon Nutritional Assessment by the Registered Dietitian your patient was determined to meet criteria / has evidence of the following diagnosis/diagnoses:          [ ]  Mild Protein Calorie Malnutrition        [ ]  Moderate Protein Calorie Malnutrition        [ x ] Severe Protein Calorie Malnutrition        [ ] Unspecified Protein Calorie Malnutrition        [ x ] Underweight / BMI <19        [ ] Morbid Obesity / BMI > 40    BMI: 17.6 kg/m2    Findings as based on:  •  Comprehensive nutrition assessment and consultation  •  Calorie counts (nutrient intake analysis)  •  Food acceptance and intake status from observations by staff  •  Follow up  •  Patient education  •  Intervention secondary to interdisciplinary rounds  •   concerns    Per physical assessment: Muscle Wasting- Temporal [ X-SEVERE  ]  Clavicle/Pectoral [ X-SEVERE  ]  Shoulder/Deltoid [   ]  Scapula [   ]  Interosseous [ X-SEVERE  ]  Quadriceps [   ]  Gastrocnemius [   ]; Fat Wasting- Orbital [   ]  Buccal [   ]  Triceps [ X-SEVERE  ]  Rib [   ]--> Suspect severe PCM 2/2 to physical assessment, suspected meeting <50% estimated needs x1 week; please see malnutrition chart note.    Treatment:    The following diet has been recommended:  1. Renal diet   2. Recommend add Nepro with Carb Steady BID (850 kcal, 38.2g protein, 344 mL free H2O)   3. Recommend obtain new weight weekly to assess for weight changes   4. Replete aimee prn    PROVIDER Section:     By signing this assessment you are acknowledging and agree with the diagnosis/diagnoses assigned by the Registered Dietitian    Comments:

## 2019-12-23 NOTE — PROGRESS NOTE ADULT - PROBLEM SELECTOR PLAN 3
Presents with oliguric PAM (1.79<1.22) worsening after 2L fluid and uptrending AGMA. Not retaining on exam. suspect PAM on CKD 2/2 pre-renal due to poor PO intake and sepsis  -F/u urine lytes   -On D5LR @50cc/hr  #AGMA   Suspect 2/2 PAM, lactate negative, trops peaked

## 2019-12-23 NOTE — PROGRESS NOTE ADULT - PROBLEM SELECTOR PLAN 3
Alk phos 585, . Could be from liver disease vs bony mets. Of note, Zytiga can also cause transaminitis   -F/u GGT Presents with oliguric PAM (1.79<1.22) worsening after 2L fluid and uptrending AGMA. Not retaining on exam. suspect PAM on CKD 2/2 pre-renal due to poor PO intake and sepsis  -F/u urine lytes     #AGMA   Suspect 2/2 PAM, lactate negative, trops peaked Presents with oliguric PAM (1.79<1.22) worsening after 2L fluid and uptrending AGMA. Not retaining on exam. suspect PAM on CKD 2/2 pre-renal due to poor PO intake and sepsis  -F/u urine lytes   -On D5LR @50cc/hr  #AGMA   Suspect 2/2 PAM, lactate negative, trops peaked

## 2019-12-23 NOTE — PROGRESS NOTE ADULT - PROBLEM SELECTOR PLAN 9
Pt names Mary as -[phone]. Has Molst signed 12/16/19 stating FULL CODE and trial of intubation signed by patient and Francesca Steiner and Nova Castro. Pt states he is not familiar with Francesca and Nova. Pt affirms he is full code. Discussed poor prognosis and limited medical offerings, Pt has poor health insight into his condition, remains hopeful there will be treatment  -Palliative consulted

## 2019-12-23 NOTE — CONSULT NOTE ADULT - SUBJECTIVE AND OBJECTIVE BOX
VIDA MCCALL   MRN-[id number]    : 1940    HPI:  79M PMH prostate cancer with mets to bone and lung, CAD with CABG in 2016, HFrEF (EF 30%), DVT (on Eliquis), from Dr. Terry’s office for hypotension SBP 70s and tachycardic. Pt currently c/o left shoulder pain, lower abd pain and HA. Living at [facility]. Endorses decreases po intake, denies CP/pressure, fevers, chills, nightsweats, n/v/d/c, changes to urinary habits. States he can walk. Per labs on 19: Na 136, CO2 Alk phos 375, AST 66, BUN 24, Cre 1.22 Uric Acid 11.3, PSA 5000. ED provider spoke to Dr. Terry: requesting palliative consult.     ED Course   WBC 98.3, -98, BP 94//63, RR 24-20, 98%   CXR: no acute infiltrates   LabsHb 10.1, CO2 18, BUN Cre 59/1.79, TB 2.9, Alk phos 585, , lactate 2.2, LD 1364, uric acid 16.6, Trop 0.66, BNP 42568  Abd us: sludge, o biliary ductal dilatation.   CTa/p: n ew possible venous collaterals/varices in the left retroperitoneum, of uncertain etiology. New possible mildly enlarged retroperitoneal and pelvic lymph nodes.   CT: head: No acute intracranial hemorrhage or transcortical infarct, chronic microangiopathic disease and age-appropriate volume loss. (21 Dec 2019 00:05)  Palliative Medicine consulted  for further   discussion re: [facility]     PAST MEDICAL & SURGICAL HISTORY:  Prostate cancer metastatic to bone: and lung  CHF (congestive heart failure)  CAD (coronary artery disease)  HTN (hypertension)  S/P hernia surgery  History of coronary artery bypass graft: 2017 @ [facility]      FAMILY HISTORY:  No pertinent family history in first degree relatives    SOCIAL HISTORY:     ROS:                      Dyspnea (Patsy 0-10):                        N/V (Y/N):                              Secretions (Y/N) :                Agitation(Y/N):   Pain (Y/N):       -Provocation/Palliation:  -Quality/Quantity:  -Radiating:  -Severity:  -Timing/Frequency:  -Impact on ADLs:    General:  Denied  HEENT:    Denied  Neck:  Denied  CVS:  Denied  Resp:  Denied  GI:  Denied  :  Denied  Musc:  Denied  Neuro:  Denied  Psych:  Denied  Skin:  Denied  Lymph:  Denied    Allergies    No Known Allergies    Intolerances    Opiate Naive (Y/N): Yes   -iStop reviewed (Y/N): Yes I Ref:  [id number]    Medications:      MEDICATIONS  (STANDING):  ampicillin  IVPB 2 Gram(s) IV Intermittent every 6 hours  apixaban 5 milliGRAM(s) Oral every 12 hours  aspirin enteric coated 81 milliGRAM(s) Oral daily  atorvastatin 40 milliGRAM(s) Oral at bedtime  dextrose 5%. 1000 milliLiter(s) (50 mL/Hr) IV Continuous <Continuous>  methimazole 5 milliGRAM(s) Oral daily  predniSONE   Tablet 5 milliGRAM(s) Oral daily  tamsulosin 0.4 milliGRAM(s) Oral at bedtime    MEDICATIONS  (PRN):  acetaminophen   Tablet .. 650 milliGRAM(s) Oral every 6 hours PRN Mild Pain (1 - 3), Moderate Pain (4 - 6)  HYDROmorphone   Tablet 2 milliGRAM(s) Oral every 6 hours PRN Severe Pain (7 - 10)    Labs:    CBC:                        8.9    7.61  )-----------( 171      ( 23 Dec 2019 06:21 )             29.2     CMP:        142  |  106  |  74<H>  ----------------------------<  107<H>  4.0   |  15<L>  |  2.42<H>    Ca    6.7<L>      23 Dec 2019 06:20  Phos  2.7       Mg     2.6         Imaging:     EXAM:  CT ABDOMEN AND PELVIS                          EXAM:  CT CHEST                          PROCEDURE DATE:  2019          INTERPRETATION:  CT SCAN OF CHEST, ABDOMEN AND PELVIS    History: Headache, history of metastatic prostate cancer, shortness of breath.    Technique: CT scan of chest, abdomen and pelvis performed from lung apices through pubic symphysis. Intravenous and oral contrast material were not administered. Axial, coronal, and sagittal multiplanar reformatted images were produced. Thin section axial images and axial MIPS of the chest were also produced.     Comparison: CT chest, abdomen pelvis from 2018.    Findings:     Lungs and large airways: The central airways are patent. Evaluation of the lungs shows no large consolidations or pulmonary infiltrates.   Multiple scattered micronodules and triangular perifissural nodules, unchanged since prior study.    Pleura:  Small right and trace left pleural effusions, unchanged since prior study    Mediastinum and hilar regions: No thoracic lymphadenopathy.    Heart and pericardium:  Cardiomegaly with biatrial enlargement. Status post CABG. No pericardial effusion.    Vessels:  Moderate calcified plaque aorta. Severe coronary artery disease.    Chest wall and lower neck:  Along the right chest well, there is been no significant change in a 8.2 x 2.8 x 11.9 cm intermuscular predominantly fatty mass. Enlarged right thyroid with multiple nodules is again seen.    Liver:  Within normal limits.    Gallbladder: Cholelithiasis.    Spleen:  Normal.    Pancreas:  Normal.    Adrenal glands:  Unchanged 1.8 cm right adrenal nodule. Nodular adrenal thickening of the left adrenal gland is again seen.    Kidneys: Left renal cysts Additional subcentimeter hypodensity in the left upper pole too small to characterize. Mild nonspecific perinephric fat stranding.    Abdominal and pelvic adenopathy:  No lymphadenopathy in abdomen or pelvis.    Peritoneum/retroperitoneum: No ascites. Anterior to the left adrenal gland, there is a minimal 1.9 x 1.5 cm nodular soft tissue density (3:84). Along the left para-aortic retroperitoneum there is serpiginous soft tissue density which extends inferiorly to the level of left external iliac artery/left pelvic sidewall (3:131), which is new to prior study. There are few new mildly prominent probable retroperitoneal and pelvic lymph nodes, including 0.6 cm node at the level of renal lizz (3:90), and 0.6 cm retrocaval node (3:99).    Gastrointestinal tract: Evaluation of GI tract without the use of oral contrast again shows a large right inguinal hernia containing the cecum, terminal ileum and minimal fluid. No signs of bowel obstruction. The appendix is not clearly visualized. However, there are no periappendiceal inflammatory changes are seen to indicate acute appendicitis. There is colonic diverticulosis. Small amount of high density material is noted in the rectum.    Pelvic organs: Within normal limits.    Soft tissues: Large right inguinal hernia containing the largebowel. Minimal fluid within the herniated sac.    Bones: Again seen are numerous sclerotic lesions seen throughout the spine and pelvis. Diffuse degenerative changes. Median sternotomy with sternal wires.    Impression:    1.  New possible venous collaterals/varices in the left retroperitoneum, of uncertain etiology. New possible mildly enlarged retroperitoneal and pelvic  lymph nodes. However evaluation is limited on noncontrast study. Clinical correlation with CT/MR venogram is recommended.  2. Otherwise, no significant interval change in the chest, abdomen and pelvis, since 2018.     PEx:  T(C): 36.7 (19 @ 09:05), Max: 36.7 (19 @ 20:29)  HR: 67 (19 @ 09:05) (67 - 87)  BP: 93/52 (19 @ 09:05) (90/62 - 94/59)  RR: 17 (19 @ 09:05) (17 - 17)  SpO2: 95% (19 @ 09:05) (95% - 100%)  Wt(kg): --  Daily     Daily Weight in k.4 (23 Dec 2019 13:26)  CAPILLARY BLOOD GLUCOSE        I&O's Summary    22 Dec 2019 07:01  -  23 Dec 2019 07:00  --------------------------------------------------------  IN: 850 mL / OUT: 300 mL / NET: 550 mL        General:   HEENT:    Neck:   CVS:   Resp:   GI:    :    Musc:     Neuro:   Psych:     Skin:  Lymph:  Preadmit Karnofsky:  %           Current Karnofsky:     %  Cachexia (Y/N):   BMI:    Advanced Directives:     Full Code     DNR/DNI     MOLST     DPOA     Living Will     Decision maker:  Legal surrogate:    GOALS OF CARE DISCUSSION       Palliative care info/counseling provided	           Family meeting       Advanced Directives addressed please see Advance Care Planning Note	           See previous Palliative Medicine Note       Documentation of GOC: 	          REFERRALS	        Palliative Med        Unit SW/Case Mgmt              Speech/Swallow       Patient/Family Support       Massage Therapy       Music Therapy       Hospice       Nutrition       Ethics       PT/OT VIDA MCCALL   MRN-[id number]    : 1940    HPI:  79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 Terry’s office for hypotension SBP 70s and tachycardic. Pt currently c/o left shoulder pain, lower abd pain and HA.Transferred from [facility] in [place] . Endorses decreases po intake, denies CP/pressure, fevers, chills, nightsweats, n/v/d/c, changes to urinary habits. States he can walk. Per labs on 19: Na 136, CO2 Alk phos 375, AST 66, BUN 24, Cre 1.22 Uric Acid 11.3, PSA 5000. ED provider spoke to Dr. Terry: requesting palliative consult.     ED Course   WBC 98.3, -98, BP 94//63, RR 24-20, 98%   CXR: no acute infiltrates   LabsHb 10.1, CO2 18, BUN Cre 59/1.79, TB 2.9, Alk phos 585, , lactate 2.2, LD 1364, uric acid 16.6, Trop 0.66, BNP 93415  Abd us: sludge, o biliary ductal dilatation.   CTa/p: n ew possible venous collaterals/varices in the left retroperitoneum, of uncertain etiology. New possible mildly enlarged retroperitoneal and pelvic lymph nodes.   CT: head: No acute intracranial hemorrhage or transcortical infarct, chronic microangiopathic disease and age-appropriate volume loss. 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  CHF (congestive heart failure)  CAD (coronary artery disease)  HTN (hypertension)  S/P hernia surgery  History of coronary artery bypass graft: 2017 @ [facility]      FAMILY HISTORY:  No pertinent family history in first degree relatives    SOCIAL HISTORY: recently at [facility] at [facility] in [place], never , no children, 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 [employer]     ROS:                      Dyspnea (Patsy 0-10):   0                     N/V (Y/N):  N                            Secretions (Y/N) :  N              Agitation(Y/N): N  Pain (Y/N):       -Provocation/Palliation:  -Quality/Quantity:  -Radiating:  -Severity:  -Timing/Frequency:  -Impact on ADLs:    General: weakness and debility   HEENT:    Denied  Neck:  Denied  CVS:  Denied  Resp:  Denied  GI:  cachexia   :  Denied  Musc:  Denied  Neuro:  Denied  Psych:  Denied  Skin:  Denied  Lymph:  Denied    Allergies    No Known Allergies    Intolerances    Opiate Naive (Y/N): Yes   -iStop reviewed (Y/N): Yes I Ref:  [id number]    Medications:      MEDICATIONS  (STANDING):  ampicillin  IVPB 2 Gram(s) IV Intermittent every 6 hours  apixaban 5 milliGRAM(s) Oral every 12 hours  aspirin enteric coated 81 milliGRAM(s) Oral daily  atorvastatin 40 milliGRAM(s) Oral at bedtime  dextrose 5%. 1000 milliLiter(s) (50 mL/Hr) IV Continuous <Continuous>  methimazole 5 milliGRAM(s) Oral daily  predniSONE   Tablet 5 milliGRAM(s) Oral daily  tamsulosin 0.4 milliGRAM(s) Oral at bedtime    MEDICATIONS  (PRN):  acetaminophen   Tablet .. 650 milliGRAM(s) Oral every 6 hours PRN Mild Pain (1 - 3), Moderate Pain (4 - 6)  HYDROmorphone   Tablet 2 milliGRAM(s) Oral every 6 hours PRN Severe Pain (7 - 10)    Labs:    CBC:                        8.9    7.61  )-----------( 171      ( 23 Dec 2019 06:21 )             29.2     CMP:    12-    142  |  106  |  74<H>  ----------------------------<  107<H>  4.0   |  15<L>  |  2.42<H>    Ca    6.7<L>      23 Dec 2019 06:20  Phos  2.7     12-  Mg     2.6         Imaging:     EXAM:  CT ABDOMEN AND PELVIS                          EXAM:  CT CHEST                          PROCEDURE DATE:  2019        INTERPRETATION:  CT SCAN OF CHEST, ABDOMEN AND PELVIS    History: Headache, history of metastatic prostate cancer, shortness of breath.    Technique: CT scan of chest, abdomen and pelvis performed from lung apices through pubic symphysis. Intravenous and oral contrast material were not administered. Axial, coronal, and sagittal multiplanar reformatted images were produced. Thin section axial images and axial MIPS of the chest were also produced.     Comparison: CT chest, abdomen pelvis from 2018.    Findings:     Lungs and large airways: The central airways are patent. Evaluation of the lungs shows no large consolidations or pulmonary infiltrates.   Multiple scattered micronodules and triangular perifissural nodules, unchanged since prior study.    Pleura:  Small right and trace left pleural effusions, unchanged since prior study    Mediastinum and hilar regions: No thoracic lymphadenopathy.    Heart and pericardium:  Cardiomegaly with biatrial enlargement. Status post CABG. No pericardial effusion.    Vessels:  Moderate calcified plaque aorta. Severe coronary artery disease.    Chest wall and lower neck:  Along the right chest well, there is been no significant change in a 8.2 x 2.8 x 11.9 cm intermuscular predominantly fatty mass. Enlarged right thyroid with multiple nodules is again seen.    Liver:  Within normal limits.    Gallbladder: Cholelithiasis.    Spleen:  Normal.    Pancreas:  Normal.    Adrenal glands:  Unchanged 1.8 cm right adrenal nodule. Nodular adrenal thickening of the left adrenal gland is again seen.    Kidneys: Left renal cysts Additional subcentimeter hypodensity in the left upper pole too small to characterize. Mild nonspecific perinephric fat stranding.    Abdominal and pelvic adenopathy:  No lymphadenopathy in abdomen or pelvis.    Peritoneum/retroperitoneum: No ascites. Anterior to the left adrenal gland, there is a minimal 1.9 x 1.5 cm nodular soft tissue density (3:84). Along the left para-aortic retroperitoneum there is serpiginous soft tissue density which extends inferiorly to the level of left external iliac artery/left pelvic sidewall (3:131), which is new to prior study. There are few new mildly prominent probable retroperitoneal and pelvic lymph nodes, including 0.6 cm node at the level of renal lizz (3:90), and 0.6 cm retrocaval node (3:99).    Gastrointestinal tract: Evaluation of GI tract without the use of oral contrast again shows a large right inguinal hernia containing the cecum, terminal ileum and minimal fluid. No signs of bowel obstruction. The appendix is not clearly visualized. However, there are no periappendiceal inflammatory changes are seen to indicate acute appendicitis. There is colonic diverticulosis. Small amount of high density material is noted in the rectum.    Pelvic organs: Within normal limits.    Soft tissues: Large right inguinal hernia containing the largebowel. Minimal fluid within the herniated sac.    Bones: Again seen are numerous sclerotic lesions seen throughout the spine and pelvis. Diffuse degenerative changes. Median sternotomy with sternal wires.    Impression:    1.  New possible venous collaterals/varices in the left retroperitoneum, of uncertain etiology. New possible mildly enlarged retroperitoneal and pelvic  lymph nodes. However evaluation is limited on noncontrast study. Clinical correlation with CT/MR venogram is recommended.  2. Otherwise, no significant interval change in the chest, abdomen and pelvis, since 2018.      CT Head No Cont (19 @ 17:30) >  XAM:  CT BRAIN                          PROCEDURE DATE:  2019      INTERPRETATION:  IAnthony MD, have reviewed the images and agree with the preliminary report with no modifications:    INDICATIONS: Headache, history ofprostate cancer.    TECHNIQUE: Serial axial images were obtained from the skull base to the vertex without the use of intravenous contrast. Sagittal and coronal images were reformatted.     COMPARISON EXAMINATION: None.    FINDINGS:    VENTRICLES ANDSULCI: Mild ventricular and sulcal prominence due to parenchymal volume loss.  INTRA-AXIAL: No intracranial mass, acute hemorrhage, midline shift or acute transcortical infarct is seen. Patchy areas of decreased attenuation in the periventricular white matter consistent with microangiopathic disease.  EXTRA-AXIAL: No extra-axial fluid collection is present.   VISUALIZED SINUSES: No air-fluid levels are identified.   VISUALIZED MASTOIDS: Clear.  CALVARIUM: Normal.  MISCELLANEOUS: Incidentally seenis a 4.1 x 1.2 x 3.3 cm lipoma in the superficial soft tissues along the right posterior occipital region.    IMPRESSION:   1. No acute intracranial hemorrhage or transcortical infarct.  2. Chronic microangiopathic disease and age-appropriate volume loss.  3. Incidentally seen 4cm scalp lipoma in the posterior right occipital region.     PEx:  T(C): 36.7 (19 @ 09:05), Max: 36.7 (19 @ 20:29)  HR: 67 (19 @ 09:05) (67 - 87)  BP: 93/52 (19 @ 09:05) (90/62 - 94/59)  RR: 17 (19 @ 09:05) (17 - 17)  SpO2: 95% (19 @ 09:05) (95% - 100%)  Wt(kg): --49.4  CAPILLARY BLOOD GLUCOSE      I&O's Summary    22 Dec 2019 07:01  -  23 Dec 2019 07:00  --------------------------------------------------------  IN: 850 mL / OUT: 300 mL / NET: 550 mL    General: cachectic elderly  male in bed in NAD   HEENT: NC/AT sclera anicteric, MM dry   Neck: supple, no JVD   CVS: S1S2 RRR bradycardic, hypertensive   Resp: Bibasilar crackles   GI: soft NT    :  voiding freely  Musc:  weakness GUTIERREZ B/L 4/5 BUE 3+4/5   Neuro: alert with some confusion, no focal deficits   Psych:  calm and cooperative    Skin: + vertical scar to chest, no break of skin   Lymph: No LAD   Preadmit Karnofsky:  40%           Current Karnofsky:  30-40%  Cachexia (Y/N): Y  BMI: 17.6    Advanced Directives:     Full Code: per ED note pt has MOLST dated  stating FULL CODE sined at SNF but pt unaware who these witnesses are     MOLST: not in chart at present time?     HCP: pt has confirmed that his longtime friend and parishioner Mary [phone]  is his his assigned proxy Will see if she has paperwork as he believes, otherwise will have pt resign while he still has capaciy      Decision maker: pt able to provide input into [facility] medcal decisions but has poor insight into his disease   Legal surrogate: Mary (?) [phone]     GOALS OF CARE DISCUSSION       Palliative care info/counseling provided	           Family meeting       Advanced Directives addressed please see Advance Care Planning Note	           See previous Palliative Medicine Note       Documentation of GOC: for discussion with HCP  	          REFERRALS	               Unit SW/Case Mgmt              Massage Therapy       Music Therapy        Nutrition

## 2019-12-23 NOTE — DIETITIAN INITIAL EVALUATION ADULT. - PROBLEM SELECTOR PLAN 5
Echo 2018: global hypokinesis of the left ventricle, EF 30%. The left atrium is severely dilated, mitral inflow pattern is consistent with restrictive left ventricular filling, suggestive of severely elevated left atrial pressure, right atrium is moderately dilated, Tethered mitral valve leaflets. Pt returns severely cachectic/dehydrated on exam but BNP <30,000.   -Was d/c'd on metoprolol tartrate 25 BID but not on NH drug list??  -Holding Lasix in setting of renal failure   -Cautious with additional fluids with frequent lung checks  -AM EKG and CXR

## 2019-12-23 NOTE — PROGRESS NOTE ADULT - SUBJECTIVE AND OBJECTIVE BOX
Interval history:  Continues to be weak.     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 He has been on monthly lupron & Xgeva and daily oral Abiraterone w/prednisone. Questionable compliance with Abiraterone due to rehab stay (uncertain if pt has been receiving it appropriately) and pt himself (poor historian and unaware of exact meds he gets at the facility). PSA has rapidly increased since May 2019...28-->202-->386 (at this point Abiraterone started)-->972-->2632-->5000. On follow up this week noted to be more confused from baseline. Vitals showed hypotension and tachycardia. Decision was made to send to [facility] for close obs. Pt's health care proxy present during office visit yesterday. GOC discuss was held.     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    Social Hx:  X-smoker. No drugs. No EtOH abuse.    Family Hx:  Not contributory at this time.     Home Medications:  apixaban 5 mg oral tablet: 1 tab(s) orally every 12 hours (21 Dec 2019 07:12)  aspirin 81 mg oral delayed release tablet: 1 tab(s) orally once a day (21 Dec 2019 07:12)  atorvastatin 40 mg oral tablet: 1 tab(s) orally once a day (21 Dec 2019 07:12)  docusate sodium 100 mg oral capsule: 1 cap(s) orally once a day (21 Dec 2019 07:12)  furosemide 20 mg oral tablet: 1 tab(s) orally once a day (21 Dec 2019 07:12)  methIMAzole 5 mg oral tablet: 1 tab(s) orally once a day (21 Dec 2019 07:12)  predniSONE 5 mg oral tablet: 1 tab(s) orally once a day (21 Dec 2019 07:12)  senna 8.8 mg/5 mL oral syrup: 5 milliliter(s) orally once a day (21 Dec 2019 07:12)  tamsulosin 0.4 mg oral capsule: 1 cap(s) orally once a day (at bedtime) (21 Dec 2019 07:12)  Zytiga 250 mg oral tablet: 4 tab(s) orally once a day (21 Dec 2019 07:12)    Allergies  No Known Allergies    Exam:  Vital Signs Last 24 Hrs  T(C): 36.4 (23 Dec 2019 05:21), Max: 36.7 (22 Dec 2019 08:56)  T(F): 97.5 (23 Dec 2019 05:21), Max: 98.1 (22 Dec 2019 08:56)  HR: 87 (23 Dec 2019 05:21) (68 - 87)  BP: 94/59 (23 Dec 2019 05:21) (90/62 - 100/62)  BP(mean): --  RR: 17 (23 Dec 2019 05:21) (16 - 17)  SpO2: 100% (23 Dec 2019 05:21) (95% - 100%)    Confused. Very thin and weak. In wheel chair mainly. Cachexia.   Weak pulses yesterday.  Bibasilar crackles  Reg rate.  Dry mucus membranes.  +BS, soft. Some tenderness.  +Pulses/ equal. No edema.    Labs:  Wbc 7.6, Hgb 8.9, Plt 171, LDH 1098, Alk 469, Uric acid 19.  Nucleated red blood cells suggest bone marrow stress- likely from metastatic disease.   Blood cultures positive for gram pos cocci in pairs.    Imaging:  CT scans reviewed.

## 2019-12-23 NOTE — CONSULT NOTE ADULT - PROBLEM SELECTOR RECOMMENDATION 2
pt with increasing weakness and debility  Has been W/C bound for past 2-3 months, requiring more assistance with ADL's   elevated LDH/ elevated lactic acid related to bacteremia and advanced malignancy.   continue abt per primary team

## 2019-12-23 NOTE — DIETITIAN INITIAL EVALUATION ADULT. - MALNUTRITION
Per physical assessment: Muscle Wasting- Temporal [ X-SEVERE  ]  Clavicle/Pectoral [ X-SEVERE  ]  Shoulder/Deltoid [   ]  Scapula [   ]  Interosseous [ X-SEVERE  ]  Quadriceps [   ]  Gastrocnemius [   ]; Fat Wasting- Orbital [   ]  Buccal [   ]  Triceps [ X-SEVERE  ]  Rib [   ]--> Suspect severe PCM 2/2 to physical assessment, suspected meeting <50% estimated needs x1 week; please see malnutrition chart note. Suspect Severe PCM

## 2019-12-23 NOTE — PROGRESS NOTE ADULT - PROBLEM SELECTOR PLAN 7
Pt has hx hyperthyroidism, needs clarification with PCP bc was discharged on methimazole TID and now taking in qd per NH records   C/w Methimazole 5mg qd. C/a ASA and Lipitor 40

## 2019-12-23 NOTE — PROGRESS NOTE ADULT - PROBLEM SELECTOR PLAN 1
Pt was dx at [facility] in May 2018, bronchoscopy done at that time mediastinal lymph node biopsy was positive for carcinoma consistent with metastatic prostate cancer. Pt being treated with Lupron and daily Zytega/Prednisone, recent PSA above 75,000. Now presents for hypotension, severely cachectic/dehydrated one exam. Labs concerning for transaminitis, CT with new possible venous collaterals/varices in the left retroperitoneum, of uncertain etiology. New possible mildly enlarged retroperitoneal and pelvic  lymph nodes suspect progressive mets   -C/w Prednisone 5mg and tamsulosin  -F/u Dr. Lewis recs (elevated HR, E. faecalis bacteremia)  -started on vancomycin and ceftriaxone, pending culture sensitivities  -antibiotics switched to ampicillin 2g q6hrs  -surveillance cultures NGTD so far  -Ucx no growth

## 2019-12-23 NOTE — PROGRESS NOTE ADULT - PROBLEM SELECTOR PLAN 1
(elevated HR, E. faecalis bacteremia)  -started on vancomycin and ceftriaxone, pending culture sensitivities  -antibiotics switched to ampicillin 2g q6hrs  -surveillance cultures NGTD so far  -Ucx no growth

## 2019-12-23 NOTE — DIETITIAN INITIAL EVALUATION ADULT. - PROBLEM SELECTOR PLAN 4
Pt presented with SoB and left shoulder pain and tropinin elevated 0.66 (chronically elevated 0.1-2). EKG with TWI in inferior and lateral leads c/w previous. Trops now peaked. Suspect trop accumulation in CKD less likely ACS, however will continue to monitor as Pt has signif cardiac hx  -AM EKG

## 2019-12-23 NOTE — CONSULT NOTE ADULT - PROBLEM SELECTOR RECOMMENDATION 3
progressive disease despite tx. Per  Hem onc not a  candidate for next line of treatment which would involve chemo.   ECOG 3

## 2019-12-23 NOTE — DIETITIAN INITIAL EVALUATION ADULT. - PROBLEM SELECTOR PLAN 1
Pt was dx at [facility] in May 2018, bronchoscopy done at that time mediastinal lymph node biopsy was positive for carcinoma consistent with metastatic prostate cancer. Pt being treated with Lupron and daily Zytega/Prednisone, recent PSA above 75,000. Now presents for hypotension, severely cachectic/dehydrated one exam. Labs concerning for transaminitis, CT with new possible venous collaterals/varices in the left retroperitoneum, of uncertain etiology. New possible mildly enlarged retroperitoneal and pelvic  lymph nodes suspect progressive mets   -C/w Prednisone 5mg and tamsulosin  -Call Heme-Onc consult if Zytiga should be continued? Not on formulary, Pt would have to bring in from home   -F/u Dr. Lewis recs

## 2019-12-23 NOTE — PROGRESS NOTE ADULT - PROBLEM SELECTOR PLAN 6
C/a ASA and Lipitor 40 Echo 2018: global hypokinesis of the left ventricle, EF 30%. The left atrium is severely dilated, mitral inflow pattern is consistent with restrictive left ventricular filling, suggestive of severely elevated left atrial pressure, right atrium is moderately dilated, Tethered mitral valve leaflets. Pt returns severely cachectic/dehydrated on exam but BNP <30,000.   -Was d/c'd on metoprolol tartrate 25 BID but not on NH drug list??  -Holding Lasix in setting of renal failure   -Cautious with additional fluids with frequent lung checks  -ECHO showing EF 30% and possible cardiac amyloidosis

## 2019-12-23 NOTE — DIETITIAN INITIAL EVALUATION ADULT. - PROBLEM SELECTOR PLAN 3
Alk phos 585, . Could be from liver disease vs bony mets. Of note, Zytiga can also cause transaminitis   -F/u GGT

## 2019-12-23 NOTE — PROGRESS NOTE ADULT - PROBLEM SELECTOR PLAN 8
Pt names Mary as [phone]. Has Molst signed 12/16/19 stating FULL CODE and trial of intubation signed by patient and Francesca Steiner and Nova Castro. Pt states he is not familiar with Francesca and Nova. Pt affirms he is full code. Discussed poor prognosis and limited medical offerings, Pt has poor health insight into his condition, remains hopeful there will be treatment  -Palliative consulted Pt has hx hyperthyroidism, needs clarification with PCP bc was discharged on methimazole TID and now taking in qd per NH records   C/w Methimazole 5mg qd.

## 2019-12-23 NOTE — DIETITIAN INITIAL EVALUATION ADULT. - PROBLEM SELECTOR PLAN 8
Pt names Mary as [phone]. Has Molst signed 12/16/19 stating FULL CODE and trial of intubation signed by patient and Francesca Steiner and Nova Castro. Pt states he is not familiar with Francesca and Nova. Pt affirms he is full code. Discussed poor prognosis and limited medical offerings, Pt has poor health insight into his condition, remains hopeful there will be treatment

## 2019-12-23 NOTE — PROGRESS NOTE ADULT - PROBLEM SELECTOR PLAN 2
Pt was dx at [facility] in May 2018, bronchoscopy done at that time mediastinal lymph node biopsy was positive for carcinoma consistent with metastatic prostate cancer. Pt being treated with Lupron and daily Zytega/Prednisone, recent PSA above 75,000. Now presents for hypotension, severely cachectic/dehydrated one exam. Labs concerning for transaminitis, CT with new possible venous collaterals/varices in the left retroperitoneum, of uncertain etiology. New possible mildly enlarged retroperitoneal and pelvic  lymph nodes suspect progressive mets   -C/w Prednisone 5mg and tamsulosin  -F/u Dr. Lewis recs

## 2019-12-23 NOTE — CONSULT NOTE ADULT - PROBLEM SELECTOR RECOMMENDATION 9
pt severely cachectic despite appetite is fair   undisclosed weight loss since dx  Albumin 2.8, severly dehydrated on admission  BMI 17.6  followed by dietary, pt states he needs softer diet  MD to order

## 2019-12-23 NOTE — CONSULT NOTE ADULT - PROBLEM SELECTOR RECOMMENDATION 4
resents with oliguric PAM (1.79<1.22) worsening after 2L fluid and uptrending AGMA. Not retaining on exam. suspect PAM on CKD 2/2 pre-renal.  AM creat 2.42

## 2019-12-23 NOTE — CONSULT NOTE ADULT - ASSESSMENT
79M PMH prostate cancer with mets to bone and lung, CAD with CABG in 2016, HFrEF (EF 30%), DVT (on Eliquis), from Dr. Terry’s office for hypotension SBP 70s and tachycardic.

## 2019-12-23 NOTE — PROGRESS NOTE ADULT - PROBLEM SELECTOR PLAN 5
Pt presented with SoB and left shoulder pain and tropinin elevated 0.66 (chronically elevated 0.1-2). EKG with TWI in inferior and lateral leads c/w previous. Trops now peaked. Suspect trop accumulation in CKD less likely ACS, however will continue to monitor as Pt has signif cardiac hx

## 2019-12-23 NOTE — PROGRESS NOTE ADULT - SUBJECTIVE AND OBJECTIVE BOX
INTERVAL HPI/OVERNIGHT EVENTS:    SUBJECTIVE: Comfortable    VITAL SIGNS:  ICU Vital Signs Last 24 Hrs  T(C): 36.2 (23 Dec 2019 17:32), Max: 36.7 (22 Dec 2019 20:29)  T(F): 97.1 (23 Dec 2019 17:32), Max: 98.1 (22 Dec 2019 20:29)  HR: 78 (23 Dec 2019 17:32) (67 - 87)  BP: 93/57 (23 Dec 2019 17:32) (90/62 - 94/59)  BP(mean): --  ABP: --  ABP(mean): --  RR: 16 (23 Dec 2019 17:32) (16 - 17)  SpO2: 95% (23 Dec 2019 17:32) (95% - 100%)        12-22 @ 07:01  -  12-23 @ 07:00  --------------------------------------------------------  IN: 850 mL / OUT: 300 mL / NET: 550 mL      CAPILLARY BLOOD GLUCOSE          PHYSICAL EXAM:    Constitutional: cachectic, no respiratory distress   HEENT: NC/AT, PERRL, EOMI, anicteric sclera, no nasal discharge; uvula midline, no oropharyngeal erythema or exudates; MMM  Neck: supple; no JVD or thyromegaly  Respiratory: CTA B/L; no W/R/R, no retractions  Cardiac: +S1/S2; RRR; no M/R/G; PMI non-displaced, sternotomy wires visible under skin   Gastrointestinal: thin, R inguinal hernia is reducible, BSx4   Extremities: WWP, no clubbing or cyanosis; no peripheral edema  Musculoskeletal: NROM x4; no joint swelling, tenderness or erythema  Vascular: 2+ radial, femoral, DP/PT pulses B/L  Dermatologic: skin warm, dry and intact; no rashes, wounds, or scars  Lymphatic: no submandibular or cervical LAD  Neurologic: AAOx3; CNII-XII grossly intact; no focal deficits    MEDICATIONS:  MEDICATIONS  (STANDING):  ampicillin  IVPB 2 Gram(s) IV Intermittent every 6 hours  apixaban 5 milliGRAM(s) Oral every 12 hours  aspirin enteric coated 81 milliGRAM(s) Oral daily  atorvastatin 40 milliGRAM(s) Oral at bedtime  dextrose 5% + lactated ringers. 1000 milliLiter(s) (50 mL/Hr) IV Continuous <Continuous>  methimazole 5 milliGRAM(s) Oral daily  predniSONE   Tablet 5 milliGRAM(s) Oral daily  tamsulosin 0.4 milliGRAM(s) Oral at bedtime    MEDICATIONS  (PRN):  acetaminophen   Tablet .. 650 milliGRAM(s) Oral every 6 hours PRN Mild Pain (1 - 3), Moderate Pain (4 - 6)  HYDROmorphone   Tablet 2 milliGRAM(s) Oral every 6 hours PRN Severe Pain (7 - 10)      ALLERGIES:  Allergies    No Known Allergies    Intolerances        LABS:                        8.9    7.61  )-----------( 171      ( 23 Dec 2019 06:21 )             29.2     12-23    142  |  106  |  74<H>  ----------------------------<  107<H>  4.0   |  15<L>  |  2.42<H>    Ca    6.7<L>      23 Dec 2019 06:20  Phos  2.7     12-23  Mg     2.6     12-23            RADIOLOGY & ADDITIONAL TESTS: Reviewed.

## 2019-12-24 DIAGNOSIS — Z51.5 ENCOUNTER FOR PALLIATIVE CARE: ICD-10-CM

## 2019-12-24 LAB
ALBUMIN SERPL ELPH-MCNC: 3.1 G/DL — LOW (ref 3.3–5)
ANION GAP SERPL CALC-SCNC: 18 MMOL/L — HIGH (ref 5–17)
BUN SERPL-MCNC: 69 MG/DL — HIGH (ref 7–23)
CALCIUM SERPL-MCNC: 6.4 MG/DL — CRITICAL LOW (ref 8.4–10.5)
CHLORIDE SERPL-SCNC: 109 MMOL/L — HIGH (ref 96–108)
CO2 SERPL-SCNC: 17 MMOL/L — LOW (ref 22–31)
CREAT SERPL-MCNC: 2.08 MG/DL — HIGH (ref 0.5–1.3)
GLUCOSE SERPL-MCNC: 98 MG/DL — SIGNIFICANT CHANGE UP (ref 70–99)
HCT VFR BLD CALC: 28.7 % — LOW (ref 39–50)
HGB BLD-MCNC: 9.2 G/DL — LOW (ref 13–17)
MAGNESIUM SERPL-MCNC: 2.5 MG/DL — SIGNIFICANT CHANGE UP (ref 1.6–2.6)
MCHC RBC-ENTMCNC: 28.8 PG — SIGNIFICANT CHANGE UP (ref 27–34)
MCHC RBC-ENTMCNC: 32.1 GM/DL — SIGNIFICANT CHANGE UP (ref 32–36)
MCV RBC AUTO: 90 FL — SIGNIFICANT CHANGE UP (ref 80–100)
NRBC # BLD: 2 /100 WBCS — HIGH (ref 0–0)
PLATELET # BLD AUTO: 160 K/UL — SIGNIFICANT CHANGE UP (ref 150–400)
POTASSIUM SERPL-MCNC: 3.5 MMOL/L — SIGNIFICANT CHANGE UP (ref 3.5–5.3)
POTASSIUM SERPL-SCNC: 3.5 MMOL/L — SIGNIFICANT CHANGE UP (ref 3.5–5.3)
RBC # BLD: 3.19 M/UL — LOW (ref 4.2–5.8)
RBC # FLD: 17.2 % — HIGH (ref 10.3–14.5)
SODIUM SERPL-SCNC: 144 MMOL/L — SIGNIFICANT CHANGE UP (ref 135–145)
WBC # BLD: 6.85 K/UL — SIGNIFICANT CHANGE UP (ref 3.8–10.5)
WBC # FLD AUTO: 6.85 K/UL — SIGNIFICANT CHANGE UP (ref 3.8–10.5)

## 2019-12-24 PROCEDURE — 99233 SBSQ HOSP IP/OBS HIGH 50: CPT

## 2019-12-24 RX ORDER — CALCIUM ACETATE 667 MG
667 TABLET ORAL DAILY
Refills: 0 | Status: DISCONTINUED | OUTPATIENT
Start: 2019-12-24 | End: 2019-12-24

## 2019-12-24 RX ORDER — POTASSIUM CHLORIDE 20 MEQ
20 PACKET (EA) ORAL
Refills: 0 | Status: COMPLETED | OUTPATIENT
Start: 2019-12-24 | End: 2019-12-24

## 2019-12-24 RX ORDER — POLYETHYLENE GLYCOL 3350 17 G/17G
17 POWDER, FOR SOLUTION ORAL DAILY
Refills: 0 | Status: DISCONTINUED | OUTPATIENT
Start: 2019-12-24 | End: 2020-01-03

## 2019-12-24 RX ORDER — CALCIUM CARBONATE 500(1250)
1 TABLET ORAL DAILY
Refills: 0 | Status: DISCONTINUED | OUTPATIENT
Start: 2019-12-24 | End: 2020-01-03

## 2019-12-24 RX ORDER — SENNA PLUS 8.6 MG/1
1 TABLET ORAL DAILY
Refills: 0 | Status: DISCONTINUED | OUTPATIENT
Start: 2019-12-24 | End: 2020-01-03

## 2019-12-24 RX ADMIN — Medication 1 TABLET(S): at 13:54

## 2019-12-24 RX ADMIN — TAMSULOSIN HYDROCHLORIDE 0.4 MILLIGRAM(S): 0.4 CAPSULE ORAL at 22:29

## 2019-12-24 RX ADMIN — Medication 5 MILLIGRAM(S): at 06:34

## 2019-12-24 RX ADMIN — ATORVASTATIN CALCIUM 40 MILLIGRAM(S): 80 TABLET, FILM COATED ORAL at 22:29

## 2019-12-24 RX ADMIN — Medication 650 MILLIGRAM(S): at 23:52

## 2019-12-24 RX ADMIN — Medication 216 GRAM(S): at 10:44

## 2019-12-24 RX ADMIN — Medication 216 GRAM(S): at 16:31

## 2019-12-24 RX ADMIN — APIXABAN 5 MILLIGRAM(S): 2.5 TABLET, FILM COATED ORAL at 12:40

## 2019-12-24 RX ADMIN — Medication 216 GRAM(S): at 03:38

## 2019-12-24 RX ADMIN — Medication 20 MILLIEQUIVALENT(S): at 10:44

## 2019-12-24 RX ADMIN — Medication 81 MILLIGRAM(S): at 12:40

## 2019-12-24 NOTE — PROGRESS NOTE ADULT - PROBLEM SELECTOR PLAN 2
pt with increasing weakness and debility  Has been W/C bound for past 2-3 months, requiring more assistance with ADL's   elevated LDH/ elevated lactic acid related to bacteremia and advanced malignancy.   continue abt per primary team.

## 2019-12-24 NOTE — PROGRESS NOTE ADULT - PROBLEM SELECTOR PLAN 1
(elevated HR, E. faecalis bacteremia)  -started on vancomycin and ceftriaxone, pending culture sensitivities  -antibiotics switched to ampicillin 2g q6hrs  -surveillance cultures NGTD so far, repeat surveillance cultures ordered  -Ucx no growth  -Dr. Rowley (ID) consulted, agrees with continuing ampicillin for now

## 2019-12-24 NOTE — PROGRESS NOTE ADULT - SUBJECTIVE AND OBJECTIVE BOX
VIDA MCCALL   MRN-[id number]    CC: HAO OROZCO     HPI:  79M PMH prostate cancer with mets to bone and lung, CAD with CABG in 2016, HFrEF (EF 30%), DVT (on Eliquis), from Dr. Terry’s office for hypotension SBP 70s and tachycardic. Pt currently c/o left shoulder pain, lower abd pain and HA. Living at [facility]. Endorses decreases po intake, denies CP/pressure, fevers, chills, nightsweats, n/v/d/c, changes to urinary habits. States he can walk. Per labs on 11/23/19: Na 136, CO2 Alk phos 375, AST 66, BUN 24, Cre 1.22 Uric Acid 11.3, PSA 5000. ED provider spoke to Dr. Terry: requesting palliative consult.     ED Course   WBC 98.3, -98, BP 94//63, RR 24-20, 98%   CXR: no acute infiltrates   LabsHb 10.1, CO2 18, BUN Cre 59/1.79, TB 2.9, Alk phos 585, , lactate 2.2, LD 1364, uric acid 16.6, Trop 0.66, BNP 94346  Abd us: sludge, o biliary ductal dilatation.   CTa/p: n ew possible venous collaterals/varices in the left retroperitoneum, of uncertain etiology. New possible mildly enlarged retroperitoneal and pelvic lymph nodes.   CT: head: No acute intracranial hemorrhage or transcortical infarct, chronic microangiopathic disease and age-appropriate volume loss. (21 Dec 2019 00:05)    Per discussion with Dr Terry pt has been W/C bound for past 2-3 months and has failed on Lupron, started on Zygeva with ongoing  elevation of PSA since May  Palliative Medicine consulted  for further discussion re: GOC     Subjective: pt alert and conversive, offer no c/o discomfort Had a large BM this AM    DYSPNEA: N	  NAUS/VOM:  N	  SECRETIONS: N	  AGITATION:  N  Pain (Y/N):   pt denies    -Provocation/Palliation:  -Quality/Quantity:  -Radiating:  -Severity:  -Timing/Frequency:  -Impact on ADLs:    OTHER REVIEW OF SYSTEMS: 12 pt review of systems unremarkable        PEx:  T(C): 36.7 (12-24-19 @ 13:50), Max: 36.7 (12-24-19 @ 13:50)  HR: 85 (12-24-19 @ 13:50) (78 - 92)  BP: 93/59 (12-24-19 @ 13:50) (93/57 - 98/60)  RR: 16 (12-24-19 @ 13:50) (16 - 18)  SpO2: 98% (12-24-19 @ 13:50) (95% - 100%)  Wt(kg): --    General: cachectic elderly  male in bed in NAD   HEENT: NC/AT sclera anicteric, MM dry   Neck: supple, no JVD   CVS: S1S2 RRR bradycardic, hypertensive   Resp: Bibasilar crackles   GI: soft NT  + BM this AM confusion, no focal deficits   Psych:  calm and cooperative    Skin: + vertical scar to chest, no break of skin   Lymph: No LAD   No Known Allergies      Opiate Naive (Y/N): Yes   -iStop reviewed (Y/N): Yeson inital consultation I Ref:  [id number]    MEDICATIONS: REVIEWED  MEDICATIONS  (STANDING):  ampicillin  IVPB 2 Gram(s) IV Intermittent every 6 hours  apixaban 5 milliGRAM(s) Oral every 12 hours  aspirin enteric coated 81 milliGRAM(s) Oral daily  atorvastatin 40 milliGRAM(s) Oral at bedtime  calcium carbonate    500 mG (Tums) Chewable 1 Tablet(s) Chew daily  dextrose 5% + lactated ringers. 1000 milliLiter(s) (50 mL/Hr) IV Continuous <Continuous>  methimazole 5 milliGRAM(s) Oral daily  potassium chloride   Powder 20 milliEquivalent(s) Oral every 2 hours  predniSONE   Tablet 5 milliGRAM(s) Oral daily  tamsulosin 0.4 milliGRAM(s) Oral at bedtime    MEDICATIONS  (PRN):  acetaminophen   Tablet .. 650 milliGRAM(s) Oral every 6 hours PRN Mild Pain (1 - 3), Moderate Pain (4 - 6)  HYDROmorphone   Tablet 2 milliGRAM(s) Oral every 6 hours PRN Severe Pain (7 - 10)      LABS:                       9.2    6.85  )-----------( 160      ( 24 Dec 2019 07:07 )             28.7                         12-24    144  |  109<H>  |  69<H>  ----------------------------<  98  3.5   |  17<L>  |  2.08<H>    Ca    6.4<LL>      24 Dec 2019 07:07  Phos  2.7     12-23  Mg     2.5     12-24    TPro  x   /  Alb  3.1<L>  /  TBili  x   /  DBili  x   /  AST  x   /  ALT  x   /  AlkPhos  x   12-24      IMAGING: reviewed     Advanced Directives:     Full Code: per ED note pt has MOLST dated 12/16 stating FULL CODE sined at SNF but pt     unaware who these witnesses were     MOLST: not in chart at present time?     HCP: pt has confirmed that his longtime friend and parishioner Mary Worley  [phone]  is his his assigned proxy Will see if she has paperwork as he believes, otherwise will have pt resign while he still has capaciy    Decision maker: pt able to provide input into [facility] medcal decisions but has poor insight into his disease   Legal surrogate: Mary (?) [phone]     GOALS OF CARE DISCUSSION       Palliative care info/counseling provided	           Family meeting       Advanced Directives addressed please see Advance Care Planning Note	           See previous Palliative Medicine Note       Documentation of GOC: for discussion with HCP 11/24 	          REFERRALS	               Unit SW/Case Mgmt              Massage Therapy       Music Therapy        Nutrition

## 2019-12-24 NOTE — PROGRESS NOTE ADULT - SUBJECTIVE AND OBJECTIVE BOX
SUBJECTIVE: comfortable    VITAL SIGNS:  ICU Vital Signs Last 24 Hrs  T(C): 36.7 (24 Dec 2019 13:50), Max: 36.7 (24 Dec 2019 13:50)  T(F): 98.1 (24 Dec 2019 13:50), Max: 98.1 (24 Dec 2019 13:50)  HR: 85 (24 Dec 2019 13:50) (78 - 92)  BP: 93/59 (24 Dec 2019 13:50) (93/57 - 98/60)  BP(mean): 71 (24 Dec 2019 09:26) (71 - 71)  ABP: --  ABP(mean): --  RR: 16 (24 Dec 2019 13:50) (16 - 18)  SpO2: 98% (24 Dec 2019 13:50) (95% - 100%)        12-23 @ 07:01  -  12-24 @ 07:00  --------------------------------------------------------  IN: 650 mL / OUT: 250 mL / NET: 400 mL      CAPILLARY BLOOD GLUCOSE          PHYSICAL EXAM:    Constitutional: NAD, cachectic elderly man  HEENT: NC/AT; PERRL, anicteric sclera; dry mucous membranes  Neck: supple, no JVD  Cardiovascular: +S1/S2, RRR, no m/r/g  Respiratory: CTA B/L, no W/R/R, poor inspiratory effort, scattered crackles on bases of R lung   Gastrointestinal: abdomen soft, NT/ND; no rebound or guarding; +BSx4  Genitourinary: no suprapubic tenderness or fullness  Extremities: WWP; no LE edema; no clubbing or cyanosis  Vascular: 2+ radial, DP/PT pulses B/L  Dermatologic: normal color and turgor; no visible rashes  Neurological: AAOx1-2 oriented to self and intermittently to place, not to date; no focal neurological deficits    MEDICATIONS:  MEDICATIONS  (STANDING):  ampicillin  IVPB 2 Gram(s) IV Intermittent every 6 hours  apixaban 5 milliGRAM(s) Oral every 12 hours  aspirin enteric coated 81 milliGRAM(s) Oral daily  atorvastatin 40 milliGRAM(s) Oral at bedtime  calcium carbonate    500 mG (Tums) Chewable 1 Tablet(s) Chew daily  dextrose 5% + lactated ringers. 1000 milliLiter(s) (50 mL/Hr) IV Continuous <Continuous>  methimazole 5 milliGRAM(s) Oral daily  predniSONE   Tablet 5 milliGRAM(s) Oral daily  tamsulosin 0.4 milliGRAM(s) Oral at bedtime    MEDICATIONS  (PRN):  acetaminophen   Tablet .. 650 milliGRAM(s) Oral every 6 hours PRN Mild Pain (1 - 3), Moderate Pain (4 - 6)  HYDROmorphone   Tablet 2 milliGRAM(s) Oral every 6 hours PRN Severe Pain (7 - 10)      ALLERGIES:  Allergies    No Known Allergies    Intolerances        LABS:                        9.2    6.85  )-----------( 160      ( 24 Dec 2019 07:07 )             28.7     12-24    144  |  109<H>  |  69<H>  ----------------------------<  98  3.5   |  17<L>  |  2.08<H>    Ca    6.4<LL>      24 Dec 2019 07:07  Phos  2.7     12-23  Mg     2.5     12-24    TPro  x   /  Alb  3.1<L>  /  TBili  x   /  DBili  x   /  AST  x   /  ALT  x   /  AlkPhos  x   12-24          RADIOLOGY & ADDITIONAL TESTS: Reviewed.

## 2019-12-24 NOTE — CONSULT NOTE ADULT - SUBJECTIVE AND OBJECTIVE BOX
HPI:  79M PMH prostate cancer with mets to bone and lung, CAD with CABG in 2016, HFrEF (EF 30%), DVT (on Eliquis), from Dr. Terry’s office for hypotension SBP 70s and tachycardic. Pt currently c/o left shoulder pain, lower abd pain and HA. Living at [facility]. Endorses decreases po intake, denies CP/pressure, fevers, chills, nightsweats, n/v/d/c, changes to urinary habits. States he can walk. Per labs on 11/23/19: Na 136, CO2 Alk phos 375, AST 66, BUN 24, Cre 1.22 Uric Acid 11.3, PSA 5000. ED provider spoke to Dr. Terry: requesting palliative consult.     ED Course   WBC 98.3, -98, BP 94//63, RR 24-20, 98%   CXR: no acute infiltrates   LabsHb 10.1, CO2 18, BUN Cre 59/1.79, TB 2.9, Alk phos 585, , lactate 2.2, LD 1364, uric acid 16.6, Trop 0.66, BNP 40554  Abd us: sludge, o biliary ductal dilatation.   CTa/p: n ew possible venous collaterals/varices in the left retroperitoneum, of uncertain etiology. New possible mildly enlarged retroperitoneal and pelvic lymph nodes.   CT: head: No acute intracranial hemorrhage or transcortical infarct, chronic microangiopathic disease and age-appropriate volume loss. (21 Dec 2019 00:05)      PAST MEDICAL & SURGICAL HISTORY:  Prostate cancer metastatic to bone: and lung  CHF (congestive heart failure)  CAD (coronary artery disease)  HTN (hypertension)  S/P hernia surgery  History of coronary artery bypass graft: 2017 @ [facility]      REVIEW OF SYSTEMS      General:	    Skin/Breast:  	  Ophthalmologic:  	  ENMT:	    Respiratory and Thorax:  	  Cardiovascular:	    Gastrointestinal:	    Genitourinary:	    Musculoskeletal:	    Neurological:	    Psychiatric:	    Hematology/Lymphatics:	    Endocrine:	    Allergic/Immunologic:	    MEDICATIONS  (STANDING):  ampicillin  IVPB 2 Gram(s) IV Intermittent every 6 hours  apixaban 5 milliGRAM(s) Oral every 12 hours  aspirin enteric coated 81 milliGRAM(s) Oral daily  atorvastatin 40 milliGRAM(s) Oral at bedtime  calcium carbonate    500 mG (Tums) Chewable 1 Tablet(s) Chew daily  dextrose 5% + lactated ringers. 1000 milliLiter(s) (50 mL/Hr) IV Continuous <Continuous>  methimazole 5 milliGRAM(s) Oral daily  potassium chloride   Powder 20 milliEquivalent(s) Oral every 2 hours  predniSONE   Tablet 5 milliGRAM(s) Oral daily  tamsulosin 0.4 milliGRAM(s) Oral at bedtime    MEDICATIONS  (PRN):  acetaminophen   Tablet .. 650 milliGRAM(s) Oral every 6 hours PRN Mild Pain (1 - 3), Moderate Pain (4 - 6)  HYDROmorphone   Tablet 2 milliGRAM(s) Oral every 6 hours PRN Severe Pain (7 - 10)      Allergies    No Known Allergies    Intolerances        SOCIAL HISTORY:    FAMILY HISTORY:  No pertinent family history in first degree relatives      Vital Signs Last 24 Hrs  T(C): 36.7 (24 Dec 2019 13:50), Max: 36.7 (24 Dec 2019 13:50)  T(F): 98.1 (24 Dec 2019 13:50), Max: 98.1 (24 Dec 2019 13:50)  HR: 85 (24 Dec 2019 13:50) (78 - 92)  BP: 93/59 (24 Dec 2019 13:50) (93/57 - 98/60)  BP(mean): 71 (24 Dec 2019 09:26) (71 - 71)  RR: 16 (24 Dec 2019 13:50) (16 - 18)  SpO2: 98% (24 Dec 2019 13:50) (95% - 100%)    PHYSICAL EXAM:        LABS:                        9.2    6.85  )-----------( 160      ( 24 Dec 2019 07:07 )             28.7     12-24    144  |  109<H>  |  69<H>  ----------------------------<  98  3.5   |  17<L>  |  2.08<H>    Ca    6.4<LL>      24 Dec 2019 07:07  Phos  2.7     12-23  Mg     2.5     12-24    TPro  x   /  Alb  3.1<L>  /  TBili  x   /  DBili  x   /  AST  x   /  ALT  x   /  AlkPhos  x   12-24    Culture - Blood (12.22.19 @ 17:39)    Specimen Source: .Blood Blood    Culture Results:   No growth at 1 day.    Culture - Urine (12.21.19 @ 09:59)    Specimen Source: .Urine Clean Catch (Midstream)    Culture Results:   No growth    Culture - Blood (12.20.19 @ 15:30)    -  Streptomycin synergy: S    -  Enterococcus species: Detec    -  Gentamicin synergy: S    -  Vancomycin: S 2    Gram Stain:   Anaerobic Bottle: Gram Positive Cocci in Pairs and Chains  Result called to and read back by_ Ms. FLORA Menchaca RN ()  12/21/2019  08:37:50  ***Blood Panel PCR results on this specimen are available  approximately 3 hours after the Gram stain result.***  Gram stain, PCR, and/or culture results may not always  correspond due to difference in methodologies.  ************************************************************  This PCR assay was performed using [employer].  The following targets are tested for: Enterococcus,  vancomycin resistant enterococci, Listeria monocytogenes,  coagulase negative staphylococci, S. aureus,  methicillin resistant S. aureus, Streptococcus agalactiae  (Group B), S. pneumoniae, S. pyogenes (Group A),  Acinetobacter baumannii, Enterobacter cloacae, E. coli,  Klebsiella oxytoca, K. pneumoniae, Proteus sp.,  Serratia marcescens, Haemophilus influenzae,  Neisseria meningitidis, Pseudomonas aeruginosa, Candida  albicans, C. glabrata, C krusei, C parapsilosis,  C. tropicalis and the KPC resistance gene.    -  Ampicillin: S <=2 Predicts results to ampicillin/sulbactam, amoxacillin-clavulanate and  piperacillin-tazobactam.    Specimen Source: .Blood Blood-Peripheral    Organism: Blood Culture PCR    Organism: Enterococcus faecalis    Culture Results:   Growth in anaerobic bottle: Enterococcus faecalis    Organism Identification: Enterococcus faecalis  Blood Culture PCR    Method Type: PCR    Method Type: ROSENDO          RADIOLOGY & ADDITIONAL STUDIES:    < from: Echocardiogram (12.23.19 @ 11:20) >    EXAM:  ECHOCARDIOGRAM (CARDIOL)                          PROCEDURE DATE:  12/23/2019          INTERPRETATION:  REPORT:    -----------------------------------  TRANSTHORACIC ECHOCARDIOGRAM REPORT       --------------------------------------------------------------------------------  Patient Name:   VIDA MCCALL Date of Exam:        12/23/2019  Medical Rec #:  [id number]            Height/Weight:       65.7 in / 108.03 lb  Account #:      [id number]            BSA:                 1.54 m²  YOB: 1940          BP:                  114/60 mmHg  Patient Age:    79 years           HR:  Patient Gender: M                  Sonographer:         Nathan Sim [facility]                                     Referring Physician: LEO FIGUEROA MD       --------------------------------------------------------------------------------  CPT:               ECHO TTE WO CON COMP - 94188; - [id number].m  Indication(s):     I33.0 - Acute and subacute infective endocarditis  Procedure:         A complete two-dimensional transthoracic echocardiogram was                     performed: 2D, M-mode, spectral and color flow Doppler.  Ordering Location: [facility]     Study Quality:     Study quality was good.       --------------------------------------------------------------------------------  CONCLUSIONS:     1. 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   2. The constellation of findings (marked LV and RV increased wall thickness, marked biatrial dilation, thickened valves, and grade III LV diastolic dysfunction) raise the possibility of an infiltrative cardiomyopathy.   3. Small left ventricular cavity size.   4. Severely reduced left ventricular systolic function with global hypokinesis.   5. Grade III left ventricular diastolic dysfunction.   6. Severe symmetric left and right ventricular increased wall thickness.   7. Normal right ventricular size.   8. Mildly reduced right ventricular     < end of copied text >  < from: Echocardiogram (12.23.19 @ 11:20) >  . Mildly reduced right ventricular systolic function.   9. Marked biatrial enlargement.  10. Aortic sclerosis without significant stenosis.  11. Mild aortic regurgitation.  12. Mild mitral regurgitation.  13. Mild tricuspid regurgitation.  14. Pulmonary hypertension present, pulmonary artery systolic pressure is 56.50 mmHg.  15. No pericardial effusion.  16. Compared to theprevious TTE performed on 5/10/2018, there have been no significant interval changes.    --------------------------------------------------------------------------------  FINDINGS:     Left Ventricle:  The left ventricle cavity size is small. Left ventricular systolic function is severely reduced with a visually estimated ejection fraction of 30% with global hypokinesis. There is severe concentric left ventricular increased wall thickness. There are no regional wall motion abnormalities seen. Analysis of mitral annular tissue Doppler, left atrial volume index, and tricuspid regurgitant velocity reveals: grade III diastolic dysfunction with elevated filling pressure.     Right Ventricle:  The right ventricle is normal in size. There is increased wall thickness of the right ventricular free wall. Right ventricular systolic function is reduced. RV tissue Doppler S' is 7.68 cm/s (normal >10 cm/s).     Left Atrium:  The left atrium is markedly dilated.     Right Atrium:  The right atrium is markedly dilated.       < end of copied text >  < from: Echocardiogram (12.23.19 @ 11:20) >     Aortic Valve:  Fibrocalcific tricuspid aortic valve without significant stenosis. There is mild aortic regurgitation.     Mitral Valve:  Structurally normal mitral valve with normal leaflet excursion. There is mild mitral regurgitation.     Tricuspid Valve:  Structurally normal tricuspid valve with normal leaflet excursion. There is mild tricuspid regurgitation. Pulmonary artery systolic pressure (estimated using the tricuspid regurgitant gradient and an estimate of right atrial pressure) is 56.50 mmHg.     Inferior Vena Cava:  The inferior vena cava is dilated (>2.1cm) with abnormal inspiratory collapse (<50%) consistent with elevated right atrial pressure (  15, range 10-20mmHg).     Pulmonic Valve:  Structurally normal pulmonic valve with normal leaflet excursion. There is trace pulmonic regurgitation.     Aorta:  The aortic root is normal in size. The aortic arch is normal without evidence of aortic coarctation.     Pericardium:  No pericardial effusion is seen.       < end of copied text > HPI:  79M PMH prostate cancer with mets to bone and lung, CAD with CABG in 2016, HFrEF (EF 30%), DVT (on Eliquis), from Dr. Terry’s office for hypotension SBP 70s and tachycardic. Pt currently c/o left shoulder pain, lower abd pain and HA. Living at [facility]. Endorses decreases po intake, denies CP/pressure, fevers, chills, nightsweats, n/v/d/c, changes to urinary habits. States he can walk. Per labs on 11/23/19: Na 136, CO2 Alk phos 375, AST 66, BUN 24, Cre 1.22 Uric Acid 11.3, PSA 5000. ED provider spoke to Dr. Terry: requesting palliative consult.     ED Course   WBC 98.3, -98, BP 94//63, RR 24-20, 98%   CXR: no acute infiltrates   LabsHb 10.1, CO2 18, BUN Cre 59/1.79, TB 2.9, Alk phos 585, , lactate 2.2, LD 1364, uric acid 16.6, Trop 0.66, BNP 57955  Abd us: sludge, o biliary ductal dilatation.   CTa/p: n ew possible venous collaterals/varices in the left retroperitoneum, of uncertain etiology. New possible mildly enlarged retroperitoneal and pelvic lymph nodes.   CT: head: No acute intracranial hemorrhage or transcortical infarct, chronic microangiopathic disease and age-appropriate volume loss. (21 Dec 2019 00:05)      PAST MEDICAL & SURGICAL HISTORY:  Prostate cancer metastatic to bone: and lung  CHF (congestive heart failure)  CAD (coronary artery disease)  HTN (hypertension)  S/P hernia surgery  History of coronary artery bypass graft: 2017 @ [facility]      REVIEW OF SYSTEMS  Otherwise negative      MEDICATIONS  (STANDING):  ampicillin  IVPB 2 Gram(s) IV Intermittent every 6 hours  apixaban 5 milliGRAM(s) Oral every 12 hours  aspirin enteric coated 81 milliGRAM(s) Oral daily  atorvastatin 40 milliGRAM(s) Oral at bedtime  calcium carbonate    500 mG (Tums) Chewable 1 Tablet(s) Chew daily  dextrose 5% + lactated ringers. 1000 milliLiter(s) (50 mL/Hr) IV Continuous <Continuous>  methimazole 5 milliGRAM(s) Oral daily  potassium chloride   Powder 20 milliEquivalent(s) Oral every 2 hours  predniSONE   Tablet 5 milliGRAM(s) Oral daily  tamsulosin 0.4 milliGRAM(s) Oral at bedtime    MEDICATIONS  (PRN):  acetaminophen   Tablet .. 650 milliGRAM(s) Oral every 6 hours PRN Mild Pain (1 - 3), Moderate Pain (4 - 6)  HYDROmorphone   Tablet 2 milliGRAM(s) Oral every 6 hours PRN Severe Pain (7 - 10)      Allergies    No Known Allergies        SOCIAL HISTORY:  Lives in a healthcare facility    FAMILY HISTORY:  No pertinent family history in first degree relatives    EXAM  Vital Signs Last 24 Hrs  T(C): 36.7 (24 Dec 2019 13:50), Max: 36.7 (24 Dec 2019 13:50)  T(F): 98.1 (24 Dec 2019 13:50), Max: 98.1 (24 Dec 2019 13:50)  HR: 85 (24 Dec 2019 13:50) (78 - 92)  BP: 93/59 (24 Dec 2019 13:50) (93/57 - 98/60)  BP(mean): 71 (24 Dec 2019 09:26) (71 - 71)  RR: 16 (24 Dec 2019 13:50) (16 - 18)  SpO2: 98% (24 Dec 2019 13:50) (95% - 100%)  Awake and responding   No distress  No rash  No lesions suggestive of embolization   RR  Chest clear anteriorly and laterally  Abd soft NT  LE no edema      LABS:                        9.2    6.85  )-----------( 160      ( 24 Dec 2019 07:07 )             28.7     12-24    144  |  109<H>  |  69<H>  ----------------------------<  98  3.5   |  17<L>  |  2.08<H>    Ca    6.4<LL>      24 Dec 2019 07:07  Phos  2.7     12-23  Mg     2.5     12-24    TPro  x   /  Alb  3.1<L>  /  TBili  x   /  DBili  x   /  AST  x   /  ALT  x   /  AlkPhos  x   12-24    Culture - Blood (12.22.19 @ 17:39)    Specimen Source: .Blood Blood    Culture Results:   No growth at 1 day.    Culture - Urine (12.21.19 @ 09:59)    Specimen Source: .Urine Clean Catch (Midstream)    Culture Results:   No growth    Culture - Blood (12.20.19 @ 15:30)    -  Streptomycin synergy: S    -  Enterococcus species: Detec    -  Gentamicin synergy: S    -  Vancomycin: S 2    Gram Stain:   Anaerobic Bottle: Gram Positive Cocci in Pairs and Chains  Result called to and read back by_ Ms. DEBIИрина Menchaca RN ()  12/21/2019  08:37:50  ***Blood Panel PCR results on this specimen are available  approximately 3 hours after the Gram stain result.***  Gram stain, PCR, and/or culture results may not always  correspond due to difference in methodologies.  ************************************************************  This PCR assay was performed using [employer].  The following targets are tested for: Enterococcus,  vancomycin resistant enterococci, Listeria monocytogenes,  coagulase negative staphylococci, S. aureus,  methicillin resistant S. aureus, Streptococcus agalactiae  (Group B), S. pneumoniae, S. pyogenes (Group A),  Acinetobacter baumannii, Enterobacter cloacae, E. coli,  Klebsiella oxytoca, K. pneumoniae, Proteus sp.,  Serratia marcescens, Haemophilus influenzae,  Neisseria meningitidis, Pseudomonas aeruginosa, Candida  albicans, C. glabrata, C krusei, C parapsilosis,  C. tropicalis and the KPC resistance gene.    -  Ampicillin: S <=2 Predicts results to ampicillin/sulbactam, amoxacillin-clavulanate and  piperacillin-tazobactam.    Specimen Source: .Blood Blood-Peripheral    Organism: Blood Culture PCR    Organism: Enterococcus faecalis    Culture Results:   Growth in anaerobic bottle: Enterococcus faecalis    Organism Identification: Enterococcus faecalis  Blood Culture PCR    Method Type: PCR    Method Type: ROSENDO          RADIOLOGY & ADDITIONAL STUDIES:    Echocardiogram (12.23.19 @ 11:20) >    EXAM:  ECHOCARDIOGRAM (CARDIOL)                          PROCEDURE DATE:  12/23/2019          INTERPRETATION:  REPORT:    -----------------------------------  TRANSTHORACIC ECHOCARDIOGRAM REPORT       --------------------------------------------------------------------------------  Patient Name:   VIDA MCCALL Date of Exam:        12/23/2019  Medical Rec #:  [id number]            Height/Weight:       65.7 in / 108.03 lb  Account #:      [id number]            BSA:                 1.54 m²  YOB: 1940          BP:                  114/60 mmHg  Patient Age:    79 years           HR:  Patient Gender: M                  Sonographer:         Nathan Sim [facility]                                     Referring Physician: LEO FIGUEROA MD       --------------------------------------------------------------------------------  CPT:               ECHO TTE WO CON COMP - 34185; - [id number].m  Indication(s):     I33.0 - Acute and subacute infective endocarditis  Procedure:         A complete two-dimensional transthoracic echocardiogram was                     performed: 2D, M-mode, spectral and color flow Doppler.  Ordering Location: [facility]     Study Quality:     Study quality was good.       --------------------------------------------------------------------------------  CONCLUSIONS:     1. 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   2. The constellation of findings (marked LV and RV increased wall thickness, marked biatrial dilation, thickened valves, and grade III LV diastolic dysfunction) raise the possibility of an infiltrative cardiomyopathy.   3. Small left ventricular cavity size.   4. Severely reduced left ventricular systolic function with global hypokinesis.   5. Grade III left ventricular diastolic dysfunction.   6. Severe symmetric left and right ventricular increased wall thickness.   7. Normal right ventricular size.   8. Mildly reduced right ventricular systolic function.   9. Marked biatrial enlargement.  10. Aortic sclerosis without significant stenosis.  11. Mild aortic regurgitation.  12. Mild mitral regurgitation.  13. Mild tricuspid regurgitation.  14. Pulmonary hypertension present, pulmonary artery systolic pressure is 56.50 mmHg.  15. No pericardial effusion.  16. Compared to the previous TTE performed on 5/10/2018, there have been no significant interval changes.    --------------------------------------------------------------------------------  FINDINGS:     Left Ventricle:  The left ventricle cavity size is small. Left ventricular systolic function is severely reduced with a visually estimated ejection fraction of 30% with global hypokinesis. There is severe concentric left ventricular increased wall thickness. There are no regional wall motion abnormalities seen. Analysis of mitral annular tissue Doppler, left atrial volume index, and tricuspid regurgitant velocity reveals: grade III diastolic dysfunction with elevated filling pressure.     Right Ventricle:  The right ventricle is normal in size. There is increased wall thickness of the right ventricular free wall. Right ventricular systolic function is reduced. RV tissue Doppler S' is 7.68 cm/s (normal >10 cm/s).     Left Atrium:  The left atrium is markedly dilated.     Right Atrium:  The right atrium is markedly dilated.     Aortic Valve:  Fibrocalcific tricuspid aortic valve without significant stenosis. There is mild aortic regurgitation.     Mitral Valve:  Structurally normal mitral valve with normal leaflet excursion. There is mild mitral regurgitation.     Tricuspid Valve:  Structurally normal tricuspid valve with normal leaflet excursion. There is mild tricuspid regurgitation. Pulmonary artery systolic pressure (estimated using the tricuspid regurgitant gradient and an estimate of right atrial pressure) is 56.50 mmHg.     Inferior Vena Cava:  The inferior vena cava is dilated (>2.1cm) with abnormal inspiratory collapse (<50%) consistent with elevated right atrial pressure (  15, range 10-20mmHg).     Pulmonic Valve:  Structurally normal pulmonic valve with normal leaflet excursion. There is trace pulmonic regurgitation.     Aorta:  The aortic root is normal in size. The aortic arch is normal without evidence of aortic coarctation.     Pericardium:  No pericardial effusion is seen.

## 2019-12-24 NOTE — PROGRESS NOTE ADULT - PROBLEM SELECTOR PLAN 2
Pt was dx at [facility] in May 2018, bronchoscopy done at that time mediastinal lymph node biopsy was positive for carcinoma consistent with metastatic prostate cancer. Pt being treated with Lupron and daily Zytega/Prednisone, recent PSA above 75,000. Now presents for hypotension, severely cachectic/dehydrated one exam. Labs concerning for transaminitis, CT with new possible venous collaterals/varices in the left retroperitoneum, of uncertain etiology. New possible mildly enlarged retroperitoneal and pelvic  lymph nodes suspect progressive mets   -C/w Prednisone 5mg and tamsulosin  -F/u Dr. Paulino  recs

## 2019-12-24 NOTE — CONSULT NOTE ADULT - ASSESSMENT
Continue IV Ampicillin  Patient is not a candidate for synergistic aminoglycosides  Could potentially add Ceftriaxone but this will increase risk of complications including Cdiff  Review goals of care Metastatic prostate cancer  Enterococcal bacteremia - portal of entry most likely  or GI tract  R/O Endocarditis    RECOMMEND  Continue IV Ampicillin  Patient is not a candidate for synergistic aminoglycosides  Could potentially add Ceftriaxone but this will increase risk of complications including Cdiff  Review goals of care

## 2019-12-24 NOTE — PROGRESS NOTE ADULT - PROBLEM SELECTOR PLAN 3
pt severely cachectic despite appetite is fair   undisclosed weight loss since dx  Albumin 2.8, severly dehydrated on admission  BMI 17.6  followed by dietary, pt states he needs softer diet  MD to order.  soft diet as tolerated

## 2019-12-24 NOTE — PROGRESS NOTE ADULT - PROBLEM SELECTOR PLAN 3
Presents with oliguric PAM (1.79<1.22) worsening after 2L fluid and uptrending AGMA. Not retaining on exam. suspect PAM on CKD 2/2 pre-renal due to poor PO intake and sepsis  -FeNa consistent with pre-renal etiology most likely due to poor PO intake   #AGMA   Suspect 2/2 PAM, lactate negative, trops peaked

## 2019-12-24 NOTE — PROGRESS NOTE ADULT - PROBLEM SELECTOR PLAN 9
Pt names Mary as -[phone]. Has Molst signed 12/16/19 stating FULL CODE and trial of intubation signed by patient and Francesca Steiner and Nova Castro. Pt states he is not familiar with Francesca and Nova. Pt affirms he is full code. Discussed poor prognosis and limited medical offerings, Pt has poor health insight into his condition, remains hopeful there will be treatment  -Palliative consulted

## 2019-12-24 NOTE — PROGRESS NOTE ADULT - PROBLEM SELECTOR PLAN 1
(elevated HR, E. faecalis bacteremia)  -started on vancomycin and ceftriaxone, pending culture sensitivities  -antibiotics switched to ampicillin 2g q6hrs  -surveillance cultures NGTD so far, repeat surveillance cultures ordered  -Ucx no growth  -Dr. Rowley (ID) consulted, agrees with continuing ampicillin for now Statement Selected

## 2019-12-24 NOTE — PROGRESS NOTE ADULT - PROBLEM SELECTOR PLAN 4
progressive disease despite tx. Per  Hem onc not a  candidate for next line of treatment which would involve chemo.   ECOG 3.

## 2019-12-24 NOTE — PROGRESS NOTE ADULT - PROBLEM SELECTOR PLAN 5
Support provided to patient and family. Patient to have access to supportive services during rest of hospital stay as the patient/family deemed necessary ie. Chaplaincy, Massage therapy, Music therapy, Patient and family supportive services, Palliative SW, etc.    spoke with HCP Blanquita Worley this AM. SHe will not be available to discuss GOC or dispo until Thursday. She was given my contact info and will call with a time later today

## 2019-12-24 NOTE — PROGRESS NOTE ADULT - PROBLEM SELECTOR PLAN 1
presents with oliguric PAM (1.79<1.22) worsening after 2L fluid and uptrending AGMA. Not retaining on exam. suspect PAM on CKD 2/2 pre-renal.  AM creat 2.02.

## 2019-12-24 NOTE — PROGRESS NOTE ADULT - PROBLEM SELECTOR PLAN 9
Pt names Mary as -[phone]. Has Molst signed 12/16/19 stating FULL CODE and trial of intubation signed by patient and Francesca Steiner and Nova Castro. Pt states he is not familiar with Francesca and Nova. Pt affirms he is full code. Discussed poor prognosis and limited medical offerings, Pt has poor health insight into his condition, remains hopeful there will be treatment  -Palliative consulted

## 2019-12-24 NOTE — PROGRESS NOTE ADULT - PROBLEM SELECTOR PLAN 2
Pt was dx at [facility] in May 2018, bronchoscopy done at that time mediastinal lymph node biopsy was positive for carcinoma consistent with metastatic prostate cancer. Pt being treated with Lupron and daily Zytega/Prednisone, recent PSA above 75,000. Now presents for hypotension, severely cachectic/dehydrated one exam. Labs concerning for transaminitis, CT with new possible venous collaterals/varices in the left retroperitoneum, of uncertain etiology. New possible mildly enlarged retroperitoneal and pelvic  lymph nodes suspect progressive mets   -C/w Prednisone 5mg and tamsulosin  -F/u Dr. Paulino  recs

## 2019-12-24 NOTE — PROGRESS NOTE ADULT - SUBJECTIVE AND OBJECTIVE BOX
INTERVAL HPI/OVERNIGHT EVENTS: NAOE. Patient pending coordination of care discussion with Palliative Care team.     SUBJECTIVE: Patient seen and examined at bedside. No acute complaints, but does mention that he has not had a bowel movement in 2-3 days.     VITAL SIGNS:  ICU Vital Signs Last 24 Hrs  T(C): 36.7 (24 Dec 2019 13:50), Max: 36.7 (24 Dec 2019 13:50)  T(F): 98.1 (24 Dec 2019 13:50), Max: 98.1 (24 Dec 2019 13:50)  HR: 85 (24 Dec 2019 13:50) (78 - 92)  BP: 93/59 (24 Dec 2019 13:50) (93/57 - 98/60)  BP(mean): 71 (24 Dec 2019 09:26) (71 - 71)  ABP: --  ABP(mean): --  RR: 16 (24 Dec 2019 13:50) (16 - 18)  SpO2: 98% (24 Dec 2019 13:50) (95% - 100%)        12-23 @ 07:01  -  12-24 @ 07:00  --------------------------------------------------------  IN: 650 mL / OUT: 250 mL / NET: 400 mL      CAPILLARY BLOOD GLUCOSE          PHYSICAL EXAM:    Constitutional: NAD  HEENT: NC/AT; PERRL, anicteric sclera; MMM  Neck: supple, no JVD  Cardiovascular: +S1/S2, RRR  Respiratory: CTA B/L, no W/R/R  Gastrointestinal: abdomen soft, NT/ND; no rebound or guarding; +BSx4  Genitourinary: no suprapubic tenderness or fullness  Extremities: WWP; no LE edema; no clubbing or cyanosis  Vascular: 2+ radial, DP/PT and femoral pulses B/L  Dermatologic: normal color and turgor; no visible rashes  Neurological:     MEDICATIONS:  MEDICATIONS  (STANDING):  ampicillin  IVPB 2 Gram(s) IV Intermittent every 6 hours  apixaban 5 milliGRAM(s) Oral every 12 hours  aspirin enteric coated 81 milliGRAM(s) Oral daily  atorvastatin 40 milliGRAM(s) Oral at bedtime  calcium carbonate    500 mG (Tums) Chewable 1 Tablet(s) Chew daily  dextrose 5% + lactated ringers. 1000 milliLiter(s) (50 mL/Hr) IV Continuous <Continuous>  methimazole 5 milliGRAM(s) Oral daily  predniSONE   Tablet 5 milliGRAM(s) Oral daily  tamsulosin 0.4 milliGRAM(s) Oral at bedtime    MEDICATIONS  (PRN):  acetaminophen   Tablet .. 650 milliGRAM(s) Oral every 6 hours PRN Mild Pain (1 - 3), Moderate Pain (4 - 6)  HYDROmorphone   Tablet 2 milliGRAM(s) Oral every 6 hours PRN Severe Pain (7 - 10)      ALLERGIES:  Allergies    No Known Allergies    Intolerances        LABS:                        9.2    6.85  )-----------( 160      ( 24 Dec 2019 07:07 )             28.7     12-24    144  |  109<H>  |  69<H>  ----------------------------<  98  3.5   |  17<L>  |  2.08<H>    Ca    6.4<LL>      24 Dec 2019 07:07  Phos  2.7     12-23  Mg     2.5     12-24    TPro  x   /  Alb  3.1<L>  /  TBili  x   /  DBili  x   /  AST  x   /  ALT  x   /  AlkPhos  x   12-24          RADIOLOGY & ADDITIONAL TESTS: Reviewed. INTERVAL HPI/OVERNIGHT EVENTS: NAOE. Patient pending coordination of care discussion with Palliative Care team.     SUBJECTIVE: Patient seen and examined at bedside. No acute complaints, but does mention that he has not had a bowel movement in 2-3 days.     VITAL SIGNS:  ICU Vital Signs Last 24 Hrs  T(C): 36.7 (24 Dec 2019 13:50), Max: 36.7 (24 Dec 2019 13:50)  T(F): 98.1 (24 Dec 2019 13:50), Max: 98.1 (24 Dec 2019 13:50)  HR: 85 (24 Dec 2019 13:50) (78 - 92)  BP: 93/59 (24 Dec 2019 13:50) (93/57 - 98/60)  BP(mean): 71 (24 Dec 2019 09:26) (71 - 71)  ABP: --  ABP(mean): --  RR: 16 (24 Dec 2019 13:50) (16 - 18)  SpO2: 98% (24 Dec 2019 13:50) (95% - 100%)        12-23 @ 07:01  -  12-24 @ 07:00  --------------------------------------------------------  IN: 650 mL / OUT: 250 mL / NET: 400 mL      CAPILLARY BLOOD GLUCOSE          PHYSICAL EXAM:    Constitutional: NAD, cachectic elderly man  HEENT: NC/AT; PERRL, anicteric sclera; dry mucous membranes  Neck: supple, no JVD  Cardiovascular: +S1/S2, RRR, no m/r/g  Respiratory: CTA B/L, no W/R/R, poor inspiratory effort, scattered crackles on bases of R lung   Gastrointestinal: abdomen soft, NT/ND; no rebound or guarding; +BSx4  Genitourinary: no suprapubic tenderness or fullness  Extremities: WWP; no LE edema; no clubbing or cyanosis  Vascular: 2+ radial, DP/PT pulses B/L  Dermatologic: normal color and turgor; no visible rashes  Neurological: AAOx1-2 oriented to self and intermittently to place, not to date; no focal neurological deficits    MEDICATIONS:  MEDICATIONS  (STANDING):  ampicillin  IVPB 2 Gram(s) IV Intermittent every 6 hours  apixaban 5 milliGRAM(s) Oral every 12 hours  aspirin enteric coated 81 milliGRAM(s) Oral daily  atorvastatin 40 milliGRAM(s) Oral at bedtime  calcium carbonate    500 mG (Tums) Chewable 1 Tablet(s) Chew daily  dextrose 5% + lactated ringers. 1000 milliLiter(s) (50 mL/Hr) IV Continuous <Continuous>  methimazole 5 milliGRAM(s) Oral daily  predniSONE   Tablet 5 milliGRAM(s) Oral daily  tamsulosin 0.4 milliGRAM(s) Oral at bedtime    MEDICATIONS  (PRN):  acetaminophen   Tablet .. 650 milliGRAM(s) Oral every 6 hours PRN Mild Pain (1 - 3), Moderate Pain (4 - 6)  HYDROmorphone   Tablet 2 milliGRAM(s) Oral every 6 hours PRN Severe Pain (7 - 10)      ALLERGIES:  Allergies    No Known Allergies    Intolerances        LABS:                        9.2    6.85  )-----------( 160      ( 24 Dec 2019 07:07 )             28.7     12-24    144  |  109<H>  |  69<H>  ----------------------------<  98  3.5   |  17<L>  |  2.08<H>    Ca    6.4<LL>      24 Dec 2019 07:07  Phos  2.7     12-23  Mg     2.5     12-24    TPro  x   /  Alb  3.1<L>  /  TBili  x   /  DBili  x   /  AST  x   /  ALT  x   /  AlkPhos  x   12-24          RADIOLOGY & ADDITIONAL TESTS: Reviewed.

## 2019-12-25 LAB
ANION GAP SERPL CALC-SCNC: 18 MMOL/L — HIGH (ref 5–17)
APTT BLD: 41 SEC — HIGH (ref 27.5–36.3)
BUN SERPL-MCNC: 65 MG/DL — HIGH (ref 7–23)
CALCIUM SERPL-MCNC: 6.7 MG/DL — LOW (ref 8.4–10.5)
CHLORIDE SERPL-SCNC: 111 MMOL/L — HIGH (ref 96–108)
CO2 SERPL-SCNC: 15 MMOL/L — LOW (ref 22–31)
CREAT SERPL-MCNC: 1.8 MG/DL — HIGH (ref 0.5–1.3)
GLUCOSE SERPL-MCNC: 77 MG/DL — SIGNIFICANT CHANGE UP (ref 70–99)
HCT VFR BLD CALC: 26.7 % — LOW (ref 39–50)
HCT VFR BLD CALC: 27 % — LOW (ref 39–50)
HCT VFR BLD CALC: 30.9 % — LOW (ref 39–50)
HGB BLD-MCNC: 8.2 G/DL — LOW (ref 13–17)
HGB BLD-MCNC: 8.5 G/DL — LOW (ref 13–17)
HGB BLD-MCNC: 8.8 G/DL — LOW (ref 13–17)
INR BLD: 2.74 — HIGH (ref 0.88–1.16)
MAGNESIUM SERPL-MCNC: 2.4 MG/DL — SIGNIFICANT CHANGE UP (ref 1.6–2.6)
MCHC RBC-ENTMCNC: 28.5 GM/DL — LOW (ref 32–36)
MCHC RBC-ENTMCNC: 28.9 PG — SIGNIFICANT CHANGE UP (ref 27–34)
MCHC RBC-ENTMCNC: 28.9 PG — SIGNIFICANT CHANGE UP (ref 27–34)
MCHC RBC-ENTMCNC: 29 PG — SIGNIFICANT CHANGE UP (ref 27–34)
MCHC RBC-ENTMCNC: 30.7 GM/DL — LOW (ref 32–36)
MCHC RBC-ENTMCNC: 31.5 GM/DL — LOW (ref 32–36)
MCV RBC AUTO: 101.3 FL — HIGH (ref 80–100)
MCV RBC AUTO: 91.8 FL — SIGNIFICANT CHANGE UP (ref 80–100)
MCV RBC AUTO: 94.3 FL — SIGNIFICANT CHANGE UP (ref 80–100)
NRBC # BLD: 1 /100 WBCS — HIGH (ref 0–0)
NRBC # BLD: 1 /100 WBCS — HIGH (ref 0–0)
NRBC # BLD: 3 /100 WBCS — HIGH (ref 0–0)
PLATELET # BLD AUTO: 140 K/UL — LOW (ref 150–400)
PLATELET # BLD AUTO: 142 K/UL — LOW (ref 150–400)
PLATELET # BLD AUTO: 151 K/UL — SIGNIFICANT CHANGE UP (ref 150–400)
POTASSIUM SERPL-MCNC: 3.9 MMOL/L — SIGNIFICANT CHANGE UP (ref 3.5–5.3)
POTASSIUM SERPL-SCNC: 3.9 MMOL/L — SIGNIFICANT CHANGE UP (ref 3.5–5.3)
PROTHROM AB SERPL-ACNC: 32 SEC — HIGH (ref 10–12.9)
RBC # BLD: 2.83 M/UL — LOW (ref 4.2–5.8)
RBC # BLD: 2.94 M/UL — LOW (ref 4.2–5.8)
RBC # BLD: 3.05 M/UL — LOW (ref 4.2–5.8)
RBC # FLD: 17.3 % — HIGH (ref 10.3–14.5)
RBC # FLD: 17.4 % — HIGH (ref 10.3–14.5)
RBC # FLD: 17.7 % — HIGH (ref 10.3–14.5)
SODIUM SERPL-SCNC: 144 MMOL/L — SIGNIFICANT CHANGE UP (ref 135–145)
WBC # BLD: 6.45 K/UL — SIGNIFICANT CHANGE UP (ref 3.8–10.5)
WBC # BLD: 7.29 K/UL — SIGNIFICANT CHANGE UP (ref 3.8–10.5)
WBC # BLD: 7.46 K/UL — SIGNIFICANT CHANGE UP (ref 3.8–10.5)
WBC # FLD AUTO: 6.45 K/UL — SIGNIFICANT CHANGE UP (ref 3.8–10.5)
WBC # FLD AUTO: 7.29 K/UL — SIGNIFICANT CHANGE UP (ref 3.8–10.5)
WBC # FLD AUTO: 7.46 K/UL — SIGNIFICANT CHANGE UP (ref 3.8–10.5)

## 2019-12-25 PROCEDURE — 71045 X-RAY EXAM CHEST 1 VIEW: CPT | Mod: 26

## 2019-12-25 RX ORDER — OXYMETAZOLINE HYDROCHLORIDE 0.5 MG/ML
3 SPRAY NASAL
Refills: 0 | Status: DISCONTINUED | OUTPATIENT
Start: 2019-12-25 | End: 2019-12-30

## 2019-12-25 RX ORDER — OXYMETAZOLINE HYDROCHLORIDE 0.5 MG/ML
2 SPRAY NASAL ONCE
Refills: 0 | Status: COMPLETED | OUTPATIENT
Start: 2019-12-25 | End: 2019-12-25

## 2019-12-25 RX ORDER — MAGNESIUM HYDROXIDE 400 MG/1
30 TABLET, CHEWABLE ORAL ONCE
Refills: 0 | Status: COMPLETED | OUTPATIENT
Start: 2019-12-25 | End: 2019-12-25

## 2019-12-25 RX ORDER — SODIUM BICARBONATE 1 MEQ/ML
650 SYRINGE (ML) INTRAVENOUS
Refills: 0 | Status: DISCONTINUED | OUTPATIENT
Start: 2019-12-25 | End: 2020-01-03

## 2019-12-25 RX ADMIN — Medication 216 GRAM(S): at 22:08

## 2019-12-25 RX ADMIN — APIXABAN 5 MILLIGRAM(S): 2.5 TABLET, FILM COATED ORAL at 00:58

## 2019-12-25 RX ADMIN — OXYMETAZOLINE HYDROCHLORIDE 2 SPRAY(S): 0.5 SPRAY NASAL at 12:31

## 2019-12-25 RX ADMIN — Medication 650 MILLIGRAM(S): at 00:52

## 2019-12-25 RX ADMIN — Medication 650 MILLIGRAM(S): at 18:11

## 2019-12-25 RX ADMIN — Medication 650 MILLIGRAM(S): at 22:01

## 2019-12-25 RX ADMIN — POLYETHYLENE GLYCOL 3350 17 GRAM(S): 17 POWDER, FOR SOLUTION ORAL at 12:28

## 2019-12-25 RX ADMIN — MAGNESIUM HYDROXIDE 30 MILLILITER(S): 400 TABLET, CHEWABLE ORAL at 22:31

## 2019-12-25 RX ADMIN — Medication 216 GRAM(S): at 17:45

## 2019-12-25 RX ADMIN — SENNA PLUS 1 TABLET(S): 8.6 TABLET ORAL at 12:29

## 2019-12-25 RX ADMIN — Medication 216 GRAM(S): at 09:46

## 2019-12-25 RX ADMIN — ATORVASTATIN CALCIUM 40 MILLIGRAM(S): 80 TABLET, FILM COATED ORAL at 21:49

## 2019-12-25 RX ADMIN — Medication 650 MILLIGRAM(S): at 23:00

## 2019-12-25 RX ADMIN — TAMSULOSIN HYDROCHLORIDE 0.4 MILLIGRAM(S): 0.4 CAPSULE ORAL at 21:49

## 2019-12-25 RX ADMIN — Medication 1 TABLET(S): at 12:33

## 2019-12-25 RX ADMIN — Medication 216 GRAM(S): at 05:59

## 2019-12-25 RX ADMIN — Medication 216 GRAM(S): at 00:02

## 2019-12-25 RX ADMIN — Medication 5 MILLIGRAM(S): at 06:09

## 2019-12-25 NOTE — PROGRESS NOTE ADULT - PROBLEM SELECTOR PLAN 9
Pt names Mary as -[phone]. Has Molst signed 12/16/19 stating FULL CODE and trial of intubation signed by patient and Francesca Steiner and Nova Castro. Pt states he is not familiar with Francesca and Nova. Pt affirms he is full code. Discussed poor prognosis and limited medical offerings, Pt has poor health insight into his condition, remains hopeful there will be treatment  -Palliative consulted

## 2019-12-25 NOTE — PROGRESS NOTE ADULT - PROBLEM SELECTOR PLAN 2
Pt was dx at [facility] in May 2018, bronchoscopy done at that time mediastinal lymph node biopsy was positive for carcinoma consistent with metastatic prostate cancer. Pt being treated with Lupron and daily Zytega/Prednisone, recent PSA above 75,000. Now presents for hypotension, severely cachectic/dehydrated one exam. Labs concerning for transaminitis, CT with new possible venous collaterals/varices in the left retroperitoneum, of uncertain etiology. New possible mildly enlarged retroperitoneal and pelvic  lymph nodes suspect progressive mets   -C/w Prednisone 5mg and tamsulosin  -F/u Dr. Paulino  recs

## 2019-12-25 NOTE — PROGRESS NOTE ADULT - SUBJECTIVE AND OBJECTIVE BOX
Interval history:  Continues to be weak.  Had episode of nosebleeding today.  Now improved with packing.  Wife had called on call service and explained that it improved by the time she came to the hospital.  Pt complains of constipation and is asking for maalox.    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 He has been on monthly lupron & Xgeva and daily oral Abiraterone w/prednisone. Questionable compliance with Abiraterone due to rehab stay (uncertain if pt has been receiving it appropriately) and pt himself (poor historian and unaware of exact meds he gets at the facility). PSA has rapidly increased since May 2019...28-->202-->386 (at this point Abiraterone started)-->972-->2632-->5000. On follow up this week noted to be more confused from baseline. Vitals showed hypotension and tachycardia. Decision was made to send to [facility] for close obs. Pt's health care proxy present during office visit yesterday. GOC discuss was held.     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    Social Hx:  X-smoker. No drugs. No EtOH abuse.    Family Hx:  Not contributory at this time.     MEDICATIONS  (STANDING):  ampicillin  IVPB 2 Gram(s) IV Intermittent every 6 hours  atorvastatin 40 milliGRAM(s) Oral at bedtime  calcium carbonate    500 mG (Tums) Chewable 1 Tablet(s) Chew daily  methimazole 5 milliGRAM(s) Oral daily  polyethylene glycol 3350 17 Gram(s) Oral daily  predniSONE   Tablet 5 milliGRAM(s) Oral daily  senna 1 Tablet(s) Oral daily  sodium bicarbonate 650 milliGRAM(s) Oral two times a day  tamsulosin 0.4 milliGRAM(s) Oral at bedtime    MEDICATIONS  (PRN):  acetaminophen   Tablet .. 650 milliGRAM(s) Oral every 6 hours PRN Mild Pain (1 - 3), Moderate Pain (4 - 6)  HYDROmorphone   Tablet 2 milliGRAM(s) Oral every 6 hours PRN Severe Pain (7 - 10)  oxymetazoline 0.05% Nasal Spray 3 Spray(s) Right Nostril two times a day PRN Congestion      Allergies  No Known Allergies    Exam:  Vital Signs Last 24 Hrs  T(C): 36.6 (25 Dec 2019 09:39), Max: 36.6 (25 Dec 2019 02:01)  T(F): 97.8 (25 Dec 2019 09:39), Max: 97.8 (25 Dec 2019 02:01)  HR: 88 (25 Dec 2019 09:39) (78 - 93)  BP: 90/52 (25 Dec 2019 09:39) (88/52 - 95/63)  BP(mean): 74 (24 Dec 2019 19:23) (74 - 74)  RR: 16 (25 Dec 2019 09:39) (16 - 20)  SpO2: 98% (25 Dec 2019 09:39) (94% - 99%)    Very thin and weak. Lying in bed. Cachexia.   HEENT: nasal packing, dried blood on nares and lip  CTAB anteriorly  RRR, S1/S2  +BS, soft, ntnd  +Pulses/ equal. No edema.  AAOx3, moving all ext    Labs:                        8.5    7.46  )-----------( 140      ( 25 Dec 2019 12:10 )             27.0   	  12-25    144  |  111<H>  |  65<H>  ----------------------------<  77  3.9   |  15<L>  |  1.80<H>    Ca    6.7<L>      25 Dec 2019 05:51  Mg     2.4     12-25    TPro  x   /  Alb  3.1<L>  /  TBili  x   /  DBili  x   /  AST  x   /  ALT  x   /  AlkPhos  x   12-24      Imaging:  CT scans reviewed.

## 2019-12-25 NOTE — PROGRESS NOTE ADULT - PROBLEM SELECTOR PLAN 1
(elevated HR, E. faecalis bacteremia)  -started on vancomycin and ceftriaxone, pending culture sensitivities  -antibiotics switched to ampicillin 2g q6hrs  -surveillance cultures NGTD so far, repeat surveillance cultures ordered  -Ucx no growth  -Dr. Rowley (ID) consulted, agrees with continuing ampicillin for now    #Epistaxis  Patient with persistent bleeding from the right nostril, causing him to cough up blood clots. Pulm originally consulted with possibility of bronchoscopy, but patient refused and source of bleed was later discovered to be the R. Nostril. ENT consulted and controlled with afrin and fibrillar dissolvable packing.  -ENT recs appreciated  -Afrin spray b/l nares, 2 sprays each nostril, BID, x 3 days max  -Apply emollients to nasal septum PRN (bacitracin, vaseline)   -Will reconsult pulm if patient begins cough up blood clots again  - monitor Hg.

## 2019-12-25 NOTE — PROGRESS NOTE ADULT - PROBLEM SELECTOR PLAN 9
Pt names Mary as [phone]. Has Molst signed 12/16/19 stating FULL CODE and trial of intubation signed by patient and Francesca Steiner and Nova Castro. Pt states he is not familiar with Francesca and Nova. Pt affirms he is full code. Discussed poor prognosis and limited medical offerings, Pt has poor health insight into his condition, remains hopeful there will be treatment  -Palliative consulted

## 2019-12-25 NOTE — PROGRESS NOTE ADULT - SUBJECTIVE AND OBJECTIVE BOX
SUBJECTIVE: comfortable  Vital Signs Last 24 Hrs  T(C): 36.4 (25 Dec 2019 05:10), Max: 36.7 (24 Dec 2019 13:50)  T(F): 97.6 (25 Dec 2019 05:10), Max: 98.1 (24 Dec 2019 13:50)  HR: 78 (25 Dec 2019 05:10) (78 - 93)  BP: 94/59 (25 Dec 2019 05:10) (88/52 - 95/63)  BP(mean): 74 (24 Dec 2019 19:23) (71 - 74)  RR: 16 (25 Dec 2019 05:10) (16 - 20)  SpO2: 99% (25 Dec 2019 05:10) (94% - 100%)  VITAL SIGNS:  ICU Vital Signs Last 24 Hrs  T(C): 36.7 (24 Dec 2019 13:50), Max: 36.7 (24 Dec 2019 13:50)  T(F): 98.1 (24 Dec 2019 13:50), Max: 98.1 (24 Dec 2019 13:50)  HR: 85 (24 Dec 2019 13:50) (78 - 92)  BP: 93/59 (24 Dec 2019 13:50) (93/57 - 98/60)  BP(mean): 71 (24 Dec 2019 09:26) (71 - 71)  ABP: --  ABP(mean): --  RR: 16 (24 Dec 2019 13:50) (16 - 18)  SpO2: 98% (24 Dec 2019 13:50) (95% - 100%)        12-23 @ 07:01  -  12-24 @ 07:00  --------------------------------------------------------  IN: 650 mL / OUT: 250 mL / NET: 400 mL      CAPILLARY BLOOD GLUCOSE          PHYSICAL EXAM:    Constitutional: NAD, cachectic elderly man  HEENT: NC/AT; PERRL, anicteric sclera; dry mucous membranes  Neck: supple, no JVD  Cardiovascular: +S1/S2, RRR, no m/r/g  Respiratory: CTA B/L, no W/R/R, poor inspiratory effort, scattered crackles on bases of R lung   Gastrointestinal: abdomen soft, NT/ND; no rebound or guarding; +BSx4  Genitourinary: no suprapubic tenderness or fullness  Extremities: WWP; no LE edema; no clubbing or cyanosis  Vascular: 2+ radial, DP/PT pulses B/L  Dermatologic: normal color and turgor; no visible rashes  Neurological: AAOx1-2 oriented to self and intermittently to place, not to date; no focal neurological deficits    MEDICATIONS:  MEDICATIONS  (STANDING):  ampicillin  IVPB 2 Gram(s) IV Intermittent every 6 hours  apixaban 5 milliGRAM(s) Oral every 12 hours  aspirin enteric coated 81 milliGRAM(s) Oral daily  atorvastatin 40 milliGRAM(s) Oral at bedtime  calcium carbonate    500 mG (Tums) Chewable 1 Tablet(s) Chew daily  dextrose 5% + lactated ringers. 1000 milliLiter(s) (50 mL/Hr) IV Continuous <Continuous>  methimazole 5 milliGRAM(s) Oral daily  predniSONE   Tablet 5 milliGRAM(s) Oral daily  tamsulosin 0.4 milliGRAM(s) Oral at bedtime    MEDICATIONS  (PRN):  acetaminophen   Tablet .. 650 milliGRAM(s) Oral every 6 hours PRN Mild Pain (1 - 3), Moderate Pain (4 - 6)  HYDROmorphone   Tablet 2 milliGRAM(s) Oral every 6 hours PRN Severe Pain (7 - 10)      ALLERGIES:  Allergies    No Known Allergies    Intolerances        LABS:                         8.8    6.45  )-----------( 142      ( 25 Dec 2019 05:51 )             30.9                        9.2    6.85  )-----------( 160      ( 24 Dec 2019 07:07 )             28.7     12-24 12-25    144  |  111<H>  |  65<H>  ----------------------------<  77  3.9   |  15<L>  |  1.80<H>    Ca    6.7<L>      25 Dec 2019 05:51  Mg     2.4     12-25    TPro  x   /  Alb  3.1<L>  /  TBili  x   /  DBili  x   /  AST  x   /  ALT  x   /  AlkPhos  x   12-24    144  |  109<H>  |  69<H>  ----------------------------<  98  3.5   |  17<L>  |  2.08<H>    Ca    6.4<LL>      24 Dec 2019 07:07  Phos  2.7     12-23  Mg     2.5     12-24    TPro  x   /  Alb  3.1<L>  /  TBili  x   /  DBili  x   /  AST  x   /  ALT  x   /  AlkPhos  x   12-24          RADIOLOGY & ADDITIONAL TESTS: Reviewed.

## 2019-12-25 NOTE — PROGRESS NOTE ADULT - SUBJECTIVE AND OBJECTIVE BOX
OVERNIGHT EVENTS: Patient had epistaxis overnight, treated with afrin    SUBJECTIVE / INTERVAL HPI: Patient seen and examined at bedside. Patient continued to experience epistaxis from right nostril and coughed up several large blood clots. Pulm consulted, patient refused a bronchoscopy, and later determined that the bleed was coming from the R. nostril. ENT consulted after afrin and pressure were unsuccessful in stopping the bleed. It was later controlled with afrin/fibrillar dissolvable packing. Patient otherwise denies any fevers, chills, night sweats, abdominal pain, n/v/d. He reports a persistent cough. Will continue goals of care with palliative tomorrow.     VITAL SIGNS:  Vital Signs Last 24 Hrs  T(C): 36.6 (25 Dec 2019 16:51), Max: 36.6 (25 Dec 2019 02:01)  T(F): 97.8 (25 Dec 2019 16:51), Max: 97.8 (25 Dec 2019 02:01)  HR: 93 (25 Dec 2019 16:51) (78 - 93)  BP: 90/56 (25 Dec 2019 16:51) (90/52 - 95/63)  BP(mean): 74 (24 Dec 2019 19:23) (74 - 74)  RR: 20 (25 Dec 2019 16:51) (16 - 20)  SpO2: 100% (25 Dec 2019 16:51) (94% - 100%)  I&O's Summary    24 Dec 2019 07:01  -  25 Dec 2019 07:00  --------------------------------------------------------  IN: 200 mL / OUT: 0 mL / NET: 200 mL        PHYSICAL EXAM:    General: WDWN  HEENT: dried blood in R. nostril. No longer bleeding at this time  Neck: supple, no jvd  Cardiovascular: +S1/S2; RRR, no m/r/g  scattered crackles on bases of R lung   Gastrointestinal: abdomen soft, NT/ND; no rebound or guarding; +BSx4  Genitourinary: no suprapubic tenderness or fullness  Extremities: WWP; no LE edema; no clubbing or cyanosis  Vascular: 2+ radial, DP/PT pulses B/L  Neurological: AAOx2; no focal deficits    MEDICATIONS:  MEDICATIONS  (STANDING):  ampicillin  IVPB 2 Gram(s) IV Intermittent every 6 hours  atorvastatin 40 milliGRAM(s) Oral at bedtime  calcium carbonate    500 mG (Tums) Chewable 1 Tablet(s) Chew daily  methimazole 5 milliGRAM(s) Oral daily  polyethylene glycol 3350 17 Gram(s) Oral daily  predniSONE   Tablet 5 milliGRAM(s) Oral daily  senna 1 Tablet(s) Oral daily  sodium bicarbonate 650 milliGRAM(s) Oral two times a day  tamsulosin 0.4 milliGRAM(s) Oral at bedtime    MEDICATIONS  (PRN):  acetaminophen   Tablet .. 650 milliGRAM(s) Oral every 6 hours PRN Mild Pain (1 - 3), Moderate Pain (4 - 6)  HYDROmorphone   Tablet 2 milliGRAM(s) Oral every 6 hours PRN Severe Pain (7 - 10)  oxymetazoline 0.05% Nasal Spray 3 Spray(s) Right Nostril two times a day PRN Congestion      ALLERGIES:  Allergies    No Known Allergies    Intolerances        LABS:                        8.5    7.46  )-----------( 140      ( 25 Dec 2019 12:10 )             27.0     12-25    144  |  111<H>  |  65<H>  ----------------------------<  77  3.9   |  15<L>  |  1.80<H>    Ca    6.7<L>      25 Dec 2019 05:51  Mg     2.4     12-25    TPro  x   /  Alb  3.1<L>  /  TBili  x   /  DBili  x   /  AST  x   /  ALT  x   /  AlkPhos  x   12-24        CAPILLARY BLOOD GLUCOSE          RADIOLOGY & ADDITIONAL TESTS: Reviewed.

## 2019-12-25 NOTE — PROGRESS NOTE ADULT - SUBJECTIVE AND OBJECTIVE BOX
78 yo M, hx of metastatic prostate cancer, CHF, DVTs (on eliquis), HTN, hyperthyroidism admitted 12/20 [facility] 2/2 dehydration, sepsis (currently being treated for E. faecalis bacteremia), currently improving on the floor. Patient was noted to have spontaneous right sided epistaxis. Attempts to control by primary team utilizing afrin nasal spray and manual pressure were unsuccessful. ENT consulted for further evaluation. Pt denies previous trauma to the area, or previous episodes in the past. Eliquis currently held, last dose was around midnight.     PAST MEDICAL & SURGICAL HISTORY:  Prostate cancer metastatic to bone: and lung  CHF (congestive heart failure)  CAD (coronary artery disease)  HTN (hypertension)  S/P hernia surgery  History of coronary artery bypass graft: 2017 @ [facility]  MEDICATIONS  (STANDING):  ampicillin  IVPB 2 Gram(s) IV Intermittent every 6 hours  atorvastatin 40 milliGRAM(s) Oral at bedtime  calcium carbonate    500 mG (Tums) Chewable 1 Tablet(s) Chew daily  methimazole 5 milliGRAM(s) Oral daily  polyethylene glycol 3350 17 Gram(s) Oral daily  predniSONE   Tablet 5 milliGRAM(s) Oral daily  senna 1 Tablet(s) Oral daily  sodium bicarbonate 650 milliGRAM(s) Oral two times a day  tamsulosin 0.4 milliGRAM(s) Oral at bedtime    MEDICATIONS  (PRN):  acetaminophen   Tablet .. 650 milliGRAM(s) Oral every 6 hours PRN Mild Pain (1 - 3), Moderate Pain (4 - 6)  HYDROmorphone   Tablet 2 milliGRAM(s) Oral every 6 hours PRN Severe Pain (7 - 10)  oxymetazoline 0.05% Nasal Spray 3 Spray(s) Right Nostril two times a day PRN Congestion    ICU Vital Signs Last 24 Hrs  T(C): 36.6 (25 Dec 2019 09:39), Max: 36.6 (25 Dec 2019 02:01)  T(F): 97.8 (25 Dec 2019 09:39), Max: 97.8 (25 Dec 2019 02:01)  HR: 88 (25 Dec 2019 09:39) (78 - 93)  BP: 90/52 (25 Dec 2019 09:39) (88/52 - 95/63)  BP(mean): 74 (24 Dec 2019 19:23) (74 - 74)  ABP: --  ABP(mean): --  RR: 16 (25 Dec 2019 09:39) (16 - 20)  SpO2: 98% (25 Dec 2019 09:39) (94% - 99%)  PE  Gen: NAD   Neuro: AAOx3   Head: NC/AT   Nose: diffuse area of bleeding noted along right anterior nasal septum. Bleeding controlled w/ afrin soaked pledgets and fibrillar dissolvable packing. Vaseline applied to b/l nasal septum. Left nasal cavity clear.   OC/OP: small clots suctioned. No evidence of active bleeding.   Neck: soft, NT   Resp: breathing comfortably on RA                          8.5    7.46  )-----------( 140      ( 25 Dec 2019 12:10 )             27.0   INR 1.39 (12/21/19)     A/P: 78 yo M, hx of metastatic prostate cancer, CHF, HTN, DVTs (Eliquis held at midnight), hyperthyroidism, currently admitted 2/2 PAM, sepsis, now improved, with spontaneous right sided epistaxis, controlled with afrin/fibrillar disolvable packing.     -Afrin spray b/l nares, 2 sprays each nostril, BID, x 3 days max   -Apply emollients to nasal septum PRN (bacitracin, vaseline)   -Humidified O2 if necessary   -Trend CBCs   -Please page ENT w/ any additional questions/concerns

## 2019-12-25 NOTE — PROGRESS NOTE ADULT - PROBLEM SELECTOR PLAN 2
Pt was dx at [facility] in May 2018, bronchoscopy done at that time mediastinal lymph node biopsy was positive for carcinoma consistent with metastatic prostate cancer. Pt being treated with Lupron and daily Zytega/Prednisone, recent PSA above 75,000. Now presents for hypotension, severely cachectic/dehydrated one exam. Labs concerning for transaminitis, CT with new possible venous collaterals/varices in the left retroperitoneum, of uncertain etiology. New possible mildly enlarged retroperitoneal and pelvic  lymph nodes suspect progressive mets   -C/w Prednisone 5mg and tamsulosin  -F/u Dr. Paulino  recs

## 2019-12-26 DIAGNOSIS — R04.0 EPISTAXIS: ICD-10-CM

## 2019-12-26 LAB
ALBUMIN SERPL ELPH-MCNC: 2.9 G/DL — LOW (ref 3.3–5)
ALP SERPL-CCNC: 307 U/L — HIGH (ref 40–120)
ALT FLD-CCNC: 28 U/L — SIGNIFICANT CHANGE UP (ref 10–45)
ANION GAP SERPL CALC-SCNC: 17 MMOL/L — SIGNIFICANT CHANGE UP (ref 5–17)
APTT BLD: 37.4 SEC — HIGH (ref 27.5–36.3)
AST SERPL-CCNC: 108 U/L — HIGH (ref 10–40)
BILIRUB SERPL-MCNC: 1.6 MG/DL — HIGH (ref 0.2–1.2)
BUN SERPL-MCNC: 66 MG/DL — HIGH (ref 7–23)
CALCIUM SERPL-MCNC: 6.9 MG/DL — LOW (ref 8.4–10.5)
CHLORIDE SERPL-SCNC: 105 MMOL/L — SIGNIFICANT CHANGE UP (ref 96–108)
CO2 SERPL-SCNC: 17 MMOL/L — LOW (ref 22–31)
CREAT SERPL-MCNC: 1.62 MG/DL — HIGH (ref 0.5–1.3)
CULTURE RESULTS: SIGNIFICANT CHANGE UP
CULTURE RESULTS: SIGNIFICANT CHANGE UP
GLUCOSE SERPL-MCNC: 95 MG/DL — SIGNIFICANT CHANGE UP (ref 70–99)
HCT VFR BLD CALC: 24.2 % — LOW (ref 39–50)
HCT VFR BLD CALC: 24.6 % — LOW (ref 39–50)
HGB BLD-MCNC: 7.4 G/DL — LOW (ref 13–17)
HGB BLD-MCNC: 7.5 G/DL — LOW (ref 13–17)
INR BLD: 2.3 — HIGH (ref 0.88–1.16)
MAGNESIUM SERPL-MCNC: 2.4 MG/DL — SIGNIFICANT CHANGE UP (ref 1.6–2.6)
MCHC RBC-ENTMCNC: 28.1 PG — SIGNIFICANT CHANGE UP (ref 27–34)
MCHC RBC-ENTMCNC: 28.5 PG — SIGNIFICANT CHANGE UP (ref 27–34)
MCHC RBC-ENTMCNC: 30.5 GM/DL — LOW (ref 32–36)
MCHC RBC-ENTMCNC: 30.6 GM/DL — LOW (ref 32–36)
MCV RBC AUTO: 92 FL — SIGNIFICANT CHANGE UP (ref 80–100)
MCV RBC AUTO: 93.5 FL — SIGNIFICANT CHANGE UP (ref 80–100)
NRBC # BLD: 0 /100 WBCS — SIGNIFICANT CHANGE UP (ref 0–0)
NRBC # BLD: 2 /100 WBCS — HIGH (ref 0–0)
ORGANISM # SPEC MICROSCOPIC CNT: SIGNIFICANT CHANGE UP
PLATELET # BLD AUTO: 139 K/UL — LOW (ref 150–400)
PLATELET # BLD AUTO: 142 K/UL — LOW (ref 150–400)
POTASSIUM SERPL-MCNC: 3.5 MMOL/L — SIGNIFICANT CHANGE UP (ref 3.5–5.3)
POTASSIUM SERPL-SCNC: 3.5 MMOL/L — SIGNIFICANT CHANGE UP (ref 3.5–5.3)
PROT SERPL-MCNC: 5.3 G/DL — LOW (ref 6–8.3)
PROTHROM AB SERPL-ACNC: 26.7 SEC — HIGH (ref 10–12.9)
RBC # BLD: 2.63 M/UL — LOW (ref 4.2–5.8)
RBC # BLD: 2.63 M/UL — LOW (ref 4.2–5.8)
RBC # FLD: 17.6 % — HIGH (ref 10.3–14.5)
RBC # FLD: 17.7 % — HIGH (ref 10.3–14.5)
SODIUM SERPL-SCNC: 139 MMOL/L — SIGNIFICANT CHANGE UP (ref 135–145)
SPECIMEN SOURCE: SIGNIFICANT CHANGE UP
SPECIMEN SOURCE: SIGNIFICANT CHANGE UP
WBC # BLD: 7.15 K/UL — SIGNIFICANT CHANGE UP (ref 3.8–10.5)
WBC # BLD: 8.47 K/UL — SIGNIFICANT CHANGE UP (ref 3.8–10.5)
WBC # FLD AUTO: 7.15 K/UL — SIGNIFICANT CHANGE UP (ref 3.8–10.5)
WBC # FLD AUTO: 8.47 K/UL — SIGNIFICANT CHANGE UP (ref 3.8–10.5)

## 2019-12-26 PROCEDURE — 99498 ADVNCD CARE PLAN ADDL 30 MIN: CPT | Mod: 25

## 2019-12-26 PROCEDURE — 99497 ADVNCD CARE PLAN 30 MIN: CPT | Mod: 25

## 2019-12-26 PROCEDURE — 99233 SBSQ HOSP IP/OBS HIGH 50: CPT

## 2019-12-26 RX ADMIN — Medication 650 MILLIGRAM(S): at 05:42

## 2019-12-26 RX ADMIN — Medication 650 MILLIGRAM(S): at 23:05

## 2019-12-26 RX ADMIN — Medication 5 MILLIGRAM(S): at 05:41

## 2019-12-26 RX ADMIN — Medication 650 MILLIGRAM(S): at 17:30

## 2019-12-26 RX ADMIN — Medication 216 GRAM(S): at 15:25

## 2019-12-26 RX ADMIN — SENNA PLUS 1 TABLET(S): 8.6 TABLET ORAL at 12:19

## 2019-12-26 RX ADMIN — POLYETHYLENE GLYCOL 3350 17 GRAM(S): 17 POWDER, FOR SOLUTION ORAL at 12:19

## 2019-12-26 RX ADMIN — Medication 216 GRAM(S): at 03:09

## 2019-12-26 RX ADMIN — TAMSULOSIN HYDROCHLORIDE 0.4 MILLIGRAM(S): 0.4 CAPSULE ORAL at 21:49

## 2019-12-26 RX ADMIN — Medication 216 GRAM(S): at 21:49

## 2019-12-26 RX ADMIN — Medication 650 MILLIGRAM(S): at 06:40

## 2019-12-26 RX ADMIN — Medication 650 MILLIGRAM(S): at 05:41

## 2019-12-26 RX ADMIN — Medication 1 TABLET(S): at 12:20

## 2019-12-26 RX ADMIN — Medication 216 GRAM(S): at 09:12

## 2019-12-26 RX ADMIN — ATORVASTATIN CALCIUM 40 MILLIGRAM(S): 80 TABLET, FILM COATED ORAL at 21:49

## 2019-12-26 NOTE — CHART NOTE - NSCHARTNOTEFT_GEN_A_CORE
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  CHF (congestive heart failure)  CAD (coronary artery disease)  HTN (hypertension)  S/P hernia surgery  History of coronary artery bypass graft: 2017 @ [facility]      Current Nutrition Order:  [facility] Soft, renal, Nepro BID (425kcal and 19gms protein/container)    PO Intake: Good (%) [   ]  Fair (50-75%) [   ] Poor (<25%) [ x  ]    GI Issues: No apparent GI distress, last BM 12/24 (fecal incontinence)    Pain: Not reporting pain at this time     Skin Integrity: No edema, no pressure injury    Labs:   12-26    139  |  105  |  66<H>  ----------------------------<  95  3.5   |  17<L>  |  1.62<H>    Ca    6.9<L>      26 Dec 2019 07:11  Mg     2.4     12-26    TPro  5.3<L>  /  Alb  2.9<L>  /  TBili  1.6<H>  /  DBili  x   /  AST  108<H>  /  ALT  28  /  AlkPhos  307<H>  12-26    CAPILLARY BLOOD GLUCOSE          Medications:  MEDICATIONS  (STANDING):  ampicillin  IVPB 2 Gram(s) IV Intermittent every 6 hours  atorvastatin 40 milliGRAM(s) Oral at bedtime  calcium carbonate    500 mG (Tums) Chewable 1 Tablet(s) Chew daily  methimazole 5 milliGRAM(s) Oral daily  polyethylene glycol 3350 17 Gram(s) Oral daily  predniSONE   Tablet 5 milliGRAM(s) Oral daily  senna 1 Tablet(s) Oral daily  sodium bicarbonate 650 milliGRAM(s) Oral two times a day  tamsulosin 0.4 milliGRAM(s) Oral at bedtime    MEDICATIONS  (PRN):  acetaminophen   Tablet .. 650 milliGRAM(s) Oral every 6 hours PRN Mild Pain (1 - 3), Moderate Pain (4 - 6)  HYDROmorphone   Tablet 2 milliGRAM(s) Oral every 6 hours PRN Severe Pain (7 - 10)  oxymetazoline 0.05% Nasal Spray 3 Spray(s) Right Nostril two times a day PRN Congestion      Weight:  Admit wt- 49.4kg    Weight Change:   No new wts to assess    Nutrition Focused Physical Exam: Completed [ x, 12/23  ]  Not Pertinent [   ]  Noted w/ suspected severe PCM 12/23, see chart note.     Estimated energy needs:   Height: 66" Weight: 109lbs, IBW 142lbs+/-10%, %IBW 76.7%, BMI 17.6 kg/m2  IBW used for calculations as pt >120% of IBW. Needs adjusted for advance age and suspected severe malnutrition.  25-30kcal/kg= 1610-1932kcal  1.2-1.4gm spro/kg= 59-69gm sprotein  30-35ml/kg= 1482-1729ml    Subjective:   Pt seen for nutrition follow up. 79M PMH prostate cancer with mets to bone and lung, CAD with CABG in 2016, HFrEF (EF 30%), DVT (still on Eliquis?), from Dr. Terry’s office for hypotension SBP 70s and tachycardia. Found to have oliguric PAM (1.79<1.22 in Nov 2019) with transaminitis, and tropinemia. Currently being treated for E. Faecalis bacteremia, and undergoing ongoing goals of care discussion with palliative care. Pt seen resting in bed, awake, but noted to be disoriented/confused. Pt is on [facility] Soft, renal diet w/ poor PO intake, consuming ~25% of meal trays. Pt reporting continued difficulty w/ consistency of diet, requesting puree, reports that he was consuming pureed foods PTA (reports difficulty chewing/swallowing, but denies coughing w/ PO). Also ordered for Nepro BID, yet to consume, RD poured Nepro into cup with straw for pt. Recommend liberalize diet to regular, puree (remove renal)- K and P WDL throughout admission, Na WDL (previously noted elevated). Diet education provided. Nutrition recs below. RD to follow up per protocol.     Previous Nutrition Diagnosis:  Malnutrition Suspect Severe PCM RT decreased appetite and weakness likely 2/2 bacteremia and advanced malignancy AEB severe muscle and fat wasting, suspected meeting <50% estimated needs.     Active [ x  ]  Resolved [   ]    Goal: Consume >75% estimated needs.    Recommendations:  1. Recommend liberalize diet to regular, puree (remove renal)- K and P WDL throughout admission, Na WDL (previously noted elevated)  2. Recommend c/w Nepro BID  3. Bi-Weekly wts  4. Monitor lytes and replete PRN, monitor lytes for need for further dietary restrictions  5. Appetite stimulant per MD discretion  *paged team    Education: encouraged PO intake, encouraged consumption of ONS    Risk Level: High [ x  ] Moderate [   ] Low [   ]

## 2019-12-26 NOTE — PROGRESS NOTE ADULT - PROBLEM SELECTOR PLAN 9
Pt names Mary as -[phone]. Has Poppy signed 12/16/19 stating FULL CODE and trial of intubation signed by patient and Francesca Steiner and Nova Castro. Goals of care discussion to be held today.   -Palliative consulted

## 2019-12-26 NOTE — GOALS OF CARE CONVERSATION - ADVANCED CARE PLANNING - CONVERSATION DETAILS
Met with pt and his HCP along with above mentioned staff  from 2:20P-3:5P Discussed prognosis, no options for further tx, pt wishes and desire for the near future. Pt states he will hold out for a miracle and believes that he is best likely to receive this miracle in SNF he wishes to return to [facility] in hope of gaining back some strength. Discussed burden vs benefit of CPR and intubation in frail cancer patients. Pt agreeable to DNR/DNI order. Will need further discussion with oncologist, PMD and ID surrounding workup and tx of endocarditis given pt poor prognosis. Initiated discussion re: [facility] to LTC with hospice.   MOLST initiated to include  DNR/DNI  limited blood draws/radiology  continue abt with close monitoring of benefit vs burden If not helpful may D/C   no escalation to ICU care, pressors

## 2019-12-26 NOTE — PROGRESS NOTE ADULT - PROBLEM SELECTOR PLAN 6
Support provided to patient and family. Patient to have access to supportive services during rest of hospital stay as the patient/family deemed necessary ie. Chaplaincy, Massage therapy, Music therapy, Patient and family supportive services, Palliative SW, etc.    HCP Blanquita Worley expected for [facility] discussion this AM

## 2019-12-26 NOTE — PROGRESS NOTE ADULT - SUBJECTIVE AND OBJECTIVE BOX
INTERVAL HPI/OVERNIGHT EVENTS:    Clinically stable  Goals of care to be further determined today    MEDICATIONS  (STANDING):  ampicillin  IVPB 2 Gram(s) IV Intermittent every 6 hours  atorvastatin 40 milliGRAM(s) Oral at bedtime  calcium carbonate    500 mG (Tums) Chewable 1 Tablet(s) Chew daily  methimazole 5 milliGRAM(s) Oral daily  polyethylene glycol 3350 17 Gram(s) Oral daily  predniSONE   Tablet 5 milliGRAM(s) Oral daily  senna 1 Tablet(s) Oral daily  sodium bicarbonate 650 milliGRAM(s) Oral two times a day  tamsulosin 0.4 milliGRAM(s) Oral at bedtime    MEDICATIONS  (PRN):  acetaminophen   Tablet .. 650 milliGRAM(s) Oral every 6 hours PRN Mild Pain (1 - 3), Moderate Pain (4 - 6)  HYDROmorphone   Tablet 2 milliGRAM(s) Oral every 6 hours PRN Severe Pain (7 - 10)  oxymetazoline 0.05% Nasal Spray 3 Spray(s) Right Nostril two times a day PRN Congestion      Allergies    No Known Allergies    EXAM  Vital Signs Last 24 Hrs  T(C): 36.4 (26 Dec 2019 09:32), Max: 36.6 (25 Dec 2019 16:51)  T(F): 97.5 (26 Dec 2019 09:32), Max: 97.8 (25 Dec 2019 16:51)  HR: 88 (26 Dec 2019 09:32) (86 - 93)  BP: 94/61 (26 Dec 2019 09:32) (90/56 - 94/61)  BP(mean): --  RR: 18 (26 Dec 2019 09:32) (18 - 20)  SpO2: 99% (26 Dec 2019 09:32) (99% - 100%)  Awakens and responds appropriately  Neck supple  Poor dentition  No thrush  Conjunctiva pale  CV irreg irreg  Chest clear on quiet inspiration  Abd soft NT  LE no edema  Condom cath collecting urine          LABS:                        7.5    8.47  )-----------( 142      ( 26 Dec 2019 12:22 )             24.6     12-26    139  |  105  |  66<H>  ----------------------------<  95  3.5   |  17<L>  |  1.62<H>    Ca    6.9<L>      26 Dec 2019 07:11  Mg     2.4     12-26    TPro  5.3<L>  /  Alb  2.9<L>  /  TBili  1.6<H>  /  DBili  x   /  AST  108<H>  /  ALT  28  /  AlkPhos  307<H>  12-26    PT/INR - ( 26 Dec 2019 07:11 )   PT: 26.7 sec;   INR: 2.30          PTT - ( 26 Dec 2019 07:11 )  PTT:37.4 sec      MICROBIOLOGY:  Culture - Blood (12.22.19 @ 17:39)    Specimen Source: .Blood Blood    Culture Results:   No growth at 3 days.    Culture - Urine (12.21.19 @ 09:59)    Specimen Source: .Urine Clean Catch (Midstream)    Culture Results:   No growth    Culture - Blood (12.20.19 @ 15:30)    -  Gentamicin synergy: S    -  Streptomycin synergy: S    -  Enterococcus species: Detec    -  Vancomycin: S 2    Gram Stain:   Anaerobic Bottle: Gram Positive Cocci in Pairs and Chains  Result called to and read back by_ Ms. FLORA Menchaca RN ()  12/21/2019  08:37:50  ***Blood Panel PCR results on this specimen are available  approximately 3 hours after the Gram stain result.***  Gram stain, PCR, and/or culture results may not always  correspond due to difference in methodologies.  ************************************************************  This PCR assay was performed using [employer].  The following targets are tested for: Enterococcus,  vancomycin resistant enterococci, Listeria monocytogenes,  coagulase negative staphylococci, S. aureus,  methicillin resistant S. aureus, Streptococcus agalactiae  (Group B), S. pneumoniae, S. pyogenes (Group A),  Acinetobacter baumannii, Enterobacter cloacae, E. coli,  Klebsiella oxytoca, K. pneumoniae, Proteus sp.,  Serratia marcescens, Haemophilus influenzae,  Neisseria meningitidis, Pseudomonas aeruginosa, Candida  albicans, C. glabrata, C krusei, C parapsilosis,  C. tropicalis and the KPC resistance gene.    -  Ampicillin: S <=2 Predicts results to ampicillin/sulbactam, amoxacillin-clavulanate and  piperacillin-tazobactam.    Specimen Source: .Blood Blood-Peripheral    Organism: Blood Culture PCR    Organism: Enterococcus faecalis    Culture Results:   Growth in anaerobic bottle: Enterococcus faecalis    Organism Identification: Enterococcus faecalis  Blood Culture PCR    Method Type: PCR    Method Type: ROSENDO

## 2019-12-26 NOTE — PROGRESS NOTE ADULT - SUBJECTIVE AND OBJECTIVE BOX
OVERNIGHT EVENTS: Given     SUBJECTIVE / INTERVAL HPI: Patient seen and examined at bedside.     VITAL SIGNS:  Vital Signs Last 24 Hrs  T(C): 36.4 (26 Dec 2019 09:32), Max: 36.6 (25 Dec 2019 16:51)  T(F): 97.5 (26 Dec 2019 09:32), Max: 97.8 (25 Dec 2019 16:51)  HR: 88 (26 Dec 2019 09:32) (86 - 93)  BP: 94/61 (26 Dec 2019 09:32) (90/56 - 94/61)  BP(mean): --  RR: 18 (26 Dec 2019 09:32) (18 - 20)  SpO2: 99% (26 Dec 2019 09:32) (99% - 100%)  I&O's Summary    25 Dec 2019 07:01  -  26 Dec 2019 07:00  --------------------------------------------------------  IN: 0 mL / OUT: 100 mL / NET: -100 mL    26 Dec 2019 07:01  -  26 Dec 2019 11:10  --------------------------------------------------------  IN: 60 mL / OUT: 0 mL / NET: 60 mL        PHYSICAL EXAM:    General: WDWN  HEENT: NC/AT; PERRL, anicteric sclera; MMM  Neck: supple  Cardiovascular: +S1/S2; RRR  Respiratory: CTA B/L; no W/R/R  Gastrointestinal: soft, NT/ND; +BSx4  Extremities: WWP; no edema, clubbing or cyanosis  Vascular: 2+ radial, DP/PT pulses B/L  Neurological: AAOx3; no focal deficits    MEDICATIONS:  MEDICATIONS  (STANDING):  ampicillin  IVPB 2 Gram(s) IV Intermittent every 6 hours  atorvastatin 40 milliGRAM(s) Oral at bedtime  calcium carbonate    500 mG (Tums) Chewable 1 Tablet(s) Chew daily  methimazole 5 milliGRAM(s) Oral daily  polyethylene glycol 3350 17 Gram(s) Oral daily  predniSONE   Tablet 5 milliGRAM(s) Oral daily  senna 1 Tablet(s) Oral daily  sodium bicarbonate 650 milliGRAM(s) Oral two times a day  tamsulosin 0.4 milliGRAM(s) Oral at bedtime    MEDICATIONS  (PRN):  acetaminophen   Tablet .. 650 milliGRAM(s) Oral every 6 hours PRN Mild Pain (1 - 3), Moderate Pain (4 - 6)  HYDROmorphone   Tablet 2 milliGRAM(s) Oral every 6 hours PRN Severe Pain (7 - 10)  oxymetazoline 0.05% Nasal Spray 3 Spray(s) Right Nostril two times a day PRN Congestion      ALLERGIES:  Allergies    No Known Allergies    Intolerances        LABS:                        7.4    7.15  )-----------( 139      ( 26 Dec 2019 07:11 )             24.2     12-26    139  |  105  |  66<H>  ----------------------------<  95  3.5   |  17<L>  |  1.62<H>    Ca    6.9<L>      26 Dec 2019 07:11  Mg     2.4     12-26    TPro  5.3<L>  /  Alb  2.9<L>  /  TBili  1.6<H>  /  DBili  x   /  AST  108<H>  /  ALT  28  /  AlkPhos  307<H>  12-26    PT/INR - ( 26 Dec 2019 07:11 )   PT: 26.7 sec;   INR: 2.30          PTT - ( 26 Dec 2019 07:11 )  PTT:37.4 sec    CAPILLARY BLOOD GLUCOSE          RADIOLOGY & ADDITIONAL TESTS: Reviewed. OVERNIGHT EVENTS: Given milk of magnesia for constipation.     SUBJECTIVE / INTERVAL HPI: Patient seen and examined at bedside.     VITAL SIGNS:  Vital Signs Last 24 Hrs  T(C): 36.4 (26 Dec 2019 09:32), Max: 36.6 (25 Dec 2019 16:51)  T(F): 97.5 (26 Dec 2019 09:32), Max: 97.8 (25 Dec 2019 16:51)  HR: 88 (26 Dec 2019 09:32) (86 - 93)  BP: 94/61 (26 Dec 2019 09:32) (90/56 - 94/61)  BP(mean): --  RR: 18 (26 Dec 2019 09:32) (18 - 20)  SpO2: 99% (26 Dec 2019 09:32) (99% - 100%)  I&O's Summary    25 Dec 2019 07:01  -  26 Dec 2019 07:00  --------------------------------------------------------  IN: 0 mL / OUT: 100 mL / NET: -100 mL    26 Dec 2019 07:01  -  26 Dec 2019 11:10  --------------------------------------------------------  IN: 60 mL / OUT: 0 mL / NET: 60 mL        PHYSICAL EXAM:    General: WDWN  HEENT: NC/AT; PERRL, anicteric sclera; MMM  Neck: supple  Cardiovascular: +S1/S2; RRR  Respiratory: CTA B/L; no W/R/R  Gastrointestinal: soft, NT/ND; +BSx4  Extremities: WWP; no edema, clubbing or cyanosis  Vascular: 2+ radial, DP/PT pulses B/L  Neurological: AAOx3; no focal deficits    MEDICATIONS:  MEDICATIONS  (STANDING):  ampicillin  IVPB 2 Gram(s) IV Intermittent every 6 hours  atorvastatin 40 milliGRAM(s) Oral at bedtime  calcium carbonate    500 mG (Tums) Chewable 1 Tablet(s) Chew daily  methimazole 5 milliGRAM(s) Oral daily  polyethylene glycol 3350 17 Gram(s) Oral daily  predniSONE   Tablet 5 milliGRAM(s) Oral daily  senna 1 Tablet(s) Oral daily  sodium bicarbonate 650 milliGRAM(s) Oral two times a day  tamsulosin 0.4 milliGRAM(s) Oral at bedtime    MEDICATIONS  (PRN):  acetaminophen   Tablet .. 650 milliGRAM(s) Oral every 6 hours PRN Mild Pain (1 - 3), Moderate Pain (4 - 6)  HYDROmorphone   Tablet 2 milliGRAM(s) Oral every 6 hours PRN Severe Pain (7 - 10)  oxymetazoline 0.05% Nasal Spray 3 Spray(s) Right Nostril two times a day PRN Congestion      ALLERGIES:  Allergies    No Known Allergies    Intolerances        LABS:                        7.4    7.15  )-----------( 139      ( 26 Dec 2019 07:11 )             24.2     12-26    139  |  105  |  66<H>  ----------------------------<  95  3.5   |  17<L>  |  1.62<H>    Ca    6.9<L>      26 Dec 2019 07:11  Mg     2.4     12-26    TPro  5.3<L>  /  Alb  2.9<L>  /  TBili  1.6<H>  /  DBili  x   /  AST  108<H>  /  ALT  28  /  AlkPhos  307<H>  12-26    PT/INR - ( 26 Dec 2019 07:11 )   PT: 26.7 sec;   INR: 2.30          PTT - ( 26 Dec 2019 07:11 )  PTT:37.4 sec    CAPILLARY BLOOD GLUCOSE          RADIOLOGY & ADDITIONAL TESTS: Reviewed. OVERNIGHT EVENTS: Given milk of magnesia for constipation.     SUBJECTIVE / INTERVAL HPI: Patient seen and examined at bedside. He says he feels overall well other than some knee pain that improved with tylenol. His epistaxis has resolved, with no more events since yesterday. Patient has metastatic prostate cancer that is no longer treatable. Plan for another goals of care discussion today. 12 point ros otherwise negative.     VITAL SIGNS:  Vital Signs Last 24 Hrs  T(C): 36.4 (26 Dec 2019 09:32), Max: 36.6 (25 Dec 2019 16:51)  T(F): 97.5 (26 Dec 2019 09:32), Max: 97.8 (25 Dec 2019 16:51)  HR: 88 (26 Dec 2019 09:32) (86 - 93)  BP: 94/61 (26 Dec 2019 09:32) (90/56 - 94/61)  BP(mean): --  RR: 18 (26 Dec 2019 09:32) (18 - 20)  SpO2: 99% (26 Dec 2019 09:32) (99% - 100%)  I&O's Summary    25 Dec 2019 07:01  -  26 Dec 2019 07:00  --------------------------------------------------------  IN: 0 mL / OUT: 100 mL / NET: -100 mL    26 Dec 2019 07:01  -  26 Dec 2019 11:10  --------------------------------------------------------  IN: 60 mL / OUT: 0 mL / NET: 60 mL        PHYSICAL EXAM:    General: cachectic  HEENT: dried blood in R. nostril. No longer bleeding at this time  Neck: supple, no jvd  Cardiovascular: +S1/S2; RRR, no m/r/g  scattered crackles on bases of R lung   Gastrointestinal: abdomen soft, NT/ND; no rebound or guarding; +BSx4  Genitourinary: no suprapubic tenderness or fullness  Extremities: WWP; no LE edema; no clubbing or cyanosis  Vascular: 2+ radial, DP/PT pulses B/L  Neurological: AAOx2; no focal deficits    MEDICATIONS:  MEDICATIONS  (STANDING):  ampicillin  IVPB 2 Gram(s) IV Intermittent every 6 hours  atorvastatin 40 milliGRAM(s) Oral at bedtime  calcium carbonate    500 mG (Tums) Chewable 1 Tablet(s) Chew daily  methimazole 5 milliGRAM(s) Oral daily  polyethylene glycol 3350 17 Gram(s) Oral daily  predniSONE   Tablet 5 milliGRAM(s) Oral daily  senna 1 Tablet(s) Oral daily  sodium bicarbonate 650 milliGRAM(s) Oral two times a day  tamsulosin 0.4 milliGRAM(s) Oral at bedtime    MEDICATIONS  (PRN):  acetaminophen   Tablet .. 650 milliGRAM(s) Oral every 6 hours PRN Mild Pain (1 - 3), Moderate Pain (4 - 6)  HYDROmorphone   Tablet 2 milliGRAM(s) Oral every 6 hours PRN Severe Pain (7 - 10)  oxymetazoline 0.05% Nasal Spray 3 Spray(s) Right Nostril two times a day PRN Congestion      ALLERGIES:  Allergies    No Known Allergies    Intolerances        LABS:                        7.4    7.15  )-----------( 139      ( 26 Dec 2019 07:11 )             24.2     12-26    139  |  105  |  66<H>  ----------------------------<  95  3.5   |  17<L>  |  1.62<H>    Ca    6.9<L>      26 Dec 2019 07:11  Mg     2.4     12-26    TPro  5.3<L>  /  Alb  2.9<L>  /  TBili  1.6<H>  /  DBili  x   /  AST  108<H>  /  ALT  28  /  AlkPhos  307<H>  12-26    PT/INR - ( 26 Dec 2019 07:11 )   PT: 26.7 sec;   INR: 2.30          PTT - ( 26 Dec 2019 07:11 )  PTT:37.4 sec    CAPILLARY BLOOD GLUCOSE          RADIOLOGY & ADDITIONAL TESTS: Reviewed.

## 2019-12-26 NOTE — PROGRESS NOTE ADULT - SUBJECTIVE AND OBJECTIVE BOX
OVERNIGHT EVENTS:     SUBJECTIVE / INTERVAL HPI: Patient seen and examined at bedside. He says he feels overall well other than some knee pain that improved with tylenol.  with no more events since yesterday. Patient has metastatic prostate cancer that is no longer treatable.     VITAL SIGNS:  Vital Signs Last 24 Hrs  T(C): 36.4 (26 Dec 2019 09:32), Max: 36.6 (25 Dec 2019 16:51)  T(F): 97.5 (26 Dec 2019 09:32), Max: 97.8 (25 Dec 2019 16:51)  HR: 88 (26 Dec 2019 09:32) (86 - 93)  BP: 94/61 (26 Dec 2019 09:32) (90/56 - 94/61)  BP(mean): --  RR: 18 (26 Dec 2019 09:32) (18 - 20)  SpO2: 99% (26 Dec 2019 09:32) (99% - 100%)  I&O's Summary    25 Dec 2019 07:01  -  26 Dec 2019 07:00  --------------------------------------------------------  IN: 0 mL / OUT: 100 mL / NET: -100 mL    26 Dec 2019 07:01  -  26 Dec 2019 11:10  --------------------------------------------------------  IN: 60 mL / OUT: 0 mL / NET: 60 mL        PHYSICAL EXAM:    General: cachectic  HEENT: dried blood in R. nostril. No longer bleeding at this time  Neck: supple, no jvd  Cardiovascular: +S1/S2; RRR, no m/r/g  scattered crackles on bases of R lung   Gastrointestinal: abdomen soft, NT/ND; no rebound or guarding; +BSx4  Genitourinary: no suprapubic tenderness or fullness  Extremities: WWP; no LE edema; no clubbing or cyanosis  Vascular: 2+ radial, DP/PT pulses B/L  Neurological: AAOx2; no focal deficits    MEDICATIONS:  MEDICATIONS  (STANDING):  ampicillin  IVPB 2 Gram(s) IV Intermittent every 6 hours  atorvastatin 40 milliGRAM(s) Oral at bedtime  calcium carbonate    500 mG (Tums) Chewable 1 Tablet(s) Chew daily  methimazole 5 milliGRAM(s) Oral daily  polyethylene glycol 3350 17 Gram(s) Oral daily  predniSONE   Tablet 5 milliGRAM(s) Oral daily  senna 1 Tablet(s) Oral daily  sodium bicarbonate 650 milliGRAM(s) Oral two times a day  tamsulosin 0.4 milliGRAM(s) Oral at bedtime    MEDICATIONS  (PRN):  acetaminophen   Tablet .. 650 milliGRAM(s) Oral every 6 hours PRN Mild Pain (1 - 3), Moderate Pain (4 - 6)  HYDROmorphone   Tablet 2 milliGRAM(s) Oral every 6 hours PRN Severe Pain (7 - 10)  oxymetazoline 0.05% Nasal Spray 3 Spray(s) Right Nostril two times a day PRN Congestion      ALLERGIES:  Allergies    No Known Allergies    Intolerances        LABS:                        7.4    7.15  )-----------( 139      ( 26 Dec 2019 07:11 )             24.2     12-26    139  |  105  |  66<H>  ----------------------------<  95  3.5   |  17<L>  |  1.62<H>    Ca    6.9<L>      26 Dec 2019 07:11  Mg     2.4     12-26    TPro  5.3<L>  /  Alb  2.9<L>  /  TBili  1.6<H>  /  DBili  x   /  AST  108<H>  /  ALT  28  /  AlkPhos  307<H>  12-26    PT/INR - ( 26 Dec 2019 07:11 )   PT: 26.7 sec;   INR: 2.30          PTT - ( 26 Dec 2019 07:11 )  PTT:37.4 sec    CAPILLARY BLOOD GLUCOSE          RADIOLOGY & ADDITIONAL TESTS: Reviewed.

## 2019-12-26 NOTE — PROGRESS NOTE ADULT - PROBLEM SELECTOR PLAN 2
Pt was dx at [facility] in May 2018, bronchoscopy done at that time mediastinal lymph node biopsy was positive for carcinoma consistent with metastatic prostate cancer. Pt being treated with Lupron and daily Zytega/Prednisone, recent PSA above 75,000. . New possible mildly enlarged retroperitoneal and pelvic  lymph nodes suspect progressive mets   -C/w Prednisone 5mg and tamsulosin  -F/u Dr. Paulino  recs

## 2019-12-26 NOTE — PROGRESS NOTE ADULT - PROBLEM SELECTOR PLAN 2
Pt was dx at [facility] in May 2018, bronchoscopy done at that time mediastinal lymph node biopsy was positive for carcinoma consistent with metastatic prostate cancer. Pt being treated with Lupron and daily Zytega/Prednisone, recent PSA above 75,000. . New possible mildly enlarged retroperitoneal and pelvic  lymph nodes suspect progressive mets   -C/w Prednisone 5mg and tamsulosin  -F/u Dr. Paulino  recs

## 2019-12-26 NOTE — PROGRESS NOTE ADULT - PROBLEM SELECTOR PLAN 4
Alk phos 585, . Could be from liver disease vs bony mets. Of note, Zytiga can also cause transaminitis   -repeat CMP in AM

## 2019-12-26 NOTE — PROGRESS NOTE ADULT - PROBLEM SELECTOR PLAN 1
resolving presentedwith oliguric PAM (1.79<1.22) worsening after 2L fluid and uptrending AGMA.   AM creat 1.62

## 2019-12-26 NOTE — PROGRESS NOTE ADULT - PROBLEM SELECTOR PLAN 4
pt severely cachectic despite appetite is fair   undisclosed weight loss since dx  Albumin 2.8, severely dehydrated on admission  BMI 17.6  followed by dietary, pt states he needs softer diet  MD to order.  soft diet as tolerated

## 2019-12-26 NOTE — PROGRESS NOTE ADULT - PROBLEM SELECTOR PLAN 9
Pt names Mary as -[phone]. Has Poppy signed 12/16/19 stating FULL CODE and trial of intubation signed by patient and Francesca Steiner and Nova Castro. Goals of care discussion to be held today.   -Palliative consulted

## 2019-12-26 NOTE — PROGRESS NOTE ADULT - SUBJECTIVE AND OBJECTIVE BOX
VIDA MCCALL   MRN-[id number]    CC: HAO OROZCO     HPI:  79M PMH prostate cancer with mets to bone and lung, CAD with CABG in 2016, HFrEF (EF 30%), DVT (on Eliquis), from Dr. Terry’s office for hypotension SBP 70s and tachycardic. Pt currently c/o left shoulder pain, lower abd pain and HA. Living at [facility]. Endorses decreases po intake, denies CP/pressure, fevers, chills, nightsweats, n/v/d/c, changes to urinary habits. States he can walk. Per labs on 11/23/19: Na 136, CO2 Alk phos 375, AST 66, BUN 24, Cre 1.22 Uric Acid 11.3, PSA 5000. ED provider spoke to Dr. Terry: requesting palliative consult.     ED Course   WBC 98.3, -98, BP 94//63, RR 24-20, 98%   CXR: no acute infiltrates   LabsHb 10.1, CO2 18, BUN Cre 59/1.79, TB 2.9, Alk phos 585, , lactate 2.2, LD 1364, uric acid 16.6, Trop 0.66, BNP 26553  Abd us: sludge, o biliary ductal dilatation.   CTa/p: n ew possible venous collaterals/varices in the left retroperitoneum, of uncertain etiology. New possible mildly enlarged retroperitoneal and pelvic lymph nodes.   CT: head: No acute intracranial hemorrhage or transcortical infarct, chronic microangiopathic disease and age-appropriate volume loss. (21 Dec 2019 00:05)    Per discussion with Dr Terry pt has been W/C bound for past 2-3 months and has failed on Lupron, started on Zygeva with ongoing  elevation of PSA since May  Palliative Medicine consulted  for further discussion re: GOC     Subjective: pt with episode of epistaxis yesterday resolved with Afrin and nasal packing. This AM patient asleep, arouses to name but very tired. C/O constipation     DYSPNEA: N	  NAUS/VOM:  N	  SECRETIONS: N	  AGITATION:  N  Pain (Y/N):   pt denies    -Provocation/Palliation:  -Quality/Quantity:  -Radiating:  -Severity:  -Timing/Frequency:  -Impact on ADLs:    OTHER REVIEW OF SYSTEMS: 12 pt review of systems unremarkable      PEx:  Vital Signs Last 24 Hrs  T(C): 36.4 (26 Dec 2019 09:32), Max: 36.6 (25 Dec 2019 16:51)  T(F): 97.5 (26 Dec 2019 09:32), Max: 97.8 (25 Dec 2019 16:51)  HR: 88 (26 Dec 2019 09:32) (86 - 93)  BP: 94/61 (26 Dec 2019 09:32) (90/56 - 94/61)  BP(mean): --  RR: 18 (26 Dec 2019 09:32) (18 - 20)  SpO2: 99% (26 Dec 2019 09:32) (99% - 100%)    General: cachectic elderly  male in bed lethargic   HEENT: NC/AT sclera anicteric, MM dry   Neck: supple, no JVD   CVS: S1S2 RRR bradycardic, hypertensive   Resp: Bibasilar crackles   GI: soft NT  + c onstipation  Neuro: no focal deficits   Psych:  calm and cooperative    Skin: + vertical scar to chest, no break of skin   Lymph: No LAD   No Known Allergies      Opiate Naive (Y/N): Yes   -iStop reviewed (Y/N): Yeson inital consultation I Ref:  [id number]    MEDICATIONS: REVIEWED  MEDICATIONS  (STANDING):  ampicillin  IVPB 2 Gram(s) IV Intermittent every 6 hours  atorvastatin 40 milliGRAM(s) Oral at bedtime  calcium carbonate    500 mG (Tums) Chewable 1 Tablet(s) Chew daily  methimazole 5 milliGRAM(s) Oral daily  polyethylene glycol 3350 17 Gram(s) Oral daily  predniSONE   Tablet 5 milliGRAM(s) Oral daily  senna 1 Tablet(s) Oral daily  sodium bicarbonate 650 milliGRAM(s) Oral two times a day  tamsulosin 0.4 milliGRAM(s) Oral at bedtime    MEDICATIONS  (PRN):  acetaminophen   Tablet .. 650 milliGRAM(s) Oral every 6 hours PRN Mild Pain (1 - 3), Moderate Pain (4 - 6)  HYDROmorphone   Tablet 2 milliGRAM(s) Oral every 6 hours PRN Severe Pain (7 - 10)  oxymetazoline 0.05% Nasal Spray 3 Spray(s) Right Nostril two times a day PRN Congestion  LABS:                       9.2    6.85  )-----------( 160      ( 24 Dec 2019 07:07 )             28.7                         12-24    144  |  109<H>  |  69<H>  ----------------------------<  98  3.5   |  17<L>  |  2.08<H>    Ca    6.4<LL>      24 Dec 2019 07:07  Phos  2.7     12-23  Mg     2.5     12-24    TPro  x   /  Alb  3.1<L>  /  TBili  x   /  DBili  x   /  AST  x   /  ALT  x   /  AlkPhos  x   12-24    IMAGING:   EXAM:  ECHOCARDIOGRAM (CARDIOL)                          PROCEDURE DATE:  12/23/2019        INTERPRETATION:  REPORT:    -----------------------------------  TRANSTHORACIC ECHOCARDIOGRAM REPORT       CONCLUSIONS:     1. 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   2. The constellation of findings (marked LV and RV increased wall thickness, marked biatrial dilation, thickened valves, and grade III LV diastolic dysfunction) raise the possibility of an infiltrative cardiomyopathy.   3. Small left ventricular cavity size.   4. Severely reduced left ventricular systolic function with global hypokinesis.   5. Grade III left ventricular diastolic dysfunction.   6. Severe symmetric left and right ventricular increased wall thickness.   7. Normal right ventricular size.   8. Mildly reduced right ventricular systolic function.   9. Marked biatrial enlargement.  10. Aortic sclerosis without significant stenosis.  11. Mild aortic regurgitation.  12. Mild mitral regurgitation.  13. Mild tricuspid regurgitation.  14. Pulmonary hypertension present, pulmonary artery systolic pressure is 56.50 mmHg.  15. No pericardial effusion.  16. Compared to the previous TTE performed on 5/10/2018, there have been no significant interval changes.      Advanced Directives:     Full Code: per ED note pt has MOLST dated 12/16 stating FULL CODE sined at [facility] but pt     unaware who these witnesses were     MOLST: not in chart at present time?     HCP: pt has confirmed that his longtime friend and parishioner Mary Worley  [phone]  is his his assigned proxy Will see if she has paperwork as he believes, otherwise will have pt resign while he still has capaciy    Decision maker: pt able to provide input into [facility] medcal decisions but has poor insight into his disease   Legal surrogate: Mary (?) [phone]     GOALS OF CARE DISCUSSION       Palliative care info/counseling provided	           Family meeting       Advanced Directives addressed please see Advance Care Planning Note	           See previous Palliative Medicine Note       Documentation of GOC: for discussion with HCP 11/26          REFERRALS	               Unit SW/Case Mgmt              Massage Therapy       Music Therapy        Nutrition

## 2019-12-27 ENCOUNTER — TRANSCRIPTION ENCOUNTER (OUTPATIENT)
Age: 79
End: 2019-12-27

## 2019-12-27 LAB
APTT BLD: 35.3 SEC — SIGNIFICANT CHANGE UP (ref 27.5–36.3)
CULTURE RESULTS: SIGNIFICANT CHANGE UP
INR BLD: 1.75 — HIGH (ref 0.88–1.16)
PROTHROM AB SERPL-ACNC: 20.1 SEC — HIGH (ref 10–12.9)
SPECIMEN SOURCE: SIGNIFICANT CHANGE UP

## 2019-12-27 RX ADMIN — Medication 216 GRAM(S): at 22:02

## 2019-12-27 RX ADMIN — POLYETHYLENE GLYCOL 3350 17 GRAM(S): 17 POWDER, FOR SOLUTION ORAL at 12:12

## 2019-12-27 RX ADMIN — Medication 216 GRAM(S): at 10:15

## 2019-12-27 RX ADMIN — Medication 1 TABLET(S): at 12:12

## 2019-12-27 RX ADMIN — Medication 5 MILLIGRAM(S): at 06:15

## 2019-12-27 RX ADMIN — Medication 216 GRAM(S): at 15:44

## 2019-12-27 RX ADMIN — Medication 650 MILLIGRAM(S): at 00:00

## 2019-12-27 RX ADMIN — Medication 216 GRAM(S): at 04:19

## 2019-12-27 RX ADMIN — ATORVASTATIN CALCIUM 40 MILLIGRAM(S): 80 TABLET, FILM COATED ORAL at 22:02

## 2019-12-27 RX ADMIN — SENNA PLUS 1 TABLET(S): 8.6 TABLET ORAL at 12:12

## 2019-12-27 RX ADMIN — Medication 650 MILLIGRAM(S): at 06:15

## 2019-12-27 RX ADMIN — Medication 650 MILLIGRAM(S): at 17:36

## 2019-12-27 NOTE — DISCHARGE NOTE PROVIDER - HOSPITAL COURSE
Patient is 78 yo M with past medical history of prostate cancer with mets to bone and lung (not responding to zytega), CAF s/p CABG in 2016, HFrEF (EF 30%), DVT (on eliquis).    Presented with , found to have _____    Problem List/Main Diagnoses (system-based):     Inpatient treatment course:     New medications:     Labs to be followed outpatient:     Exam to be followed outpatient: Patient is 80 yo M with past medical history of prostate cancer with mets to bone and lung (not responding to zytega), CAF s/p CABG in 2016, HFrEF (EF 30%), DVT (on eliquis).    Presented with hypotension and tachycardia from his outpatient oncologist office, found to have enterococcus faecalis bacteremia.         Problem List/Main Diagnoses (system-based):     1) Sepsis: 2/2 enterococcus faecalis bacteremia. Sensitive to and treated with ampicillin. Transitioned to Augmentin.     2) Epistaxis: treated with afrin and packing    3) Metastatic prostate Cancer: PSA increasing even while on Zytega. Goals of care discussion held with patient choosing DNR/DNI.     4) PAM: 2/2 poor po intake. Improved with hydration         Inpatient treatment course: Patient     New medications:     Labs to be followed outpatient:     Exam to be followed outpatient: Patient is 80 yo M with past medical history of prostate cancer with mets to bone and lung (not responding to zytega), CAF s/p CABG in 2016, HFrEF (EF 30%), DVT (on eliquis).    Presented with hypotension and tachycardia from his outpatient oncologist office, found to have enterococcus faecalis bacteremia.         Problem List/Main Diagnoses (system-based):     1) Sepsis: 2/2 enterococcus faecalis bacteremia. Sensitive to and treated with ampicillin. Transitioned to amoxicillin 500 TID    2) Epistaxis: treated with afrin and packing    3) Metastatic prostate Cancer: PSA increasing even while on Zytega. Goals of care discussion held with patient choosing DNR/DNI.     4) PAM: 2/2 poor po intake. Improved with hydration     5) lower GI bleed: treated with 1 unit pRBC, spontaneously resolved.         Inpatient treatment course: Patient blood cultures grew enterococcus faecalis sensitive to ampicillin. Started IV ampicillin treatment. Patient refused SIMI, and goals of care discussion was held. Spoke with his oncologist who confirmed PSA continuing to rise while on zytega, and he is not a good candidate for chemotherapy. Prognosis is very poor. Patient chose DNR/DNI. Course complicated by episode of persistent epistaxis that resolved with afrin and packing. Patient also experienced an episode of blood clots in his stool, requiring 1 unit of pRBC. Patient stabilized, and stool was normal in color afterwards. Patient also had episode of chest pain, with trop elevated to 1.25 in setting of demand ischemia 2/2 GI bleed, CKD, and bacteremia. EKG showed no ischemic changes and was similar to previous. Patient refused PICC for outpatient IV AMP, and ID recommended lifelong Amoxicillin 500mg TID for his enterococcus faecalis bacteremia.     New medications: Lifelong Amoxicillin 500mg TID    Labs to be followed outpatient: none    Exam to be followed outpatient: none

## 2019-12-27 NOTE — PHYSICAL THERAPY INITIAL EVALUATION ADULT - GENERAL OBSERVATIONS, REHAB EVAL
As per Shikha GARCIA patient cleared for PT/OOB. received supine + heplock, in NAD. C/O pain left shoulder, JOSE Trivedi aware

## 2019-12-27 NOTE — PHYSICAL THERAPY INITIAL EVALUATION ADULT - ACTIVE RANGE OF MOTION EXAMINATION, REHAB EVAL
escept left shoulder decreased 0-70/bilateral upper extremity Active ROM was WFL (within functional limits)/bilateral  lower extremity Active ROM was WFL (within functional limits)

## 2019-12-27 NOTE — PROGRESS NOTE ADULT - PROBLEM SELECTOR PLAN 9
Pt names Mary as [phone]. Has Molst signed 12/26/19 stating patient is DNR/DNI. Patient would like to continue to receive antibiotics.   -Palliative consulted

## 2019-12-27 NOTE — PROGRESS NOTE ADULT - PROBLEM SELECTOR PLAN 2
Pt was dx at [facility] in May 2018, bronchoscopy done at that time mediastinal lymph node biopsy was positive for carcinoma consistent with metastatic prostate cancer. Pt being treated with Lupron and daily Zytega/Prednisone, recent PSA above 75,000. . New possible mildly enlarged retroperitoneal and pelvic  lymph nodes suspect progressive mets   -C/w Prednisone 5mg and tamsulosin  -Spoke with Dr. Paulino  who says prognosis is poor. He was not Next step is chemo which cannot be started while patient is bacteremic. Goals of care held and patient is now DNR/DNI.

## 2019-12-27 NOTE — PROGRESS NOTE ADULT - PROBLEM SELECTOR PLAN 9
Pt names Mary as [phone]. Has Molst signed 12/26/19 stating patient is DNR/DNI. Patient would like to continue to receive antibiotics.   -Palliative consulted

## 2019-12-27 NOTE — DISCHARGE NOTE PROVIDER - NSDCMRMEDTOKEN_GEN_ALL_CORE_FT
apixaban 5 mg oral tablet: 1 tab(s) orally every 12 hours  aspirin 81 mg oral delayed release tablet: 1 tab(s) orally once a day  atorvastatin 40 mg oral tablet: 1 tab(s) orally once a day  docusate sodium 100 mg oral capsule: 1 cap(s) orally once a day  furosemide 20 mg oral tablet: 1 tab(s) orally once a day  methIMAzole 5 mg oral tablet: 1 tab(s) orally once a day  predniSONE 5 mg oral tablet: 1 tab(s) orally once a day  senna 8.8 mg/5 mL oral syrup: 5 milliliter(s) orally once a day  tamsulosin 0.4 mg oral capsule: 1 cap(s) orally once a day (at bedtime)  Zytiga 250 mg oral tablet: 4 tab(s) orally once a day amoxicillin 500 mg oral capsule: 1 cap(s) orally 3 times a day   aspirin 81 mg oral delayed release tablet: 1 tab(s) orally once a day  atorvastatin 40 mg oral tablet: 1 tab(s) orally once a day  methIMAzole 5 mg oral tablet: 1 tab(s) orally once a day  polyethylene glycol 3350 oral powder for reconstitution: 17 gram(s) orally once a day  predniSONE 5 mg oral tablet: 1 tab(s) orally once a day  senna 8.8 mg/5 mL oral syrup: 5 milliliter(s) orally once a day  tamsulosin 0.4 mg oral capsule: 1 cap(s) orally once a day (at bedtime)  Zytiga 250 mg oral tablet: 4 tab(s) orally once a day

## 2019-12-27 NOTE — PROGRESS NOTE ADULT - PROBLEM SELECTOR PLAN 1
(elevated HR, E. faecalis bacteremia)  - c/w ampicillin 2g q6hrs  - Will transition to po abx for discharge  -Blood cultures growing enterococcus faecalis susceptible to ampicillin and augmentin  -Ucx no growth  -Dr. Rowley (ID) consulted, agrees with continuing ampicillin for now    #Epistaxis  resolved  -ENT recs appreciated  - monitor Hg.

## 2019-12-27 NOTE — PROGRESS NOTE ADULT - SUBJECTIVE AND OBJECTIVE BOX
Interval history:  Continues to be weak.  Does not understand everything which is discussed with him.     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 He has been on monthly lupron & Xgeva and daily oral Abiraterone w/prednisone. Questionable compliance with Abiraterone due to rehab stay (uncertain if pt has been receiving it appropriately) and pt himself (poor historian and unaware of exact meds he gets at the facility). PSA has rapidly increased since May 2019...28-->202-->386 (at this point Abiraterone started)-->972-->2632-->5000. On follow up this week noted to be more confused from baseline. Vitals showed hypotension and tachycardia. Decision was made to send to [facility] for close obs. Pt's health care proxy present during office visit yesterday. GOC discuss was held.     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    Social Hx:  X-smoker. No drugs. No EtOH abuse.    Family Hx:  Not contributory at this time.     MEDICATIONS  (STANDING):  ampicillin  IVPB 2 Gram(s) IV Intermittent every 6 hours  atorvastatin 40 milliGRAM(s) Oral at bedtime  calcium carbonate    500 mG (Tums) Chewable 1 Tablet(s) Chew daily  methimazole 5 milliGRAM(s) Oral daily  polyethylene glycol 3350 17 Gram(s) Oral daily  predniSONE   Tablet 5 milliGRAM(s) Oral daily  senna 1 Tablet(s) Oral daily  sodium bicarbonate 650 milliGRAM(s) Oral two times a day  tamsulosin 0.4 milliGRAM(s) Oral at bedtime    MEDICATIONS  (PRN):  acetaminophen   Tablet .. 650 milliGRAM(s) Oral every 6 hours PRN Mild Pain (1 - 3), Moderate Pain (4 - 6)  HYDROmorphone   Tablet 2 milliGRAM(s) Oral every 6 hours PRN Severe Pain (7 - 10)  oxymetazoline 0.05% Nasal Spray 3 Spray(s) Right Nostril two times a day PRN Congestion      Allergies  No Known Allergies    Exam:  Vital Signs Last 24 Hrs  T(C): 36.2 (27 Dec 2019 16:18), Max: 36.6 (27 Dec 2019 10:53)  T(F): 97.2 (27 Dec 2019 16:18), Max: 97.8 (27 Dec 2019 10:53)  HR: 80 (27 Dec 2019 16:18) (80 - 92)  BP: 101/61 (27 Dec 2019 16:18) (87/61 - 101/61)  BP(mean): --  RR: 18 (27 Dec 2019 16:18) (15 - 18)  SpO2: 99% (27 Dec 2019 16:18) (98% - 99%)    Very thin and weak. Lying in bed. Cachexia.   HEENT: nasal packing, dried blood on nares and lip  CTAB anteriorly  RRR, S1/S2  +BS, soft, ntnd  +Pulses/ equal. No edema.  AAOx3, moving all ext    Labs:  No labs from today.     Imaging:  CT scans reviewed.

## 2019-12-27 NOTE — PROGRESS NOTE ADULT - PROBLEM SELECTOR PLAN 5
Pt presented with SoB and left shoulder pain and tropinin elevated 0.66 (chronically elevated 0.1-2). EKG with TWI in inferior and lateral leads c/w previous. Trops now peaked. Suspect trop accumulation in CKD less likely ACS.

## 2019-12-27 NOTE — PROGRESS NOTE ADULT - SUBJECTIVE AND OBJECTIVE BOX
OVERNIGHT EVENTS: NEEMA    SUBJECTIVE / INTERVAL HPI: no change    VITAL SIGNS:  Vital Signs Last 24 Hrs  T(C): 36.6 (27 Dec 2019 10:53), Max: 36.6 (27 Dec 2019 10:53)  T(F): 97.8 (27 Dec 2019 10:53), Max: 97.8 (27 Dec 2019 10:53)  HR: 89 (27 Dec 2019 10:53) (81 - 92)  BP: 100/60 (27 Dec 2019 10:53) (87/61 - 108/63)  BP(mean): --  RR: 18 (27 Dec 2019 10:53) (15 - 18)  SpO2: 99% (27 Dec 2019 10:53) (98% - 99%)  I&O's Summary    26 Dec 2019 07:01  -  27 Dec 2019 07:00  --------------------------------------------------------  IN: 60 mL / OUT: 100 mL / NET: -40 mL        PHYSICAL EXAM:    General: cachectic, confusion waxes and wanes  HEENT: No blood seen in the nares.   Neck: supple, no jvd  Cardiovascular: +S1/S2; RRR, no m/r/g  scattered crackles on bases of R lung   Gastrointestinal: abdomen soft, NT/ND; no rebound or guarding; +BSx4  Genitourinary: no suprapubic tenderness or fullness  Extremities: WWP; no LE edema; no clubbing or cyanosis  Vascular: 2+ radial, DP/PT pulses B/L  Neurological: AAOx2; no focal deficits      MEDICATIONS:  MEDICATIONS  (STANDING):  ampicillin  IVPB 2 Gram(s) IV Intermittent every 6 hours  atorvastatin 40 milliGRAM(s) Oral at bedtime  calcium carbonate    500 mG (Tums) Chewable 1 Tablet(s) Chew daily  methimazole 5 milliGRAM(s) Oral daily  polyethylene glycol 3350 17 Gram(s) Oral daily  predniSONE   Tablet 5 milliGRAM(s) Oral daily  senna 1 Tablet(s) Oral daily  sodium bicarbonate 650 milliGRAM(s) Oral two times a day  tamsulosin 0.4 milliGRAM(s) Oral at bedtime    MEDICATIONS  (PRN):  acetaminophen   Tablet .. 650 milliGRAM(s) Oral every 6 hours PRN Mild Pain (1 - 3), Moderate Pain (4 - 6)  HYDROmorphone   Tablet 2 milliGRAM(s) Oral every 6 hours PRN Severe Pain (7 - 10)  oxymetazoline 0.05% Nasal Spray 3 Spray(s) Right Nostril two times a day PRN Congestion      ALLERGIES:  Allergies    No Known Allergies    Intolerances        LABS:                        7.5    8.47  )-----------( 142      ( 26 Dec 2019 12:22 )             24.6     12-26    139  |  105  |  66<H>  ----------------------------<  95  3.5   |  17<L>  |  1.62<H>    Ca    6.9<L>      26 Dec 2019 07:11  Mg     2.4     12-26    TPro  5.3<L>  /  Alb  2.9<L>  /  TBili  1.6<H>  /  DBili  x   /  AST  108<H>  /  ALT  28  /  AlkPhos  307<H>  12-26    PT/INR - ( 27 Dec 2019 07:58 )   PT: 20.1 sec;   INR: 1.75          PTT - ( 27 Dec 2019 07:58 )  PTT:35.3 sec    CAPILLARY BLOOD GLUCOSE          RADIOLOGY & ADDITIONAL TESTS: Reviewed.

## 2019-12-27 NOTE — PROGRESS NOTE ADULT - PROBLEM SELECTOR PLAN 2
Pt was dx at [facility] in May 2018, bronchoscopy done at that time mediastinal lymph node biopsy was positive for carcinoma consistent with metastatic prostate cancer. Pt being treated with Lupron and daily Zytega/Prednisone, recent PSA above 75,000. . New possible mildly enlarged retroperitoneal and pelvic  lymph nodes suspect progressive mets   -C/w Prednisone 5mg and tamsulosin  -Spoke with Dr. Paulino  who says prognosis is poor. He was not Next step is chemo which cannot be started while patient is bacteremic. Goals of care held and patient is now DNR/DNI.

## 2019-12-27 NOTE — PROGRESS NOTE ADULT - PROBLEM SELECTOR PLAN 6
Echo 2018: global hypokinesis of the left ventricle, EF 30%. The left atrium is severely dilated, mitral inflow pattern is consistent with restrictive left ventricular filling, suggestive of severely elevated left atrial pressure, right atrium is moderately dilated, Tethered mitral valve leaflets. Pt returns severely cachectic/dehydrated on exam but BNP <30,000.   -Was d/c'd on metoprolol tartrate 25 BID but not on [facility] drug list  -Holding Lasix in setting of renal failure   -Cautious with additional fluids with frequent lung checks  -ECHO showing EF 30% and possible cardiac amyloidosis

## 2019-12-27 NOTE — PROGRESS NOTE ADULT - PROBLEM SELECTOR PLAN 4
Alk phos 585, . Could be from liver disease vs bony mets. Of note, Zytiga can also cause transaminitis   -Improving.  and ALT 28

## 2019-12-27 NOTE — DISCHARGE NOTE PROVIDER - NSDCCPCAREPLAN_GEN_ALL_CORE_FT
PRINCIPAL DISCHARGE DIAGNOSIS  Diagnosis: Sepsis  Assessment and Plan of Treatment: You were found to a bacteria called enterococcus faecalis in your blood stream. A SIMI was considered to see if the bacteria is on your heat valves, but you did not want that procedure done. We discussed the possibility of placing in a PICC line, and you preferred to take oral antibiotics instead, with the understanding that IV antibiotics work better. You were started on IV antibiotics, and will be discharged on amoxicillin 500mg three times a day. It is very important to take this medication every day, three times a day to continue to treat your infection.      SECONDARY DISCHARGE DIAGNOSES  Diagnosis: Prostate CA  Assessment and Plan of Treatment: You have a known history of prostate cancer that has spread to your lungs and bones. Your cancer has not responded to zytega, and your options are very limited for treatment. We discussed your prognosis, and you decided to be DNR/DNI and did not want agressive treatments. No SIMI was done, and no PICC was placed. Please continue to follow-up with your oncologist.    Diagnosis: Severe protein-calorie malnutrition  Assessment and Plan of Treatment: You were found to have mlanutrition while you were in the hsopital. Please continue to drink ensure, and eat high calorie meals.

## 2019-12-27 NOTE — PHYSICAL THERAPY INITIAL EVALUATION ADULT - PERTINENT HX OF CURRENT PROBLEM, REHAB EVAL
79M PMH prostate cancer with mets to bone and lung, CAD with CABG in 2016, HFrEF (EF 30%), DVT (on Eliquis), from Dr. Terry’s office for hypotension SBP 70s and tachycardic. Pt currently c/o left shoulder pain, lower abd pain and HA. Living at [facility].

## 2019-12-27 NOTE — PROGRESS NOTE ADULT - SUBJECTIVE AND OBJECTIVE BOX
OVERNIGHT EVENTS: NEEMA    SUBJECTIVE / INTERVAL HPI: Patient seen and examined at bedside. Patient says he has some knee pain that was previously resolved with tylenol. Standing PRN order in. No more epistaxis since his last event 2 days ago. ROS otherwise negative.     VITAL SIGNS:  Vital Signs Last 24 Hrs  T(C): 36.6 (27 Dec 2019 10:53), Max: 36.6 (27 Dec 2019 10:53)  T(F): 97.8 (27 Dec 2019 10:53), Max: 97.8 (27 Dec 2019 10:53)  HR: 89 (27 Dec 2019 10:53) (81 - 92)  BP: 100/60 (27 Dec 2019 10:53) (87/61 - 108/63)  BP(mean): --  RR: 18 (27 Dec 2019 10:53) (15 - 18)  SpO2: 99% (27 Dec 2019 10:53) (98% - 99%)  I&O's Summary    26 Dec 2019 07:01  -  27 Dec 2019 07:00  --------------------------------------------------------  IN: 60 mL / OUT: 100 mL / NET: -40 mL        PHYSICAL EXAM:    General: cachectic, confusion waxes and wanes  HEENT: No blood seen in the nares.   Neck: supple, no jvd  Cardiovascular: +S1/S2; RRR, no m/r/g  scattered crackles on bases of R lung   Gastrointestinal: abdomen soft, NT/ND; no rebound or guarding; +BSx4  Genitourinary: no suprapubic tenderness or fullness  Extremities: WWP; no LE edema; no clubbing or cyanosis  Vascular: 2+ radial, DP/PT pulses B/L  Neurological: AAOx2; no focal deficits      MEDICATIONS:  MEDICATIONS  (STANDING):  ampicillin  IVPB 2 Gram(s) IV Intermittent every 6 hours  atorvastatin 40 milliGRAM(s) Oral at bedtime  calcium carbonate    500 mG (Tums) Chewable 1 Tablet(s) Chew daily  methimazole 5 milliGRAM(s) Oral daily  polyethylene glycol 3350 17 Gram(s) Oral daily  predniSONE   Tablet 5 milliGRAM(s) Oral daily  senna 1 Tablet(s) Oral daily  sodium bicarbonate 650 milliGRAM(s) Oral two times a day  tamsulosin 0.4 milliGRAM(s) Oral at bedtime    MEDICATIONS  (PRN):  acetaminophen   Tablet .. 650 milliGRAM(s) Oral every 6 hours PRN Mild Pain (1 - 3), Moderate Pain (4 - 6)  HYDROmorphone   Tablet 2 milliGRAM(s) Oral every 6 hours PRN Severe Pain (7 - 10)  oxymetazoline 0.05% Nasal Spray 3 Spray(s) Right Nostril two times a day PRN Congestion      ALLERGIES:  Allergies    No Known Allergies    Intolerances        LABS:                        7.5    8.47  )-----------( 142      ( 26 Dec 2019 12:22 )             24.6     12-26    139  |  105  |  66<H>  ----------------------------<  95  3.5   |  17<L>  |  1.62<H>    Ca    6.9<L>      26 Dec 2019 07:11  Mg     2.4     12-26    TPro  5.3<L>  /  Alb  2.9<L>  /  TBili  1.6<H>  /  DBili  x   /  AST  108<H>  /  ALT  28  /  AlkPhos  307<H>  12-26    PT/INR - ( 27 Dec 2019 07:58 )   PT: 20.1 sec;   INR: 1.75          PTT - ( 27 Dec 2019 07:58 )  PTT:35.3 sec    CAPILLARY BLOOD GLUCOSE          RADIOLOGY & ADDITIONAL TESTS: Reviewed.

## 2019-12-27 NOTE — DISCHARGE NOTE PROVIDER - CARE PROVIDER_API CALL
Ledy Terry)  Internal Medicine; Medical Oncology  [address], [address], [place] [address]  Phone: [phone]  Fax: [phone]  Follow Up Time:

## 2019-12-27 NOTE — PHYSICAL THERAPY INITIAL EVALUATION ADULT - MANUAL MUSCLE TESTING RESULTS, REHAB EVAL
right UE grossly 5/5. Left shoulder NT due to shoulder pain. Left elbow flex/ext grossly 4-/5.  strenght grossly 5/5. Right LE grossly 5/5. Left hip flex and ankle DF grossly 4-/5. Left knee ext grossly 5/5

## 2019-12-27 NOTE — PHYSICAL THERAPY INITIAL EVALUATION ADULT - PASSIVE RANGE OF MOTION EXAMINATION, REHAB EVAL
bilateral lower extremity Passive ROM was WNL/except left shoulder flexion decreased 0-80/bilateral upper extremity Passive ROM was WNL (within normal limits)

## 2019-12-28 LAB
ANION GAP SERPL CALC-SCNC: 20 MMOL/L — HIGH (ref 5–17)
BUN SERPL-MCNC: 68 MG/DL — HIGH (ref 7–23)
CALCIUM SERPL-MCNC: 7.2 MG/DL — LOW (ref 8.4–10.5)
CHLORIDE SERPL-SCNC: 106 MMOL/L — SIGNIFICANT CHANGE UP (ref 96–108)
CO2 SERPL-SCNC: 17 MMOL/L — LOW (ref 22–31)
CREAT SERPL-MCNC: 1.78 MG/DL — HIGH (ref 0.5–1.3)
GLUCOSE SERPL-MCNC: 96 MG/DL — SIGNIFICANT CHANGE UP (ref 70–99)
HCT VFR BLD CALC: 21.3 % — LOW (ref 39–50)
HCT VFR BLD CALC: 29.7 % — LOW (ref 39–50)
HCT VFR BLD CALC: 31.2 % — LOW (ref 39–50)
HGB BLD-MCNC: 6.9 G/DL — CRITICAL LOW (ref 13–17)
HGB BLD-MCNC: 9.2 G/DL — LOW (ref 13–17)
HGB BLD-MCNC: 9.9 G/DL — LOW (ref 13–17)
MAGNESIUM SERPL-MCNC: 2.7 MG/DL — HIGH (ref 1.6–2.6)
MCHC RBC-ENTMCNC: 28.9 PG — SIGNIFICANT CHANGE UP (ref 27–34)
MCHC RBC-ENTMCNC: 28.9 PG — SIGNIFICANT CHANGE UP (ref 27–34)
MCHC RBC-ENTMCNC: 29.2 PG — SIGNIFICANT CHANGE UP (ref 27–34)
MCHC RBC-ENTMCNC: 31 GM/DL — LOW (ref 32–36)
MCHC RBC-ENTMCNC: 31.7 GM/DL — LOW (ref 32–36)
MCHC RBC-ENTMCNC: 32.4 GM/DL — SIGNIFICANT CHANGE UP (ref 32–36)
MCV RBC AUTO: 89.1 FL — SIGNIFICANT CHANGE UP (ref 80–100)
MCV RBC AUTO: 92 FL — SIGNIFICANT CHANGE UP (ref 80–100)
MCV RBC AUTO: 93.4 FL — SIGNIFICANT CHANGE UP (ref 80–100)
NRBC # BLD: 3 /100 WBCS — HIGH (ref 0–0)
PLATELET # BLD AUTO: 131 K/UL — LOW (ref 150–400)
PLATELET # BLD AUTO: 132 K/UL — LOW (ref 150–400)
PLATELET # BLD AUTO: 146 K/UL — LOW (ref 150–400)
POTASSIUM SERPL-MCNC: 3.2 MMOL/L — LOW (ref 3.5–5.3)
POTASSIUM SERPL-SCNC: 3.2 MMOL/L — LOW (ref 3.5–5.3)
RBC # BLD: 2.39 M/UL — LOW (ref 4.2–5.8)
RBC # BLD: 3.18 M/UL — LOW (ref 4.2–5.8)
RBC # BLD: 3.39 M/UL — LOW (ref 4.2–5.8)
RBC # FLD: 17.1 % — HIGH (ref 10.3–14.5)
RBC # FLD: 17.6 % — HIGH (ref 10.3–14.5)
RBC # FLD: 18.3 % — HIGH (ref 10.3–14.5)
SODIUM SERPL-SCNC: 143 MMOL/L — SIGNIFICANT CHANGE UP (ref 135–145)
TROPONIN T SERPL-MCNC: 1.19 NG/ML — CRITICAL HIGH (ref 0–0.01)
TROPONIN T SERPL-MCNC: 1.25 NG/ML — CRITICAL HIGH (ref 0–0.01)
TROPONIN T SERPL-MCNC: 1.29 NG/ML — CRITICAL HIGH (ref 0–0.01)
WBC # BLD: 10.05 K/UL — SIGNIFICANT CHANGE UP (ref 3.8–10.5)
WBC # BLD: 9.02 K/UL — SIGNIFICANT CHANGE UP (ref 3.8–10.5)
WBC # BLD: 9.21 K/UL — SIGNIFICANT CHANGE UP (ref 3.8–10.5)
WBC # FLD AUTO: 10.05 K/UL — SIGNIFICANT CHANGE UP (ref 3.8–10.5)
WBC # FLD AUTO: 9.02 K/UL — SIGNIFICANT CHANGE UP (ref 3.8–10.5)
WBC # FLD AUTO: 9.21 K/UL — SIGNIFICANT CHANGE UP (ref 3.8–10.5)

## 2019-12-28 PROCEDURE — 93010 ELECTROCARDIOGRAM REPORT: CPT | Mod: 76

## 2019-12-28 PROCEDURE — 71045 X-RAY EXAM CHEST 1 VIEW: CPT | Mod: 26

## 2019-12-28 RX ORDER — POTASSIUM CHLORIDE 20 MEQ
20 PACKET (EA) ORAL ONCE
Refills: 0 | Status: DISCONTINUED | OUTPATIENT
Start: 2019-12-28 | End: 2019-12-28

## 2019-12-28 RX ORDER — SODIUM CHLORIDE 9 MG/ML
250 INJECTION INTRAMUSCULAR; INTRAVENOUS; SUBCUTANEOUS ONCE
Refills: 0 | Status: COMPLETED | OUTPATIENT
Start: 2019-12-28 | End: 2019-12-28

## 2019-12-28 RX ORDER — POTASSIUM CHLORIDE 20 MEQ
40 PACKET (EA) ORAL ONCE
Refills: 0 | Status: COMPLETED | OUTPATIENT
Start: 2019-12-28 | End: 2019-12-28

## 2019-12-28 RX ORDER — POTASSIUM CHLORIDE 20 MEQ
40 PACKET (EA) ORAL EVERY 4 HOURS
Refills: 0 | Status: DISCONTINUED | OUTPATIENT
Start: 2019-12-28 | End: 2019-12-28

## 2019-12-28 RX ADMIN — Medication 650 MILLIGRAM(S): at 07:02

## 2019-12-28 RX ADMIN — Medication 1 TABLET(S): at 12:30

## 2019-12-28 RX ADMIN — Medication 216 GRAM(S): at 07:01

## 2019-12-28 RX ADMIN — Medication 5 MILLIGRAM(S): at 07:02

## 2019-12-28 RX ADMIN — SENNA PLUS 1 TABLET(S): 8.6 TABLET ORAL at 12:30

## 2019-12-28 RX ADMIN — Medication 650 MILLIGRAM(S): at 19:10

## 2019-12-28 RX ADMIN — ATORVASTATIN CALCIUM 40 MILLIGRAM(S): 80 TABLET, FILM COATED ORAL at 21:40

## 2019-12-28 RX ADMIN — Medication 40 MILLIEQUIVALENT(S): at 21:40

## 2019-12-28 RX ADMIN — Medication 650 MILLIGRAM(S): at 18:07

## 2019-12-28 RX ADMIN — Medication 650 MILLIGRAM(S): at 18:06

## 2019-12-28 RX ADMIN — Medication 216 GRAM(S): at 18:06

## 2019-12-28 RX ADMIN — Medication 40 MILLIEQUIVALENT(S): at 14:41

## 2019-12-28 RX ADMIN — SODIUM CHLORIDE 250 MILLILITER(S): 9 INJECTION INTRAMUSCULAR; INTRAVENOUS; SUBCUTANEOUS at 00:47

## 2019-12-28 RX ADMIN — Medication 216 GRAM(S): at 12:31

## 2019-12-28 RX ADMIN — TAMSULOSIN HYDROCHLORIDE 0.4 MILLIGRAM(S): 0.4 CAPSULE ORAL at 21:40

## 2019-12-28 NOTE — PROGRESS NOTE ADULT - PROBLEM SELECTOR PLAN 5
Pt presented with SoB and left shoulder pain and tropinin elevated 0.66 (chronically elevated 0.1-2). EKG with TWI in inferior and lateral leads c/w previous. Trops now peaked. Suspect trop accumulation in CKD less likely ACS.    -Patient originally complained of chest discomfort and later said it was L. arm pain instead. EKG similar to previous; however troponin elevated to 1.25 in setting of dehydration, bactaremia, CKD, and GI bleed. Most likely 2/2 demand ischemia NSTEMI  - Will trend Troponin until peak   - No treatment at time as likely demand ischemia NSTEMI 2/2 CKD, GI bleed, and bacteremia. Low concern for ACS

## 2019-12-28 NOTE — PROGRESS NOTE ADULT - SUBJECTIVE AND OBJECTIVE BOX
OVERNIGHT EVENTS: Patient had dark red clots in his stool overnight. Hg was 6.9 and patient received 1 unit pRBC and 250 cc of NS.     SUBJECTIVE / INTERVAL HPI: Patient seen and examined at bedside. Patient complaining of chest discomfort, later said it is his left arm arthritis and not chest pain. EKG ordered which was similar to his previous. CXR looked clear. Trop elevated to 1.25 in setting of bacteremia and GI bleed overnight, will trend. Patient otherwise says he feels much better, and continues to have bowel movements that are more brown in color. No clots seen. ID recommends sending patient out on PICC for 6 week course of IV abx for the enterococcus faecalis bacteremia     VITAL SIGNS:  Vital Signs Last 24 Hrs  T(C): 36.6 (28 Dec 2019 09:02), Max: 36.7 (28 Dec 2019 06:50)  T(F): 97.9 (28 Dec 2019 09:02), Max: 98 (28 Dec 2019 06:50)  HR: 96 (28 Dec 2019 09:02) (80 - 100)  BP: 96/63 (28 Dec 2019 09:02) (86/59 - 103/67)  BP(mean): --  RR: 18 (28 Dec 2019 09:02) (16 - 18)  SpO2: 99% (28 Dec 2019 09:02) (94% - 100%)  I&O's Summary    27 Dec 2019 07:01  -  28 Dec 2019 07:00  --------------------------------------------------------  IN: 100 mL / OUT: 0 mL / NET: 100 mL        PHYSICAL EXAM:    General: cachectic, confusion waxes and wanes  HEENT: No blood seen in the nares.   Neck: supple, no jvd  Cardiovascular: +S1/S2; RRR, no m/r/g  Lungs: crackles at L. base, clear on right.    Gastrointestinal: abdomen soft, NT/ND; no rebound or guarding; +BSx4  Genitourinary: no suprapubic tenderness or fullness  Extremities: WWP; no LE edema; no clubbing or cyanosis  MSK: Shoulder tender to palpation  Vascular: 2+ radial, DP/PT pulses B/L  Neurological: AAOx2; no focal deficits    MEDICATIONS:  MEDICATIONS  (STANDING):  ampicillin  IVPB 2 Gram(s) IV Intermittent every 6 hours  atorvastatin 40 milliGRAM(s) Oral at bedtime  calcium carbonate    500 mG (Tums) Chewable 1 Tablet(s) Chew daily  methimazole 5 milliGRAM(s) Oral daily  polyethylene glycol 3350 17 Gram(s) Oral daily  potassium chloride    Tablet ER 40 milliEquivalent(s) Oral every 4 hours  predniSONE   Tablet 5 milliGRAM(s) Oral daily  senna 1 Tablet(s) Oral daily  sodium bicarbonate 650 milliGRAM(s) Oral two times a day  tamsulosin 0.4 milliGRAM(s) Oral at bedtime    MEDICATIONS  (PRN):  acetaminophen   Tablet .. 650 milliGRAM(s) Oral every 6 hours PRN Mild Pain (1 - 3), Moderate Pain (4 - 6)  HYDROmorphone   Tablet 2 milliGRAM(s) Oral every 6 hours PRN Severe Pain (7 - 10)  oxymetazoline 0.05% Nasal Spray 3 Spray(s) Right Nostril two times a day PRN Congestion      ALLERGIES:  Allergies    No Known Allergies    Intolerances        LABS:                        9.2    9.02  )-----------( 131      ( 28 Dec 2019 10:42 )             29.7     12-28    143  |  106  |  68<H>  ----------------------------<  96  3.2<L>   |  17<L>  |  1.78<H>    Ca    7.2<L>      28 Dec 2019 10:42  Mg     2.7     12-28      PT/INR - ( 27 Dec 2019 07:58 )   PT: 20.1 sec;   INR: 1.75          PTT - ( 27 Dec 2019 07:58 )  PTT:35.3 sec    CAPILLARY BLOOD GLUCOSE          RADIOLOGY & ADDITIONAL TESTS: Reviewed.

## 2019-12-28 NOTE — PROGRESS NOTE ADULT - SUBJECTIVE AND OBJECTIVE BOX
INTERVAL HPI/OVERNIGHT EVENTS:    ANTIBIOTICS/RELEVANT:    MEDICATIONS  (STANDING):  ampicillin  IVPB 2 Gram(s) IV Intermittent every 6 hours  atorvastatin 40 milliGRAM(s) Oral at bedtime  calcium carbonate    500 mG (Tums) Chewable 1 Tablet(s) Chew daily  methimazole 5 milliGRAM(s) Oral daily  polyethylene glycol 3350 17 Gram(s) Oral daily  potassium chloride    Tablet ER 40 milliEquivalent(s) Oral every 4 hours  predniSONE   Tablet 5 milliGRAM(s) Oral daily  senna 1 Tablet(s) Oral daily  sodium bicarbonate 650 milliGRAM(s) Oral two times a day  tamsulosin 0.4 milliGRAM(s) Oral at bedtime    MEDICATIONS  (PRN):  acetaminophen   Tablet .. 650 milliGRAM(s) Oral every 6 hours PRN Mild Pain (1 - 3), Moderate Pain (4 - 6)  HYDROmorphone   Tablet 2 milliGRAM(s) Oral every 6 hours PRN Severe Pain (7 - 10)  oxymetazoline 0.05% Nasal Spray 3 Spray(s) Right Nostril two times a day PRN Congestion      Allergies    No Known Allergies    Intolerances        Vital Signs Last 24 Hrs  T(C): 36.6 (28 Dec 2019 09:02), Max: 36.7 (28 Dec 2019 06:50)  T(F): 97.9 (28 Dec 2019 09:02), Max: 98 (28 Dec 2019 06:50)  HR: 96 (28 Dec 2019 09:02) (80 - 100)  BP: 96/63 (28 Dec 2019 09:02) (86/59 - 103/67)  BP(mean): --  RR: 18 (28 Dec 2019 09:02) (16 - 18)  SpO2: 99% (28 Dec 2019 09:02) (94% - 100%)    PHYSICAL EXAM:      Constitutional:    Eyes:    ENMT:    Neck:    Breasts:    Back:    Respiratory:    Cardiovascular:    Gastrointestinal:    Genitourinary:    Rectal:    Extremities:    Vascular:    Neurological:    Skin:    Lymph Nodes:    Musculoskeletal:    Psychiatric:        LABS:                        9.2    9.02  )-----------( 131      ( 28 Dec 2019 10:42 )             29.7     12-28    143  |  106  |  68<H>  ----------------------------<  96  3.2<L>   |  17<L>  |  1.78<H>    Ca    7.2<L>      28 Dec 2019 10:42  Mg     2.7     12-28      PT/INR - ( 27 Dec 2019 07:58 )   PT: 20.1 sec;   INR: 1.75          PTT - ( 27 Dec 2019 07:58 )  PTT:35.3 sec      MICROBIOLOGY:    RADIOLOGY & ADDITIONAL STUDIES: INTERVAL HPI/OVERNIGHT EVENTS:    Not able to get out of bed and sit in a chair - too weak  HCP not consenting to SIMI    MEDICATIONS  (STANDING):  ampicillin  IVPB 2 Gram(s) IV Intermittent every 6 hours  atorvastatin 40 milliGRAM(s) Oral at bedtime  calcium carbonate    500 mG (Tums) Chewable 1 Tablet(s) Chew daily  methimazole 5 milliGRAM(s) Oral daily  polyethylene glycol 3350 17 Gram(s) Oral daily  potassium chloride    Tablet ER 40 milliEquivalent(s) Oral every 4 hours  predniSONE   Tablet 5 milliGRAM(s) Oral daily  senna 1 Tablet(s) Oral daily  sodium bicarbonate 650 milliGRAM(s) Oral two times a day  tamsulosin 0.4 milliGRAM(s) Oral at bedtime    MEDICATIONS  (PRN):  acetaminophen   Tablet .. 650 milliGRAM(s) Oral every 6 hours PRN Mild Pain (1 - 3), Moderate Pain (4 - 6)  HYDROmorphone   Tablet 2 milliGRAM(s) Oral every 6 hours PRN Severe Pain (7 - 10)  oxymetazoline 0.05% Nasal Spray 3 Spray(s) Right Nostril two times a day PRN Congestion      Allergies    No Known Allergies    EXAM  Vital Signs Last 24 Hrs  T(C): 36.6 (28 Dec 2019 09:02), Max: 36.7 (28 Dec 2019 06:50)  T(F): 97.9 (28 Dec 2019 09:02), Max: 98 (28 Dec 2019 06:50)  HR: 96 (28 Dec 2019 09:02) (80 - 100)  BP: 96/63 (28 Dec 2019 09:02) (86/59 - 103/67)  BP(mean): --  RR: 18 (28 Dec 2019 09:02) (16 - 18)  SpO2: 99% (28 Dec 2019 09:02) (94% - 100%)  Awake and alert  Responding appropriately  No rash  Otherwise unchanged      LABS:                        9.2    9.02  )-----------( 131      ( 28 Dec 2019 10:42 )             29.7     12-28    143  |  106  |  68<H>  ----------------------------<  96  3.2<L>   |  17<L>  |  1.78<H>    Ca    7.2<L>      28 Dec 2019 10:42  Mg     2.7     12-28      PT/INR - ( 27 Dec 2019 07:58 )   PT: 20.1 sec;   INR: 1.75          PTT - ( 27 Dec 2019 07:58 )  PTT:35.3 sec      MICROBIOLOGY:    Culture - Blood (12.22.19 @ 17:39)    Specimen Source: .Blood Blood    Culture Results:   No growth at 5 days.    Culture - Blood (12.20.19 @ 15:30)    -  Gentamicin synergy: S    -  Enterococcus species: Detec    Specimen Source: .Blood Blood-Peripheral    Organism Identification: Enterococcus faecalis  Blood Culture PCR

## 2019-12-28 NOTE — PROGRESS NOTE ADULT - PROBLEM SELECTOR PLAN 9
Pt names Mary as [phone]. Has Molst signed 12/26/19 stating patient is DNR/DNI. Patient would like to continue to receive antibiotics.   -Palliative consulted

## 2019-12-28 NOTE — PROGRESS NOTE ADULT - PROBLEM SELECTOR PLAN 1
(elevated HR, E. faecalis bacteremia)  - c/w ampicillin 2g q6hrs  - Will consider transitioning to po abx for discharge  - ID recommends PICC for 6 weeks of IV treatment for possible endocarditis  -Blood cultures growing enterococcus faecalis susceptible to ampicillin and augmentin  -Ucx no growth  -Dr. Rowley (ID) consulted, agrees with continuing ampicillin for now    #Epistaxis  resolved  -ENT recs appreciated  - monitor Hg.    #GI bleed:   Patient experienced bloody BM and given 1 unit pRBC. Hg improved from 6.9 to 9.2.   - Repeat CBC in afternoon

## 2019-12-28 NOTE — PROGRESS NOTE ADULT - PROBLEM SELECTOR PLAN 2
Pt was dx at [facility] in May 2018, bronchoscopy done at that time mediastinal lymph node biopsy was positive for carcinoma consistent with metastatic prostate cancer. Pt being treated with Lupron and daily Zytega/Prednisone, recent PSA above 75,000. . New possible mildly enlarged retroperitoneal and pelvic  lymph nodes suspect progressive mets   -C/w Prednisone 5mg and tamsulosin  -Spoke with Dr. Paulino  who says prognosis is poor. He was not Next step is chemo which cannot be started while patient is bacteremic. Goals of care held and patient is now DNR/DNI.

## 2019-12-28 NOTE — PROGRESS NOTE ADULT - PROBLEM SELECTOR PLAN 3
Letter pended. Tonia Carrasco RN   Presents with oliguric PAM (1.79<1.22) worsening after 2L fluid and uptrending AGMA. Not retaining on exam. suspect PAM on CKD 2/2 pre-renal due to poor PO intake and sepsis  -FeNa consistent with pre-renal etiology most likely due to poor PO intake   #AGMA   Suspect 2/2 PAM, lactate negative, trops peaked

## 2019-12-28 NOTE — CHART NOTE - NSCHARTNOTEFT_GEN_A_CORE
Patient had a bloody BM around 11pm. Found to be hypotensive to 93/58, repeat /71. Pt asymptomatic. CBC and T&S obtained with Hgb of 6.9. He had an additional bloody BM at 12:30am, BP 86/59. Patient was verbally consented for 1u pRBCS transfusion. He is also given a 250cc NS bolus.

## 2019-12-28 NOTE — PROGRESS NOTE ADULT - PROBLEM SELECTOR PLAN 9
Pt names Mary as [phone]. Has Molst signed 12/26/19 stating patient is DNR/DNI. Patient would like to continue to receive antibiotics.   -Palliative consulted

## 2019-12-28 NOTE — PROGRESS NOTE ADULT - SUBJECTIVE AND OBJECTIVE BOX
OVERNIGHT EVENTS: Patient had dark red clots in his stool overnight. Hg was 6.9 and patient received 1 unit pRBC and 250 cc of NS.     SUBJECTIVE / INTERVAL HPI: Patient seen and examined at bedside. Patient complaining of chest discomfort, later said it is his left arm arthritis and not chest pain. EKG ordered which was similar to his previous. CXR looked clear. Trop elevated to 1.25 in setting of bacteremia and GI bleed overnight, will trend. Patient otherwise says he feels much better, and continues to have bowel movements that are more brown in color. No clots seen. ID recommends sending patient out on PICC for 6 week course of IV abx for the enterococcus faecalis bacteremia for concern of endocarditis. Will discuss with patient.     VITAL SIGNS:  Vital Signs Last 24 Hrs  T(C): 36.6 (28 Dec 2019 09:02), Max: 36.7 (28 Dec 2019 06:50)  T(F): 97.9 (28 Dec 2019 09:02), Max: 98 (28 Dec 2019 06:50)  HR: 96 (28 Dec 2019 09:02) (80 - 100)  BP: 96/63 (28 Dec 2019 09:02) (86/59 - 103/67)  BP(mean): --  RR: 18 (28 Dec 2019 09:02) (16 - 18)  SpO2: 99% (28 Dec 2019 09:02) (94% - 100%)  I&O's Summary    27 Dec 2019 07:01  -  28 Dec 2019 07:00  --------------------------------------------------------  IN: 100 mL / OUT: 0 mL / NET: 100 mL        PHYSICAL EXAM:    General: cachectic, confusion waxes and wanes  HEENT: No blood seen in the nares.   Neck: supple, no jvd  Cardiovascular: +S1/S2; RRR, no m/r/g  Lungs: crackles at L. base, clear on right.    Gastrointestinal: abdomen soft, NT/ND; no rebound or guarding; +BSx4  Genitourinary: no suprapubic tenderness or fullness  Extremities: WWP; no LE edema; no clubbing or cyanosis  MSK: Shoulder tender to palpation  Vascular: 2+ radial, DP/PT pulses B/L  Neurological: AAOx2; no focal deficits    MEDICATIONS:  MEDICATIONS  (STANDING):  ampicillin  IVPB 2 Gram(s) IV Intermittent every 6 hours  atorvastatin 40 milliGRAM(s) Oral at bedtime  calcium carbonate    500 mG (Tums) Chewable 1 Tablet(s) Chew daily  methimazole 5 milliGRAM(s) Oral daily  polyethylene glycol 3350 17 Gram(s) Oral daily  potassium chloride    Tablet ER 40 milliEquivalent(s) Oral every 4 hours  predniSONE   Tablet 5 milliGRAM(s) Oral daily  senna 1 Tablet(s) Oral daily  sodium bicarbonate 650 milliGRAM(s) Oral two times a day  tamsulosin 0.4 milliGRAM(s) Oral at bedtime    MEDICATIONS  (PRN):  acetaminophen   Tablet .. 650 milliGRAM(s) Oral every 6 hours PRN Mild Pain (1 - 3), Moderate Pain (4 - 6)  HYDROmorphone   Tablet 2 milliGRAM(s) Oral every 6 hours PRN Severe Pain (7 - 10)  oxymetazoline 0.05% Nasal Spray 3 Spray(s) Right Nostril two times a day PRN Congestion      ALLERGIES:  Allergies    No Known Allergies    Intolerances        LABS:                        9.2    9.02  )-----------( 131      ( 28 Dec 2019 10:42 )             29.7     12-28    143  |  106  |  68<H>  ----------------------------<  96  3.2<L>   |  17<L>  |  1.78<H>    Ca    7.2<L>      28 Dec 2019 10:42  Mg     2.7     12-28      PT/INR - ( 27 Dec 2019 07:58 )   PT: 20.1 sec;   INR: 1.75          PTT - ( 27 Dec 2019 07:58 )  PTT:35.3 sec    CAPILLARY BLOOD GLUCOSE          RADIOLOGY & ADDITIONAL TESTS: Reviewed.

## 2019-12-28 NOTE — PROGRESS NOTE ADULT - PROBLEM SELECTOR PLAN 2
Pt was dx at [facility] in May 2018, bronchoscopy done at that time mediastinal lymph node biopsy was positive for carcinoma consistent with metastatic prostate cancer. Pt being treated with Lupron and daily Zytega/Prednisone, recent PSA above 75,000. . New possible mildly enlarged retroperitoneal and pelvic  lymph nodes suspect progressive mets   -C/w Prednisone 5mg and tamsulosin  -Spoke with Dr. Paulino  who says prognosis is poor. He was not Next step is chemo which cannot be started while patient is bacteremic. Goals of care held and patient is now DNR/DNI.

## 2019-12-29 LAB
ANION GAP SERPL CALC-SCNC: 20 MMOL/L — HIGH (ref 5–17)
BUN SERPL-MCNC: 68 MG/DL — HIGH (ref 7–23)
CALCIUM SERPL-MCNC: 7.4 MG/DL — LOW (ref 8.4–10.5)
CHLORIDE SERPL-SCNC: 108 MMOL/L — SIGNIFICANT CHANGE UP (ref 96–108)
CO2 SERPL-SCNC: 16 MMOL/L — LOW (ref 22–31)
CREAT SERPL-MCNC: 1.75 MG/DL — HIGH (ref 0.5–1.3)
GLUCOSE SERPL-MCNC: 74 MG/DL — SIGNIFICANT CHANGE UP (ref 70–99)
HCT VFR BLD CALC: 30.8 % — LOW (ref 39–50)
HGB BLD-MCNC: 9.5 G/DL — LOW (ref 13–17)
MCHC RBC-ENTMCNC: 28.9 PG — SIGNIFICANT CHANGE UP (ref 27–34)
MCHC RBC-ENTMCNC: 30.8 GM/DL — LOW (ref 32–36)
MCV RBC AUTO: 93.6 FL — SIGNIFICANT CHANGE UP (ref 80–100)
NRBC # BLD: 4 /100 WBCS — HIGH (ref 0–0)
PLATELET # BLD AUTO: 129 K/UL — LOW (ref 150–400)
POTASSIUM SERPL-MCNC: 4 MMOL/L — SIGNIFICANT CHANGE UP (ref 3.5–5.3)
POTASSIUM SERPL-SCNC: 4 MMOL/L — SIGNIFICANT CHANGE UP (ref 3.5–5.3)
RBC # BLD: 3.29 M/UL — LOW (ref 4.2–5.8)
RBC # FLD: 18 % — HIGH (ref 10.3–14.5)
SODIUM SERPL-SCNC: 144 MMOL/L — SIGNIFICANT CHANGE UP (ref 135–145)
WBC # BLD: 9.04 K/UL — SIGNIFICANT CHANGE UP (ref 3.8–10.5)
WBC # FLD AUTO: 9.04 K/UL — SIGNIFICANT CHANGE UP (ref 3.8–10.5)

## 2019-12-29 RX ADMIN — ATORVASTATIN CALCIUM 40 MILLIGRAM(S): 80 TABLET, FILM COATED ORAL at 21:55

## 2019-12-29 RX ADMIN — Medication 216 GRAM(S): at 07:02

## 2019-12-29 RX ADMIN — Medication 650 MILLIGRAM(S): at 02:17

## 2019-12-29 RX ADMIN — Medication 5 MILLIGRAM(S): at 07:03

## 2019-12-29 RX ADMIN — Medication 650 MILLIGRAM(S): at 01:17

## 2019-12-29 RX ADMIN — SENNA PLUS 1 TABLET(S): 8.6 TABLET ORAL at 12:18

## 2019-12-29 RX ADMIN — Medication 216 GRAM(S): at 01:15

## 2019-12-29 RX ADMIN — Medication 216 GRAM(S): at 15:50

## 2019-12-29 RX ADMIN — Medication 650 MILLIGRAM(S): at 07:03

## 2019-12-29 RX ADMIN — Medication 650 MILLIGRAM(S): at 18:26

## 2019-12-29 RX ADMIN — TAMSULOSIN HYDROCHLORIDE 0.4 MILLIGRAM(S): 0.4 CAPSULE ORAL at 21:55

## 2019-12-29 RX ADMIN — Medication 216 GRAM(S): at 18:26

## 2019-12-29 RX ADMIN — POLYETHYLENE GLYCOL 3350 17 GRAM(S): 17 POWDER, FOR SOLUTION ORAL at 12:18

## 2019-12-29 RX ADMIN — Medication 1 TABLET(S): at 12:18

## 2019-12-29 NOTE — PROGRESS NOTE ADULT - SUBJECTIVE AND OBJECTIVE BOX
SUBJECTIVE / INTERVAL HPI: comfortable, on ab  Vital Signs Last 24 Hrs  T(C): 36.4 (29 Dec 2019 21:18), Max: 36.7 (29 Dec 2019 05:08)  T(F): 97.5 (29 Dec 2019 21:18), Max: 98.1 (29 Dec 2019 08:44)  HR: 76 (29 Dec 2019 21:18) (71 - 79)  BP: 103/68 (29 Dec 2019 21:18) (96/61 - 104/63)  BP(mean): --  RR: 18 (29 Dec 2019 21:18) (18 - 18)  SpO2: 99% (29 Dec 2019 21:18) (99% - 100%)  VITAL SIGNS:  Vital Signs Last 24 Hrs  T(C): 36.6 (28 Dec 2019 09:02), Max: 36.7 (28 Dec 2019 06:50)  T(F): 97.9 (28 Dec 2019 09:02), Max: 98 (28 Dec 2019 06:50)  HR: 96 (28 Dec 2019 09:02) (80 - 100)  BP: 96/63 (28 Dec 2019 09:02) (86/59 - 103/67)  BP(mean): --  RR: 18 (28 Dec 2019 09:02) (16 - 18)  SpO2: 99% (28 Dec 2019 09:02) (94% - 100%)  I&O's Summary    27 Dec 2019 07:01  -  28 Dec 2019 07:00  --------------------------------------------------------  IN: 100 mL / OUT: 0 mL / NET: 100 mL        PHYSICAL EXAM:    General: cachectic, confusion waxes and wanes  HEENT: No blood seen in the nares.   Neck: supple, no jvd  Cardiovascular: +S1/S2; RRR, no m/r/g  Lungs: crackles at L. base, clear on right.    Gastrointestinal: abdomen soft, NT/ND; no rebound or guarding; +BSx4  Genitourinary: no suprapubic tenderness or fullness  Extremities: WWP; no LE edema; no clubbing or cyanosis  MSK: Shoulder tender to palpation  Vascular: 2+ radial, DP/PT pulses B/L  Neurological: AAOx2; no focal deficits    MEDICATIONS:  MEDICATIONS  (STANDING):  ampicillin  IVPB 2 Gram(s) IV Intermittent every 6 hours  atorvastatin 40 milliGRAM(s) Oral at bedtime  calcium carbonate    500 mG (Tums) Chewable 1 Tablet(s) Chew daily  methimazole 5 milliGRAM(s) Oral daily  polyethylene glycol 3350 17 Gram(s) Oral daily  potassium chloride    Tablet ER 40 milliEquivalent(s) Oral every 4 hours  predniSONE   Tablet 5 milliGRAM(s) Oral daily  senna 1 Tablet(s) Oral daily  sodium bicarbonate 650 milliGRAM(s) Oral two times a day  tamsulosin 0.4 milliGRAM(s) Oral at bedtime    MEDICATIONS  (PRN):  acetaminophen   Tablet .. 650 milliGRAM(s) Oral every 6 hours PRN Mild Pain (1 - 3), Moderate Pain (4 - 6)  HYDROmorphone   Tablet 2 milliGRAM(s) Oral every 6 hours PRN Severe Pain (7 - 10)  oxymetazoline 0.05% Nasal Spray 3 Spray(s) Right Nostril two times a day PRN Congestion      ALLERGIES:  Allergies    No Known Allergies    Intolerances        LABS:                        9.5    9.04  )-----------( 129      ( 29 Dec 2019 06:24 )             30.8                         9.2    9.02  )-----------( 131      ( 28 Dec 2019 10:42 )             29.7     12-28 12-29    144  |  108  |  68<H>  ----------------------------<  74  4.0   |  16<L>  |  1.75<H>    Ca    7.4<L>      29 Dec 2019 06:24  Mg     2.7     12-28      143  |  106  |  68<H>  ----------------------------<  96  3.2<L>   |  17<L>  |  1.78<H>    Ca    7.2<L>      28 Dec 2019 10:42  Mg     2.7     12-28      PT/INR - ( 27 Dec 2019 07:58 )   PT: 20.1 sec;   INR: 1.75          PTT - ( 27 Dec 2019 07:58 )  PTT:35.3 sec    CAPILLARY BLOOD GLUCOSE          RADIOLOGY & ADDITIONAL TESTS: Reviewed.

## 2019-12-29 NOTE — PROGRESS NOTE ADULT - PROBLEM SELECTOR PLAN 9
Pt names Mary as [phone]. Has Molst signed 12/26/19 stating patient is DNR/DNI. Patient would like to continue to receive antibiotics.   -Palliative consulted

## 2019-12-30 RX ADMIN — Medication 5 MILLIGRAM(S): at 06:23

## 2019-12-30 RX ADMIN — Medication 650 MILLIGRAM(S): at 04:17

## 2019-12-30 RX ADMIN — SENNA PLUS 1 TABLET(S): 8.6 TABLET ORAL at 13:30

## 2019-12-30 RX ADMIN — Medication 216 GRAM(S): at 13:30

## 2019-12-30 RX ADMIN — Medication 216 GRAM(S): at 23:07

## 2019-12-30 RX ADMIN — Medication 1 TABLET(S): at 13:30

## 2019-12-30 RX ADMIN — Medication 216 GRAM(S): at 06:23

## 2019-12-30 RX ADMIN — TAMSULOSIN HYDROCHLORIDE 0.4 MILLIGRAM(S): 0.4 CAPSULE ORAL at 23:06

## 2019-12-30 RX ADMIN — Medication 216 GRAM(S): at 18:11

## 2019-12-30 RX ADMIN — ATORVASTATIN CALCIUM 40 MILLIGRAM(S): 80 TABLET, FILM COATED ORAL at 23:06

## 2019-12-30 RX ADMIN — Medication 650 MILLIGRAM(S): at 05:17

## 2019-12-30 RX ADMIN — Medication 650 MILLIGRAM(S): at 18:11

## 2019-12-30 RX ADMIN — Medication 650 MILLIGRAM(S): at 06:24

## 2019-12-30 RX ADMIN — POLYETHYLENE GLYCOL 3350 17 GRAM(S): 17 POWDER, FOR SOLUTION ORAL at 13:30

## 2019-12-30 RX ADMIN — Medication 216 GRAM(S): at 00:21

## 2019-12-30 NOTE — PROGRESS NOTE ADULT - PROBLEM SELECTOR PLAN 9
Pt names Mary as [phone]. Has Molst signed 12/26/19 stating patient is DNR/DNI. Patient would like to continue to receive antibiotics.   -Palliative consulted

## 2019-12-30 NOTE — PROGRESS NOTE ADULT - PROBLEM SELECTOR PLAN 2
Pt was dx at [facility] in May 2018, bronchoscopy done at that time mediastinal lymph node biopsy was positive for carcinoma consistent with metastatic prostate cancer. Pt being treated with Lupron and daily Zytega/Prednisone, recent PSA above 75,000. . New possible mildly enlarged retroperitoneal and pelvic  lymph nodes suspect progressive mets   -C/w Prednisone 5mg and tamsulosin  -Spoke with Dr. Paulino  who says prognosis is poor. He was not Next step is chemo which cannot be started while patient is bacteremic. Goals of care held and patient is now DNR/DNI.

## 2019-12-30 NOTE — PROGRESS NOTE ADULT - PROBLEM SELECTOR PLAN 1
(elevated HR, E. faecalis bacteremia)  - c/w ampicillin 2g q6hrs  - Will consider transitioning to po abx for discharge  - ID recommends PICC for 6 weeks of IV treatment for possible endocarditis  -Blood cultures growing enterococcus faecalis susceptible to ampicillin and augmentin  -Ucx no growth  -Dr. Rowley (ID) consulted, agrees with continuing ampicillin for now    #Epistaxis  resolved  -ENT recs appreciated  - monitor Hg.    #GI bleed:   Improved

## 2019-12-30 NOTE — PROGRESS NOTE ADULT - PROBLEM SELECTOR PLAN 4
Alk phos [phone], . Could be from liver disease vs bony mets. Of note, Zytiga can also cause transaminitis   -Improving.

## 2019-12-30 NOTE — PROGRESS NOTE ADULT - SUBJECTIVE AND OBJECTIVE BOX
OVERNIGHT EVENTS: NEEMA    SUBJECTIVE / INTERVAL HPI: Patient seen and examined at bedside. Patient says he feels well other than persistent L. shoulder pain that is alleviated with tylenol. He has had no further episodes of epistaxis or hematochezia. Will continue to address goals of care and PICC placement for IV abx as patient has very poor prognosis from his metastatic prostate cancer.     VITAL SIGNS:  Vital Signs Last 24 Hrs  T(C): 36.5 (30 Dec 2019 09:41), Max: 36.5 (30 Dec 2019 09:41)  T(F): 97.7 (30 Dec 2019 09:41), Max: 97.7 (30 Dec 2019 09:41)  HR: 85 (30 Dec 2019 09:41) (76 - 93)  BP: 106/70 (30 Dec 2019 09:41) (100/63 - 106/70)  BP(mean): --  RR: 19 (30 Dec 2019 09:41) (18 - 19)  SpO2: 85% (30 Dec 2019 09:41) (85% - 100%)  I&O's Summary      PHYSICAL EXAM:    General: cachectic, confused  HEENT: NC/AT; PERRL, anicteric sclera; MMM  Neck: supple  Cardiovascular: +S1/S2; RRR, no m/r/g  Respiratory: CTA B/L; no W/R/R  Gastrointestinal: soft, NT/ND; +BSx4  Extremities: WWP; no edema, clubbing or cyanosis. Left shoulder tender to palpation.   Vascular: 2+ radial, DP/PT pulses B/L  Neurological: AAOx2; no focal deficits    MEDICATIONS:  MEDICATIONS  (STANDING):  ampicillin  IVPB 2 Gram(s) IV Intermittent every 6 hours  atorvastatin 40 milliGRAM(s) Oral at bedtime  calcium carbonate    500 mG (Tums) Chewable 1 Tablet(s) Chew daily  methimazole 5 milliGRAM(s) Oral daily  polyethylene glycol 3350 17 Gram(s) Oral daily  predniSONE   Tablet 5 milliGRAM(s) Oral daily  senna 1 Tablet(s) Oral daily  sodium bicarbonate 650 milliGRAM(s) Oral two times a day  tamsulosin 0.4 milliGRAM(s) Oral at bedtime    MEDICATIONS  (PRN):  acetaminophen   Tablet .. 650 milliGRAM(s) Oral every 6 hours PRN Mild Pain (1 - 3), Moderate Pain (4 - 6)      ALLERGIES:  Allergies    No Known Allergies    Intolerances        LABS:                        9.5    9.04  )-----------( 129      ( 29 Dec 2019 06:24 )             30.8     12-29    144  |  108  |  68<H>  ----------------------------<  74  4.0   |  16<L>  |  1.75<H>    Ca    7.4<L>      29 Dec 2019 06:24  Mg     2.7     12-28          CAPILLARY BLOOD GLUCOSE          RADIOLOGY & ADDITIONAL TESTS: Reviewed.

## 2019-12-30 NOTE — PROGRESS NOTE ADULT - PROBLEM SELECTOR PLAN 9
Pt names Mary as [phone]. Has Molst signed 12/26/19 stating patient is DNR/DNI. Patient would like to continue to receive antibiotics.   -Palliative consulted

## 2019-12-30 NOTE — CHART NOTE - NSCHARTNOTEFT_GEN_A_CORE
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     GI Issues: Denies N/V/D/C. +BM 12/29; Fecal incontinence noted.     Pain: No pain reported at this time.     Skin Integrity: Intact pressure-wise noted.     Labs:   12-29    144  |  108  |  68<H>  ----------------------------<  74  4.0   |  16<L>  |  1.75<H>    Ca    7.4<L>      29 Dec 2019 06:24      CAPILLARY BLOOD GLUCOSE          Medications:  MEDICATIONS  (STANDING):  ampicillin  IVPB 2 Gram(s) IV Intermittent every 6 hours  atorvastatin 40 milliGRAM(s) Oral at bedtime  calcium carbonate    500 mG (Tums) Chewable 1 Tablet(s) Chew daily  methimazole 5 milliGRAM(s) Oral daily  polyethylene glycol 3350 17 Gram(s) Oral daily  predniSONE   Tablet 5 milliGRAM(s) Oral daily  senna 1 Tablet(s) Oral daily  sodium bicarbonate 650 milliGRAM(s) Oral two times a day  tamsulosin 0.4 milliGRAM(s) Oral at bedtime    MEDICATIONS  (PRN):  acetaminophen   Tablet .. 650 milliGRAM(s) Oral every 6 hours PRN Mild Pain (1 - 3), Moderate Pain (4 - 6)      Weight: (12/21) 109lbs      Weight Change: No new wts to assess at this time. Continue to monitor wt trends.     Nutrition Focused Physical Exam: Completed [ x, 12/23  ]  Not Pertinent [   ]  Noted w/ suspected severe PCM 12/23, see chart note.     Estimated energy needs:   Height: 66" Weight(12/21): 109lbs, IBW 142lbs+/-10%, %IBW 76.7%, BMI 17.6 kg/m2  IBW used for calculations as pt >120% of IBW. Needs adjusted for advance age and suspected severe malnutrition.  25-30kcal/kg= 0097-3192 kcal/day  1.2-1.4gm pro/kg= 59-69 gm protein/day  30-35ml/kg= 6677-0848 ml/day    Subjective:   Pt is a 79 y.o M PMH prostate cancer with mets to bone and lung, CAD with CABG in 2016, HFrEF (EF 30%), DVT (still on Eliquis?), from Dr. Terry’s office for hypotension SBP 70s and tachycardia. Found to have oliguric PAM (1.79<1.22 in Nov 2019) with transaminitis, and tropinemia. Currently being treated for E. Faecalis bacteremia, and undergoing ongoing goals of care discussion with palliative care. C/b epistaxis that was controlled with afrin and packing. Goals of Care discussion held and pt currently DNR/DNI. Course c/b bloody blots in stool and Hg to 6.9 s/p 1 pRBC, noted Hg now improved to 9.2. Continuing to address goals of care and PICC placement for IV abx as pt has very poor prognosis from his metastatic prostate cancer.    Pt seen resting in bed, awake, but noted to be disoriented/confused per chart. Pt reports slightly improved appetite, consuming 25-50% of meals; Observed 25% finished breakfast tray and 1/2 bottle finished Nepro (~215kcal, ~10g protein). Pt also reports has been drinking 1 Nepro daily. Denies N/V/D/C. +BM 12/29; Fecal incontinence noted. Encouraged PO intake, reviewed diet order, and trying other menu options. Pt appeared receptive. RD to f/u per protocol.      Previous Nutrition Diagnosis:  Malnutrition Suspect Severe PCM RT decreased appetite and weakness likely 2/2 bacteremia and advanced malignancy AEB severe muscle and fat wasting, suspected meeting <50% estimated needs.    Active [ X ]  Resolved [   ]    Goal: Consume >75% estimated needs.     Recommendations:  1) Recommend continue with regular, pureed diet (K and P WDL throughout admission. Na WDL (previously noted elevated)).   2) Recommend continue with Nepro BID (provides 850 kcal, 38g protein).  3) Monitor bi-weekly wts.  4) Monitor lytes and replete prn. Monitor lytes for need for further dietary restrictions.   5) Appetite stimulant and Bowel regimen per MD discretion.     Education:   Encouraged PO intake, reviewed diet order, and encouraged trying other menu options.     Risk Level: High [ X ] Moderate [   ] Low [   ]

## 2019-12-30 NOTE — PROGRESS NOTE ADULT - PROBLEM SELECTOR PLAN 2
Pt was dx at [facility] in May 2018, bronchoscopy done at that time mediastinal lymph node biopsy was positive for carcinoma consistent with metastatic prostate cancer. Pt being treated with Lupron and daily Zytega/Prednisone, recent PSA above 75,000. . New possible mildly enlarged retroperitoneal and pelvic  lymph nodes suspect progressive mets   -C/w Prednisone 5mg and tamsulosin  -Spoke with Dr. Paulino  who says prognosis is poor. He was not Next step is chemo which cannot be started while patient is bacteremic. Goals of care held and patient is now DNR/DNI.

## 2019-12-31 LAB
ANION GAP SERPL CALC-SCNC: 20 MMOL/L — HIGH (ref 5–17)
BUN SERPL-MCNC: 61 MG/DL — HIGH (ref 7–23)
CALCIUM SERPL-MCNC: 7 MG/DL — LOW (ref 8.4–10.5)
CHLORIDE SERPL-SCNC: 111 MMOL/L — HIGH (ref 96–108)
CO2 SERPL-SCNC: 15 MMOL/L — LOW (ref 22–31)
CREAT SERPL-MCNC: 1.41 MG/DL — HIGH (ref 0.5–1.3)
GLUCOSE SERPL-MCNC: 72 MG/DL — SIGNIFICANT CHANGE UP (ref 70–99)
HCT VFR BLD CALC: 31 % — LOW (ref 39–50)
HGB BLD-MCNC: 9.3 G/DL — LOW (ref 13–17)
MAGNESIUM SERPL-MCNC: 2.7 MG/DL — HIGH (ref 1.6–2.6)
MCHC RBC-ENTMCNC: 29.2 PG — SIGNIFICANT CHANGE UP (ref 27–34)
MCHC RBC-ENTMCNC: 30 GM/DL — LOW (ref 32–36)
MCV RBC AUTO: 97.5 FL — SIGNIFICANT CHANGE UP (ref 80–100)
NRBC # BLD: 3 /100 WBCS — HIGH (ref 0–0)
PLATELET # BLD AUTO: 115 K/UL — LOW (ref 150–400)
POTASSIUM SERPL-MCNC: 4 MMOL/L — SIGNIFICANT CHANGE UP (ref 3.5–5.3)
POTASSIUM SERPL-SCNC: 4 MMOL/L — SIGNIFICANT CHANGE UP (ref 3.5–5.3)
RBC # BLD: 3.18 M/UL — LOW (ref 4.2–5.8)
RBC # FLD: 19.3 % — HIGH (ref 10.3–14.5)
SODIUM SERPL-SCNC: 146 MMOL/L — HIGH (ref 135–145)
WBC # BLD: 7.08 K/UL — SIGNIFICANT CHANGE UP (ref 3.8–10.5)
WBC # FLD AUTO: 7.08 K/UL — SIGNIFICANT CHANGE UP (ref 3.8–10.5)

## 2019-12-31 PROCEDURE — 73030 X-RAY EXAM OF SHOULDER: CPT | Mod: 26,LT

## 2019-12-31 RX ADMIN — SENNA PLUS 1 TABLET(S): 8.6 TABLET ORAL at 11:25

## 2019-12-31 RX ADMIN — Medication 216 GRAM(S): at 18:26

## 2019-12-31 RX ADMIN — Medication 5 MILLIGRAM(S): at 06:17

## 2019-12-31 RX ADMIN — TAMSULOSIN HYDROCHLORIDE 0.4 MILLIGRAM(S): 0.4 CAPSULE ORAL at 22:38

## 2019-12-31 RX ADMIN — ATORVASTATIN CALCIUM 40 MILLIGRAM(S): 80 TABLET, FILM COATED ORAL at 22:38

## 2019-12-31 RX ADMIN — Medication 650 MILLIGRAM(S): at 06:18

## 2019-12-31 RX ADMIN — Medication 1 TABLET(S): at 11:25

## 2019-12-31 RX ADMIN — Medication 216 GRAM(S): at 06:18

## 2019-12-31 RX ADMIN — Medication 650 MILLIGRAM(S): at 18:26

## 2019-12-31 RX ADMIN — Medication 216 GRAM(S): at 11:25

## 2019-12-31 NOTE — PROGRESS NOTE ADULT - PROBLEM SELECTOR PLAN 1
(elevated HR, E. faecalis bacteremia)  - c/w ampicillin 2g q6hrs  - Will transition to amoxicillin 500mg TID upon discharge as patient does not want PICC placed  -Blood cultures growing enterococcus faecalis susceptible to ampicillin  -Ucx no growth  -Dr. Rowley (ID) consulted, agrees with continuing ampicillin for now    #Epistaxis  resolved  -ENT recs appreciated  - monitor Hg.    #GI bleed:   resolved

## 2019-12-31 NOTE — PROGRESS NOTE ADULT - PROBLEM SELECTOR PLAN 9
Pt names Mary as [phone]. Has Molst signed 12/26/19 stating patient is DNR/DNI. Patient would like to continue to receive oral antibiotics rather than IV.   -Palliative consulted

## 2019-12-31 NOTE — PROGRESS NOTE ADULT - PROBLEM SELECTOR PLAN 3
suspect PAM on CKD 2/2 pre-renal due to poor PO intake and sepsis  -FeNa consistent with pre-renal etiology most likely due to poor PO intake   #AGMA   Suspect 2/2 PAM, lactate negative, trops peaked

## 2019-12-31 NOTE — PROGRESS NOTE ADULT - PROBLEM SELECTOR PLAN 5
Pt presented with SOB and left shoulder pain and tropinin elevated 0.66 (chronically elevated 0.1-2). EKG with TWI in inferior and lateral leads c/w previous. Trops now peaked. Suspect trop accumulation in CKD less likely ACS.    -Patient originally complained of chest discomfort and later said it was L. arm pain instead. EKG similar to previous; however troponin elevated to 1.25 in setting of dehydration, bactaremia, CKD, and GI bleed. Most likely 2/2 demand ischemia NSTEMI  - trop peaked  - No treatment at time as likely demand ischemia NSTEMI 2/2 CKD, GI bleed, and bacteremia. Low concern for ACS

## 2019-12-31 NOTE — PROGRESS NOTE ADULT - SUBJECTIVE AND OBJECTIVE BOX
OVERNIGHT EVENTS: NEEMA    SUBJECTIVE / INTERVAL HPI: Patient seen and examined at bedside. Patient continues to complain of L. shoulder pain, says it has persisted for months. He also restated that he does not want a PICC placed, and wants to be discharged on oral antibiotics for his infection. ROS otherwise negative. He is awaiting BAILEY.     VITAL SIGNS:  Vital Signs Last 24 Hrs  T(C): 36.2 (31 Dec 2019 09:06), Max: 36.6 (30 Dec 2019 16:33)  T(F): 97.2 (31 Dec 2019 09:06), Max: 97.9 (30 Dec 2019 16:33)  HR: 90 (31 Dec 2019 09:06) (73 - 90)  BP: 101/69 (31 Dec 2019 09:06) (100/66 - 106/66)  BP(mean): --  RR: 19 (31 Dec 2019 09:06) (18 - 19)  SpO2: 98% (31 Dec 2019 09:06) (97% - 100%)  I&O's Summary      PHYSICAL EXAM:    General: cachectic  HEENT: NC/AT; PERRL, anicteric sclera; MMM  Neck: supple  Cardiovascular: +S1/S2; RRR, no m/r/g  Respiratory: CTA B/L; no W/R/R  Gastrointestinal: soft, NT/ND; +BSx4  Extremities: WWP; no edema, clubbing or cyanosis. L. shoulder tender to palpation  Vascular: 2+ radial, DP/PT pulses B/L  Neurological: AAOx3; L. shoulder weakness    MEDICATIONS:  MEDICATIONS  (STANDING):  ampicillin  IVPB 2 Gram(s) IV Intermittent every 6 hours  atorvastatin 40 milliGRAM(s) Oral at bedtime  calcium carbonate    500 mG (Tums) Chewable 1 Tablet(s) Chew daily  methimazole 5 milliGRAM(s) Oral daily  polyethylene glycol 3350 17 Gram(s) Oral daily  predniSONE   Tablet 5 milliGRAM(s) Oral daily  senna 1 Tablet(s) Oral daily  sodium bicarbonate 650 milliGRAM(s) Oral two times a day  tamsulosin 0.4 milliGRAM(s) Oral at bedtime    MEDICATIONS  (PRN):  acetaminophen   Tablet .. 650 milliGRAM(s) Oral every 6 hours PRN Mild Pain (1 - 3), Moderate Pain (4 - 6)      ALLERGIES:  Allergies    No Known Allergies    Intolerances        LABS:                        9.3    7.08  )-----------( 115      ( 31 Dec 2019 08:21 )             31.0     12-31    146<H>  |  111<H>  |  61<H>  ----------------------------<  72  4.0   |  15<L>  |  1.41<H>    Ca    7.0<L>      31 Dec 2019 08:21  Mg     2.7     12-31          CAPILLARY BLOOD GLUCOSE          RADIOLOGY & ADDITIONAL TESTS: Reviewed.

## 2020-01-01 RX ADMIN — Medication 1 TABLET(S): at 12:34

## 2020-01-01 RX ADMIN — ATORVASTATIN CALCIUM 40 MILLIGRAM(S): 80 TABLET, FILM COATED ORAL at 23:07

## 2020-01-01 RX ADMIN — Medication 216 GRAM(S): at 12:34

## 2020-01-01 RX ADMIN — Medication 216 GRAM(S): at 18:29

## 2020-01-01 RX ADMIN — Medication 216 GRAM(S): at 00:31

## 2020-01-01 RX ADMIN — SENNA PLUS 1 TABLET(S): 8.6 TABLET ORAL at 12:34

## 2020-01-01 RX ADMIN — Medication 650 MILLIGRAM(S): at 23:11

## 2020-01-01 RX ADMIN — Medication 650 MILLIGRAM(S): at 01:44

## 2020-01-01 RX ADMIN — Medication 650 MILLIGRAM(S): at 06:39

## 2020-01-01 RX ADMIN — Medication 650 MILLIGRAM(S): at 00:44

## 2020-01-01 RX ADMIN — Medication 216 GRAM(S): at 06:39

## 2020-01-01 RX ADMIN — Medication 650 MILLIGRAM(S): at 18:29

## 2020-01-01 RX ADMIN — Medication 5 MILLIGRAM(S): at 06:39

## 2020-01-01 RX ADMIN — Medication 216 GRAM(S): at 23:07

## 2020-01-01 RX ADMIN — TAMSULOSIN HYDROCHLORIDE 0.4 MILLIGRAM(S): 0.4 CAPSULE ORAL at 23:07

## 2020-01-01 NOTE — PROGRESS NOTE ADULT - SUBJECTIVE AND OBJECTIVE BOX
Subjective: comfortable    VITALS  Vital Signs Last 24 Hrs  T(C): 36.5 (01 Jan 2020 05:40), Max: 36.7 (31 Dec 2019 18:43)  T(F): 97.7 (01 Jan 2020 05:40), Max: 98.1 (31 Dec 2019 18:43)  HR: 78 (01 Jan 2020 05:40) (60 - 90)  BP: 112/69 (01 Jan 2020 05:40) (97/70 - 113/67)  BP(mean): 79 (31 Dec 2019 20:55) (79 - 79)  RR: 18 (01 Jan 2020 05:40) (18 - 19)  SpO2: 94% (01 Jan 2020 05:40) (94% - 99%)    I&O's Summary      CAPILLARY BLOOD GLUCOSE      PHYSICAL EXAM:    General: cachectic  HEENT: NC/AT; PERRL, anicteric sclera; MMM  Neck: supple  Cardiovascular: +S1/S2; RRR, no m/r/g  Respiratory: CTA B/L; no W/R/R  Gastrointestinal: soft, NT/ND; +BSx4  Extremities: WWP; no edema, clubbing or cyanosis. L. shoulder tender to palpation  Vascular: 2+ radial, DP/PT pulses B/L  Neurological: AAOx3; L. shoulder weakness        MEDICATIONS  (STANDING):  ampicillin  IVPB 2 Gram(s) IV Intermittent every 6 hours  atorvastatin 40 milliGRAM(s) Oral at bedtime  calcium carbonate    500 mG (Tums) Chewable 1 Tablet(s) Chew daily  methimazole 5 milliGRAM(s) Oral daily  polyethylene glycol 3350 17 Gram(s) Oral daily  predniSONE   Tablet 5 milliGRAM(s) Oral daily  senna 1 Tablet(s) Oral daily  sodium bicarbonate 650 milliGRAM(s) Oral two times a day  tamsulosin 0.4 milliGRAM(s) Oral at bedtime    MEDICATIONS  (PRN):  acetaminophen   Tablet .. 650 milliGRAM(s) Oral every 6 hours PRN Mild Pain (1 - 3), Moderate Pain (4 - 6)      LABS                        9.3    7.08  )-----------( 115      ( 31 Dec 2019 08:21 )             31.0     12-31    146<H>  |  111<H>  |  61<H>  ----------------------------<  72  4.0   |  15<L>  |  1.41<H>    Ca    7.0<L>      31 Dec 2019 08:21  Mg     2.7     12-31

## 2020-01-01 NOTE — PROGRESS NOTE ADULT - PROBLEM SELECTOR PLAN 2
Pt was dx at [facility] in May 2018, bronchoscopy done at that time mediastinal lymph node biopsy was positive for carcinoma consistent with metastatic prostate cancer. Pt being treated with Lupron and daily Zytega/Prednisone, recent PSA above 75,000. . New possible mildly enlarged retroperitoneal and pelvic  lymph nodes suspect progressive mets   -C/w Prednisone 5mg and tamsulosin  -Spoke with Dr. Paulino  who says prognosis is poor. He was not Next step is chemo which cannot be started while patient is bacteremic. Goals of care held and patient is now DNR/DNI.

## 2020-01-01 NOTE — PROGRESS NOTE ADULT - PROBLEM SELECTOR PLAN 9
Pt names Mary as [phone]. Has Molst signed 12/26/19 stating patient is DNR/DNI. Patient would like to continue to receive oral antibiotics rather than IV.   -Palliative consulted

## 2020-01-01 NOTE — PROGRESS NOTE ADULT - PROBLEM SELECTOR PLAN 9
Pt names Mary as [phone]. Has Molst signed 12/26/19 stating patient is DNR/DNI. Patient would like to continue to receive oral antibiotics rather than IV.   -Palliative consulted

## 2020-01-01 NOTE — PROGRESS NOTE ADULT - PROBLEM SELECTOR PLAN 5
Pt presented with SOB and left shoulder pain and tropinin elevated 0.66 (chronically elevated 0.1-2). EKG with TWI in inferior and lateral leads c/w previous. Trops now peaked. Suspect trop accumulation in CKD less likely ACS.    -Patient originally complained of chest discomfort and later said it was L. arm pain instead. EKG similar to previous; however troponin elevated to 1.25 in setting of dehydration, bacteremia, CKD, and GI bleed. Most likely 2/2 demand ischemia NSTEMI  - trop peaked  - No treatment at time as likely demand ischemia NSTEMI 2/2 CKD, GI bleed, and bacteremia. Low concern for ACS

## 2020-01-01 NOTE — PROGRESS NOTE ADULT - PROBLEM SELECTOR PLAN 5
Pt presented with SOB and left shoulder pain and tropinin elevated 0.66 (chronically elevated 0.1-2). EKG with TWI in inferior and lateral leads c/w previous. Trops now peaked. Suspect trop accumulation in CKD less likely ACS.    -Patient originally complained of chest discomfort and later said it was L. arm pain instead. EKG similar to previous; however troponin elevated to 1.25 in setting of dehydration, bactaremia, CKD, and GI bleed. Most likely 2/2 demand ischemia NSTEMI  - trop peaked  - No treatment at time as likely demand ischemia NSTEMI 2/2 CKD, GI bleed, and bacteremia. Low concern for ACS Pt presented with SOB and left shoulder pain and tropinin elevated 0.66 (chronically elevated 0.1-2). EKG with TWI in inferior and lateral leads c/w previous. Trops now peaked. Suspect trop accumulation in CKD less likely ACS.    -Patient originally complained of chest discomfort and later said it was L. arm pain instead. EKG similar to previous; however troponin elevated to 1.25 in setting of dehydration, bacteremia, CKD, and GI bleed. Most likely 2/2 demand ischemia NSTEMI  - trop peaked  - No treatment at time as likely demand ischemia NSTEMI 2/2 CKD, GI bleed, and bacteremia. Low concern for ACS

## 2020-01-01 NOTE — PROGRESS NOTE ADULT - SUBJECTIVE AND OBJECTIVE BOX
O/N Events:  The patient desaturated to the high 80s and was placed on 2L nasal cannula which improved his saturation to the high 90s.  He was then taken off of nasal cannula and his saturations returned to normal.  Subjective: Patient seen and examined at bedside, he has no complaints this morning.    VITALS  Vital Signs Last 24 Hrs  T(C): 36.5 (01 Jan 2020 05:40), Max: 36.7 (31 Dec 2019 18:43)  T(F): 97.7 (01 Jan 2020 05:40), Max: 98.1 (31 Dec 2019 18:43)  HR: 78 (01 Jan 2020 05:40) (60 - 90)  BP: 112/69 (01 Jan 2020 05:40) (97/70 - 113/67)  BP(mean): 79 (31 Dec 2019 20:55) (79 - 79)  RR: 18 (01 Jan 2020 05:40) (18 - 19)  SpO2: 94% (01 Jan 2020 05:40) (94% - 99%)    I&O's Summary      CAPILLARY BLOOD GLUCOSE      PHYSICAL EXAM:    General: cachectic  HEENT: NC/AT; PERRL, anicteric sclera; MMM  Neck: supple  Cardiovascular: +S1/S2; RRR, no m/r/g  Respiratory: CTA B/L; no W/R/R  Gastrointestinal: soft, NT/ND; +BSx4  Extremities: WWP; no edema, clubbing or cyanosis. L. shoulder tender to palpation  Vascular: 2+ radial, DP/PT pulses B/L  Neurological: AAOx3; L. shoulder weakness        MEDICATIONS  (STANDING):  ampicillin  IVPB 2 Gram(s) IV Intermittent every 6 hours  atorvastatin 40 milliGRAM(s) Oral at bedtime  calcium carbonate    500 mG (Tums) Chewable 1 Tablet(s) Chew daily  methimazole 5 milliGRAM(s) Oral daily  polyethylene glycol 3350 17 Gram(s) Oral daily  predniSONE   Tablet 5 milliGRAM(s) Oral daily  senna 1 Tablet(s) Oral daily  sodium bicarbonate 650 milliGRAM(s) Oral two times a day  tamsulosin 0.4 milliGRAM(s) Oral at bedtime    MEDICATIONS  (PRN):  acetaminophen   Tablet .. 650 milliGRAM(s) Oral every 6 hours PRN Mild Pain (1 - 3), Moderate Pain (4 - 6)      LABS                        9.3    7.08  )-----------( 115      ( 31 Dec 2019 08:21 )             31.0     12-31    146<H>  |  111<H>  |  61<H>  ----------------------------<  72  4.0   |  15<L>  |  1.41<H>    Ca    7.0<L>      31 Dec 2019 08:21  Mg     2.7     12-31

## 2020-01-02 PROCEDURE — 99233 SBSQ HOSP IP/OBS HIGH 50: CPT

## 2020-01-02 RX ADMIN — POLYETHYLENE GLYCOL 3350 17 GRAM(S): 17 POWDER, FOR SOLUTION ORAL at 14:22

## 2020-01-02 RX ADMIN — Medication 216 GRAM(S): at 17:18

## 2020-01-02 RX ADMIN — TAMSULOSIN HYDROCHLORIDE 0.4 MILLIGRAM(S): 0.4 CAPSULE ORAL at 22:54

## 2020-01-02 RX ADMIN — Medication 5 MILLIGRAM(S): at 05:44

## 2020-01-02 RX ADMIN — SENNA PLUS 1 TABLET(S): 8.6 TABLET ORAL at 13:48

## 2020-01-02 RX ADMIN — Medication 650 MILLIGRAM(S): at 00:11

## 2020-01-02 RX ADMIN — ATORVASTATIN CALCIUM 40 MILLIGRAM(S): 80 TABLET, FILM COATED ORAL at 22:54

## 2020-01-02 RX ADMIN — Medication 216 GRAM(S): at 05:44

## 2020-01-02 RX ADMIN — Medication 1 TABLET(S): at 13:47

## 2020-01-02 RX ADMIN — Medication 650 MILLIGRAM(S): at 05:45

## 2020-01-02 RX ADMIN — Medication 650 MILLIGRAM(S): at 17:18

## 2020-01-02 RX ADMIN — Medication 216 GRAM(S): at 13:47

## 2020-01-02 NOTE — PROGRESS NOTE ADULT - PROBLEM SELECTOR PLAN 2
Pt was dx at [facility] in May 2018, bronchoscopy done at that time mediastinal lymph node biopsy was positive for carcinoma consistent with metastatic prostate cancer. Pt being treated with Lupron and daily Zytega/Prednisone, recent PSA above 75,000. . New possible mildly enlarged retroperitoneal and pelvic  lymph nodes suspect progressive mets   -C/w Prednisone 5mg and tamsulosin  -Spoke with Dr. Paulino  who says prognosis is poor. He was not Next step is chemo which cannot be started while patient is bacteremic. Goals of care held and patient is now DNR/DNI.

## 2020-01-02 NOTE — PROGRESS NOTE ADULT - PROBLEM SELECTOR PLAN 1
noted with 1 episode on 12/25  Resolved with Afrin and nasal packing   no further bleeding since then

## 2020-01-02 NOTE — PROGRESS NOTE ADULT - SUBJECTIVE AND OBJECTIVE BOX
VIDA MCCALL   MRN-[id number]    CC: PAM HAO     HPI:  79M PMH prostate cancer with mets to bone and lung, CAD with CABG in 2016, HFrEF (EF 30%), DVT (on Eliquis), from Dr. Terry’s office for hypotension SBP 70s and tachycardic. Pt currently c/o left shoulder pain, lower abd pain and HA. Living at [facility]. Endorses decreases po intake, denies CP/pressure, fevers, chills, nightsweats, n/v/d/c, changes to urinary habits. States he can walk. Per labs on 19: Na 136, CO2 Alk phos 375, AST 66, BUN 24, Cre 1.22 Uric Acid 11.3, PSA 5000. ED provider spoke to Dr. Terry: requesting palliative consult.     Per discussion with Dr Terry pt has been W/C bound for past 2-3 months and has failed on Lupron, started on Zygeva with ongoing  elevation of PSA since May  Palliative Medicine consulted  for further discussion re: GOC     Subjective: pt with decompensation overnight, resolved spontaneously. Pt appears weaker but able to engage in conversation     DYSPNEA: N	  NAUS/VOM:  N	  SECRETIONS: N	  AGITATION:  N  Pain (Y/N):   pt denies    -Provocation/Palliation:  -Quality/Quantity:  -Radiating:  -Severity:  -Timing/Frequency:  -Impact on ADLs:    OTHER REVIEW OF SYSTEMS: 12 pt review of systems unremarkable      PEx:  Vital Signs Last 24 Hrs  T(C): 36.8 (2020 09:35), Max: 36.8 (2020 05:15)  T(F): 98.2 (2020 09:35), Max: 98.3 (2020 05:15)  HR: 90 (2020 09:35) (82 - 90)  BP: 105/66 (2020 09:35) (94/62 - 105/66)  BP(mean): --  RR: 19 (2020 09:35) (16 - 19)  SpO2: 92% (2020 09:35) (92% - 98%)    General: cachectic elderly  male in bed in  NAD  HEENT: NC/AT sclera anicteric, MM dry   Neck: supple, no JVD   CVS: S1S2 RRR  Resp: CTA B/L   GI: soft NT  + constipation  Neuro: no focal deficits   Psych:  calm and cooperative    Skin: + vertical scar to chest, no break of skin   Lymph: No LAD   No Known Allergies      Opiate Naive (Y/N): Yes   -iStop reviewed (Y/N): Yeson inital consultation I Ref:  [id number]    MEDICATIONS: REVIEWED    MEDICATIONS  (STANDING):  ampicillin  IVPB 2 Gram(s) IV Intermittent every 6 hours  atorvastatin 40 milliGRAM(s) Oral at bedtime  calcium carbonate    500 mG (Tums) Chewable 1 Tablet(s) Chew daily  methimazole 5 milliGRAM(s) Oral daily  polyethylene glycol 3350 17 Gram(s) Oral daily  predniSONE   Tablet 5 milliGRAM(s) Oral daily  senna 1 Tablet(s) Oral daily  sodium bicarbonate 650 milliGRAM(s) Oral two times a day  tamsulosin 0.4 milliGRAM(s) Oral at bedtime    MEDICATIONS  (PRN):  acetaminophen   Tablet .. 650 milliGRAM(s) Oral every 6 hours PRN Mild Pain (1 - 3), Moderate Pain (4 - 6)    LABS: No new labs since                      IMAGIN Lead ECG   Ventricular Rate 70 BPM    Atrial Rate 70 BPM    P-R Interval 202 ms    QRS Duration 88 ms    Q-T Interval 456 ms    QTC Calculation(Bezet) 492 ms    P Axis 102 degrees    R Axis -76 degrees    T Axis 102 degrees    Diagnosis Line Sinus rhythm with premature supraventricular complexes  Left Anterior Fasicular Block  Septal infarct , age undetermined  Inferior infarct , age undetermined  Poor R Wave Progression    Advanced Directives:    HCP: pt has confirmed that his longtime friend and parishioner Mary Worley  [phone]  has been his assigned proxy     Decision maker: pt able to provide input into complex medcal decisions but has poor insight into his disease   Legal surrogate: Mary Worley) [phone], pt planning to change to a relative and will provide info when available      GOALS OF CARE DISCUSSION       Palliative care info/counseling provided	           Documentation of GOC: pending transfer to SNF for LTC and likely hospice           REFERRALS	               Unit SW/Case Mgmt              Massage Therapy       Music Therapy        Nutrition

## 2020-01-02 NOTE — PROGRESS NOTE ADULT - SUBJECTIVE AND OBJECTIVE BOX
OVERNIGHT EVENTS: NEEMA    SUBJECTIVE / INTERVAL HPI: Patient seen and examined at bedside. He says he feels weak and has trouble walking. ROS otherwise negative. No epistaxis or lower GI bleed since last week. Awaiting BAILEY.     VITAL SIGNS:  Vital Signs Last 24 Hrs  T(C): 36.8 (02 Jan 2020 09:35), Max: 36.8 (02 Jan 2020 05:15)  T(F): 98.2 (02 Jan 2020 09:35), Max: 98.3 (02 Jan 2020 05:15)  HR: 90 (02 Jan 2020 09:35) (82 - 90)  BP: 105/66 (02 Jan 2020 09:35) (94/62 - 105/66)  BP(mean): --  RR: 19 (02 Jan 2020 09:35) (16 - 19)  SpO2: 92% (02 Jan 2020 09:35) (92% - 98%)  I&O's Summary      PHYSICAL EXAM:    General: Cachectic elderly male  HEENT: NC/AT; PERRL, anicteric sclera; MMM  Neck: supple  Cardiovascular: +S1/S2; RRR  Respiratory: CTA B/L; no W/R/R  Gastrointestinal: soft, NT/ND; +BSx4  Extremities: WWP; no edema, clubbing or cyanosis  Vascular: 2+ radial, DP/PT pulses B/L  Neurological: AAOx3; no focal deficits    MEDICATIONS:  MEDICATIONS  (STANDING):  ampicillin  IVPB 2 Gram(s) IV Intermittent every 6 hours  atorvastatin 40 milliGRAM(s) Oral at bedtime  calcium carbonate    500 mG (Tums) Chewable 1 Tablet(s) Chew daily  methimazole 5 milliGRAM(s) Oral daily  polyethylene glycol 3350 17 Gram(s) Oral daily  predniSONE   Tablet 5 milliGRAM(s) Oral daily  senna 1 Tablet(s) Oral daily  sodium bicarbonate 650 milliGRAM(s) Oral two times a day  tamsulosin 0.4 milliGRAM(s) Oral at bedtime    MEDICATIONS  (PRN):  acetaminophen   Tablet .. 650 milliGRAM(s) Oral every 6 hours PRN Mild Pain (1 - 3), Moderate Pain (4 - 6)      ALLERGIES:  Allergies    No Known Allergies    Intolerances        LABS:              CAPILLARY BLOOD GLUCOSE          RADIOLOGY & ADDITIONAL TESTS: Reviewed.

## 2020-01-02 NOTE — PROGRESS NOTE ADULT - PROBLEM SELECTOR PLAN 9
Pt names Mary as [phone]. Has Molst signed 12/26/19 stating patient is DNR/DNI. Patient would like to continue to receive oral antibiotics rather than IV.   -Palliative consulted

## 2020-01-02 NOTE — PROGRESS NOTE ADULT - PROBLEM SELECTOR PLAN 5
Support provided to patient and family. Patient to have access to supportive services during rest of hospital stay as the patient/family deemed necessary ie. Chaplaincy, Massage therapy, Music therapy, Patient and family supportive services, Palliative SW, etc.    pt awaiting transition ot SNF

## 2020-01-02 NOTE — PROGRESS NOTE ADULT - SUBJECTIVE AND OBJECTIVE BOX
Interval history:  Continues to be weak.  Episode of desaturation yesterday.     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 He has been on monthly lupron & Xgeva and daily oral Abiraterone w/prednisone. Questionable compliance with Abiraterone due to rehab stay (uncertain if pt has been receiving it appropriately) and pt himself (poor historian and unaware of exact meds he gets at the facility). PSA has rapidly increased since May 2019...28-->202-->386 (at this point Abiraterone started)-->972-->2632-->5000. On follow up this week noted to be more confused from baseline. Vitals showed hypotension and tachycardia. Decision was made to send to [facility] for close obs. Pt's health care proxy present during office visit yesterday. GOC discuss was held.     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    Social Hx:  X-smoker. No drugs. No EtOH abuse.    Family Hx:  Not contributory at this time.     MEDICATIONS  (STANDING):  ampicillin  IVPB 2 Gram(s) IV Intermittent every 6 hours  atorvastatin 40 milliGRAM(s) Oral at bedtime  calcium carbonate    500 mG (Tums) Chewable 1 Tablet(s) Chew daily  methimazole 5 milliGRAM(s) Oral daily  polyethylene glycol 3350 17 Gram(s) Oral daily  predniSONE   Tablet 5 milliGRAM(s) Oral daily  senna 1 Tablet(s) Oral daily  sodium bicarbonate 650 milliGRAM(s) Oral two times a day  tamsulosin 0.4 milliGRAM(s) Oral at bedtime    MEDICATIONS  (PRN):  acetaminophen   Tablet .. 650 milliGRAM(s) Oral every 6 hours PRN Mild Pain (1 - 3), Moderate Pain (4 - 6)  HYDROmorphone   Tablet 2 milliGRAM(s) Oral every 6 hours PRN Severe Pain (7 - 10)  oxymetazoline 0.05% Nasal Spray 3 Spray(s) Right Nostril two times a day PRN Congestion      Allergies  No Known Allergies    Exam:  Vital Signs Last 24 Hrs  T(C): 36.8 (02 Jan 2020 05:15), Max: 36.8 (02 Jan 2020 05:15)  T(F): 98.3 (02 Jan 2020 05:15), Max: 98.3 (02 Jan 2020 05:15)  HR: 89 (02 Jan 2020 05:15) (82 - 103)  BP: 94/62 (02 Jan 2020 05:15) (94/62 - 107/74)  BP(mean): --  RR: 18 (02 Jan 2020 05:15) (16 - 18)  SpO2: 94% (02 Jan 2020 05:15) (94% - 100%)    Very thin and weak. Lying in bed. Cachexia.   HEENT: nasal packing, dried blood on nares and lip  CTAB anteriorly  RRR, S1/S2  +BS, soft, ntnd  +Pulses/ equal. No edema.  AAOx3, moving all ext    Labs:  No labs from today.     Imaging:  CT scans reviewed.

## 2020-01-02 NOTE — PROGRESS NOTE ADULT - SUBJECTIVE AND OBJECTIVE BOX
OVERNIGHT EVENTS: NEEMA    SUBJECTIVE / INTERVAL HPI: Patient seen and examined at bedside. He says he feels weak and has trouble walking. ROS otherwise negative. No epistaxis or lower GI bleed since last week. Awaiting BAILEY.     VITAL SIGNS:  Vital Signs Last 24 Hrs  T(C): 36.8 (02 Jan 2020 09:35), Max: 36.8 (02 Jan 2020 05:15)  T(F): 98.2 (02 Jan 2020 09:35), Max: 98.3 (02 Jan 2020 05:15)  HR: 90 (02 Jan 2020 09:35) (82 - 90)  BP: 105/66 (02 Jan 2020 09:35) (94/62 - 105/66)  BP(mean): --  RR: 19 (02 Jan 2020 09:35) (16 - 19)  SpO2: 92% (02 Jan 2020 09:35) (92% - 98%)  I&O's Summary      PHYSICAL EXAM:    General: Cachectic elderly male  HEENT: NC/AT; PERRL, anicteric sclera; MMM  Neck: supple  Cardiovascular: +S1/S2; RRR  Respiratory: CTA B/L; no W/R/R  Gastrointestinal: soft, NT/ND; +BSx4  Extremities: WWP; no edema, clubbing or cyanosis  Vascular: 2+ radial, DP/PT pulses B/L  Neurological: AAOx3; no focal deficits    MEDICATIONS:  MEDICATIONS  (STANDING):  ampicillin  IVPB 2 Gram(s) IV Intermittent every 6 hours  atorvastatin 40 milliGRAM(s) Oral at bedtime  calcium carbonate    500 mG (Tums) Chewable 1 Tablet(s) Chew daily  methimazole 5 milliGRAM(s) Oral daily  polyethylene glycol 3350 17 Gram(s) Oral daily  predniSONE   Tablet 5 milliGRAM(s) Oral daily  senna 1 Tablet(s) Oral daily  sodium bicarbonate 650 milliGRAM(s) Oral two times a day  tamsulosin 0.4 milliGRAM(s) Oral at bedtime    MEDICATIONS  (PRN):  acetaminophen   Tablet .. 650 milliGRAM(s) Oral every 6 hours PRN Mild Pain (1 - 3), Moderate Pain (4 - 6)      ALLERGIES:  Allergies    No Known Allergies    Intolerances        LABS:              CAPILLARY BLOOD GLUCOSE          RADIOLOGY & ADDITIONAL TESTS: Reviewed. OVERNIGHT EVENTS: NEEMA    SUBJECTIVE / INTERVAL HPI: c/o weakness    VITAL SIGNS:  Vital Signs Last 24 Hrs  T(C): 36.8 (02 Jan 2020 09:35), Max: 36.8 (02 Jan 2020 05:15)  T(F): 98.2 (02 Jan 2020 09:35), Max: 98.3 (02 Jan 2020 05:15)  HR: 90 (02 Jan 2020 09:35) (82 - 90)  BP: 105/66 (02 Jan 2020 09:35) (94/62 - 105/66)  BP(mean): --  RR: 19 (02 Jan 2020 09:35) (16 - 19)  SpO2: 92% (02 Jan 2020 09:35) (92% - 98%)  I&O's Summary      PHYSICAL EXAM:    General: Cachectic elderly male  HEENT: NC/AT; PERRL, anicteric sclera; MMM  Neck: supple  Cardiovascular: +S1/S2; RRR  Respiratory: CTA B/L; no W/R/R  Gastrointestinal: soft, NT/ND; +BSx4  Extremities: WWP; no edema, clubbing or cyanosis  Vascular: 2+ radial, DP/PT pulses B/L  Neurological: AAOx3; no focal deficits    MEDICATIONS:  MEDICATIONS  (STANDING):  ampicillin  IVPB 2 Gram(s) IV Intermittent every 6 hours  atorvastatin 40 milliGRAM(s) Oral at bedtime  calcium carbonate    500 mG (Tums) Chewable 1 Tablet(s) Chew daily  methimazole 5 milliGRAM(s) Oral daily  polyethylene glycol 3350 17 Gram(s) Oral daily  predniSONE   Tablet 5 milliGRAM(s) Oral daily  senna 1 Tablet(s) Oral daily  sodium bicarbonate 650 milliGRAM(s) Oral two times a day  tamsulosin 0.4 milliGRAM(s) Oral at bedtime    MEDICATIONS  (PRN):  acetaminophen   Tablet .. 650 milliGRAM(s) Oral every 6 hours PRN Mild Pain (1 - 3), Moderate Pain (4 - 6)      ALLERGIES:  Allergies    No Known Allergies    Intolerances        LABS:              CAPILLARY BLOOD GLUCOSE          RADIOLOGY & ADDITIONAL TESTS: Reviewed.

## 2020-01-02 NOTE — PROGRESS NOTE ADULT - PROBLEM SELECTOR PLAN 9
Pt names Mayr as [phone]. Has Molst signed 12/26/19 stating patient is DNR/DNI. Patient would like to continue to receive oral antibiotics rather than IV.   -Palliative consulted

## 2020-01-03 ENCOUNTER — TRANSCRIPTION ENCOUNTER (OUTPATIENT)
Age: 80
End: 2020-01-03

## 2020-01-03 VITALS
SYSTOLIC BLOOD PRESSURE: 100 MMHG | DIASTOLIC BLOOD PRESSURE: 65 MMHG | HEART RATE: 95 BPM | OXYGEN SATURATION: 100 % | TEMPERATURE: 98 F | RESPIRATION RATE: 19 BRPM

## 2020-01-03 PROCEDURE — 93005 ELECTROCARDIOGRAM TRACING: CPT

## 2020-01-03 PROCEDURE — 80202 ASSAY OF VANCOMYCIN: CPT

## 2020-01-03 PROCEDURE — 82977 ASSAY OF GGT: CPT

## 2020-01-03 PROCEDURE — 82570 ASSAY OF URINE CREATININE: CPT

## 2020-01-03 PROCEDURE — 85045 AUTOMATED RETICULOCYTE COUNT: CPT

## 2020-01-03 PROCEDURE — 84550 ASSAY OF BLOOD/URIC ACID: CPT

## 2020-01-03 PROCEDURE — 71250 CT THORAX DX C-: CPT

## 2020-01-03 PROCEDURE — 97162 PT EVAL MOD COMPLEX 30 MIN: CPT

## 2020-01-03 PROCEDURE — 87040 BLOOD CULTURE FOR BACTERIA: CPT

## 2020-01-03 PROCEDURE — 85025 COMPLETE CBC W/AUTO DIFF WBC: CPT

## 2020-01-03 PROCEDURE — 84443 ASSAY THYROID STIM HORMONE: CPT

## 2020-01-03 PROCEDURE — 84100 ASSAY OF PHOSPHORUS: CPT

## 2020-01-03 PROCEDURE — 85730 THROMBOPLASTIN TIME PARTIAL: CPT

## 2020-01-03 PROCEDURE — 80048 BASIC METABOLIC PNL TOTAL CA: CPT

## 2020-01-03 PROCEDURE — 97116 GAIT TRAINING THERAPY: CPT

## 2020-01-03 PROCEDURE — 82550 ASSAY OF CK (CPK): CPT

## 2020-01-03 PROCEDURE — 93306 TTE W/DOPPLER COMPLETE: CPT

## 2020-01-03 PROCEDURE — 84480 ASSAY TRIIODOTHYRONINE (T3): CPT

## 2020-01-03 PROCEDURE — 85610 PROTHROMBIN TIME: CPT

## 2020-01-03 PROCEDURE — 80053 COMPREHEN METABOLIC PANEL: CPT

## 2020-01-03 PROCEDURE — 73030 X-RAY EXAM OF SHOULDER: CPT

## 2020-01-03 PROCEDURE — 71045 X-RAY EXAM CHEST 1 VIEW: CPT

## 2020-01-03 PROCEDURE — 82553 CREATINE MB FRACTION: CPT

## 2020-01-03 PROCEDURE — 36415 COLL VENOUS BLD VENIPUNCTURE: CPT

## 2020-01-03 PROCEDURE — 99285 EMERGENCY DEPT VISIT HI MDM: CPT | Mod: 25

## 2020-01-03 PROCEDURE — 84300 ASSAY OF URINE SODIUM: CPT

## 2020-01-03 PROCEDURE — 87186 SC STD MICRODIL/AGAR DIL: CPT

## 2020-01-03 PROCEDURE — 76770 US EXAM ABDO BACK WALL COMP: CPT

## 2020-01-03 PROCEDURE — 84439 ASSAY OF FREE THYROXINE: CPT

## 2020-01-03 PROCEDURE — 83605 ASSAY OF LACTIC ACID: CPT

## 2020-01-03 PROCEDURE — 96361 HYDRATE IV INFUSION ADD-ON: CPT

## 2020-01-03 PROCEDURE — P9016: CPT

## 2020-01-03 PROCEDURE — 84145 PROCALCITONIN (PCT): CPT

## 2020-01-03 PROCEDURE — 74176 CT ABD & PELVIS W/O CONTRAST: CPT

## 2020-01-03 PROCEDURE — 99233 SBSQ HOSP IP/OBS HIGH 50: CPT

## 2020-01-03 PROCEDURE — 85027 COMPLETE CBC AUTOMATED: CPT

## 2020-01-03 PROCEDURE — 84484 ASSAY OF TROPONIN QUANT: CPT

## 2020-01-03 PROCEDURE — 83735 ASSAY OF MAGNESIUM: CPT

## 2020-01-03 PROCEDURE — 86900 BLOOD TYPING SEROLOGIC ABO: CPT

## 2020-01-03 PROCEDURE — 81001 URINALYSIS AUTO W/SCOPE: CPT

## 2020-01-03 PROCEDURE — 82010 KETONE BODYS QUAN: CPT

## 2020-01-03 PROCEDURE — 86850 RBC ANTIBODY SCREEN: CPT

## 2020-01-03 PROCEDURE — 97110 THERAPEUTIC EXERCISES: CPT

## 2020-01-03 PROCEDURE — 76705 ECHO EXAM OF ABDOMEN: CPT

## 2020-01-03 PROCEDURE — 96360 HYDRATION IV INFUSION INIT: CPT

## 2020-01-03 PROCEDURE — 87086 URINE CULTURE/COLONY COUNT: CPT

## 2020-01-03 PROCEDURE — 70450 CT HEAD/BRAIN W/O DYE: CPT

## 2020-01-03 PROCEDURE — 83615 LACTATE (LD) (LDH) ENZYME: CPT

## 2020-01-03 PROCEDURE — 86923 COMPATIBILITY TEST ELECTRIC: CPT

## 2020-01-03 PROCEDURE — 83880 ASSAY OF NATRIURETIC PEPTIDE: CPT

## 2020-01-03 PROCEDURE — 87150 DNA/RNA AMPLIFIED PROBE: CPT

## 2020-01-03 PROCEDURE — 36430 TRANSFUSION BLD/BLD COMPNT: CPT

## 2020-01-03 PROCEDURE — 86901 BLOOD TYPING SEROLOGIC RH(D): CPT

## 2020-01-03 PROCEDURE — 82040 ASSAY OF SERUM ALBUMIN: CPT

## 2020-01-03 RX ORDER — POLYETHYLENE GLYCOL 3350 17 G/17G
17 POWDER, FOR SOLUTION ORAL
Qty: 0 | Refills: 0 | DISCHARGE
Start: 2020-01-03

## 2020-01-03 RX ORDER — AMOXICILLIN 250 MG/5ML
1 SUSPENSION, RECONSTITUTED, ORAL (ML) ORAL
Qty: 90 | Refills: 11
Start: 2020-01-03 | End: 2020-12-27

## 2020-01-03 RX ORDER — FUROSEMIDE 40 MG
1 TABLET ORAL
Qty: 0 | Refills: 0 | DISCHARGE

## 2020-01-03 RX ADMIN — Medication 650 MILLIGRAM(S): at 06:07

## 2020-01-03 RX ADMIN — Medication 650 MILLIGRAM(S): at 00:54

## 2020-01-03 RX ADMIN — Medication 216 GRAM(S): at 12:08

## 2020-01-03 RX ADMIN — Medication 5 MILLIGRAM(S): at 06:07

## 2020-01-03 RX ADMIN — Medication 216 GRAM(S): at 06:07

## 2020-01-03 RX ADMIN — Medication 216 GRAM(S): at 00:46

## 2020-01-03 RX ADMIN — Medication 650 MILLIGRAM(S): at 00:10

## 2020-01-03 RX ADMIN — SENNA PLUS 1 TABLET(S): 8.6 TABLET ORAL at 12:08

## 2020-01-03 RX ADMIN — Medication 1 TABLET(S): at 12:07

## 2020-01-03 NOTE — PROGRESS NOTE ADULT - PROVIDER SPECIALTY LIST ADULT
ENT
Heme/Onc
Infectious Disease
Infectious Disease
Internal Medicine
Palliative Care
Internal Medicine

## 2020-01-03 NOTE — PROGRESS NOTE ADULT - REASON FOR ADMISSION
PAM

## 2020-01-03 NOTE — PROGRESS NOTE ADULT - PROBLEM SELECTOR PROBLEM 10
Transition of care performed with sharing of clinical summary

## 2020-01-03 NOTE — PROGRESS NOTE ADULT - SUBJECTIVE AND OBJECTIVE BOX
SUBJECTIVE / INTERVAL HPI:s he feels a bit dizzy, but has not been drinking fluids throughout the day. ROS otherwise negative.     VITAL SIGNS:  Vital Signs Last 24 Hrs  T(C): 36.3 (03 Jan 2020 05:17), Max: 36.8 (02 Jan 2020 09:35)  T(F): 97.4 (03 Jan 2020 05:17), Max: 98.2 (02 Jan 2020 09:35)  HR: 82 (03 Jan 2020 05:17) (59 - 90)  BP: 95/50 (03 Jan 2020 05:17) (95/50 - 105/66)  BP(mean): --  RR: 18 (03 Jan 2020 05:17) (14 - 20)  SpO2: 98% (03 Jan 2020 05:17) (92% - 100%)  I&O's Summary      PHYSICAL EXAM:    General: Cachectic, resting comfortably  HEENT: NC/AT; PERRL, anicteric sclera; MMM  Neck: supple  Cardiovascular: +S1/S2; slightly tachy, regular rhythm   Respiratory: CTA B/L; no W/R/R  Gastrointestinal: soft, NT/ND; +BSx4  Extremities: WWP; no edema, clubbing or cyanosis  Vascular: 2+ radial, DP/PT pulses B/L  Neurological: AAOx3; no focal deficits    MEDICATIONS:  MEDICATIONS  (STANDING):  ampicillin  IVPB 2 Gram(s) IV Intermittent every 6 hours  atorvastatin 40 milliGRAM(s) Oral at bedtime  calcium carbonate    500 mG (Tums) Chewable 1 Tablet(s) Chew daily  methimazole 5 milliGRAM(s) Oral daily  polyethylene glycol 3350 17 Gram(s) Oral daily  predniSONE   Tablet 5 milliGRAM(s) Oral daily  senna 1 Tablet(s) Oral daily  sodium bicarbonate 650 milliGRAM(s) Oral two times a day  tamsulosin 0.4 milliGRAM(s) Oral at bedtime    MEDICATIONS  (PRN):  acetaminophen   Tablet .. 650 milliGRAM(s) Oral every 6 hours PRN Mild Pain (1 - 3), Moderate Pain (4 - 6)      ALLERGIES:  Allergies    No Known Allergies    Intolerances        LABS:              CAPILLARY BLOOD GLUCOSE          RADIOLOGY & ADDITIONAL TESTS: Reviewed.

## 2020-01-03 NOTE — PROGRESS NOTE ADULT - PROBLEM SELECTOR PLAN 5
Support provided to patient and family. Patient to have access to supportive services during rest of hospital stay as the patient/family deemed necessary ie. Chaplaincy, Massage therapy, Music therapy, Patient and family supportive services, Palliative SW, etc.    pt awaiting transition ot SNF. [facility] once there

## 2020-01-03 NOTE — PROGRESS NOTE ADULT - PROBLEM SELECTOR PLAN 3
pt severely cachectic despite appetite is fair   undisclosed weight loss since dx  Albumin 2.9 12/26  dehydrated with poor po fluid intake   BMI 17.6  followed by dietary, pt states he needs softer diet  MD to order.  soft diet as tolerated

## 2020-01-03 NOTE — PROGRESS NOTE ADULT - PROBLEM SELECTOR PLAN 1
(elevated HR, E. faecalis bacteremia)  - c/w ampicillin 2g q6hrs  - Will transition to amoxicillin 500mg TID upon discharge as patient does not want PICC placed  -Blood cultures growing enterococcus faecalis susceptible to ampicillin  -Ucx no growth  -Dr. Rowley (ID) consulted, agrees with continuing ampicillin for now

## 2020-01-03 NOTE — PROGRESS NOTE ADULT - PROBLEM SELECTOR PLAN 9
Pt names Mary as [phone]. Has Molst signed 12/26/19 stating patient is DNR/DNI. Patient would like to continue to receive oral antibiotics rather than IV.   -Palliative consulted

## 2020-01-03 NOTE — PROGRESS NOTE ADULT - PROBLEM SELECTOR PLAN 9
Pt names Mary as [phone]. Has Molst signed 12/26/19 stating patient is DNR/DNI. Patient would like to continue to receive oral antibiotics rather than IV.   -Palliative consulted

## 2020-01-03 NOTE — PROGRESS NOTE ADULT - ASSESSMENT
78 yo M with hx as above known to me for met/tomasa res prostate ca. Sent to hospital from office for weakness, confusion, failure to thrive, hypotension.     Prostate ca- treatment hx as above. Progressive disease despite treatment. Not a candidate for next line of treatment which would involve chemo.   GO discussed with Ms Talavera (pt's health care proxy). It was made clear that pt's disease is progressing and further treatment options are limited.  If he does not improve with treatment of his bacteremia then will push for palliative care/ comfort care. Please call consult today.   Weakness/ confusion/ hypotension/ elevated LDH/ elevated lactic acid related to bacteremia and advanced malignancy.   On Ceftriaxone. Repeat cultures neg.   Elevated Alk phos due to bone mets. US of liver/gallbladder neg for cholecystitis.   Anemia- Hgb lower than outpt baseline (was in 13s on Nov 22, 2019). Will monitor closely.   Microangiopathy less likely given normal plts and only mildly abn coags but will keep this on the differential as it may be an evolving process.   Cont supportive care for now.   Monitor for fluid overload in setting of IV hydration- pt has a reduced EF of approx 30%.       Thank you to every for helping with this case.  Ledy Terry MD  [facility]- [phone]
79M PMH prostate cancer with mets to bone and lung, CAD with CABG in 2016, HFrEF (EF 30%), DVT (on Eliquis), from Dr. Terry’s office for hypotension SBP 70s and tachycardic.
79M PMH prostate cancer with mets to bone and lung, CAD with CABG in 2016, HFrEF (EF 30%), DVT (still on Eliquis?), from Dr. Terry’s office for hypotension SBP 70s and tachycardia. Found to have oliguric PAM (1.79<1.22 in Nov 2019) with transaminitis, and tropinemia. Admitted for rehydration and palliative  care consult
79M PMH prostate cancer with mets to bone and lung, CAD with CABG in 2016, HFrEF (EF 30%), DVT (still on Eliquis?), from Dr. Terry’s office for hypotension SBP 70s and tachycardia. Found to have oliguric PAM (1.79<1.22 in Nov 2019) with transaminitis, and tropinemia. Admitted for rehydration and palliative  care consult
79M PMH prostate cancer with mets to bone and lung, CAD with CABG in 2016, HFrEF (EF 30%), DVT (still on Eliquis?), from Dr. Terry’s office for hypotension SBP 70s and tachycardia. Found to have oliguric PAM (1.79<1.22 in Nov 2019) with transaminitis, and tropinemia. Currently being treated for E. Faecalis  patient currently DNR/DNI.
79M PMH prostate cancer with mets to bone and lung, CAD with CABG in 2016, HFrEF (EF 30%), DVT (still on Eliquis?), from Dr. Terry’s office for hypotension SBP 70s and tachycardia. Found to have oliguric PAM (1.79<1.22 in Nov 2019) with transaminitis, and tropinemia. Currently being treated for E. Faecalis bacteremia, and undergoing ongoing goals of care discussion with palliative care.
79M PMH prostate cancer with mets to bone and lung, CAD with CABG in 2016, HFrEF (EF 30%), DVT (still on Eliquis?), from Dr. Terry’s office for hypotension SBP 70s and tachycardia. Found to have oliguric PAM (1.79<1.22 in Nov 2019) with transaminitis, and tropinemia. Currently being treated for E. Faecalis bacteremia, and undergoing ongoing goals of care discussion with palliative care. Goals of Care discussion held and patient currently DNR/DNI with patient not wanting to undergo SIMI.
79M PMH prostate cancer with mets to bone and lung, CAD with CABG in 2016, HFrEF (EF 30%), DVT (still on Eliquis?), from Dr. Terry’s office for hypotension SBP 70s and tachycardia. Found to have oliguric PAM (1.79<1.22 in Nov 2019) with transaminitis, and tropinemia. Currently being treated for E. Faecalis bacteremia, and undergoing ongoing goals of care discussion with palliative care. Goals of Care discussion held and patient currently DNR/DNI with patient not wanting to undergo SIMI.
79M PMH prostate cancer with mets to bone and lung, CAD with CABG in 2016, HFrEF (EF 30%), DVT (still on Eliquis?), from Dr. Terry’s office for hypotension SBP 70s and tachycardia. Found to have oliguric PAM (1.79<1.22 in Nov 2019) with transaminitis, and tropinemia. Currently being treated for E. Faecalis bacteremia, and undergoing ongoing goals of care discussion with palliative care. Goals of Care discussion held and patient currently DNR/DNI with patient not wanting to undergo SIMI. Awaiting BAILEY placement.
79M PMH prostate cancer with mets to bone and lung, CAD with CABG in 2016, HFrEF (EF 30%), DVT (still on Eliquis?), from Dr. Terry’s office for hypotension SBP 70s and tachycardia. Found to have oliguric PAM (1.79<1.22 in Nov 2019) with transaminitis, and tropinemia. Currently being treated for E. Faecalis bacteremia, and undergoing ongoing goals of care discussion with palliative care. c/b epistaxis that was controlled with afrin and packing.
79M PMH prostate cancer with mets to bone and lung, CAD with CABG in 2016, HFrEF (EF 30%), DVT (still on Eliquis?), from Dr. Terry’s office for hypotension SBP 70s and tachycardia. Found to have oliguric PAM (1.79<1.22 in Nov 2019) with transaminitis, and tropinemia. Currently being treated for E. Faecalis bacteremia, and undergoing ongoing goals of care discussion with palliative care. c/b epistaxis that was controlled with afrin and packing. Goals of Care discussion held and patient currently DNR/DNI. Course c/b bloody blots in stool and Hg to 6.9 s/p 1 pRBC
79M PMH prostate cancer with mets to bone and lung, CAD with CABG in 2016, HFrEF (EF 30%), DVT (still on Eliquis?), from Dr. Terry’s office for hypotension SBP 70s and tachycardia. Found to have oliguric PAM (1.79<1.22 in Nov 2019) with transaminitis, and tropinemia. Currently being treated for E. Faecalis bacteremia, and undergoing ongoing goals of care discussion with palliative care. c/b epistaxis that was controlled with afrin and packing. Goals of Care discussion held and patient currently DNR/DNI. Course c/b bloody blots in stool and Hg to 6.9 s/p 1 pRBC
79M PMH prostate cancer with mets to bone and lung, CAD with CABG in 2016, HFrEF (EF 30%), DVT (still on Eliquis?), from Dr. Terry’s office for hypotension SBP 70s and tachycardia. Found to have oliguric PAM (1.79<1.22 in Nov 2019) with transaminitis, and tropinemia. Currently being treated for E. Faecalis bacteremia, and undergoing ongoing goals of care discussion with palliative care. c/b epistaxis that was controlled with afrin and packing. Goals of Care discussion held and patient currently DNT/DNI.
79M PMH prostate cancer with mets to bone and lung, CAD with CABG in 2016, HFrEF (EF 30%), DVT (still on Eliquis?), from Dr. Terry’s office for hypotension SBP 70s and tachycardia. Found to have oliguric PAM (1.79<1.22 in Nov 2019) with transaminitis, and tropinemia. Currently being treated for E. Faecalis bacteremia, and undergoing ongoing goals of care discussion with palliative care. c/b epistaxis that was controlled with afrin and packing. Palliative will have goals of care discussion today.
79M PMH prostate cancer with mets to bone and lung, CAD with CABG in 2016, HFrEF (EF 30%), DVT (still on Eliquis?), from Dr. Terry’s office for hypotension SBP 70s and tachycardia. Found to have oliguric PAM (1.79<1.22 in Nov 2019) with transaminitis, and tropinemia. Currently being treated for E. Faecalis bacteremia. Goals of Care discussion held and patient currently DNR/DNI with patient not wanting to undergo SIMI or PICC for outpatient IV abx. Will be discharged on amoxicillin 500mg TID. Awaiting Anh placement.
80 yo M with hx as above known to me for met/tomasa res prostate ca. Sent to hospital from office for weakness, confusion, failure to thrive, hypotension.     Prostate ca- treatment hx as above. Progressive disease despite treatment. Not a candidate for next line of treatment which would involve chemo.   [facility] discussed with Ms Talavera (pt's health care proxy). It was made clear that pt's disease is progressing and further treatment options are limited.  If he does not improve with treatment of his bacteremia then will push for palliative care/ comfort care.   Weakness/ confusion/ hypotension/ elevated LDH/ elevated lactic acid may be related to bacteremia and advanced malignancy.   On Ceftriaxone. Please repeat blood cultures.   Elevated Alk phos due to bone mets. US of liver/gallbladder neg for cholecystitis.   Anemia- Hgb lower than outpt baseline (was in 13s on Nov 22, 2019). Will monitor closely.   Microangiopathy less likely given normal plts and only mildly abn coags but will keep this on the differential as it may be an evolving process.   Cont supportive care for now.   Monitor for fluid overload in setting of IV hydration- pt has a reduced EF of approx 30%.   Please call palliative care on Monday.     Thank you to every for helping with this case.  Ledy Terry MD  [facility]- [phone]
80 yo M with hx as above known to me for met/tomasa res prostate ca. Sent to hospital from office for weakness, confusion, failure to thrive, hypotension.  Today with epistaxis, improved s/p nasal packing.    Prostate ca- treatment hx as above. Progressive disease despite treatment. Not a candidate for next line of treatment which would involve chemo.   GOC discussed by Dr. WILLIS  with Ms Sadaf (pt's health care proxy). It was made clear that pt's disease is progressing and further treatment options are limited.  Pall care following, appreciate reccs.  Weakness related to bacteremia and advanced malignancy.   On ampicillin.  Elevated Alk phos due to bone mets. US of liver/gallbladder neg for cholecystitis.   Anemia- Hgb lower than outpt baseline (was in 13s on Nov 22, 2019). Will monitor closely.   Cont supportive care for now.   Monitor for fluid overload in setting of IV hydration- pt has a reduced EF of approx 30%.       Thank you to every for helping with this case.  Lorie Galvan MD for Ledy Terry MD  [facility]- [phone]
80 yo M with hx as above known to me for met/tomasa res prostate ca. Sent to hospital from office for weakness, confusion, failure to thrive, hypotension.  Today with epistaxis, improved s/p nasal packing.    Prostate ca- treatment hx as above. Progressive disease despite treatment. Not a candidate for next line of treatment which would involve chemo.   GOC discussed by Dr. WILLIS  with Ms Sadaf (pt's health care proxy). It was made clear that pt's disease is progressing and further treatment options are limited.  Pall care following, appreciate reccs.  Weakness related to bacteremia and advanced malignancy.   On ampicillin. Will need to switch to oral.   Elevated Alk phos due to bone mets. US of liver/gallbladder neg for cholecystitis.   Anemia- Hgb lower than outpt baseline (was in 13s on Nov 22, 2019). Will monitor closely.   Cont supportive care for now.   Monitor for fluid overload in setting of IV hydration- pt has a reduced EF of approx 30%.   DNR/DNI. Appreciate palliative care recs.  Had discussion again with pt this morning about progression of his cancer and limited options for further treatment.       Thank you to every for helping with this case.  Lorie Galvan MD for Ledy Terry MD  [facility]- [phone]
80 yo M with hx as above known to me for met/tomasa res prostate ca. Sent to hospital from office for weakness, confusion, failure to thrive, hypotension.  Today with epistaxis, improved s/p nasal packing.    Prostate ca- treatment hx as above. Progressive disease despite treatment. Not a candidate for next line of treatment which would involve chemo.   GOC discussed by Dr. WILLIS  with Ms Talavera (pt's health care proxy). It was made clear that pt's disease is progressing and further treatment options are limited.  Pall care following, appreciate reccs.  Weakness related to bacteremia and advanced malignancy.   On ampicillin. Will need to switch to oral.   Elevated Alk phos due to bone mets. US of liver/gallbladder neg for cholecystitis.   Anemia- Hgb lower than outpt baseline (was in 13s on Nov 22, 2019).   Cont supportive care for now.   DNR/DNI. Appreciate palliative care recs.  Will discuss future medical plan with pt's health care proxy Ms Talavera.       Thank you   Ledy Terry MD  [facility]- [phone]
Metastatic prostate cancer  Enterococcal bacteremia / septicemia   Unclear whether endocarditis present  Portal of entry unknown but suspect  or GI source    Awaiting further discussions regarding the expected prognosis and goals of care to determine whether patient is a candidate for further work up such as SIMI and longer term IV antibiotics    For now, continue maximum dose of IV Ampicillin 2 gm IV q 4 hours, but adjusted to improving renal function.  Patient is not a good candidate for addition of synergistic aminoglycosides/gentamicin.    Could possibly add IV Ceftriaxone
79M PMH prostate cancer with mets to bone and lung, CAD with CABG in 2016, HFrEF (EF 30%), DVT (still on Eliquis?), from Dr. Terry’s office for hypotension SBP 70s and tachycardia. Found to have oliguric PAM (1.79<1.22 in Nov 2019) with transaminitis, and tropinemia. Currently being treated for E. Faecalis bacteremia, and undergoing ongoing goals of care discussion with palliative care.
79M PMH prostate cancer with mets to bone and lung, CAD with CABG in 2016, HFrEF (EF 30%), DVT (still on Eliquis?), from Dr. Terry’s office for hypotension SBP 70s and tachycardia. Found to have oliguric PAM (1.79<1.22 in Nov 2019) with transaminitis, and tropinemia. Currently being treated for E. Faecalis bacteremia, and undergoing ongoing goals of care discussion with palliative care. c/b epistaxis that was controlled with afrin and packing. Goals of Care discussion held and patient currently DNT/DNI.
79M PMH prostate cancer with mets to bone and lung, CAD with CABG in 2016, HFrEF (EF 30%), DVT (still on Eliquis?), from Dr. Terry’s office for hypotension SBP 70s and tachycardia. Found to have oliguric PAM (1.79<1.22 in Nov 2019) with transaminitis, and tropinemia. Currently being treated for E. Faecalis bacteremia. Goals of Care discussion held and patient currently DNR/DNI with patient not wanting to undergo SIMI or PICC for outpatient IV abx. Will be discharged on amoxicillin 500mg TID. Awaiting Anh placement.
79M PMH prostate cancer with mets to bone and lung, CAD with CABG in 2016, HFrEF (EF 30%), DVT (still on Eliquis?), from Dr. Terry’s office for hypotension SBP 70s and tachycardia. Found to have oliguric PAM (1.79<1.22 in Nov 2019) with transaminitis, and tropinemia. Currently being treated for E. Faecalis bacteremia, and undergoing ongoing goals of care discussion with palliative care.
Enterococcal bacteremia  End stage metastatic prostate cancer    RECOMMEND  If endocarditis cannot be excluded with SIMI, would treat with 6 weeks of IV antibiotic for presumed endocarditis, if this is within the parameters of goals of care.  If not, consider life long suppression with PO amoxicillin
79M PMH prostate cancer with mets to bone and lung, CAD with CABG in 2016, HFrEF (EF 30%), DVT (still on Eliquis?), from Dr. Terry’s office for hypotension SBP 70s and tachycardia. Found to have oliguric PAM (1.79<1.22 in Nov 2019) with transaminitis, and tropinemia. Currently being treated for E. Faecalis bacteremia, and undergoing ongoing goals of care discussion with palliative care. c/b epistaxis that was controlled with afrin and packing. Palliative will have goals of care discussion today.
79M PMH prostate cancer with mets to bone and lung, CAD with CABG in 2016, HFrEF (EF 30%), DVT (still on Eliquis?), from Dr. Terry’s office for hypotension SBP 70s and tachycardia. Found to have oliguric PAM (1.79<1.22 in Nov 2019) with transaminitis, and tropinemia. Currently being treated for E. Faecalis bacteremia, and undergoing ongoing goals of care discussion with palliative care. Goals of Care discussion held and patient currently DNR/DNI with patient not wanting to undergo SIMI. Awaiting BAILEY placement.

## 2020-01-03 NOTE — PROGRESS NOTE ADULT - ATTENDING COMMENTS
Patient seen and examined and evaluation completed by me in person
Patient seen and examined and evaluation completed by me in person
GI stable  IV ampicillin times 6 weeks  f/u blood culture-- to f/u cult--d/c if f/u culture negative  supp rx
GI stable  IV ampicillin times 6 weeks  f/u blood culture-- to f/u cult--d/c if f/u culture negative  supp rx
Mets carcinoma  Palliative care, ?hospice  FULL CODE as per pt own wishes  IV ampicillin
Mets carcinoma  Palliative care, ?hospice  FULL CODE as per pt own wishes  IV ampicillin
Mets carcinoma  Palliative care, ?hospice  FULL CODE as per pt own wishes  IV ampicillin  for sepsis
Overall prog is extr poor  Supp rx  Would not rec further invasive testing  Continue AB  d/c to [facility]
SEPSIS    cont IV --ampi  COMFORT CARE
Mets cancer  supportive care  Pain--controlled.
Mets carcinoma  Palliative care, ?hospice  FULL CODE as per pt own wishes  IV ampicillin
Mets carcinoma  Palliative care, ?hospice  FULL CODE as per pt own wishes  IV ampicillin  for sepsis
Continue IV--ampicillin'---can cge to PO, patient request.  DNR/I  Supp rx  d/c planning to [facility]

## 2020-01-03 NOTE — PROGRESS NOTE ADULT - PROBLEM SELECTOR PLAN 2
Pt was dx at [facility] in May 2018, bronchoscopy done at that time mediastinal lymph node biopsy was positive for carcinoma consistent with metastatic prostate cancer. Pt being treated with Lupron and daily Zytega/Prednisone, recent PSA above 75,000. . New possible mildly enlarged retroperitoneal and pelvic  lymph nodes suspect progressive mets   -C/w Prednisone 5mg and tamsulosin  -Spoke with Dr. Paulino  who says prognosis is poor. He was not Next step is chemo which cannot be started while patient is bacteremic. Goals of care held and patient is now DNR/DNI.

## 2020-01-03 NOTE — PROGRESS NOTE ADULT - SUBJECTIVE AND OBJECTIVE BOX
OVERNIGHT EVENTS: NEEMA    SUBJECTIVE / INTERVAL HPI: Patient seen and examined at bedside. Patient says he feels a bit dizzy, but has not been drinking fluids throughout the day. Otherwise he says he feels well. ROS otherwise negative.     VITAL SIGNS:  Vital Signs Last 24 Hrs  T(C): 36.3 (03 Jan 2020 05:17), Max: 36.8 (02 Jan 2020 09:35)  T(F): 97.4 (03 Jan 2020 05:17), Max: 98.2 (02 Jan 2020 09:35)  HR: 82 (03 Jan 2020 05:17) (59 - 90)  BP: 95/50 (03 Jan 2020 05:17) (95/50 - 105/66)  BP(mean): --  RR: 18 (03 Jan 2020 05:17) (14 - 20)  SpO2: 98% (03 Jan 2020 05:17) (92% - 100%)  I&O's Summary      PHYSICAL EXAM:    General: Cachectic, resting comfortably  HEENT: NC/AT; PERRL, anicteric sclera; MMM  Neck: supple  Cardiovascular: +S1/S2; slightly tachy, regular rhythm   Respiratory: CTA B/L; no W/R/R  Gastrointestinal: soft, NT/ND; +BSx4  Extremities: WWP; no edema, clubbing or cyanosis  Vascular: 2+ radial, DP/PT pulses B/L  Neurological: AAOx3; no focal deficits    MEDICATIONS:  MEDICATIONS  (STANDING):  ampicillin  IVPB 2 Gram(s) IV Intermittent every 6 hours  atorvastatin 40 milliGRAM(s) Oral at bedtime  calcium carbonate    500 mG (Tums) Chewable 1 Tablet(s) Chew daily  methimazole 5 milliGRAM(s) Oral daily  polyethylene glycol 3350 17 Gram(s) Oral daily  predniSONE   Tablet 5 milliGRAM(s) Oral daily  senna 1 Tablet(s) Oral daily  sodium bicarbonate 650 milliGRAM(s) Oral two times a day  tamsulosin 0.4 milliGRAM(s) Oral at bedtime    MEDICATIONS  (PRN):  acetaminophen   Tablet .. 650 milliGRAM(s) Oral every 6 hours PRN Mild Pain (1 - 3), Moderate Pain (4 - 6)      ALLERGIES:  Allergies    No Known Allergies    Intolerances        LABS:              CAPILLARY BLOOD GLUCOSE          RADIOLOGY & ADDITIONAL TESTS: Reviewed.

## 2020-01-03 NOTE — DISCHARGE NOTE NURSING/CASE MANAGEMENT/SOCIAL WORK - PATIENT PORTAL LINK FT
You can access the FollowMyHealth Patient Portal offered by [facility] by registering at the following website: [email or web address]. By joining [facility]’s FollowMyHealth portal, you will also be able to view your health information using other applications (apps) compatible with our system.

## 2020-01-03 NOTE — PROGRESS NOTE ADULT - SUBJECTIVE AND OBJECTIVE BOX
VIDA MCCALL   MRN-[id number]    CC: PAM HAO     HPI:  79M PMH prostate cancer with mets to bone and lung, CAD with CABG in 2016, HFrEF (EF 30%), DVT (on Eliquis), from Dr. Terry’s office for hypotension SBP 70s and tachycardic. Pt currently c/o left shoulder pain, lower abd pain and HA. Living at [facility]. Endorses decreases po intake, denies CP/pressure, fevers, chills, nightsweats, n/v/d/c, changes to urinary habits. States he can walk. Per labs on 19: Na 136, CO2 Alk phos 375, AST 66, BUN 24, Cre 1.22 Uric Acid 11.3, PSA 5000. ED provider spoke to Dr. Terry: requesting palliative consult.     Per discussion with Dr Terry pt has been W/C bound for past 2-3 months and has failed on Lupron, started on Zygeva with ongoing  elevation of PSA since May  Palliative Medicine consulted  for further discussion re: GOC     Subjective: pt asleep easily aroused to name call, appears weak and clinically dry     DYSPNEA: N	  NAUS/VOM:  N	  SECRETIONS: N	  AGITATION:  N  Pain (Y/N):   pt denies    -Provocation/Palliation:  -Quality/Quantity:  -Radiating:  -Severity:  -Timing/Frequency:  -Impact on ADLs:    OTHER REVIEW OF SYSTEMS: 12 pt review of systems unremarkable      PEx:  Vital Signs Last 24 Hrs  T(C): 36.4 (2020 09:41), Max: 36.4 (2020 22:50)  T(F): 97.6 (2020 09:41), Max: 97.6 (2020 09:41)  HR: 89 (2020 09:41) (59 - 89)  BP: 92/68 (2020 09:41) (92/68 - 101/66)  BP(mean): --  RR: 19 (2020 09:41) (14 - 20)  SpO2: 100% (2020 09:41) (98% - 100%)    General: cachectic elderly  male in bed weak  in  NAD  HEENT: NC/AT sclera anicteric, MM dry   Neck: supple, no JVD   CVS: S1S2 RRR  Resp: CTA B/L   GI: soft NT  + constipation  Neuro: no focal deficits   Psych:  calm and cooperative    Skin: + vertical scar to chest, no break of skin   Lymph: No LAD   No Known Allergies      Opiate Naive (Y/N): Yes   -iStop reviewed (Y/N): Yeson inital consultation I Ref:  [id number]    MEDICATIONS: REVIEWED    MEDICATIONS  (STANDING):  ampicillin  IVPB 2 Gram(s) IV Intermittent every 6 hours  atorvastatin 40 milliGRAM(s) Oral at bedtime  calcium carbonate    500 mG (Tums) Chewable 1 Tablet(s) Chew daily  methimazole 5 milliGRAM(s) Oral daily  polyethylene glycol 3350 17 Gram(s) Oral daily  predniSONE   Tablet 5 milliGRAM(s) Oral daily  senna 1 Tablet(s) Oral daily  sodium bicarbonate 650 milliGRAM(s) Oral two times a day  tamsulosin 0.4 milliGRAM(s) Oral at bedtime    MEDICATIONS  (PRN):  acetaminophen   Tablet .. 650 milliGRAM(s) Oral every 6 hours PRN Mild Pain (1 - 3), Moderate Pain (4 - 6)  IMAGIN Lead ECG   Ventricular Rate 70 BPM    Atrial Rate 70 BPM    P-R Interval 202 ms    QRS Duration 88 ms    Q-T Interval 456 ms    QTC Calculation(Bezet) 492 ms    P Axis 102 degrees    R Axis -76 degrees    T Axis 102 degrees    Diagnosis Line Sinus rhythm with premature supraventricular complexes  Left Anterior Fasicular Block  Septal infarct , age undetermined  Inferior infarct , age undetermined  Poor R Wave Progression    Advanced Directives:    HCP: pt has confirmed that his longtime friend and parishioner Mary Worley  [phone]  has been his assigned proxy     Decision maker: pt able to provide input into complex medcal decisions but has poor insight into his disease   Legal surrogate: Mary Worley) [phone], pt planning to change to a relative and will provide info when available      GOALS OF CARE DISCUSSION       Palliative care info/counseling provided	           Documentation of GOC: pending transfer to SNF for LTC and likely hospice           REFERRALS	               Unit SW/Case Mgmt              Massage Therapy       Music Therapy        Nutrition

## 2020-01-03 NOTE — CHART NOTE - NSCHARTNOTEFT_GEN_A_CORE
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 +BM 1/3.     Pain: No pain reported at this time.     Skin Integrity: Sacrum abrasion noted; Stage II pressure injury on left buttocks noted.      Labs:         CAPILLARY BLOOD GLUCOSE          Medications:  MEDICATIONS  (STANDING):  ampicillin  IVPB 2 Gram(s) IV Intermittent every 6 hours  atorvastatin 40 milliGRAM(s) Oral at bedtime  calcium carbonate    500 mG (Tums) Chewable 1 Tablet(s) Chew daily  methimazole 5 milliGRAM(s) Oral daily  polyethylene glycol 3350 17 Gram(s) Oral daily  predniSONE   Tablet 5 milliGRAM(s) Oral daily  senna 1 Tablet(s) Oral daily  sodium bicarbonate 650 milliGRAM(s) Oral two times a day  tamsulosin 0.4 milliGRAM(s) Oral at bedtime    MEDICATIONS  (PRN):  acetaminophen   Tablet .. 650 milliGRAM(s) Oral every 6 hours PRN Mild Pain (1 - 3), Moderate Pain (4 - 6)      Weight: (12/21)109lbs    Weight Change: No new wts to assess at this time.     Nutrition Focused Physical Exam: Completed [ x, 12/23  ]  Not Pertinent [   ]  Noted w/ suspected severe PCM 12/23, see chart note.     Estimated energy needs:   Height: 66" Weight(12/21): 109lbs, IBW 142lbs+/-10%, %IBW 76.7%, BMI 17.6 kg/m2  IBW(64.5 kg) used for calculations as pt < 80% of IBW. Needs adjusted for advance age and suspected severe malnutrition, +pressure injury. Fluids deferred to team 2/2 hypernatremia.   25-30kcal/kg= 1860-0353 kcal/day  1.2-1.4gm pro/kg= 77-90 gm protein/day    Subjective:   Pt is a 79 y.o M PMH prostate cancer with mets to bone and lung, CAD with CABG in 2016, HFrEF (EF 30%), DVT (still on Eliquis?), from Dr. Terry’s office for hypotension SBP 70s and tachycardia. Found to have oliguric PAM (1.79<1.22 in Nov 2019) with transaminitis, and tropinemia. Currently being treated for E. Faecalis bacteremia. C/b epistaxis that was controlled with afrin and packing. Course c/b bloody blots in stool and Hg to 6.9 s/p 1 pRBC, noted Hg now improved to 9.2. Continuing to address goals of care and PICC placement for IV abx as pt has very poor prognosis from his metastatic prostate cancer. Goals of Care discussion held and patient currently DNR/DNI with patient not wanting to undergo ISMI. Awaiting BAILEY placement.     Pt seen resting in bed, noted to be confused. Observed untouched breakfast tray at bedside. Pt reports not feeling hungry at this time and expresses preference to sleep instead. Pt reports he has been consuming Nepro 1 bottle daily (425kcal, 19 g protein). Denies N/V/D/C. +BM 1/2 noted. Encouraged PO intake.     Previous Nutrition Diagnosis:  Malnutrition Suspect Severe PCM RT decreased appetite and weakness likely 2/2 bacteremia and advanced malignancy AEB severe muscle and fat wasting, suspected meeting <50% estimated needs.    Active [ X ]  Resolved [   ]    Goal: Align nutrition w/ GOC.     Recommendations:  1) Recommend continue with regular, pureed diet (Suspect hypernatremia not likely due to consuming foods high in sodium as pt w/ ongoing poor PO intake. RD to f/u.)   2) If pt should pursue alternate means of nutrition, please re-consult nutrition.    3) Recommend continue with Nepro BID (provides 850 kcal, 38g protein).  4) Recommend add multivitamins/minerals daily.   5) Monitor bi-weekly wts, lytes and replete prn. Monitor lytes for further dietary restrictions.  6) Appetite stimulant and bowel regimen per MD discretion.     Education:   Encouraged PO intake.     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 +BM 1/3.     Pain: No pain reported at this time.     Skin Integrity: Sacrum abrasion noted; Stage II pressure injury on left buttocks noted.      Labs:         CAPILLARY BLOOD GLUCOSE          Medications:  MEDICATIONS  (STANDING):  ampicillin  IVPB 2 Gram(s) IV Intermittent every 6 hours  atorvastatin 40 milliGRAM(s) Oral at bedtime  calcium carbonate    500 mG (Tums) Chewable 1 Tablet(s) Chew daily  methimazole 5 milliGRAM(s) Oral daily  polyethylene glycol 3350 17 Gram(s) Oral daily  predniSONE   Tablet 5 milliGRAM(s) Oral daily  senna 1 Tablet(s) Oral daily  sodium bicarbonate 650 milliGRAM(s) Oral two times a day  tamsulosin 0.4 milliGRAM(s) Oral at bedtime    MEDICATIONS  (PRN):  acetaminophen   Tablet .. 650 milliGRAM(s) Oral every 6 hours PRN Mild Pain (1 - 3), Moderate Pain (4 - 6)      Weight: (12/21)109lbs    Weight Change: No new wts to assess at this time.     Nutrition Focused Physical Exam: Completed [ x, 12/23  ]  Not Pertinent [   ]  Noted w/ suspected severe PCM 12/23, see chart note.     Estimated energy needs:   Height: 66" Weight(12/21): 109lbs, IBW 142lbs+/-10%, %IBW 76.7%, BMI 17.6 kg/m2  IBW(64.5 kg) used for calculations as pt < 80% of IBW. Needs adjusted for advance age and suspected severe malnutrition, +pressure injury. Fluids deferred to team 2/2 hypernatremia.   25-30kcal/kg= 9453-3175 kcal/day  1.2-1.4gm pro/kg= 77-90 gm protein/day    Subjective:   Pt is a 79 y.o M PMH prostate cancer with mets to bone and lung, CAD with CABG in 2016, HFrEF (EF 30%), DVT (still on Eliquis?), from Dr. Terry’s office for hypotension SBP 70s and tachycardia. Found to have oliguric PAM (1.79<1.22 in Nov 2019) with transaminitis, and tropinemia. Currently being treated for E. Faecalis bacteremia. C/b epistaxis that was controlled with afrin and packing. Course c/b bloody blots in stool and Hg to 6.9 s/p 1 pRBC, noted Hg now improved to 9.2. Continuing to address goals of care and PICC placement for IV abx as pt has very poor prognosis from his metastatic prostate cancer. Goals of Care discussion held and patient currently DNR/DNI with patient not wanting to undergo SIMI. Awaiting BAILEY placement.     Pt seen resting in bed, noted to be confused. Observed untouched breakfast tray at bedside. Pt reports not feeling hungry at this time and expresses preference to sleep instead. Pt reports he has been consuming Nepro 1 bottle daily (425kcal, 19 g protein) and states he would like to keep the pureed consistency. Denies N/V/D/C. +BM 1/2 noted. Encouraged PO intake.     Previous Nutrition Diagnosis:  Malnutrition Suspect Severe PCM RT decreased appetite and weakness likely 2/2 bacteremia and advanced malignancy AEB severe muscle and fat wasting, suspected meeting <50% estimated needs.    Active [ X ]  Resolved [   ]    Goal: Align nutrition w/ GOC.     Recommendations:  1) Recommend continue with regular, pureed diet (Suspect hypernatremia not likely due to consuming foods high in sodium as pt w/ ongoing poor PO intake. RD to f/u. Of note, Pt on pureed diet per pt's preferences.)   2) If pt should pursue alternate means of nutrition, please re-consult nutrition.    3) Recommend continue with Nepro BID (provides 850 kcal, 38g protein).  4) Recommend add multivitamins/minerals daily.   5) Monitor bi-weekly wts, lytes and replete prn. Monitor lytes for further dietary restrictions.  6) Appetite stimulant and bowel regimen per MD discretion.     Education:   Encouraged PO intake.     Risk Level: High [ X ] Moderate [   ] Low [   ]

## 2020-01-07 DIAGNOSIS — A41.81 SEPSIS DUE TO ENTEROCOCCUS: ICD-10-CM

## 2020-01-07 DIAGNOSIS — R62.7 ADULT FAILURE TO THRIVE: ICD-10-CM

## 2020-01-07 DIAGNOSIS — R04.0 EPISTAXIS: ICD-10-CM

## 2020-01-07 DIAGNOSIS — Z99.3 DEPENDENCE ON WHEELCHAIR: ICD-10-CM

## 2020-01-07 DIAGNOSIS — C78.00 SECONDARY MALIGNANT NEOPLASM OF UNSPECIFIED LUNG: ICD-10-CM

## 2020-01-07 DIAGNOSIS — E05.90 THYROTOXICOSIS, UNSPECIFIED WITHOUT THYROTOXIC CRISIS OR STORM: ICD-10-CM

## 2020-01-07 DIAGNOSIS — N17.9 ACUTE KIDNEY FAILURE, UNSPECIFIED: ICD-10-CM

## 2020-01-07 DIAGNOSIS — E86.0 DEHYDRATION: ICD-10-CM

## 2020-01-07 DIAGNOSIS — Z79.01 LONG TERM (CURRENT) USE OF ANTICOAGULANTS: ICD-10-CM

## 2020-01-07 DIAGNOSIS — I95.9 HYPOTENSION, UNSPECIFIED: ICD-10-CM

## 2020-01-07 DIAGNOSIS — C79.51 SECONDARY MALIGNANT NEOPLASM OF BONE: ICD-10-CM

## 2020-01-07 DIAGNOSIS — K40.90 UNILATERAL INGUINAL HERNIA, WITHOUT OBSTRUCTION OR GANGRENE, NOT SPECIFIED AS RECURRENT: ICD-10-CM

## 2020-01-07 DIAGNOSIS — Z95.1 PRESENCE OF AORTOCORONARY BYPASS GRAFT: ICD-10-CM

## 2020-01-07 DIAGNOSIS — K92.2 GASTROINTESTINAL HEMORRHAGE, UNSPECIFIED: ICD-10-CM

## 2020-01-07 DIAGNOSIS — Z51.5 ENCOUNTER FOR PALLIATIVE CARE: ICD-10-CM

## 2020-01-07 DIAGNOSIS — C61 MALIGNANT NEOPLASM OF PROSTATE: ICD-10-CM

## 2020-01-07 DIAGNOSIS — I13.0 HYPERTENSIVE HEART AND CHRONIC KIDNEY DISEASE WITH HEART FAILURE AND STAGE 1 THROUGH STAGE 4 CHRONIC KIDNEY DISEASE, OR UNSPECIFIED CHRONIC KIDNEY DISEASE: ICD-10-CM

## 2020-01-07 DIAGNOSIS — E43 UNSPECIFIED SEVERE PROTEIN-CALORIE MALNUTRITION: ICD-10-CM

## 2020-01-07 DIAGNOSIS — Z87.891 PERSONAL HISTORY OF NICOTINE DEPENDENCE: ICD-10-CM

## 2020-01-07 DIAGNOSIS — I24.8 OTHER FORMS OF ACUTE ISCHEMIC HEART DISEASE: ICD-10-CM

## 2020-01-07 DIAGNOSIS — N18.9 CHRONIC KIDNEY DISEASE, UNSPECIFIED: ICD-10-CM

## 2020-01-07 DIAGNOSIS — Z79.82 LONG TERM (CURRENT) USE OF ASPIRIN: ICD-10-CM

## 2020-01-07 DIAGNOSIS — I25.10 ATHEROSCLEROTIC HEART DISEASE OF NATIVE CORONARY ARTERY WITHOUT ANGINA PECTORIS: ICD-10-CM

## 2020-01-07 DIAGNOSIS — Z86.718 PERSONAL HISTORY OF OTHER VENOUS THROMBOSIS AND EMBOLISM: ICD-10-CM

## 2020-01-07 DIAGNOSIS — I50.22 CHRONIC SYSTOLIC (CONGESTIVE) HEART FAILURE: ICD-10-CM

## 2020-11-09 NOTE — PROVIDER CONTACT NOTE (CRITICAL VALUE NOTIFICATION) - NAME OF MD/NP/PA/DO NOTIFIED:
The pt was called and her symptoms were triaged. The pt's brother in law had visited her for about an hour on 11/4/2020. He is now covid positive. The pt was told to isolated. She is already working on getting a covid test scheduled. She is going to reschedule her lab appointment for after the quarantine date. The pt verbalized understanding. She was transferred to the registration desk.   Reason for Disposition  • [1] CORONAVIRUS EXPOSURE within last 14 days AND [2] NO cough, fever, or breathing difficulty    Answer Assessment - Initial Assessment Questions  1. PLACE of CONTACT: \"Where were you when you were exposed to coronavirus?\" (e.g., city, state, country)      [place]  2. TYPE of CONTACT: \"How much contact was there?\" (e.g., live in same house, work in same office, same school)      In house about an hour  3. DATE of CONTACT: \"When did you have contact with a coronavirus patient?\" (e.g., days)      11/4/2020  4. SYMPTOMS: \"Do you have any symptoms?\" (e.g., fever, cough, breathing difficulty)      none  5. PREGNANCY OR POSTPARTUM: \"Is there any chance you are pregnant?\" \"When was your last menstrual period?\" \"Did you deliver in the last 2 weeks?\"      none  6. HIGH RISK: \"Do you have any heart or lung problems? Do you have a weakened immune system?\" (e.g., CHF, COPD, asthma, HIV positive, chemotherapy, renal failure, diabetes mellitus, sickle cell anemia)      na    Protocols used: CORONAVIRUS (2019-NCOV) EXPOSURE-A-     DWAYNE Bose

## 2021-02-08 NOTE — PHYSICAL THERAPY INITIAL EVALUATION ADULT - IMPAIRMENTS CONTRIBUTING TO GAIT DEVIATIONS, PT EVAL
Principal Discharge DX:	Encounter for medical screening examination   impaired balance/decreased aerobic capacity/endurance/decreased strength

## 2021-02-16 NOTE — PROGRESS NOTE ADULT - ASSESSMENT
ASSESSMENT/PLAN77 yo male FORMER SMOKER with PMHx of HTN, HLD, pre- DM, CAD s/p CABG (in 2016 @ [facility]), chronic systolic CHF, h/o DVT 6 months ago (on Eliquis)  admitted for r/o ACS s/p non obstructed Cath, being worked up for possible prostate CA, found to have multiple  lung nodules, diffuse mediastinal LAD. Clinically metastatic cancer.    1. O2 2LNC  2. Bronchodilators:  Atrovent/ albuterol q 4 – 6 hours as needed  3. Corticosteroids: off  4. ID/Antibiotics: off pending bronchoscopy CX  5. Cardiac/HTN: optimize BP  6. GI: Rx/ prophylaxis c PPI/H2B  7. Heme: Rx/VT prophylaxis c SQH/SCD/AC continue   Eliquis  8. Aspiration precautions  9 Mobilize, OOB  10. Surgical pathology is pending  Discussed with managing team, ID  Patient requests all Lab and Radiology Results on their Discharge Instructions

## 2021-06-30 NOTE — PROGRESS NOTE ADULT - SUBJECTIVE AND OBJECTIVE BOX
Interventional Cardiology PA Adult Progress Note    Subjective Assessment: Pt was examined at bedside.  	  MEDICATIONS:  furosemide    Tablet 40 milliGRAM(s) Oral daily  losartan 25 milliGRAM(s) Oral daily  metoprolol succinate ER 25 milliGRAM(s) Oral daily  tamsulosin 0.4 milliGRAM(s) Oral at bedtime      ALBUTerol/ipratropium for Nebulization 3 milliLiter(s) Nebulizer every 6 hours PRN    acetaminophen   Tablet. 650 milliGRAM(s) Oral every 6 hours PRN  traMADol 25 milliGRAM(s) Oral every 6 hours PRN    docusate sodium 100 milliGRAM(s) Oral daily  pantoprazole    Tablet 40 milliGRAM(s) Oral before breakfast  senna Syrup 5 milliLiter(s) Oral daily    atorvastatin 80 milliGRAM(s) Oral at bedtime  methimazole 5 milliGRAM(s) Oral three times a day        	    [PHYSICAL EXAM:  TELEMETRY:  T(C): 36.3 (05-17-18 @ 10:12), Max: 36.8 (05-17-18 @ 01:50)  HR: 65 (05-17-18 @ 13:50) (65 - 78)  BP: 98/53 (05-17-18 @ 13:50) (91/50 - 107/61)  RR: 16 (05-17-18 @ 13:50) (16 - 18)  SpO2: 100% (05-17-18 @ 13:50) (96% - 100%)  Wt(kg): --  I&O's Summary    16 May 2018 07:01  -  17 May 2018 07:00  --------------------------------------------------------  IN: 1120 mL / OUT: 1715 mL / NET: -595 mL    17 May 2018 07:01  -  17 May 2018 14:22  --------------------------------------------------------  IN: 0 mL / OUT: 400 mL / NET: -400 mL        De Leon:  Central/PICC/Mid Line:                                         Appearance: Normal	  HEENT:   Normal oral mucosa, PERRL, EOMI	  Neck: Supple, + JVD/ - JVD; Carotid Bruit   Cardiovascular: Normal S1 S2, No JVD, No murmurs,   Respiratory: Lungs clear to auscultation/Decreased Breath Sounds/No Rales, Rhonchi, Wheezing	  Gastrointestinal:  Soft, Non-tender, + BS	  Skin: No rashes, No ecchymoses, No cyanosis  Extremities: Normal range of motion, No clubbing, cyanosis or edema  Vascular: Peripheral pulses palpable 2+ bilaterally  Neurologic: Non-focal  Psychiatry: A & O x 3, Mood & affect appropriate      	    ECG:  	  RADIOLOGY:   DIAGNOSTIC TESTING:  [ ] Echocardiogram:  [ ]  Catheterization:  [ ] Stress Test:    [ ] SIMI  OTHER: 	    LABS:	 	  CARDIAC MARKERS:                                  11.2   6.4   )-----------( 317      ( 17 May 2018 07:19 )             34.3     05-17    137  |  99  |  18  ----------------------------<  97  4.2   |  27  |  1.03    Ca    9.2      17 May 2018 07:19  Mg     2.2     05-17      proBNP:   Lipid Profile:   HgA1c:   TSH:   PT/INR - ( 17 May 2018 07:19 )   PT: 16.1 sec;   INR: 1.44          PTT - ( 17 May 2018 07:19 )  PTT:49.3 sec    ASSESSMENT/PLAN: 	        DVT ppx:  Dispo: Detail Level: Detailed Body Location Override (Optional - Billing Will Still Be Based On Selected Body Map Location If Applicable): nasal root Interventional Cardiology PA Adult Progress Note    Subjective Assessment: Pt was examined at bedside. Feeling well this AM. Denies CP, SOB or palpitations.  	  MEDICATIONS:  furosemide    Tablet 40 milliGRAM(s) Oral daily  losartan 25 milliGRAM(s) Oral daily  metoprolol succinate ER 25 milliGRAM(s) Oral daily  tamsulosin 0.4 milliGRAM(s) Oral at bedtime  ALBUTerol/ipratropium for Nebulization 3 milliLiter(s) Nebulizer every 6 hours PRN  acetaminophen   Tablet. 650 milliGRAM(s) Oral every 6 hours PRN  traMADol 25 milliGRAM(s) Oral every 6 hours PRN  docusate sodium 100 milliGRAM(s) Oral daily  pantoprazole    Tablet 40 milliGRAM(s) Oral before breakfast  senna Syrup 5 milliLiter(s) Oral daily  atorvastatin 80 milliGRAM(s) Oral at bedtime  methimazole 5 milliGRAM(s) Oral three times a day        	    [PHYSICAL EXAM:  TELEMETRY:  T(C): 36.3 (05-17-18 @ 10:12), Max: 36.8 (05-17-18 @ 01:50)  HR: 65 (05-17-18 @ 13:50) (65 - 78)  BP: 98/53 (05-17-18 @ 13:50) (91/50 - 107/61)  RR: 16 (05-17-18 @ 13:50) (16 - 18)  SpO2: 100% (05-17-18 @ 13:50) (96% - 100%)    I&O's Summary    16 May 2018 07:01  -  17 May 2018 07:00  --------------------------------------------------------  IN: 1120 mL / OUT: 1715 mL / NET: -595 mL    17 May 2018 07:01  -  17 May 2018 14:22  --------------------------------------------------------  IN: 0 mL / OUT: 400 mL / NET: -400 mL                                              Appearance: Frail, elderly man laying comfortably in bed	  HEENT:   Normal oral mucosa, PERRL, EOMI	  Neck: Supple,  - JVD; Carotid Bruit   Cardiovascular: Normal S1 S2, No JVD, No murmurs,   Respiratory: Lungs clear to auscultation//No Rales, Rhonchi, Wheezing	  Gastrointestinal:  Mild tenderness to LLQ on palpation, no guarding or rebound tenderness  Skin: No rashes, No ecchymoses, No cyanosis  Extremities: Normal range of motion, No clubbing, cyanosis or edema  Vascular: 1+ B/L PT/DP B/L  Neurologic: Non-focal  Psychiatry: A & O x 3, Mood & affect appropriate    CT chest 05/14/2018: 1. Innumerable scattered pulmonary nodules are noted bilaterally. Most of which demonstrated a lobulated configuration, likely representing mucous   plugging. 2. However there are more discrete nodular opacities some demonstrating feeding vessel sign concerning for metastatic prostate. 3. Bulky mediastinal lymphadenopathy as described above and also thought to be prostatic. Correlation with PET/CT for more complete staging. 4. Views osteoblastic metastases involving the scapulae left greater than right as well as multiple ribs with a pathologic fracture involving the right eighth rib as well as the vertebra without compression fracture   deformity. 5. Patchy ground less opacities predominantly in the right upper and right lower lobes which may represent early pneumonitis. Short interval surveillance may be of value to confirm stability and/or resolution.     CT abdomen/pelvis 05/11/2018: Prostate tumor. Distended urinary bladder. Extensive bony metastasis. Multiple solid pulmonary nodules. Pulmonary metastasis is a   consideration. Recommend CT of chest for complete evaluation. Small right pleural effusion. Cardiomegaly. Heterogeneous hepatic enhancement likely due to altered perfusion. No discrete hepatic mass. Cholelithiasis. No biliary dilatation. Patent portal vein. Nonspecific thickening of the bilateral adrenal glands could be due to adenomatous hyperplasia. CT without contrast or PET/CT would be useful for differentiation from metastasis.     ECHO 05/10/2018: There is severe concentric left ventricular hypertrophy. There is severe global hypokinesis of the left ventricle. The left ventricular ejection fraction   is severely reduced. The left ventricular ejection fraction is 30%.The left atrium is severely dilated. The left atrial volume index is 58 cc/m2 (normal <34cc/m2)The mitral inflow pattern is consistent with restrictive left ventricular filling, suggestive of severely elevated left atrial pressure (>20mmHg).  The right atrium is moderately dilated. The right atrial   volume index is 44 cc/m2 (normal range 21+/-6 for women, 25 +/- 7 for men)  Calcified aortic valve. There is trace aortic regurgitation. Tethered mitral valve leaflets. There is mild mitral regurgitation. There is mild tricuspid regurgitation. There is mild pulmonary hypertension. The pulmonary artery systolic pressure is estimated to be 49 mmHg. Structurally normal pulmonic valve. There is mild pulmonic regurgitation. There is no pericardial effusion. Bilateral pleural effusion noted.     Chest X-ray 05/09/2018: Cardiomegaly. Pulmonary vascular congestion. Interstitial pulmonary edema. Sclerotic bone metastases, likely from prostate cancer.    LABS:	 	                          11.2   6.4   )-----------( 317      ( 17 May 2018 07:19 )             34.3     05-17    137  |  99  |  18  ----------------------------<  97  4.2   |  27  |  1.03    Ca    9.2      17 May 2018 07:19  Mg     2.2     05-17          PT/INR - ( 17 May 2018 07:19 )   PT: 16.1 sec;   INR: 1.44          PTT - ( 17 May 2018 07:19 )  PTT:49.3 sec X Size Of Lesion In Cm (Optional): 0 Incorporate Mauc In Note: No Interventional Cardiology PA Adult Progress Note    CC: SOB  Subjective Assessment: Pt was examined at bedside. Feeling well this AM. Denies CP, SOB or palpitations.  12 point ROS negative except per subjective; HPI  	  MEDICATIONS:  furosemide    Tablet 40 milliGRAM(s) Oral daily  losartan 25 milliGRAM(s) Oral daily  metoprolol succinate ER 25 milliGRAM(s) Oral daily  tamsulosin 0.4 milliGRAM(s) Oral at bedtime  ALBUTerol/ipratropium for Nebulization 3 milliLiter(s) Nebulizer every 6 hours PRN  acetaminophen   Tablet. 650 milliGRAM(s) Oral every 6 hours PRN  traMADol 25 milliGRAM(s) Oral every 6 hours PRN  docusate sodium 100 milliGRAM(s) Oral daily  pantoprazole    Tablet 40 milliGRAM(s) Oral before breakfast  senna Syrup 5 milliLiter(s) Oral daily  atorvastatin 80 milliGRAM(s) Oral at bedtime  methimazole 5 milliGRAM(s) Oral three times a day        	    [PHYSICAL EXAM:  TELEMETRY:  T(C): 36.3 (05-17-18 @ 10:12), Max: 36.8 (05-17-18 @ 01:50)  HR: 65 (05-17-18 @ 13:50) (65 - 78)  BP: 98/53 (05-17-18 @ 13:50) (91/50 - 107/61)  RR: 16 (05-17-18 @ 13:50) (16 - 18)  SpO2: 100% (05-17-18 @ 13:50) (96% - 100%)    I&O's Summary    16 May 2018 07:01  -  17 May 2018 07:00  --------------------------------------------------------  IN: 1120 mL / OUT: 1715 mL / NET: -595 mL    17 May 2018 07:01  -  17 May 2018 14:22  --------------------------------------------------------  IN: 0 mL / OUT: 400 mL / NET: -400 mL                                              Appearance: Frail, elderly man laying comfortably in bed	  HEENT:   Normal oral mucosa, PERRL, EOMI	  Neck: Supple,  - JVD; Carotid Bruit   Cardiovascular: Normal S1 S2, No JVD, No murmurs,   Respiratory: Lungs clear to auscultation//No Rales, Rhonchi, Wheezing	  Gastrointestinal:  Mild tenderness to LLQ on palpation, no guarding or rebound tenderness  Skin: No rashes, No ecchymoses, No cyanosis  Extremities: Normal range of motion, No clubbing, cyanosis or edema  Vascular: 1+ B/L PT/DP B/L  Neurologic: Non-focal  Psychiatry: A & O x 3, Mood & affect appropriate    CT chest 05/14/2018: 1. Innumerable scattered pulmonary nodules are noted bilaterally. Most of which demonstrated a lobulated configuration, likely representing mucous   plugging. 2. However there are more discrete nodular opacities some demonstrating feeding vessel sign concerning for metastatic prostate. 3. Bulky mediastinal lymphadenopathy as described above and also thought to be prostatic. Correlation with PET/CT for more complete staging. 4. Views osteoblastic metastases involving the scapulae left greater than right as well as multiple ribs with a pathologic fracture involving the right eighth rib as well as the vertebra without compression fracture   deformity. 5. Patchy ground less opacities predominantly in the right upper and right lower lobes which may represent early pneumonitis. Short interval surveillance may be of value to confirm stability and/or resolution.     CT abdomen/pelvis 05/11/2018: Prostate tumor. Distended urinary bladder. Extensive bony metastasis. Multiple solid pulmonary nodules. Pulmonary metastasis is a   consideration. Recommend CT of chest for complete evaluation. Small right pleural effusion. Cardiomegaly. Heterogeneous hepatic enhancement likely due to altered perfusion. No discrete hepatic mass. Cholelithiasis. No biliary dilatation. Patent portal vein. Nonspecific thickening of the bilateral adrenal glands could be due to adenomatous hyperplasia. CT without contrast or PET/CT would be useful for differentiation from metastasis.     ECHO 05/10/2018: There is severe concentric left ventricular hypertrophy. There is severe global hypokinesis of the left ventricle. The left ventricular ejection fraction   is severely reduced. The left ventricular ejection fraction is 30%.The left atrium is severely dilated. The left atrial volume index is 58 cc/m2 (normal <34cc/m2)The mitral inflow pattern is consistent with restrictive left ventricular filling, suggestive of severely elevated left atrial pressure (>20mmHg).  The right atrium is moderately dilated. The right atrial   volume index is 44 cc/m2 (normal range 21+/-6 for women, 25 +/- 7 for men)  Calcified aortic valve. There is trace aortic regurgitation. Tethered mitral valve leaflets. There is mild mitral regurgitation. There is mild tricuspid regurgitation. There is mild pulmonary hypertension. The pulmonary artery systolic pressure is estimated to be 49 mmHg. Structurally normal pulmonic valve. There is mild pulmonic regurgitation. There is no pericardial effusion. Bilateral pleural effusion noted.     Chest X-ray 05/09/2018: Cardiomegaly. Pulmonary vascular congestion. Interstitial pulmonary edema. Sclerotic bone metastases, likely from prostate cancer.    LABS:	 	                          11.2   6.4   )-----------( 317      ( 17 May 2018 07:19 )             34.3     05-17    137  |  99  |  18  ----------------------------<  97  4.2   |  27  |  1.03    Ca    9.2      17 May 2018 07:19  Mg     2.2     05-17          PT/INR - ( 17 May 2018 07:19 )   PT: 16.1 sec;   INR: 1.44          PTT - ( 17 May 2018 07:19 )  PTT:49.3 sec

## 2021-11-22 NOTE — PROGRESS NOTE ADULT - SUBJECTIVE AND OBJECTIVE BOX
Patient arrived to 10S from ED in stable condition with all belongings. Patient denies any pain or chest pain at this time but does complain of SOB with activity and when lying flat. Patient oriented to room and call light system. Will continue to monitor.    Subjective Assessment: , complaint of some slight chest pressure.  Patient also with c/o penile pain 2/2 gould placement.   	  MEDICATIONS:  furosemide    Tablet 40 milliGRAM(s) Oral every 12 hours  losartan 25 milliGRAM(s) Oral daily  metoprolol succinate ER 25 milliGRAM(s) Oral daily  nitroglycerin     SubLingual 0.4 milliGRAM(s) SubLingual every 5 minutes PRN  senna Syrup 5 milliLiter(s) Oral daily  atorvastatin 80 milliGRAM(s) Oral at bedtime  methimazole 5 milliGRAM(s) Oral three times a day  aspirin enteric coated 81 milliGRAM(s) Oral daily	    [PHYSICAL EXAM:  TELEMETRY:  T(C): 36 (05-11-18 @ 09:47), Max: 37 (05-10-18 @ 22:35)  HR: 74 (05-11-18 @ 16:32) (74 - 92)  BP: 96/62 (05-11-18 @ 16:32) (94/58 - 120/65)  RR: 17 (05-11-18 @ 16:32) (16 - 18)  SpO2: 98% (05-11-18 @ 16:32) (95% - 98%)    I&O's Summary    10 May 2018 07:01  -  11 May 2018 07:00  --------------------------------------------------------  IN: 316 mL / OUT: 2050 mL / NET: -1734 mL    11 May 2018 07:01  -  11 May 2018 17:24  --------------------------------------------------------  IN: 128.5 mL / OUT: 0 mL / NET: 128.5 mL    Gould: No  Central/PICC/Mid Line: NO                                        Appearance: Normal	  HEENT:   Normal oral mucosa, PERRL, EOMI	  Neck: Supple,  - JVD; Carotid Bruit   Cardiovascular: Normal S1 S2, No JVD, No murmurs,   Respiratory: mild crackles  Gastrointestinal:  Soft, Non-tender, + BS	  Skin: No rashes, No ecchymoses, No cyanosis  Extremities: Normal range of motion, No clubbing, cyanosis or edema  Vascular: Peripheral pulses palpable 2+ bilaterally  Neurologic: Non-focal  Psychiatry: A & O x 3, Mood & affect appropriate    LABS:	 	  CARDIAC MARKERS:                        13.8   12.4  )-----------( 338      ( 11 May 2018 06:41 )             41.4     05-11    143  |  101  |  20  ----------------------------<  102<H>  3.4<L>   |  29  |  0.86    Ca    9.1      11 May 2018 06:40  Mg     2.0     05-11    TPro  7.4  /  Alb  3.5  /  TBili  2.2<H>  /  DBili  0.8<H>  /  AST  36  /  ALT  8<L>  /  AlkPhos  767<H>  05-11    PT/INR - ( 11 May 2018 06:42 )   PT: 24.5 sec;   INR: 2.17     PTT - ( 11 May 2018 10:09 )  PTT:52.1 sec    ASSESSMENT/PLAN:

## 2022-02-04 NOTE — DIETITIAN INITIAL EVALUATION ADULT. - PROBLEM/PLAN-4
Called patient to check on her suggested to patient to get AT gel drop or rufus to use. Also Tylenol as needed for pain. Per patient her symptoms are improving. Will call patient on Monday to see how she is doing.   DISPLAY PLAN FREE TEXT

## 2023-04-13 NOTE — PROGRESS NOTE ADULT - SUBJECTIVE AND OBJECTIVE BOX
HPI:  Patient presents today for follow-up from depression anxiety.  She was started on Prozac 10 mg and BuSpar 10 mg p.r.n..  She reports significant improvement denies any unwanted side effects.  She agrees to continue medication at this current dose.  Depression and or Anxiety:     The following portions of the patient's history were reviewed and updated as appropriate: allergies, current medications, past family history, past medical history, past social history, past surgical history, and problem list.    Review of Systems  Review of Systems   Constitutional: Negative for fatigue, fever and unexpected weight change.   Respiratory: Negative for cough and shortness of breath.    Cardiovascular: Negative for chest pain, palpitations and leg swelling.   Gastrointestinal: Negative for abdominal pain, constipation, diarrhea and nausea.   Endocrine: Negative.    Genitourinary: Negative for dysmenorrhea, dyspareunia, dysuria, hot flashes, menorrhagia, menstrual problem, pelvic pain, vaginal discharge and vaginal dryness.   Musculoskeletal: Negative for arthralgias and myalgias.   Integumentary:  Negative for rash, mole/lesion, breast mass, nipple discharge, breast skin changes and breast tenderness. Negative.   Neurological: Negative for seizures, syncope and headaches.   Psychiatric/Behavioral: See HPI  All other systems reviewed and are negative.      Objective:     Physical Exam  Constitutional:       General: She is not in acute distress.     Appearance: Normal appearance. She is not ill-appearing.   HENT:      Head: Normocephalic.      Nose: Nose normal.   Pulmonary:      Effort: Pulmonary effort is normal. No respiratory distress.   Abdominal:      General: Bowel sounds are normal. There is no distension.      Palpations: Abdomen is soft. There is no mass.      Tenderness: There is no abdominal tenderness.   Musculoskeletal:      Right lower leg: No edema.      Left lower leg: No edema.   Skin:     General:  Skin is warm and dry.      Findings: No lesion.   Neurological:      General: No focal deficit present.      Mental Status: She is alert and oriented to person, place, and time.      Motor: No weakness.      Gait: Gait normal.   Psychiatric:         Mood and Affect: Mood normal.         Behavior: Behavior normal.         Thought Content: Thought content normal.         Judgment: Judgment normal.     Anxiety and depression      Continue current dose.  Follow-up p.r.n. report to ER for suicidal homicidal ideations.    Return for annual exam with Pap smear   INTERVAL HPI/OVERNIGHT EVENTS: No acute events. Pt felt nauseous yesterday after starting prostate CA treatment but resolved. He has no new complaints.    ROS  CV: Denies chest pain, palpitations  RESP: Denies SOB  GI: Denies abdominal pain, constipation, diarrhea, nausea, vomiting  : Denies dysuria, hematuria, flank or back pain  ID: Denies fevers, chills  MSK: Denies joint pain     VITAL SIGNS:  T(F): 97.5 (05-23-18 @ 10:05), Max: 99.8 (05-22-18 @ 21:31)  HR: 65 (05-23-18 @ 11:03) (65 - 80)  BP: 92/55 (05-23-18 @ 11:03) (81/46 - 100/56)  RR: 18 (05-23-18 @ 11:03) (18 - 18)  SpO2: 99% (05-23-18 @ 11:03) (99% - 100%)    05-22-18 @ 07:01  -  05-23-18 @ 07:00  --------------------------------------------------------  IN: 100 mL / OUT: 850 mL / NET: -750 mL    05-23-18 @ 07:01  -  05-23-18 @ 13:35  --------------------------------------------------------  IN: 120 mL / OUT: 0 mL / NET: 120 mL    PHYSICAL EXAM:  Constitutional: WDWN, NAD, resting comfortably   Head: NC/AT  Eyes: PERRL, EOMI, anicteric sclera, no mucosal pallor  ENT: no nasal discharge; uvula midline, no oropharyngeal erythema or exudates; MMM  Neck: supple; no JVD or thyromegaly, no lymphadenopathy  Respiratory: CTA B/L; no W/R/R, no retractions  Cardiac: +S1/S2; RRR; no M/R/G  Gastrointestinal: abdomen soft, NT/ND; no rebound or guarding; +BSx4  : De Leon in place draining clear urine  Back: spine midline, no bony tenderness or step-offs; no CVAT B/L  Extremities: warm; no calf tenderness, no pedal edema, no cyanosis, no clubbing  Musculoskeletal: NROM x4; no joint swelling, tenderness or erythema  Vascular: 2+ radial; DP/PT pulses B/L  Neurologic: AAOx3; CNII-XII grossly intact; 5/5 strength throughout, sensory symmetrically intact in B/L LE   Psychiatric: pleasant and conversive; affect and characteristics of appearance, verbalizations, behaviors are appropriate      MEDICATIONS  (STANDING):  amoxicillin  875 milliGRAM(s)/clavulanate 1 Tablet(s) Oral two times a day  apixaban 5 milliGRAM(s) Oral every 12 hours  ascorbic acid 500 milliGRAM(s) Oral daily  aspirin enteric coated 81 milliGRAM(s) Oral daily  atorvastatin 80 milliGRAM(s) Oral at bedtime  benzocaine 15 mG/menthol 3.6 mG Lozenge 1 Lozenge Oral every 4 hours  bicalutamide 50 milliGRAM(s) Oral daily  calcium carbonate 1250 mG (OsCal) 2 Tablet(s) Oral daily  cholecalciferol 1000 Unit(s) Oral daily  docusate sodium 100 milliGRAM(s) Oral daily  ferrous    sulfate 325 milliGRAM(s) Oral daily  furosemide    Tablet 40 milliGRAM(s) Oral daily  losartan 25 milliGRAM(s) Oral daily  melatonin 1 milliGRAM(s) Oral at bedtime  methimazole 5 milliGRAM(s) Oral three times a day  metoprolol succinate ER 25 milliGRAM(s) Oral daily  pantoprazole    Tablet 40 milliGRAM(s) Oral before breakfast  senna Syrup 5 milliLiter(s) Oral daily  sodium ferric gluconate complex IVPB 25 milliGRAM(s) IV Intermittent once  sodium ferric gluconate complex IVPB 100 milliGRAM(s) IV Intermittent once  tamsulosin 0.4 milliGRAM(s) Oral at bedtime    MEDICATIONS  (PRN):  acetaminophen   Tablet. 650 milliGRAM(s) Oral every 6 hours PRN Mild Pain (1 - 3)  ALBUTerol/ipratropium for Nebulization 3 milliLiter(s) Nebulizer every 6 hours PRN Shortness of Breath and/or Wheezing  aluminum hydroxide/magnesium hydroxide/simethicone Suspension 30 milliLiter(s) Oral every 6 hours PRN Dyspepsia      Allergies    No Known Allergies    Intolerances        LABS:                        9.9    8.1   )-----------( 414      ( 23 May 2018 06:38 )             29.8     05-23    137  |  98  |  14  ----------------------------<  98  4.1   |  25  |  0.86    Ca    8.1<L>      23 May 2018 06:38  Phos  2.6     05-22  Mg     2.1     05-23      PT/INR - ( 22 May 2018 06:11 )   PT: 30.6 sec;   INR: 2.70          PTT - ( 22 May 2018 06:11 )  PTT:37.9 sec      RADIOLOGY & ADDITIONAL TESTS:

## 2023-08-04 NOTE — PROGRESS NOTE ADULT - PROBLEM SELECTOR PLAN 2
APC backup, balloon catheter Tranexamic Acid Counseling:  Patient advised of the small risk of bleeding problems with tranexamic acid. They were also instructed to call if they developed any nausea, vomiting or diarrhea. All of the patient's questions and concerns were addressed.

## 2023-09-20 NOTE — PROGRESS NOTE ADULT - SUBJECTIVE AND OBJECTIVE BOX
INTERVAL HPI/OVERNIGHT EVENTS:    SUBJECTIVE: Patient seen and examined at bedside.    VITAL SIGNS:  ICU Vital Signs Last 24 Hrs  T(C): 36.2 (23 Dec 2019 17:32), Max: 36.7 (22 Dec 2019 20:29)  T(F): 97.1 (23 Dec 2019 17:32), Max: 98.1 (22 Dec 2019 20:29)  HR: 78 (23 Dec 2019 17:32) (67 - 87)  BP: 93/57 (23 Dec 2019 17:32) (90/62 - 94/59)  BP(mean): --  ABP: --  ABP(mean): --  RR: 16 (23 Dec 2019 17:32) (16 - 17)  SpO2: 95% (23 Dec 2019 17:32) (95% - 100%)        12-22 @ 07:01  -  12-23 @ 07:00  --------------------------------------------------------  IN: 850 mL / OUT: 300 mL / NET: 550 mL      CAPILLARY BLOOD GLUCOSE          PHYSICAL EXAM:    Constitutional: cachectic, no respiratory distress   HEENT: NC/AT, PERRL, EOMI, anicteric sclera, no nasal discharge; uvula midline, no oropharyngeal erythema or exudates; MMM  Neck: supple; no JVD or thyromegaly  Respiratory: CTA B/L; no W/R/R, no retractions  Cardiac: +S1/S2; RRR; no M/R/G; PMI non-displaced, sternotomy wires visible under skin   Gastrointestinal: thin, R inguinal hernia is reducible, BSx4   Extremities: WWP, no clubbing or cyanosis; no peripheral edema  Musculoskeletal: NROM x4; no joint swelling, tenderness or erythema  Vascular: 2+ radial, femoral, DP/PT pulses B/L  Dermatologic: skin warm, dry and intact; no rashes, wounds, or scars  Lymphatic: no submandibular or cervical LAD  Neurologic: AAOx3; CNII-XII grossly intact; no focal deficits    MEDICATIONS:  MEDICATIONS  (STANDING):  ampicillin  IVPB 2 Gram(s) IV Intermittent every 6 hours  apixaban 5 milliGRAM(s) Oral every 12 hours  aspirin enteric coated 81 milliGRAM(s) Oral daily  atorvastatin 40 milliGRAM(s) Oral at bedtime  dextrose 5% + lactated ringers. 1000 milliLiter(s) (50 mL/Hr) IV Continuous <Continuous>  methimazole 5 milliGRAM(s) Oral daily  predniSONE   Tablet 5 milliGRAM(s) Oral daily  tamsulosin 0.4 milliGRAM(s) Oral at bedtime    MEDICATIONS  (PRN):  acetaminophen   Tablet .. 650 milliGRAM(s) Oral every 6 hours PRN Mild Pain (1 - 3), Moderate Pain (4 - 6)  HYDROmorphone   Tablet 2 milliGRAM(s) Oral every 6 hours PRN Severe Pain (7 - 10)      ALLERGIES:  Allergies    No Known Allergies    Intolerances        LABS:                        8.9    7.61  )-----------( 171      ( 23 Dec 2019 06:21 )             29.2     12-23    142  |  106  |  74<H>  ----------------------------<  107<H>  4.0   |  15<L>  |  2.42<H>    Ca    6.7<L>      23 Dec 2019 06:20  Phos  2.7     12-23  Mg     2.6     12-23            RADIOLOGY & ADDITIONAL TESTS: Reviewed. Detail Level: Detailed

## 2023-11-27 NOTE — PROGRESS NOTE ADULT - PROBLEM/PLAN-5
Continue with current medications.  Blood work before your next visit.  If blood work or imaging were ordered during your visit, all the nonurgent lab results will be discussed with you at your next office visit.  Please arrive 15 minutes before your appointment.   Return to office in 3-4 months or as needed       
DISPLAY PLAN FREE TEXT

## 2024-11-27 NOTE — PROGRESS NOTE ADULT - PROBLEM SELECTOR PROBLEM 7
Med Refill   methIMAzole (Tapazole) 5 mg tablet [[id number]]     [id number] - [place], [place] - [address]  [address], [facility] [address]  Phone: [phone]  Fax: [phone]  GERALDO #: --     Patient requesting a 90 day supply   Liver function abnormality

## 2025-07-15 NOTE — ED ADULT NURSE NOTE - INTEGUMENTARY WDL
68 y/o M brought to ED from [facility] (hx of heroin and ETOH) for progressive weakness, gait disturbance, confusion, and decreased volume when speaking. CT head without contrast revealed multiple lesions of hypodensity with a hyperdense rim, notably in the left temporal lobe and right cerebellum. There were additional small hyperdensities in the right frontoparietal and left parasagittal parietal lobe. There is mass effect and partial effacement of the 4th ventricle secondary to the cerebellar lesion with developing hydrocephalus without MLS. CT of the chest, abdomen, and pelvis with IV contrast showed RUL mass with mediastinal adenopathy. Labs revealed Hep C and treponema antibodies were positive. Hep C PCR pending. Penicillin G was administered. A sepsis workup was completed and antibiotics were initiated. Intubated to get MRI. S/p EVD. Extubated 7/4/25 without difficulty.     Admit to [facility]  Q1h neuro checks, I&Os, and vitals   Daily CBC, CMP, Mag, Phos  NSGY following; s/p SOC craniotomy for tumor resection on 7/7  Dex 2 mg BID, no plans to taper further pending outpt f/u  Bactrim MWF for PJP ppx given plan for prolonged steroid taper  EVD removed 7/14  Zyprexa BID for agitation  Phenobarb taper to off given improved agitation   Clonidine 0.3 mg TID for agitation/opioid dependence/hypertension  Avoid versed for agitation  Tube feeds at goal, SLP following for swallow  Folate/thiamine/MV  SBP <160   Prn labetalol, hydralazine  PRN Zofran 4mg q4h  SCDs, SQH  PT/OT/SLP following        7/16 Transfer to hospital medicine. admitted with generalized weakness, falls, and paucity of speech presenting with multiple ring enhancing lesions on brain CT - CTHead 7/1 showed multiple brain masses concerning for mets with surrounding edema, can't rule out abscesses. Mass in cerebellum with effacement of 4th ventricule outflow with concern for developing hydrocephalus. MRI brain 7/1: non diagnostic due to motion. CT CAP 7/1:  spiculated Right lung mass and lymph node adenopathy in chest and neck concerning for metastatic disease. MRI Brain W/WO 7/2: multifocal enhancing lesions c/f metastatic disease, largest R cerebellum w/mass effect on 4th. now s/p R SOC for tumor resection on 7/7. Post op CTH/MRI 7/7: anticipate post operative changes, hemostatic products left at superior/medial border of resection cavity. EVD removed 7/14,  posterior incision without  pseudomeningocele. Dexamthasone weaned to 2 BID per NCC, continue GI ppx while on steroid. on bactrim for PCP prophylaxis.  Admitted form [facility], currently on phenobarb taper and scheduled zyprexa and clonidine for ETOH and heroin use. Pending rehab placement. Needs OP follow up with NSGY (continue decadron till then) and Radiation oncology team. Pathology is lung carcinoma but Tempus and PD-L1 testing are pending. on NGT feeds impact peptide. SLP following. on 4 point restraints.  CTHead 7/14 -Ventricles remain somewhat prominent but without overt hydrocephalus or significant change in size following EVD removal.Evolving recent postsurgical change in the posterior fossa. No new intracranial hemorrhage or major vascular distribution infarct. AAO to self. on  UE restraints.  MBS 7/16. SLP recs NPO. to complete phenobarbital taper today         Color consistent with ethnicity/race, warm, dry intact, resilient.